# Patient Record
Sex: MALE | Race: BLACK OR AFRICAN AMERICAN | Employment: OTHER | URBAN - METROPOLITAN AREA
[De-identification: names, ages, dates, MRNs, and addresses within clinical notes are randomized per-mention and may not be internally consistent; named-entity substitution may affect disease eponyms.]

---

## 2020-04-14 ENCOUNTER — HOSPITAL ENCOUNTER (OUTPATIENT)
Age: 75
Setting detail: SPECIMEN
Discharge: HOME | End: 2020-04-14
Payer: MEDICARE

## 2020-04-14 LAB
ABSOLUTE EOS #: 0.12 K/UL (ref 0–0.44)
ABSOLUTE IMMATURE GRANULOCYTE: <0.03 K/UL (ref 0–0.3)
ABSOLUTE LYMPH #: 1.37 K/UL (ref 1.1–3.7)
ABSOLUTE MONO #: 0.38 K/UL (ref 0.1–1.2)
ALBUMIN SERPL-MCNC: 4.4 G/DL (ref 3.5–5.2)
ALBUMIN/GLOBULIN RATIO: 1.3 (ref 1–2.5)
ALP BLD-CCNC: 73 U/L (ref 40–129)
ALT SERPL-CCNC: 16 U/L (ref 5–41)
ANION GAP SERPL CALCULATED.3IONS-SCNC: 13 MMOL/L (ref 9–17)
AST SERPL-CCNC: 18 U/L
BASOPHILS # BLD: 1 % (ref 0–2)
BASOPHILS ABSOLUTE: 0.04 K/UL (ref 0–0.2)
BILIRUB SERPL-MCNC: 0.62 MG/DL (ref 0.3–1.2)
BUN BLDV-MCNC: 23 MG/DL (ref 8–23)
BUN/CREAT BLD: ABNORMAL (ref 9–20)
CALCIUM SERPL-MCNC: 9.8 MG/DL (ref 8.6–10.4)
CHLORIDE BLD-SCNC: 103 MMOL/L (ref 98–107)
CHOLESTEROL/HDL RATIO: 2.9
CHOLESTEROL: 112 MG/DL
CO2: 26 MMOL/L (ref 20–31)
CREAT SERPL-MCNC: 1.19 MG/DL (ref 0.7–1.2)
DIFFERENTIAL TYPE: NORMAL
EOSINOPHILS RELATIVE PERCENT: 3 % (ref 1–4)
GFR AFRICAN AMERICAN: >60 ML/MIN
GFR NON-AFRICAN AMERICAN: 60 ML/MIN
GFR SERPL CREATININE-BSD FRML MDRD: ABNORMAL ML/MIN/{1.73_M2}
GFR SERPL CREATININE-BSD FRML MDRD: ABNORMAL ML/MIN/{1.73_M2}
GLUCOSE BLD-MCNC: 88 MG/DL (ref 70–99)
HCT VFR BLD CALC: 47.6 % (ref 40.7–50.3)
HDLC SERPL-MCNC: 39 MG/DL
HEMOGLOBIN: 14.8 G/DL (ref 13–17)
IMMATURE GRANULOCYTES: 0 %
INR BLD: 1.8
LDL CHOLESTEROL: 47 MG/DL (ref 0–130)
LYMPHOCYTES # BLD: 36 % (ref 24–43)
MCH RBC QN AUTO: 27.6 PG (ref 25.2–33.5)
MCHC RBC AUTO-ENTMCNC: 31.1 G/DL (ref 28.4–34.8)
MCV RBC AUTO: 88.8 FL (ref 82.6–102.9)
MONOCYTES # BLD: 10 % (ref 3–12)
NRBC AUTOMATED: 0 PER 100 WBC
PDW BLD-RTO: 14.1 % (ref 11.8–14.4)
PLATELET # BLD: 200 K/UL (ref 138–453)
PLATELET ESTIMATE: NORMAL
PMV BLD AUTO: 10.2 FL (ref 8.1–13.5)
POTASSIUM SERPL-SCNC: 5.2 MMOL/L (ref 3.7–5.3)
PROTHROMBIN TIME: 18.4 SEC (ref 9–12)
RBC # BLD: 5.36 M/UL (ref 4.21–5.77)
RBC # BLD: NORMAL 10*6/UL
SEG NEUTROPHILS: 50 % (ref 36–65)
SEGMENTED NEUTROPHILS ABSOLUTE COUNT: 1.9 K/UL (ref 1.5–8.1)
SODIUM BLD-SCNC: 142 MMOL/L (ref 135–144)
TOTAL PROTEIN: 7.7 G/DL (ref 6.4–8.3)
TRIGL SERPL-MCNC: 129 MG/DL
TSH SERPL DL<=0.05 MIU/L-ACNC: 0.91 MIU/L (ref 0.3–5)
VLDLC SERPL CALC-MCNC: ABNORMAL MG/DL (ref 1–30)
WBC # BLD: 3.8 K/UL (ref 3.5–11.3)
WBC # BLD: NORMAL 10*3/UL

## 2020-04-21 ENCOUNTER — HOSPITAL ENCOUNTER (EMERGENCY)
Age: 75
Discharge: HOME | End: 2020-04-22
Payer: MEDICAID

## 2020-04-21 ENCOUNTER — APPOINTMENT (OUTPATIENT)
Dept: CT IMAGING | Age: 75
End: 2020-04-21
Payer: MEDICAID

## 2020-04-21 VITALS
SYSTOLIC BLOOD PRESSURE: 141 MMHG | TEMPERATURE: 97.7 F | RESPIRATION RATE: 22 BRPM | DIASTOLIC BLOOD PRESSURE: 78 MMHG | OXYGEN SATURATION: 95 % | WEIGHT: 260 LBS | HEART RATE: 73 BPM

## 2020-04-21 LAB
ABO: NORMAL
ALBUMIN SERPL-MCNC: 4.3 G/DL (ref 3.5–5.1)
ALP BLD-CCNC: 76 U/L (ref 38–126)
ALT SERPL-CCNC: 14 U/L (ref 11–66)
AMPHETAMINE+METHAMPHETAMINE URINE SCREEN: NEGATIVE
ANION GAP SERPL CALCULATED.3IONS-SCNC: 8 MEQ/L (ref 8–16)
ANTIBODY SCREEN: NORMAL
APTT: 48.6 SECONDS (ref 22–38)
AST SERPL-CCNC: 19 U/L (ref 5–40)
BACTERIA: ABNORMAL
BARBITURATE QUANTITATIVE URINE: NEGATIVE
BASOPHILS # BLD: 0.9 %
BASOPHILS ABSOLUTE: 0 THOU/MM3 (ref 0–0.1)
BENZODIAZEPINE QUANTITATIVE URINE: NEGATIVE
BILIRUB SERPL-MCNC: 0.4 MG/DL (ref 0.3–1.2)
BILIRUBIN URINE: NEGATIVE
BLOOD, URINE: NEGATIVE
BUN BLDV-MCNC: 17 MG/DL (ref 7–22)
CALCIUM SERPL-MCNC: 10.1 MG/DL (ref 8.5–10.5)
CANNABINOID QUANTITATIVE URINE: NEGATIVE
CASTS: ABNORMAL /LPF
CASTS: ABNORMAL /LPF
CHARACTER, URINE: CLEAR
CHLORIDE BLD-SCNC: 103 MEQ/L (ref 98–111)
CO2: 31 MEQ/L (ref 23–33)
COCAINE METABOLITE QUANTITATIVE URINE: NEGATIVE
COLOR: YELLOW
CREAT SERPL-MCNC: 1.3 MG/DL (ref 0.4–1.2)
CRYSTALS: ABNORMAL
EKG ATRIAL RATE: 68 BPM
EKG P AXIS: 77 DEGREES
EKG P-R INTERVAL: 170 MS
EKG Q-T INTERVAL: 408 MS
EKG QRS DURATION: 86 MS
EKG QTC CALCULATION (BAZETT): 433 MS
EKG R AXIS: 65 DEGREES
EKG T AXIS: 78 DEGREES
EKG VENTRICULAR RATE: 68 BPM
EOSINOPHIL # BLD: 5.2 %
EOSINOPHILS ABSOLUTE: 0.2 THOU/MM3 (ref 0–0.4)
EPITHELIAL CELLS, UA: ABNORMAL /HPF
ERYTHROCYTE [DISTWIDTH] IN BLOOD BY AUTOMATED COUNT: 14 % (ref 11.5–14.5)
ERYTHROCYTE [DISTWIDTH] IN BLOOD BY AUTOMATED COUNT: 47.3 FL (ref 35–45)
ETHYL ALCOHOL, SERUM: < 0.01 %
GLUCOSE BLD-MCNC: 77 MG/DL (ref 70–108)
GLUCOSE, URINE: NEGATIVE MG/DL
HCT VFR BLD CALC: 48.9 % (ref 42–52)
HEMOGLOBIN: 14.8 GM/DL (ref 14–18)
IMMATURE GRANS (ABS): 0.01 THOU/MM3 (ref 0–0.07)
IMMATURE GRANULOCYTES: 0.2 %
INR BLD: 2.23 (ref 0.85–1.13)
KETONES, URINE: NEGATIVE
LACTIC ACID: 1.6 MMOL/L (ref 0.5–2.2)
LEUKOCYTE EST, POC: NEGATIVE
LYMPHOCYTES # BLD: 34.8 %
LYMPHOCYTES ABSOLUTE: 1.5 THOU/MM3 (ref 1–4.8)
MCH RBC QN AUTO: 28 PG (ref 26–33)
MCHC RBC AUTO-ENTMCNC: 30.3 GM/DL (ref 32.2–35.5)
MCV RBC AUTO: 92.6 FL (ref 80–94)
MISCELLANEOUS LAB TEST RESULT: ABNORMAL
MONOCYTES # BLD: 11.1 %
MONOCYTES ABSOLUTE: 0.5 THOU/MM3 (ref 0.4–1.3)
NITRITE, URINE: NEGATIVE
NUCLEATED RED BLOOD CELLS: 0 /100 WBC
OPIATES, URINE: NEGATIVE
OSMOLALITY CALCULATION: 283.5 MOSMOL/KG (ref 275–300)
OXYCODONE: NEGATIVE
PH UA: 6 (ref 5–9)
PHENCYCLIDINE QUANTITATIVE URINE: NEGATIVE
PLATELET # BLD: 195 THOU/MM3 (ref 130–400)
PMV BLD AUTO: 9.6 FL (ref 9.4–12.4)
POTASSIUM SERPL-SCNC: 4.8 MEQ/L (ref 3.5–5.2)
PROTEIN UA: 30 MG/DL
RBC # BLD: 5.28 MILL/MM3 (ref 4.7–6.1)
RBC URINE: ABNORMAL /HPF
RENAL EPITHELIAL, UA: ABNORMAL
RH FACTOR: NORMAL
SEG NEUTROPHILS: 47.8 %
SEGMENTED NEUTROPHILS ABSOLUTE COUNT: 2 THOU/MM3 (ref 1.8–7.7)
SODIUM BLD-SCNC: 142 MEQ/L (ref 135–145)
SPECIFIC GRAVITY UA: 1.01 (ref 1–1.03)
TOTAL PROTEIN: 7.2 G/DL (ref 6.1–8)
UROBILINOGEN, URINE: 0.2 EU/DL (ref 0–1)
WBC # BLD: 4.2 THOU/MM3 (ref 4.8–10.8)
WBC UA: ABNORMAL /HPF
YEAST: ABNORMAL

## 2020-04-21 PROCEDURE — 74176 CT ABD & PELVIS W/O CONTRAST: CPT

## 2020-04-21 PROCEDURE — 85025 COMPLETE CBC W/AUTO DIFF WBC: CPT

## 2020-04-21 PROCEDURE — G0480 DRUG TEST DEF 1-7 CLASSES: HCPCS

## 2020-04-21 PROCEDURE — 80307 DRUG TEST PRSMV CHEM ANLYZR: CPT

## 2020-04-21 PROCEDURE — 86850 RBC ANTIBODY SCREEN: CPT

## 2020-04-21 PROCEDURE — 99285 EMERGENCY DEPT VISIT HI MDM: CPT

## 2020-04-21 PROCEDURE — APPSS180 APP SPLIT SHARED TIME > 60 MINUTES: Performed by: PHYSICIAN ASSISTANT

## 2020-04-21 PROCEDURE — 83605 ASSAY OF LACTIC ACID: CPT

## 2020-04-21 PROCEDURE — 36415 COLL VENOUS BLD VENIPUNCTURE: CPT

## 2020-04-21 PROCEDURE — 80053 COMPREHEN METABOLIC PANEL: CPT

## 2020-04-21 PROCEDURE — 86901 BLOOD TYPING SEROLOGIC RH(D): CPT

## 2020-04-21 PROCEDURE — 76376 3D RENDER W/INTRP POSTPROCES: CPT

## 2020-04-21 PROCEDURE — 71250 CT THORAX DX C-: CPT

## 2020-04-21 PROCEDURE — 6370000000 HC RX 637 (ALT 250 FOR IP): Performed by: NURSE PRACTITIONER

## 2020-04-21 PROCEDURE — 93005 ELECTROCARDIOGRAM TRACING: CPT | Performed by: NURSE PRACTITIONER

## 2020-04-21 PROCEDURE — 85610 PROTHROMBIN TIME: CPT

## 2020-04-21 PROCEDURE — 70450 CT HEAD/BRAIN W/O DYE: CPT

## 2020-04-21 PROCEDURE — 72125 CT NECK SPINE W/O DYE: CPT

## 2020-04-21 PROCEDURE — 6360000004 HC RX CONTRAST MEDICATION: Performed by: NURSE PRACTITIONER

## 2020-04-21 PROCEDURE — 81001 URINALYSIS AUTO W/SCOPE: CPT

## 2020-04-21 PROCEDURE — 6820000002 HC L2 INJURY CALL ACTIVATION

## 2020-04-21 PROCEDURE — 85730 THROMBOPLASTIN TIME PARTIAL: CPT

## 2020-04-21 PROCEDURE — 86900 BLOOD TYPING SEROLOGIC ABO: CPT

## 2020-04-21 RX ORDER — ACETAMINOPHEN 500 MG
500 TABLET ORAL EVERY 8 HOURS PRN
Status: ON HOLD | COMMUNITY
End: 2021-06-08 | Stop reason: HOSPADM

## 2020-04-21 RX ORDER — LOSARTAN POTASSIUM 50 MG/1
50 TABLET ORAL DAILY
COMMUNITY

## 2020-04-21 RX ORDER — MECLIZINE HYDROCHLORIDE 25 MG/1
25 TABLET ORAL 2 TIMES DAILY PRN
COMMUNITY

## 2020-04-21 RX ORDER — WARFARIN SODIUM 3 MG/1
TABLET ORAL DAILY
COMMUNITY
End: 2020-05-21 | Stop reason: ALTCHOICE

## 2020-04-21 RX ORDER — HYDROCODONE BITARTRATE AND ACETAMINOPHEN 5; 325 MG/1; MG/1
1 TABLET ORAL ONCE
Status: COMPLETED | OUTPATIENT
Start: 2020-04-21 | End: 2020-04-21

## 2020-04-21 RX ORDER — BUSPIRONE HYDROCHLORIDE 15 MG/1
30 TABLET ORAL DAILY
COMMUNITY
End: 2020-05-21

## 2020-04-21 RX ORDER — WARFARIN SODIUM 2.5 MG/1
2.5 TABLET ORAL
COMMUNITY
End: 2020-05-21 | Stop reason: ALTCHOICE

## 2020-04-21 RX ORDER — TAMSULOSIN HYDROCHLORIDE 0.4 MG/1
0.4 CAPSULE ORAL DAILY
COMMUNITY

## 2020-04-21 RX ORDER — TRAMADOL HYDROCHLORIDE 50 MG/1
50 TABLET ORAL EVERY 6 HOURS PRN
Qty: 12 TABLET | Refills: 0 | Status: SHIPPED | OUTPATIENT
Start: 2020-04-21 | End: 2020-04-24

## 2020-04-21 RX ORDER — ATORVASTATIN CALCIUM 20 MG/1
20 TABLET, FILM COATED ORAL DAILY
COMMUNITY

## 2020-04-21 RX ADMIN — IOPAMIDOL 80 ML: 755 INJECTION, SOLUTION INTRAVENOUS at 20:15

## 2020-04-21 RX ADMIN — HYDROCODONE BITARTRATE AND ACETAMINOPHEN 1 TABLET: 5; 325 TABLET ORAL at 22:50

## 2020-04-21 SDOH — HEALTH STABILITY: MENTAL HEALTH: HOW OFTEN DO YOU HAVE A DRINK CONTAINING ALCOHOL?: NEVER

## 2020-04-21 ASSESSMENT — ENCOUNTER SYMPTOMS
WHEEZING: 0
VOMITING: 0
FACIAL SWELLING: 0
ABDOMINAL PAIN: 1
SHORTNESS OF BREATH: 0
BACK PAIN: 0
EYE PAIN: 0
STRIDOR: 0
NAUSEA: 0

## 2020-04-21 ASSESSMENT — PAIN SCALES - GENERAL
PAINLEVEL_OUTOF10: 10
PAINLEVEL_OUTOF10: 10

## 2020-04-21 ASSESSMENT — PAIN DESCRIPTION - LOCATION: LOCATION: GENERALIZED

## 2020-04-21 ASSESSMENT — PAIN DESCRIPTION - PAIN TYPE: TYPE: ACUTE PAIN

## 2020-04-21 NOTE — ED NOTES
Bed: 021A  Expected date: 4/21/20  Expected time: 7:35 PM  Means of arrival: [facility]  Comments:     Phuong Terry RN  04/21/20 1940

## 2020-04-22 LAB — GFR SERPL CREATININE-BSD FRML MDRD: 54 ML/MIN/1.73M2

## 2020-04-22 PROCEDURE — 93010 ELECTROCARDIOGRAM REPORT: CPT | Performed by: INTERNAL MEDICINE

## 2020-04-22 NOTE — CONSULTS
Trauma Consult     Patient:  Brittnee Gonsales date: 4/21/2020   YOB: 1945 Date of Evaluation: 4/21/2020  MRN: [id number]  Acct: [de-identified]    Injury Date: 4/21/20  Injury time: evening  PCP: No primary care provider on file. Referring physician: Melissa Mclean CNP    Time of Trauma Surgeon Notification: 20:08 on 4/21/20  Time of CAROLYNE Arrival: 20:12 on 4/21/20  Time of Trauma Surgeon Arrival:  20:27 on 4/21/20    Assessment:    Fall  Right sided abdominal pain  Neck pain  Dizziness  Changes in Mental Status  Closed head injury with no loss of consciousness  Plan:    Pan CT images reviewed. Patient did not sustain any traumatic injuries that warrant admission under trauma surgery. Patient initially presenting with altered mental status which improved throughout subsequent assessments. Labs reviewed, generally unremarkable. Disposition per ED provider regarding possible medicine admission. Care coordinated with ED provider and trauma surgeon, Dr. Raji Rogers. Activation: []Level I (Trauma Alert) [x]Level II (Injury Call) []Level III (Trauma Consult) [] Downgraded (Time)   Mode of Arrival: EMS transportation  Referring Facility: none  Loss of Consciousness [x]No []Yes[]Unknown Duration(min)  Mechanism of Injury:  []Motor Vehicle crash   []Single Vehicle [] []Passenger []Scene Fatality []Front Seat  []Restrained   []Air Bag Deployed   []Ejected []Rollover []Pedestrian []Trapped   Type of vehicle:   Protective Devices:   []Motorcycle  Wearing Helmet []Yes []No  []Bicycle  Wearing Helmet []Yes []No  [x]Fall   Distance - from standing   []Assault    Abuse Reported []Yes []No  []Gunshot  []Stabbing  []Work Related  []Burn: []Flame []Scald []Electrical []Chemical []Contact []Inhalation []House Fire  []Other: There is no problem list on file for this patient.     Subjective   Chief Complaint: Fall    History of Present Illness: Patient is a 24-year-old male presents to [facility] Patient did not sustain any traumatic injuries that warrant admission under trauma surgery. Disposition per ED provider. Care coordinated with ED provider and trauma surgeon, Dr. Lexie Sharma. Review of Systems:   Review of Systems   Constitutional: Negative for chills, diaphoresis and fever. HENT: Negative for facial swelling, mouth sores and nosebleeds. Eyes: Positive for visual disturbance. Negative for pain. Blurry vision   Respiratory: Negative for shortness of breath, wheezing and stridor. Cardiovascular: Negative for chest pain and palpitations. Gastrointestinal: Positive for abdominal pain. Negative for nausea and vomiting. Musculoskeletal: Positive for neck pain. Negative for arthralgias and back pain. Skin: Negative for pallor, rash and wound. Neurological: Positive for dizziness, numbness and headaches. Paresthesias in hands and patient reports this is chronic and unchanged. Hematological: Bruises/bleeds easily. On Coumadin   Psychiatric/Behavioral: Positive for confusion. Negative for agitation and behavioral problems. Codeine and Ibuprofen  History reviewed. Patient endorsed having history of neck and back surgery. Also endorsed history of cholecystectomy and colectomy with colostomy. History reviewed.   Patient endorsed history of renal cancer and history of clots  Social History     Socioeconomic History    Marital status: Single     Spouse name: None    Number of children: None    Years of education: None    Highest education level: None   Occupational History    None   Social Needs    Financial resource strain: None    Food insecurity     Worry: None     Inability: None    Transportation needs     Medical: None     Non-medical: None   Tobacco Use    Smoking status: Never Smoker    Smokeless tobacco: Never Used   Substance and Sexual Activity    Alcohol use: Never     Frequency: Never    Drug use: Never    Sexual activity: None   Lifestyle  Physical activity     Days per week: None     Minutes per session: None    Stress: None   Relationships    Social connections     Talks on phone: None     Gets together: None     Attends [employer] service: None     Active member of club or organization: None     Attends meetings of clubs or organizations: None     Relationship status: None    Intimate partner violence     Fear of current or ex partner: None     Emotionally abused: None     Physically abused: None     Forced sexual activity: None   Other Topics Concern    None   Social History Narrative    None     History reviewed. No pertinent family history.     Home medications:    Previous Medications    ACETAMINOPHEN (TYLENOL) 500 MG TABLET    Take 1,000 mg by mouth every 6 hours as needed for Pain    ATORVASTATIN (LIPITOR) 20 MG TABLET    Take 20 mg by mouth daily pm    BUSPIRONE (BUSPAR) 15 MG TABLET    Take 30 mg by mouth daily am    DULOXETINE HCL 30 MG CSDR    Take 90 mg by mouth daily am    LOSARTAN (COZAAR) 50 MG TABLET    Take 50 mg by mouth daily Am    MECLIZINE (ANTIVERT) 25 MG TABLET    Take 25 mg by mouth daily am    TAMSULOSIN (FLOMAX) 0.4 MG CAPSULE    Take 0.4 mg by mouth daily pm    WARFARIN (COUMADIN) 2.5 MG TABLET    Take 2.5 mg by mouth pm    WARFARIN (COUMADIN) 3 MG TABLET    Take by mouth daily pm       Hospital medications:  Scheduled Meds:  Continuous Infusions:  PRN Meds:  Objective   ED TRIAGE VITALS  BP: (!) 141/78, Temp: 97.7 °F (36.5 °C), Pulse: 73, Resp: 22, SpO2: 95 %  Radha Coma Scale  Eye Opening: Spontaneous  Best Verbal Response: Confused  Best Motor Response: Obeys commands  [place] Coma Scale Score: 14  Results for orders placed or performed during the hospital encounter of 04/21/20   APTT   Result Value Ref Range    aPTT 48.6 (H) 22.0 - 38.0 seconds   CBC Auto Differential   Result Value Ref Range    WBC 4.2 (L) 4.8 - 10.8 thou/mm3    RBC 5.28 4.70 - 6.10 mill/mm3    Hemoglobin 14.8 14.0 - 18.0 gm/dl    Hematocrit trauma noted. Eyes: PERRL, EOMI, Nontraumatic  Neurologic: Initially only oriented to self. Subsequent exams with improved mental status as patient could later identify location and time. Following commands. Moves all four extremities. CN 2-12 grossly intact. No signs of focal neurological deficits. Neck: Trachea midline. Cervical spines are tender to palpation midline, without step-offs, crepitus or deformity. C-collar applied. Back:TL spines are NTTP midline, without step-offs, crepitus or deformity. No abrasions, contusions, or ecchymosis noted. Midline surgical scar noted over thoracic lumbar spine  Lungs: Clear to auscultation bilaterally. Chest Wall: Chest rise symmetrical.  Chest wall without tenderness to palpation. No crepitus, deformities, lacerations, or abrasions. Heart: RRR. Normal S1/S2. No obvious M/G/R. Abdomen:  Soft, Tenderness palpation in right upper and lower quadrants. No guarding. Non-peritoneal.  Multiple large surgical scars noted across abdomen including large midline incisional scar, transverse scar in RUQ and transverse scar in LLQ. Pelvis:  Tender to palpation over right hip, stable to compression. Extremities: No gross deformities. PMS intact. Radial /DP/PT pulses 2+ bilaterally. Patient moves all extremities x4. Patient able to flex and extends joints of bilateral upper and lower extremities. Skin: Skin warm and dry. Normal for ethnicity. Radiology:     CT HEAD WO CONTRAST   Final Result   1. Negative exam for acute pathology. 2. Mild cerebral atrophy changes noted. **This report has been created using voice recognition software. It may contain minor errors which are inherent in voice recognition technology. **      Final report electronically signed by Dr. Yadi Rodas on 4/21/2020 9:16 PM      CT CERVICAL SPINE WO CONTRAST   Final Result   . 1. Negative exam for acute fracture changes.    2. Postsurgical instrumentation of the cervical spine described above. 3. Chronic degenerative changes present. **This report has been created using voice recognition software. It may contain minor errors which are inherent in voice recognition technology. **      Final report electronically signed by Dr. Joanna Colon on 4/21/2020 9:24 PM      CT LUMBAR RECONSTRUCTION WO POST PROCESS   Final Result   1. . Chronic multilevel degenerative changes throughout the lumbar spine with postsurgical laminectomy. 2. A lateral inguinal hernias containing fat. 3. No evidence of acute pathology of the abdomen or pelvis. CT lumbar spine with reconstruction. FINDINGS:  The patient has undergone prior spinal laminectomy from the level of T12-L5 S1 despite changes there are chronic appearing canal narrowing findings. The immediate paraspinal soft tissues are negative for fluid collection or acute changes. Chronic facet arthropathy and stenosis of the foramen are present         Chronic canal stenosis remains most prominent at L4-5. There is bilateral moderate foramen stenosis at L3-4 and bilateral severe impingement of the foramen at L4-5 and L5-S1. Atherosclerotic changes of the aorta are present. Chronic postsurgical changes of left iliac bone near the iliac crest are also noted. IMPRESSION:.   1. Chronic postsurgical changes of the lumbar spine with degenerative spondylosis and associated canal and foramen narrowing described above. 2. Negative exam for post traumatic acute pathology changes. **This report has been created using voice recognition software. It may contain minor errors which are inherent in voice recognition technology. **      Final report electronically signed by Dr. Joanna Colon on 4/21/2020 9:51 PM      CT THORACIC RECONSTRUCTION WO POST PROCESS   Final Result   . 1. Negative CT of the chest for acute appearing pathology. 2. Chronic multilevel degenerative changes of the thoracic spine.       CT thoracic spine with the foramen at L4-5 and L5-S1. Atherosclerotic changes of the aorta are present. Chronic postsurgical changes of left iliac bone near the iliac crest are also noted. IMPRESSION:.   1. Chronic postsurgical changes of the lumbar spine with degenerative spondylosis and associated canal and foramen narrowing described above. 2. Negative exam for post traumatic acute pathology changes. **This report has been created using voice recognition software. It may contain minor errors which are inherent in voice recognition technology. **      Final report electronically signed by Dr. Kathern Cogan on 4/21/2020 9:51 PM      CT CHEST WO CONTRAST   Final Result   . 1. Negative CT of the chest for acute appearing pathology. 2. Chronic multilevel degenerative changes of the thoracic spine. CT thoracic spine with multiplanar reconstruction      FINDINGS:  There is multilevel degenerative changes of the thoracic spine without evidence of acute fracture or gross subluxation. There are chronic degenerative disc and endplate changes and marginal osteophyte changes that produce chronic central canal    narrowing at C7-T1 T4-5 which are chronic in appearance. The cervicothoracic and thoracolumbar junctions remain intact. The patient has had prior laminectomy involving the upper portion of the lumbar spine partially visualized. The paraspinal soft    tissues are grossly negative for active appearing pathology changes. There is partial visualization of surgical laminectomy of the upper lumbar spine. IMPRESSION:   1.. Multilevel general changes of the thoracic spine with posterior disc marginal osteophyte spurring producing areas of ventral thecal sac mass effect that are chronic in appearance            **This report has been created using voice recognition software. It may contain minor errors which are inherent in voice recognition technology. **      Final report electronically signed by Dr. Camilo Dailey Zaid on 4/21/2020 9:39 PM        Fast Exam: Yes    FAST EXAM:  A limited, bedside FAST exam was performed. The medical necessity was to evaluate for the presence or absence of intraperitoneal or pericardial fluid. The structures studied were the hepatorenal space, splenorenal space, pericardium, and bladder. FINDINGS:  Indeterminate, no obvious free intra-abdominal fluid seen but study not technically adequate as visualization all of important structures throughout each window difficult to obtain. FAST exam performed by myself. CT chest and abd/pelvis obtained. Electronically signed by Micha Lubin PA-C on 4/21/2020 at 11:05 PM Patient seen and examined independently by me. Above discussed and I agree with CNP. Labs, cultures, and radiographs where available were reviewed. See orders for the updated patient care plan.     Hemanth Resendez MD, level 2 trauma consult time called was 8:09 PM time arrived was 627 PM.  51-year-old male apparently released from [facility] but in a nursing home house with a ankle monitor on who apparently had an unwitnessed fall and initially became confused in the emergency room he had been here approximately half hour and with the confusion a level 2 trauma was called patient is on Coumadin states he hit his head although there is no visible signs of trauma multiple ER personnel were unable to obtain IV access and CTs of the head were performed without contrast but was negative for acute bleed CT of the spine was negative for acute injury although patient has had a prior fusion all other x-rays were negative patient admits that he takes multiple psych meds and also some meclizine for some recent dizziness after review there is no significant injuries from the fall okay to admit to medicine for further review and work-up  4/22/2020   8:28 AM

## 2020-04-22 NOTE — ED NOTES
Unable to obtain IV access at this time. Multiple attempts failed.       Georges Becerra RN  04/21/20 [id number]

## 2020-04-22 NOTE — ED NOTES
Pt concerned with not being able to pay for medical care. Informed pt that let us make sure he is okay and then worry about his insurance. Pt consents to medical treatment at this time.      Yaa Hagan RN  04/21/20 [id number]

## 2020-04-23 NOTE — ED PROVIDER NOTES
by mouth daily pmHistorical Med       !! - Potential duplicate medications found. Please discuss with provider. ALLERGIES     is allergic to codeine and ibuprofen. FAMILY HISTORY     has no family status information on file. family history is not on file. SOCIAL HISTORY      reports that he has never smoked. He has never used smokeless tobacco. He reports that he does not drink alcohol or use drugs. PHYSICAL EXAM     INITIAL VITALS:  weight is 260 lb (117.9 kg). His oral temperature is 97.7 °F (36.5 °C). His blood pressure is 141/78 (abnormal) and his pulse is 73. His respiration is 22 and oxygen saturation is 95%. Physical Exam  Vitals signs and nursing note reviewed. Constitutional:       General: He is not in acute distress. Appearance: He is well-developed. He is not diaphoretic. HENT:      Head: Normocephalic and atraumatic. Eyes:      General:         Right eye: No discharge. Left eye: No discharge. Conjunctiva/sclera: Conjunctivae normal.   Neck:      Musculoskeletal: Normal range of motion. Trachea: No tracheal deviation. Cardiovascular:      Rate and Rhythm: Normal rate and regular rhythm. Heart sounds: Normal heart sounds. No murmur. No gallop. Comments: Normal capillary refill  Pulmonary:      Effort: Pulmonary effort is normal. No respiratory distress. Breath sounds: Normal breath sounds. No stridor. Abdominal:      General: Bowel sounds are normal.      Palpations: Abdomen is soft. Musculoskeletal: Normal range of motion. General: No tenderness or deformity. Skin:     General: Skin is warm and dry. Coloration: Skin is not pale. Findings: No erythema or rash. Neurological:      General: No focal deficit present. Mental Status: He is alert. Mental status is at baseline. He is disoriented. Cranial Nerves: No cranial nerve deficit.    Psychiatric:         Behavior: Behavior normal.           DIFFERENTIAL DIAGNOSIS:   Including but not limited to CHI, drug abuse, withdrawal, injury    DIAGNOSTIC RESULTS     EKG: AllEKG's are interpreted by the Emergency Department Physician who either signs or Co-signs this chart in the absence of a cardiologist.      EKG 12 Lead (Final result)    Component (Lab Inquiry)   Collection Time Result Time Ventricular Rate Atrial Rate P-R Interval QRS Duration Q-T Interval QTc Calculation (Bazett) P Axis R Axis T Axis   04/21/20 19:48:02 04/21/20 19:48:02 68 68 170 86 408 433 77 65 78         Final result                Narrative:    Normal sinus rhythm  Nonspecific T wave abnormality  Abnormal ECG  No previous ECGs available  Confirmed by CARLIE JARRETT ([id number]) on 4/22/2020 10:25:17 AM                      RADIOLOGY: non-plain film images(s) such as CT, Ultrasound and MRI are read by the radiologist.  Plain radiographic images are visualized and preliminarily interpreted by the emergencyphysician unless otherwise stated below. CT HEAD WO CONTRAST   Final Result   1. Negative exam for acute pathology. 2. Mild cerebral atrophy changes noted. **This report has been created using voice recognition software. It may contain minor errors which are inherent in voice recognition technology. **      Final report electronically signed by Dr. Lynnette Milan on 4/21/2020 9:16 PM      CT CERVICAL SPINE WO CONTRAST   Final Result   . 1. Negative exam for acute fracture changes. 2. Postsurgical instrumentation of the cervical spine described above. 3. Chronic degenerative changes present. **This report has been created using voice recognition software. It may contain minor errors which are inherent in voice recognition technology. **      Final report electronically signed by Dr. Lynnette Milan on 4/21/2020 9:24 PM      CT LUMBAR RECONSTRUCTION WO POST PROCESS   Final Result   1. . Chronic multilevel degenerative changes throughout the lumbar spine with postsurgical laminectomy.    2. A lateral inguinal hernias containing fat. 3. No evidence of acute pathology of the abdomen or pelvis. CT lumbar spine with reconstruction. FINDINGS:  The patient has undergone prior spinal laminectomy from the level of T12-L5 S1 despite changes there are chronic appearing canal narrowing findings. The immediate paraspinal soft tissues are negative for fluid collection or acute changes. Chronic facet arthropathy and stenosis of the foramen are present         Chronic canal stenosis remains most prominent at L4-5. There is bilateral moderate foramen stenosis at L3-4 and bilateral severe impingement of the foramen at L4-5 and L5-S1. Atherosclerotic changes of the aorta are present. Chronic postsurgical changes of left iliac bone near the iliac crest are also noted. IMPRESSION:.   1. Chronic postsurgical changes of the lumbar spine with degenerative spondylosis and associated canal and foramen narrowing described above. 2. Negative exam for post traumatic acute pathology changes. **This report has been created using voice recognition software. It may contain minor errors which are inherent in voice recognition technology. **      Final report electronically signed by Dr. Dee Beckett on 4/21/2020 9:51 PM      CT THORACIC RECONSTRUCTION WO POST PROCESS   Final Result   . 1. Negative CT of the chest for acute appearing pathology. 2. Chronic multilevel degenerative changes of the thoracic spine. CT thoracic spine with multiplanar reconstruction      FINDINGS:  There is multilevel degenerative changes of the thoracic spine without evidence of acute fracture or gross subluxation. There are chronic degenerative disc and endplate changes and marginal osteophyte changes that produce chronic central canal    narrowing at C7-T1 T4-5 which are chronic in appearance. The cervicothoracic and thoracolumbar junctions remain intact.  The patient has had prior laminectomy involving the upper It may contain minor errors which are inherent in voice recognition technology. **      Final report electronically signed by Dr. Philippe Mukherjee on 4/21/2020 9:51 PM      CT CHEST WO CONTRAST   Final Result   . 1. Negative CT of the chest for acute appearing pathology. 2. Chronic multilevel degenerative changes of the thoracic spine. CT thoracic spine with multiplanar reconstruction      FINDINGS:  There is multilevel degenerative changes of the thoracic spine without evidence of acute fracture or gross subluxation. There are chronic degenerative disc and endplate changes and marginal osteophyte changes that produce chronic central canal    narrowing at C7-T1 T4-5 which are chronic in appearance. The cervicothoracic and thoracolumbar junctions remain intact. The patient has had prior laminectomy involving the upper portion of the lumbar spine partially visualized. The paraspinal soft    tissues are grossly negative for active appearing pathology changes. There is partial visualization of surgical laminectomy of the upper lumbar spine. IMPRESSION:   1.. Multilevel general changes of the thoracic spine with posterior disc marginal osteophyte spurring producing areas of ventral thecal sac mass effect that are chronic in appearance            **This report has been created using voice recognition software. It may contain minor errors which are inherent in voice recognition technology. **      Final report electronically signed by Dr. Philippe Mukherjee on 4/21/2020 9:39 PM            LABS:   Labs Reviewed   APTT - Abnormal; Notable for the following components:       Result Value    aPTT 48.6 (*)     All other components within normal limits   CBC WITH AUTO DIFFERENTIAL - Abnormal; Notable for the following components:    WBC 4.2 (*)     MCHC 30.3 (*)     RDW-SD 47.3 (*)     All other components within normal limits   COMPREHENSIVE METABOLIC PANEL - Abnormal; Notable for the following components:    CREATININE every 6 hours as needed for Pain for up to 12 doses. , Disp-12 tablet, R-0Print             (Please note that portions of this note were completed with a voice recognition program.  Efforts were made to edit thedictations but occasionally words are mis-transcribed.)    STEFANO Zuleta CNP, APRN - CNP  04/23/20 [id number]

## 2020-05-11 ENCOUNTER — HOSPITAL ENCOUNTER (EMERGENCY)
Age: 75
Discharge: HOME | End: 2020-05-11
Attending: FAMILY MEDICINE
Payer: MEDICARE

## 2020-05-11 VITALS
TEMPERATURE: 98.6 F | HEART RATE: 75 BPM | HEIGHT: 68 IN | DIASTOLIC BLOOD PRESSURE: 87 MMHG | RESPIRATION RATE: 15 BRPM | OXYGEN SATURATION: 97 % | WEIGHT: 260 LBS | SYSTOLIC BLOOD PRESSURE: 146 MMHG | BODY MASS INDEX: 39.4 KG/M2

## 2020-05-11 LAB
ANION GAP SERPL CALCULATED.3IONS-SCNC: 7 MEQ/L (ref 8–16)
BASOPHILS # BLD: 1.3 %
BASOPHILS ABSOLUTE: 0 THOU/MM3 (ref 0–0.1)
BUN BLDV-MCNC: 15 MG/DL (ref 7–22)
CALCIUM SERPL-MCNC: 9.6 MG/DL (ref 8.5–10.5)
CHLORIDE BLD-SCNC: 107 MEQ/L (ref 98–111)
CO2: 28 MEQ/L (ref 23–33)
CREAT SERPL-MCNC: 1 MG/DL (ref 0.4–1.2)
EOSINOPHIL # BLD: 5 %
EOSINOPHILS ABSOLUTE: 0.2 THOU/MM3 (ref 0–0.4)
ERYTHROCYTE [DISTWIDTH] IN BLOOD BY AUTOMATED COUNT: 14.2 % (ref 11.5–14.5)
ERYTHROCYTE [DISTWIDTH] IN BLOOD BY AUTOMATED COUNT: 46.9 FL (ref 35–45)
GFR SERPL CREATININE-BSD FRML MDRD: 73 ML/MIN/1.73M2
GLUCOSE BLD-MCNC: 96 MG/DL (ref 70–108)
HCT VFR BLD CALC: 44.6 % (ref 42–52)
HEMOGLOBIN: 14.1 GM/DL (ref 14–18)
IMMATURE GRANS (ABS): 0.01 THOU/MM3 (ref 0–0.07)
IMMATURE GRANULOCYTES: 0.3 %
LYMPHOCYTES # BLD: 26.5 %
LYMPHOCYTES ABSOLUTE: 0.8 THOU/MM3 (ref 1–4.8)
MCH RBC QN AUTO: 28.5 PG (ref 26–33)
MCHC RBC AUTO-ENTMCNC: 31.6 GM/DL (ref 32.2–35.5)
MCV RBC AUTO: 90.3 FL (ref 80–94)
MONOCYTES # BLD: 8.5 %
MONOCYTES ABSOLUTE: 0.3 THOU/MM3 (ref 0.4–1.3)
NUCLEATED RED BLOOD CELLS: 0 /100 WBC
OSMOLALITY CALCULATION: 283.8 MOSMOL/KG (ref 275–300)
PLATELET # BLD: 167 THOU/MM3 (ref 130–400)
PMV BLD AUTO: 9.3 FL (ref 9.4–12.4)
POTASSIUM SERPL-SCNC: 4.8 MEQ/L (ref 3.5–5.2)
RBC # BLD: 4.94 MILL/MM3 (ref 4.7–6.1)
SEG NEUTROPHILS: 58.4 %
SEGMENTED NEUTROPHILS ABSOLUTE COUNT: 1.9 THOU/MM3 (ref 1.8–7.7)
SODIUM BLD-SCNC: 142 MEQ/L (ref 135–145)
WBC # BLD: 3.2 THOU/MM3 (ref 4.8–10.8)

## 2020-05-11 PROCEDURE — 85025 COMPLETE CBC W/AUTO DIFF WBC: CPT

## 2020-05-11 PROCEDURE — 99285 EMERGENCY DEPT VISIT HI MDM: CPT

## 2020-05-11 PROCEDURE — 6370000000 HC RX 637 (ALT 250 FOR IP): Performed by: FAMILY MEDICINE

## 2020-05-11 PROCEDURE — 36415 COLL VENOUS BLD VENIPUNCTURE: CPT

## 2020-05-11 PROCEDURE — 80048 BASIC METABOLIC PNL TOTAL CA: CPT

## 2020-05-11 RX ORDER — HYDROCODONE BITARTRATE AND ACETAMINOPHEN 5; 325 MG/1; MG/1
1 TABLET ORAL EVERY 6 HOURS PRN
Qty: 6 TABLET | Refills: 0 | Status: SHIPPED | OUTPATIENT
Start: 2020-05-11 | End: 2020-05-14

## 2020-05-11 RX ORDER — CYCLOBENZAPRINE HCL 10 MG
10 TABLET ORAL 2 TIMES DAILY PRN
Qty: 10 TABLET | Refills: 0 | Status: SHIPPED | OUTPATIENT
Start: 2020-05-11 | End: 2020-05-21

## 2020-05-11 RX ORDER — HYDROCODONE BITARTRATE AND ACETAMINOPHEN 5; 325 MG/1; MG/1
1 TABLET ORAL ONCE
Status: COMPLETED | OUTPATIENT
Start: 2020-05-11 | End: 2020-05-11

## 2020-05-11 RX ADMIN — HYDROCODONE BITARTRATE AND ACETAMINOPHEN 1 TABLET: 5; 325 TABLET ORAL at 16:26

## 2020-05-11 ASSESSMENT — ENCOUNTER SYMPTOMS
ABDOMINAL PAIN: 1
BACK PAIN: 1
SHORTNESS OF BREATH: 0

## 2020-05-11 ASSESSMENT — PAIN DESCRIPTION - LOCATION: LOCATION: NECK

## 2020-05-11 ASSESSMENT — PAIN SCALES - GENERAL: PAINLEVEL_OUTOF10: 9

## 2020-05-11 ASSESSMENT — PAIN DESCRIPTION - PAIN TYPE: TYPE: ACUTE PAIN

## 2020-05-11 ASSESSMENT — PAIN DESCRIPTION - FREQUENCY: FREQUENCY: CONTINUOUS

## 2020-05-11 NOTE — ED PROVIDER NOTES
[facility]  eMERGENCY dEPARTMENT eNCOUnter          CHIEF COMPLAINT       Chief Complaint   Patient presents with    Back Pain    Neck Pain       Nurses Notes reviewed and I agree except as noted in the HPI. HISTORY OF PRESENT ILLNESS    Bettye Chambers is a 76 y.o. male who presents with neck and back pain    Location/Symptom: Chronic neck and back pain  Timing/Onset: For years  Context/Setting: Multiple neck and back surgeries  History of degenerative spine disease    Quality: Chronic pain in the neck worse with movement  Pain in the low back chronic moving around  Pain is gradually worse over time  He was supposed to be considered for another surgery by a specialist in [place]  Duration: Chronic for years worse over the last week  Modifying Factors: Been using Tylenol for pain  Severity: 6/10    REVIEW OF SYSTEMS     Review of Systems   Constitutional: Negative for chills and fever. HENT: Negative for congestion. Respiratory: Negative for shortness of breath. Cardiovascular: Negative for chest pain. Gastrointestinal: Positive for abdominal pain. Abdominal bloating    Some right-sided pain intermittently    Remote cholecystectomy   Genitourinary: Negative for difficulty urinating and dysuria. Musculoskeletal: Positive for back pain and neck pain. Skin: Negative for rash. Neurological:        He has difficulty ambulating around because of his chronic spinal problems and he has a walker and wheelchair at home    No acute change in strength   Hematological: Negative for adenopathy. Psychiatric/Behavioral: Negative for confusion. PAST MEDICAL HISTORY    has no past medical history on file. SURGICAL HISTORY      has no past surgical history on file.     CURRENT MEDICATIONS       Previous Medications    ACETAMINOPHEN (TYLENOL) 500 MG TABLET    Take 1,000 mg by mouth every 6 hours as needed for Pain    ATORVASTATIN (LIPITOR) 20 MG TABLET    Take 20 mg by mouth changes in the lumbar spine without acute process    WBC 3200    Normal renal function and blood sugar    Patient appears to have chronic pain and no acute intervention is needed today    Would recommend he follow with spine-list as scheduled to be referred    Additionally can consider chronic pain therapy    We did give norco 1 by mouth    We will prescribe a few Norco for home use    Add Flexeril        CRITICAL CARE:   none    CONSULTS:  none    PROCEDURES:  None    FINAL IMPRESSION      1. Neck pain    2. Chronic bilateral low back pain, unspecified whether sciatica present          DISPOSITION/PLAN     Follow with his primary care    Confirm the date of his outpatient referral to spine specialist    Ask his primary care to refer for pain management    Discharge home    [phone]    In 3 days        DISCHARGE MEDICATIONS:  New Prescriptions    CYCLOBENZAPRINE (FLEXERIL) 10 MG TABLET    Take 1 tablet by mouth 2 times daily as needed for Muscle spasms    HYDROCODONE-ACETAMINOPHEN (NORCO) 5-325 MG PER TABLET    Take 1 tablet by mouth every 6 hours as needed for Pain for up to 6 doses. Intended supply: 3 days.  Take lowest dose possible to manage pain       (Please note that portions of this note were completed with a voice recognition program.  Efforts were made to edit the dictations but occasionally words are mis-transcribed.)    MD Krzysztof Saha MD  05/11/20 1799

## 2020-05-18 ENCOUNTER — HOSPITAL ENCOUNTER (EMERGENCY)
Age: 75
Discharge: HOME | End: 2020-05-19
Payer: MEDICARE

## 2020-05-18 ENCOUNTER — APPOINTMENT (OUTPATIENT)
Dept: CT IMAGING | Age: 75
End: 2020-05-18
Payer: MEDICARE

## 2020-05-18 ENCOUNTER — APPOINTMENT (OUTPATIENT)
Dept: GENERAL RADIOLOGY | Age: 75
End: 2020-05-18
Payer: MEDICARE

## 2020-05-18 VITALS
OXYGEN SATURATION: 96 % | TEMPERATURE: 97.9 F | SYSTOLIC BLOOD PRESSURE: 184 MMHG | DIASTOLIC BLOOD PRESSURE: 101 MMHG | RESPIRATION RATE: 14 BRPM | HEART RATE: 73 BPM | WEIGHT: 250 LBS | BODY MASS INDEX: 35.79 KG/M2 | HEIGHT: 70 IN

## 2020-05-18 LAB
ALBUMIN SERPL-MCNC: 4.3 G/DL (ref 3.5–5.1)
ALP BLD-CCNC: 53 U/L (ref 38–126)
ALT SERPL-CCNC: 16 U/L (ref 11–66)
ANION GAP SERPL CALCULATED.3IONS-SCNC: 8 MEQ/L (ref 8–16)
APTT: 37.2 SECONDS (ref 22–38)
AST SERPL-CCNC: 19 U/L (ref 5–40)
BACTERIA: ABNORMAL /HPF
BASOPHILS # BLD: 0.7 %
BASOPHILS ABSOLUTE: 0 THOU/MM3 (ref 0–0.1)
BILIRUB SERPL-MCNC: 0.5 MG/DL (ref 0.3–1.2)
BILIRUBIN DIRECT: < 0.2 MG/DL (ref 0–0.3)
BILIRUBIN URINE: NEGATIVE
BLOOD, URINE: NEGATIVE
BUN BLDV-MCNC: 20 MG/DL (ref 7–22)
CALCIUM SERPL-MCNC: 10.2 MG/DL (ref 8.5–10.5)
CASTS 2: ABNORMAL /LPF
CASTS UA: ABNORMAL /LPF
CHARACTER, URINE: CLEAR
CHLORIDE BLD-SCNC: 104 MEQ/L (ref 98–111)
CO2: 29 MEQ/L (ref 23–33)
COLOR: YELLOW
CREAT SERPL-MCNC: 1.1 MG/DL (ref 0.4–1.2)
CRYSTALS, UA: ABNORMAL
EKG ATRIAL RATE: 74 BPM
EKG P AXIS: 60 DEGREES
EKG P-R INTERVAL: 160 MS
EKG Q-T INTERVAL: 396 MS
EKG QRS DURATION: 80 MS
EKG QTC CALCULATION (BAZETT): 439 MS
EKG R AXIS: 26 DEGREES
EKG T AXIS: 59 DEGREES
EKG VENTRICULAR RATE: 74 BPM
EOSINOPHIL # BLD: 1.1 %
EOSINOPHILS ABSOLUTE: 0.1 THOU/MM3 (ref 0–0.4)
EPITHELIAL CELLS, UA: ABNORMAL /HPF
ERYTHROCYTE [DISTWIDTH] IN BLOOD BY AUTOMATED COUNT: 14 % (ref 11.5–14.5)
ERYTHROCYTE [DISTWIDTH] IN BLOOD BY AUTOMATED COUNT: 44.9 FL (ref 35–45)
GFR SERPL CREATININE-BSD FRML MDRD: 65 ML/MIN/1.73M2
GLUCOSE BLD-MCNC: 98 MG/DL (ref 70–108)
GLUCOSE URINE: NEGATIVE MG/DL
HCT VFR BLD CALC: 43.8 % (ref 42–52)
HEMOGLOBIN: 14.3 GM/DL (ref 14–18)
IMMATURE GRANS (ABS): 0.02 THOU/MM3 (ref 0–0.07)
IMMATURE GRANULOCYTES: 0.4 %
INR BLD: 0.99 (ref 0.85–1.13)
KETONES, URINE: NEGATIVE
LEUKOCYTE ESTERASE, URINE: NEGATIVE
LIPASE: 34.7 U/L (ref 5.6–51.3)
LYMPHOCYTES # BLD: 19.1 %
LYMPHOCYTES ABSOLUTE: 1 THOU/MM3 (ref 1–4.8)
MAGNESIUM: 2 MG/DL (ref 1.6–2.4)
MCH RBC QN AUTO: 28.5 PG (ref 26–33)
MCHC RBC AUTO-ENTMCNC: 32.6 GM/DL (ref 32.2–35.5)
MCV RBC AUTO: 87.4 FL (ref 80–94)
MISCELLANEOUS 2: ABNORMAL
MONOCYTES # BLD: 8.7 %
MONOCYTES ABSOLUTE: 0.5 THOU/MM3 (ref 0.4–1.3)
NITRITE, URINE: NEGATIVE
NUCLEATED RED BLOOD CELLS: 0 /100 WBC
OSMOLALITY CALCULATION: 283.8 MOSMOL/KG (ref 275–300)
PH UA: 5 (ref 5–9)
PLATELET # BLD: 194 THOU/MM3 (ref 130–400)
PMV BLD AUTO: 9.6 FL (ref 9.4–12.4)
POTASSIUM SERPL-SCNC: 4.6 MEQ/L (ref 3.5–5.2)
PROTEIN UA: 300
RBC # BLD: 5.01 MILL/MM3 (ref 4.7–6.1)
RBC URINE: ABNORMAL /HPF
RENAL EPITHELIAL, UA: ABNORMAL
SEG NEUTROPHILS: 70 %
SEGMENTED NEUTROPHILS ABSOLUTE COUNT: 3.8 THOU/MM3 (ref 1.8–7.7)
SODIUM BLD-SCNC: 141 MEQ/L (ref 135–145)
SPECIFIC GRAVITY, URINE: > 1.03 (ref 1–1.03)
TOTAL PROTEIN: 7.5 G/DL (ref 6.1–8)
TROPONIN T: < 0.01 NG/ML
TSH SERPL DL<=0.05 MIU/L-ACNC: 1.68 UIU/ML (ref 0.4–4.2)
UROBILINOGEN, URINE: 1 EU/DL (ref 0–1)
WBC # BLD: 5.4 THOU/MM3 (ref 4.8–10.8)
WBC UA: ABNORMAL /HPF
YEAST: ABNORMAL

## 2020-05-18 PROCEDURE — 83690 ASSAY OF LIPASE: CPT

## 2020-05-18 PROCEDURE — 81001 URINALYSIS AUTO W/SCOPE: CPT

## 2020-05-18 PROCEDURE — 93005 ELECTROCARDIOGRAM TRACING: CPT | Performed by: NURSE PRACTITIONER

## 2020-05-18 PROCEDURE — 70450 CT HEAD/BRAIN W/O DYE: CPT

## 2020-05-18 PROCEDURE — 84443 ASSAY THYROID STIM HORMONE: CPT

## 2020-05-18 PROCEDURE — 71045 X-RAY EXAM CHEST 1 VIEW: CPT

## 2020-05-18 PROCEDURE — 82248 BILIRUBIN DIRECT: CPT

## 2020-05-18 PROCEDURE — 83735 ASSAY OF MAGNESIUM: CPT

## 2020-05-18 PROCEDURE — 84484 ASSAY OF TROPONIN QUANT: CPT

## 2020-05-18 PROCEDURE — 80053 COMPREHEN METABOLIC PANEL: CPT

## 2020-05-18 PROCEDURE — 86850 RBC ANTIBODY SCREEN: CPT

## 2020-05-18 PROCEDURE — 85730 THROMBOPLASTIN TIME PARTIAL: CPT

## 2020-05-18 PROCEDURE — 85025 COMPLETE CBC W/AUTO DIFF WBC: CPT

## 2020-05-18 PROCEDURE — 86900 BLOOD TYPING SEROLOGIC ABO: CPT

## 2020-05-18 PROCEDURE — 86901 BLOOD TYPING SEROLOGIC RH(D): CPT

## 2020-05-18 PROCEDURE — 99284 EMERGENCY DEPT VISIT MOD MDM: CPT

## 2020-05-18 PROCEDURE — 74176 CT ABD & PELVIS W/O CONTRAST: CPT

## 2020-05-18 PROCEDURE — 85610 PROTHROMBIN TIME: CPT

## 2020-05-18 PROCEDURE — 6360000004 HC RX CONTRAST MEDICATION: Performed by: NURSE PRACTITIONER

## 2020-05-18 PROCEDURE — 36415 COLL VENOUS BLD VENIPUNCTURE: CPT

## 2020-05-18 PROCEDURE — 93010 ELECTROCARDIOGRAM REPORT: CPT | Performed by: INTERNAL MEDICINE

## 2020-05-18 RX ADMIN — IOPAMIDOL 80 ML: 755 INJECTION, SOLUTION INTRAVENOUS at 22:41

## 2020-05-18 ASSESSMENT — PAIN DESCRIPTION - LOCATION
LOCATION: ABDOMEN
LOCATION: ABDOMEN
LOCATION: ABDOMEN;BACK

## 2020-05-18 ASSESSMENT — PAIN DESCRIPTION - PAIN TYPE
TYPE: ACUTE PAIN

## 2020-05-18 ASSESSMENT — PAIN SCALES - GENERAL
PAINLEVEL_OUTOF10: 8
PAINLEVEL_OUTOF10: 9
PAINLEVEL_OUTOF10: 9

## 2020-05-18 ASSESSMENT — PAIN DESCRIPTION - ORIENTATION
ORIENTATION: LOWER
ORIENTATION: LOWER

## 2020-05-18 ASSESSMENT — PAIN DESCRIPTION - DESCRIPTORS: DESCRIPTORS: SHOOTING

## 2020-05-18 ASSESSMENT — PAIN DESCRIPTION - FREQUENCY: FREQUENCY: INTERMITTENT

## 2020-05-19 LAB
ABO: NORMAL
ANTIBODY SCREEN: NORMAL
RH FACTOR: NORMAL

## 2020-05-19 ASSESSMENT — ENCOUNTER SYMPTOMS
RHINORRHEA: 0
ABDOMINAL PAIN: 1
VOMITING: 1
BACK PAIN: 0
NAUSEA: 1
CHEST TIGHTNESS: 0
COUGH: 0
DIARRHEA: 1

## 2020-05-19 NOTE — ED NOTES
Bed: 021A  Expected date: 5/18/20  Expected time: 7:56 PM  Means of arrival: [facility]  Comments:     Corie Ceja RN  05/18/20 2002

## 2020-05-19 NOTE — ED PROVIDER NOTES
Department Physician who either signs or Co-signs this chart in the absence of a cardiologist.  SR with PVC's. Rate of 74, MO 10, QRS 80, QT/QTc 396-439      RADIOLOGY: non-plain film images(s) such as CT, Ultrasound and MRI are read by the radiologist.  Plain radiographic images are visualized and preliminarily interpreted by the emergencyphysician unless otherwise stated below. CT ABDOMEN PELVIS WO CONTRAST Additional Contrast? None   Final Result   1. Bilateral inguinal hernias again noted. On the right a small portion of the urinary bladder dome is retained within the hernia. 2. Postsurgical changes of the small bowel with minimal bowel wall thickening not excluded. No evidence of bowel obstruction present. Changes are similar to prior study. 3. Chronic degenerative changes of the skeleton      **This report has been created using voice recognition software. It may contain minor errors which are inherent in voice recognition technology. **      Final report electronically signed by Dr. Yina Singleton on 5/18/2020 11:06 PM      XR CHEST PORTABLE   Final Result   Probable minimal atelectasis left lung base. **This report has been created using voice recognition software. It may contain minor errors which are inherent in voice recognition technology. **      Final report electronically signed by Dr. Nyasia Rose on 5/18/2020 8:41 PM      CT HEAD WO CONTRAST   Final Result   No acute intracranial findings. **This report has been created using voice recognition software. It may contain minor errors which are inherent in voice recognition technology. **      Final report electronically signed by Dr. Nyasia Rose on 5/18/2020 8:37 PM            LABS:   Labs Reviewed   GLOMERULAR FILTRATION RATE, ESTIMATED - Abnormal; Notable for the following components:       Result Value    Est, Glom Filt Rate 65 (*)     All other components within normal limits   URINE WITH REFLEXED MICRO - Abnormal;

## 2020-05-19 NOTE — ED TRIAGE NOTES
Pt presents to ED by EMS with complaints of dizziness, emesis, generalized fatigued, and pain in the lower back/abdomen Pt states the dizzy spells have \"been around for years\". Tremors in hands noted. Pt appears slightly confused. Pt is diaphoretic. Pt also states he has been vomiting since this morning. Pt states that he has vomited 3-4 times today. Pt also states that he has noticed red drainage coming from his penis. Pt states this has been going on for awhile, intermittently. EKG completed. Tele applied. Pt is oriented to person and place. Call light in reach.  Will continue to monitor

## 2020-05-19 NOTE — ED NOTES
Pt reporting to this RN that he is unable to walk and requires a wheelchair. Pt wheelchair outisde of his home and will need it to get from vehicle to house. Pt unable to find ride home. [facility] called at this time. approx arrival in one hour.       Hemanth Gaston, NAZ  05/18/20 3130

## 2020-05-19 NOTE — ED NOTES
Pt reassessed at this time. Pt resting on cot with eyes open. States abdominal pain 8/10 currently. Vitals stable. Call light in reach.  Will continue to monitor      Ines Cockayne, RN  05/18/20 9476

## 2020-05-20 ENCOUNTER — CARE COORDINATION (OUTPATIENT)
Dept: CARE COORDINATION | Age: 75
End: 2020-05-20

## 2020-05-20 NOTE — CARE COORDINATION
Patient contacted regarding [id number] monitoring s/p recent ED visit. .  Care Transition Nurse/ Ambulatory Care Manager contacted the patient by telephone to perform post discharge assessment. Verified name and  with patient as identifiers. Provided introduction to self, and explanation of the CTN/ACM role, and reason for call due to risk factors for infection and/or exposure to COVID-19. Symptoms reviewed with patient who verbalized the following symptoms: pain or aching joints, no new symptoms, no worsening symptoms and abdominal pain, and dizziness. Due to no new or worsening symptoms encounter was not routed to provider for escalation. Patient has following risk factors of: heart failure, COPD and chronic kidney disease. CTN/ACM reviewed discharge instructions, medical action plan and red flags such as increased shortness of breath, increasing fever and signs of decompensation with patient who verbalized understanding. Discussed exposure protocols and quarantine with CDC Guidelines What to do if you are sick with coronavirus disease .  Patient was given an opportunity for questions and concerns. The patient agrees to contact the [facility] exposure line [phone], local health department and PCP office for questions related to their healthcare. CTN/ACM provided contact information for future needs. Reviewed and educated patient on any new and changed medications related to discharge diagnosis     Patient/family/caregiver given information for Get[facility] Loop and agrees to enroll - Yes - LOOP enrollment completed for self monitoring POC   Patient's preferred e-mail: N/A   Patient's preferred phone number: [phone]   Based on Loop alert triggers, patient will be contacted by nurse care manager for worsening symptoms. Pt will be further monitored by COVID Loop Team based on severity of symptoms and risk factors.     Patient called for covid-19 f/u s/p recent ED visit for body ache,

## 2020-05-21 ENCOUNTER — OFFICE VISIT (OUTPATIENT)
Dept: SURGERY | Age: 75
End: 2020-05-21
Payer: MEDICARE

## 2020-05-21 VITALS
DIASTOLIC BLOOD PRESSURE: 72 MMHG | HEART RATE: 72 BPM | TEMPERATURE: 96.6 F | RESPIRATION RATE: 20 BRPM | OXYGEN SATURATION: 95 % | BODY MASS INDEX: 35.87 KG/M2 | SYSTOLIC BLOOD PRESSURE: 128 MMHG | HEIGHT: 70 IN

## 2020-05-21 PROCEDURE — 1123F ACP DISCUSS/DSCN MKR DOCD: CPT | Performed by: SURGERY

## 2020-05-21 PROCEDURE — 3017F COLORECTAL CA SCREEN DOC REV: CPT | Performed by: SURGERY

## 2020-05-21 PROCEDURE — 99203 OFFICE O/P NEW LOW 30 MIN: CPT | Performed by: SURGERY

## 2020-05-21 PROCEDURE — 1036F TOBACCO NON-USER: CPT | Performed by: SURGERY

## 2020-05-21 PROCEDURE — G8417 CALC BMI ABV UP PARAM F/U: HCPCS | Performed by: SURGERY

## 2020-05-21 PROCEDURE — 4040F PNEUMOC VAC/ADMIN/RCVD: CPT | Performed by: SURGERY

## 2020-05-21 PROCEDURE — G8427 DOCREV CUR MEDS BY ELIG CLIN: HCPCS | Performed by: SURGERY

## 2020-05-21 RX ORDER — GABAPENTIN 600 MG/1
800 TABLET ORAL 3 TIMES DAILY
Status: ON HOLD | COMMUNITY
End: 2021-05-25

## 2020-05-21 RX ORDER — RIVAROXABAN 10 MG/1
10 TABLET, FILM COATED ORAL EVERY 24 HOURS
COMMUNITY

## 2020-05-21 RX ORDER — UMECLIDINIUM BROMIDE AND VILANTEROL TRIFENATATE 62.5; 25 UG/1; UG/1
1 POWDER RESPIRATORY (INHALATION) DAILY
Status: ON HOLD | COMMUNITY
End: 2021-02-20

## 2020-05-21 RX ORDER — HYDROXYZINE 50 MG/1
50 TABLET, FILM COATED ORAL 3 TIMES DAILY PRN
Status: ON HOLD | COMMUNITY
End: 2021-05-25

## 2020-05-21 RX ORDER — HYDROCODONE BITARTRATE AND ACETAMINOPHEN 5; 325 MG/1; MG/1
1 TABLET ORAL EVERY 6 HOURS PRN
Status: ON HOLD | COMMUNITY
End: 2021-05-25

## 2020-05-21 RX ORDER — ALBUTEROL SULFATE 90 UG/1
2 AEROSOL, METERED RESPIRATORY (INHALATION) EVERY 6 HOURS PRN
COMMUNITY

## 2020-05-21 NOTE — PROGRESS NOTES
MG capsule Take 0.4 mg by mouth daily pm      cyclobenzaprine (FLEXERIL) 10 MG tablet Take 1 tablet by mouth 2 times daily as needed for Muscle spasms 10 tablet 0    acetaminophen (TYLENOL) 500 MG tablet Take 1,000 mg by mouth every 6 hours as needed for Pain      atorvastatin (LIPITOR) 20 MG tablet Take 20 mg by mouth daily pm       No current facility-administered medications for this visit. Allergies   Allergen Reactions    Codeine Hives    Effexor [Venlafaxine] Itching    Ibuprofen     Tramadol Hives       Subjective:      Review of Systems   Constitutional: Positive for activity change and fatigue. Negative for appetite change, chills, diaphoresis, fever and unexpected weight change. HENT: Negative. Negative for congestion, dental problem, drooling, ear discharge, ear pain, facial swelling, hearing loss, mouth sores, nosebleeds, postnasal drip, rhinorrhea, sinus pressure, sinus pain, sneezing, sore throat, tinnitus, trouble swallowing and voice change. Eyes: Negative. Negative for photophobia, pain, discharge, redness, itching and visual disturbance. Respiratory: Positive for shortness of breath. Negative for apnea, cough, choking, chest tightness, wheezing and stridor. Cardiovascular: Positive for leg swelling. Negative for chest pain and palpitations. Gastrointestinal: Positive for abdominal pain and nausea. Endocrine: Negative. Negative for cold intolerance, heat intolerance, polydipsia, polyphagia and polyuria. Genitourinary: Positive for difficulty urinating. Negative for decreased urine volume, discharge, dysuria, enuresis, flank pain, frequency, genital sores, hematuria, penile pain, penile swelling, scrotal swelling, testicular pain and urgency. Musculoskeletal: Positive for back pain. Negative for arthralgias, gait problem, joint swelling, myalgias, neck pain and neck stiffness. Skin: Negative for color change, pallor, rash and wound. Allergic/Immunologic: Negative. Range    Troponin T < 0.010 ng/ml   TSH with Reflex   Result Value Ref Range    TSH 1.680 0.400 - 4.20 uIU/mL   Anion Gap   Result Value Ref Range    Anion Gap 8.0 8.0 - 16.0 meq/L   Glomerular Filtration Rate, Estimated   Result Value Ref Range    Est, Glom Filt Rate 65 (A) ml/min/1.73m2   Osmolality   Result Value Ref Range    Osmolality Calc 283.8 275.0 - 300 mOsmol/kg   Urine with Reflexed Micro   Result Value Ref Range    Glucose, Ur NEGATIVE NEGATIVE mg/dl    Bilirubin Urine NEGATIVE NEGATIVE    Ketones, Urine NEGATIVE NEGATIVE    Specific Gravity, Urine > 1.030 (A) 1.002 - 1.03    Blood, Urine NEGATIVE NEGATIVE    pH, UA 5.0 5.0 - 9.0    Protein,  (A) NEGATIVE    Urobilinogen, Urine 1.0 0.0 - 1.0 eu/dl    Nitrite, Urine NEGATIVE NEGATIVE    Leukocyte Esterase, Urine NEGATIVE NEGATIVE    Color, UA YELLOW STRAW-YELL    Character, Urine CLEAR CLEAR-SL C    RBC, UA 0-2 0-2/hpf /hpf    WBC, UA 0-2 0-4/hpf /hpf    Epithelial Cells, UA 0-2 3-5/hpf /hpf    Bacteria, UA NONE SEEN FEW/NONE S /hpf    Casts UA 4-8 HYALINE NONE SEEN /lpf    Crystals, UA NONE SEEN NONE SEEN    Renal Epithelial, UA NONE SEEN NONE SEEN    Yeast, UA NONE SEEN NONE SEEN    CASTS 2 NONE SEEN NONE SEEN /lpf    MISCELLANEOUS 2 NONE SEEN    EKG 12 Lead   Result Value Ref Range    Ventricular Rate 74 BPM    Atrial Rate 74 BPM    P-R Interval 160 ms    QRS Duration 80 ms    Q-T Interval 396 ms    QTc Calculation (Bazett) 439 ms    P Axis 60 degrees    R Axis 26 degrees    T Axis 59 degrees   TYPE AND SCREEN   Result Value Ref Range    ABO O     Rh Factor POS     Antibody Screen NEG        Assessment:     1.   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 morbidly obese patient does have bilateral inguinal hernias he is having some discomfort but no evidence of small bowel obstruction on recent CT scan patient is also had multiple back surgeries and is going to be needing more surgery soon patient is such a poor risk for surgery that I would be very hesitant to repair these hernias they would have to be done via the open method meka all of this with the patient for the time being recommend continuing to follow patient might call back after he gets his back surgeries performed    Plan:     Turn to office as needed      Electronicallysigned by Hemanth Resendez MD on 5/21/2020 at 1:22 PM

## 2020-05-25 ASSESSMENT — ENCOUNTER SYMPTOMS
SHORTNESS OF BREATH: 1
SORE THROAT: 0
EYE ITCHING: 0
TROUBLE SWALLOWING: 0
NAUSEA: 1
PHOTOPHOBIA: 0
SINUS PAIN: 0
COUGH: 0
EYE REDNESS: 0
EYE DISCHARGE: 0
BACK PAIN: 1
FACIAL SWELLING: 0
CHOKING: 0
RHINORRHEA: 0
COLOR CHANGE: 0
ALLERGIC/IMMUNOLOGIC NEGATIVE: 1
APNEA: 0
SINUS PRESSURE: 0
ABDOMINAL PAIN: 1
STRIDOR: 0
WHEEZING: 0
VOICE CHANGE: 0
CHEST TIGHTNESS: 0
EYE PAIN: 0
EYES NEGATIVE: 1

## 2020-05-27 ENCOUNTER — APPOINTMENT (OUTPATIENT)
Dept: GENERAL RADIOLOGY | Age: 75
End: 2020-05-27
Payer: MEDICARE

## 2020-05-27 ENCOUNTER — HOSPITAL ENCOUNTER (EMERGENCY)
Age: 75
Discharge: HOME | End: 2020-05-27
Payer: MEDICARE

## 2020-05-27 ENCOUNTER — APPOINTMENT (OUTPATIENT)
Dept: CT IMAGING | Age: 75
End: 2020-05-27
Payer: MEDICARE

## 2020-05-27 VITALS
WEIGHT: 260 LBS | HEART RATE: 62 BPM | BODY MASS INDEX: 37.22 KG/M2 | DIASTOLIC BLOOD PRESSURE: 94 MMHG | RESPIRATION RATE: 15 BRPM | OXYGEN SATURATION: 96 % | HEIGHT: 70 IN | TEMPERATURE: 98.1 F | SYSTOLIC BLOOD PRESSURE: 168 MMHG

## 2020-05-27 LAB
ALBUMIN SERPL-MCNC: 3.9 G/DL (ref 3.5–5.1)
ALP BLD-CCNC: 61 U/L (ref 38–126)
ALT SERPL-CCNC: 10 U/L (ref 11–66)
ANION GAP SERPL CALCULATED.3IONS-SCNC: 9 MEQ/L (ref 8–16)
AST SERPL-CCNC: 16 U/L (ref 5–40)
BASOPHILS # BLD: 1.1 %
BASOPHILS ABSOLUTE: 0 THOU/MM3 (ref 0–0.1)
BILIRUB SERPL-MCNC: 0.7 MG/DL (ref 0.3–1.2)
BUN BLDV-MCNC: 19 MG/DL (ref 7–22)
CALCIUM SERPL-MCNC: 9.5 MG/DL (ref 8.5–10.5)
CHLORIDE BLD-SCNC: 103 MEQ/L (ref 98–111)
CO2: 27 MEQ/L (ref 23–33)
CREAT SERPL-MCNC: 1.1 MG/DL (ref 0.4–1.2)
EOSINOPHIL # BLD: 2.3 %
EOSINOPHILS ABSOLUTE: 0.1 THOU/MM3 (ref 0–0.4)
ERYTHROCYTE [DISTWIDTH] IN BLOOD BY AUTOMATED COUNT: 13.6 % (ref 11.5–14.5)
ERYTHROCYTE [DISTWIDTH] IN BLOOD BY AUTOMATED COUNT: 45.9 FL (ref 35–45)
GFR SERPL CREATININE-BSD FRML MDRD: 65 ML/MIN/1.73M2
GLUCOSE BLD-MCNC: 84 MG/DL (ref 70–108)
HCT VFR BLD CALC: 48.3 % (ref 42–52)
HEMOGLOBIN: 14.5 GM/DL (ref 14–18)
IMMATURE GRANS (ABS): 0.01 THOU/MM3 (ref 0–0.07)
IMMATURE GRANULOCYTES: 0.3 %
LIPASE: 60.4 U/L (ref 5.6–51.3)
LYMPHOCYTES # BLD: 31.1 %
LYMPHOCYTES ABSOLUTE: 1.1 THOU/MM3 (ref 1–4.8)
MCH RBC QN AUTO: 27.8 PG (ref 26–33)
MCHC RBC AUTO-ENTMCNC: 30 GM/DL (ref 32.2–35.5)
MCV RBC AUTO: 92.5 FL (ref 80–94)
MONOCYTES # BLD: 8.9 %
MONOCYTES ABSOLUTE: 0.3 THOU/MM3 (ref 0.4–1.3)
NUCLEATED RED BLOOD CELLS: 0 /100 WBC
OSMOLALITY CALCULATION: 279 MOSMOL/KG (ref 275–300)
PLATELET # BLD: 200 THOU/MM3 (ref 130–400)
PMV BLD AUTO: 9.5 FL (ref 9.4–12.4)
POTASSIUM SERPL-SCNC: 4.2 MEQ/L (ref 3.5–5.2)
RBC # BLD: 5.22 MILL/MM3 (ref 4.7–6.1)
SEG NEUTROPHILS: 56.3 %
SEGMENTED NEUTROPHILS ABSOLUTE COUNT: 2 THOU/MM3 (ref 1.8–7.7)
SODIUM BLD-SCNC: 139 MEQ/L (ref 135–145)
TOTAL PROTEIN: 7.1 G/DL (ref 6.1–8)
WBC # BLD: 3.5 THOU/MM3 (ref 4.8–10.8)

## 2020-05-27 PROCEDURE — 6370000000 HC RX 637 (ALT 250 FOR IP): Performed by: PHYSICIAN ASSISTANT

## 2020-05-27 PROCEDURE — 99283 EMERGENCY DEPT VISIT LOW MDM: CPT

## 2020-05-27 PROCEDURE — 80053 COMPREHEN METABOLIC PANEL: CPT

## 2020-05-27 PROCEDURE — 83690 ASSAY OF LIPASE: CPT

## 2020-05-27 PROCEDURE — 74176 CT ABD & PELVIS W/O CONTRAST: CPT

## 2020-05-27 PROCEDURE — 36415 COLL VENOUS BLD VENIPUNCTURE: CPT

## 2020-05-27 PROCEDURE — 74018 RADEX ABDOMEN 1 VIEW: CPT

## 2020-05-27 PROCEDURE — 85025 COMPLETE CBC W/AUTO DIFF WBC: CPT

## 2020-05-27 RX ORDER — LIDOCAINE 50 MG/G
1 PATCH TOPICAL DAILY
Qty: 15 PATCH | Refills: 0 | Status: ON HOLD | OUTPATIENT
Start: 2020-05-27 | End: 2021-05-25

## 2020-05-27 RX ORDER — HYDROCODONE BITARTRATE AND ACETAMINOPHEN 5; 325 MG/1; MG/1
1 TABLET ORAL ONCE
Status: COMPLETED | OUTPATIENT
Start: 2020-05-27 | End: 2020-05-27

## 2020-05-27 RX ORDER — POLYETHYLENE GLYCOL 3350 17 G/17G
17 POWDER, FOR SOLUTION ORAL DAILY
Qty: 510 G | Refills: 0 | Status: SHIPPED | OUTPATIENT
Start: 2020-05-27 | End: 2020-06-26

## 2020-05-27 RX ORDER — DICYCLOMINE HYDROCHLORIDE 10 MG/1
20 CAPSULE ORAL
Qty: 40 CAPSULE | Refills: 0 | Status: ON HOLD | OUTPATIENT
Start: 2020-05-27 | End: 2021-05-25

## 2020-05-27 RX ORDER — DICYCLOMINE HYDROCHLORIDE 10 MG/1
20 CAPSULE ORAL ONCE
Status: COMPLETED | OUTPATIENT
Start: 2020-05-27 | End: 2020-05-27

## 2020-05-27 RX ADMIN — DICYCLOMINE HYDROCHLORIDE 20 MG: 10 CAPSULE ORAL at 16:32

## 2020-05-27 RX ADMIN — HYDROCODONE BITARTRATE AND ACETAMINOPHEN 1 TABLET: 5; 325 TABLET ORAL at 16:32

## 2020-05-27 ASSESSMENT — ENCOUNTER SYMPTOMS
SHORTNESS OF BREATH: 0
ABDOMINAL PAIN: 1
CONSTIPATION: 1
NAUSEA: 0
VOMITING: 0
BACK PAIN: 1
COLOR CHANGE: 0

## 2020-05-27 ASSESSMENT — PAIN DESCRIPTION - PAIN TYPE: TYPE: ACUTE PAIN

## 2020-05-27 ASSESSMENT — PAIN SCALES - GENERAL: PAINLEVEL_OUTOF10: 9

## 2020-05-27 ASSESSMENT — PAIN DESCRIPTION - DESCRIPTORS: DESCRIPTORS: CRAMPING

## 2020-05-27 ASSESSMENT — PAIN DESCRIPTION - LOCATION: LOCATION: ABDOMEN

## 2020-05-27 NOTE — ED NOTES
Incontinent care provided for patient at this time.  Patient called family to bring him more clothes     Jennifer More RN  05/27/20 9207

## 2020-05-27 NOTE — ED PROVIDER NOTES
[facility]  eMERGENCY dEPARTMENT eNCOUnter          CHIEF COMPLAINT       Chief Complaint   Patient presents with    Abdominal Cramping     x1 month       Nurses Notes reviewed and I agree except as noted inthe HPI. HISTORY OF PRESENT ILLNESS    Sebastian Polanco is a 76 y.o. male who presents to the Emergency Department for the evaluation of abdominal pain. Patient reports he's been having intermittent abdominal pain for the last month, occurring diffusely in the upper abdomen with bowel movements. No post-prandial pain. Reports no fevers, chills, nausea, vomiting. He describes pain as an aching with sharp pain as well as worsened in the right abdomen. Abdominal pain is worse today. He's had multiple evaluations for the right sided pain without diagnosis. Reports prior open cholecystectomy at [facility] in 2014 as well as bowel resection secondary to obstruction. He's been taking Norco for the pain, but hasn't had it today. The HPI was provided by the patient. REVIEW OF SYSTEMS     Review of Systems   Constitutional: Negative for chills and fever. Respiratory: Negative for shortness of breath. Cardiovascular: Negative for chest pain. Gastrointestinal: Positive for abdominal pain and constipation. Negative for nausea and vomiting. Genitourinary: Negative for dysuria. Musculoskeletal: Positive for back pain. Skin: Negative for color change. Neurological: Negative for syncope, light-headedness and headaches. PAST MEDICAL HISTORY    has a past medical history of Anxiety, Chronic back pain, Chronic kidney disease (CKD), stage II (mild), COPD (chronic obstructive pulmonary disease) ([address] Depression, Diabetes mellitus ([address] DVT (deep venous thrombosis) ([address] Hyperlipidemia, Hypertension, Hypothyroidism, Lumbago with sciatica, and Polyneuropathy.     SURGICAL HISTORY      has a past surgical history that includes Cholecystectomy; back surgery; shoulder surgery; and Small intestine surgery. CURRENT MEDICATIONS       Discharge Medication List as of 2020  4:21 PM      CONTINUE these medications which have NOT CHANGED    Details   rivaroxaban (XARELTO) 10 MG TABS tablet Take 10 mg by mouth every 24 hoursHistorical Med      umeclidinium-vilanterol (ANORO ELLIPTA) 62.5-25 MCG/INH AEPB inhaler Inhale 1 puff into the lungs dailyHistorical Med      albuterol sulfate HFA (VENTOLIN HFA) 108 (90 Base) MCG/ACT inhaler Inhale 2 puffs into the lungs every 6 hours as needed for WheezingHistorical Med      hydrOXYzine (ATARAX) 50 MG tablet Take 50 mg by mouth 3 times daily as needed for ItchingHistorical Med      gabapentin (NEURONTIN) 600 MG tablet Take 600 mg by mouth 2 times daily. Historical Med      HYDROcodone-acetaminophen (NORCO) 5-325 MG per tablet Take 1 tablet by mouth every 6 hours as needed for Pain. Historical Med      losartan (COZAAR) 50 MG tablet Take 50 mg by mouth daily AmHistorical Med      meclizine (ANTIVERT) 25 MG tablet Take 25 mg by mouth daily amHistorical Med      acetaminophen (TYLENOL) 500 MG tablet Take 1,000 mg by mouth every 6 hours as needed for PainHistorical Med      tamsulosin (FLOMAX) 0.4 MG capsule Take 0.4 mg by mouth daily pmHistorical Med      atorvastatin (LIPITOR) 20 MG tablet Take 20 mg by mouth daily pmHistorical Med             ALLERGIES     is allergic to codeine; effexor [venlafaxine]; ibuprofen; and tramadol. FAMILY HISTORY     He indicated that his mother is . He indicated that his father is . Family history is unknown by patient. SOCIAL HISTORY      reports that he has never smoked. He has never used smokeless tobacco. He reports that he does not drink alcohol or use drugs. PHYSICAL EXAM     INITIAL VITALS:  height is 5' 10\" (1.778 m) and weight is 260 lb (117.9 kg). His oral temperature is 98.1 °F (36.7 °C). His blood pressure is 168/94 (abnormal) and his pulse is 62.  His respiration is 15 and oxygen saturation is 96%.    Physical Exam  Vitals signs and nursing note reviewed. Constitutional:       Appearance: Normal appearance. HENT:      Head: Normocephalic and atraumatic. Eyes:      Conjunctiva/sclera: Conjunctivae normal.   Cardiovascular:      Rate and Rhythm: Normal rate. Pulmonary:      Effort: Pulmonary effort is normal. No respiratory distress. Abdominal:      Palpations: Abdomen is soft. Tenderness: There is abdominal tenderness. There is no right CVA tenderness, left CVA tenderness, guarding or rebound. Negative signs include Soriano's sign and McBurney's sign. Comments: Extensive abdominal scarring   Skin:     General: Skin is warm and dry. Neurological:      General: No focal deficit present. Mental Status: He is alert and oriented to person, place, and time. Psychiatric:         Mood and Affect: Mood normal.         Behavior: Behavior normal.         DIFFERENTIAL DIAGNOSIS:   Differential diagnoses are discussed    DIAGNOSTIC RESULTS     EKG: All EKG's are interpreted by the Emergency Department Physician who either signs or Co-signsthis chart in the absence of a cardiologist.          RADIOLOGY: non-plain film images(s) such as CT, Ultrasound and MRI are read by the radiologist.    CT ABDOMEN PELVIS WO CONTRAST Additional Contrast? None   Final Result      1. Scattered stool in the colon with diverticulosis. No evidence for diverticulitis. 2. No free intraperitoneal air. 3. Contracted gallbladder with gallstone. **This report has been created using voice recognition software. It may contain minor errors which are inherent in voice recognition technology. **      Final report electronically signed by Dr. Rob Fernandez on 5/27/2020 3:08 PM      XR ABDOMEN (KUB) (SINGLE AP VIEW)   Final Result      Stool in the visualized colon. Generalized lucency over the mid abdomen. Correlation with symptoms is advised. Free intraperitoneal air cannot be entirely excluded.  Consider erect or decubitus films. CT abdomen pelvis also be performed if clinically    warranted. Findings were discussed with [facility] at 1:15 PM on the by 20/7/2020   **This report has been created using voice recognition software. It may contain minor errors which are inherent in voice recognition technology. **      Final report electronically signed by Dr. Nicole Rudolph on 5/27/2020 1:16 PM          LABS:      Labs Reviewed   CBC WITH AUTO DIFFERENTIAL - Abnormal; Notable for the following components:       Result Value    WBC 3.5 (*)     MCHC 30.0 (*)     RDW-SD 45.9 (*)     Monocytes Absolute 0.3 (*)     All other components within normal limits   COMPREHENSIVE METABOLIC PANEL - Abnormal; Notable for the following components:    ALT 10 (*)     All other components within normal limits   LIPASE - Abnormal; Notable for the following components:    Lipase 60.4 (*)     All other components within normal limits   GLOMERULAR FILTRATION RATE, ESTIMATED - Abnormal; Notable for the following components:    Est, Glom Filt Rate 65 (*)     All other components within normal limits   ANION GAP   OSMOLALITY       EMERGENCY DEPARTMENT COURSE:   Vitals:    Vitals:    05/27/20 1232 05/27/20 1236 05/27/20 1238 05/27/20 1340   BP:   (!) 168/94    Pulse:  60  62   Resp:  18  15   Temp:  98.1 °F (36.7 °C)     TempSrc:  Oral     SpO2:  96%     Weight: 260 lb (117.9 kg)      Height: 5' 10\" (1.778 m)         4:04 PM EDT: The patient was seen and evaluated. Patient presents for complaints of abdominal pain. He has had some constant pain in the right abdomen for a while now and is pending GI work-up for this. However, he had worsened pain with attempting bowel movements and came in for this today. He has extensive surgical history for the abdomen. KUB showed visualized stool as well as generalized lucency of the mid abdomen. Free intraperitoneal air cannot be entirely excluded.   CT of the abdomen was performed for further evaluation and showed scattered stool in the colon with diverticulosis with no free intraperitoneal air and contracted gallbladder with gallstones. Interestingly, the patient has had prior cholecystectomy many years ago. His laboratory results were reassuring. Lipase was marginally elevated at 60 and there is no white blood cell count elevation. Discussed with attending provider. He has negative Soriano sign. He will continue GI follow-up and return precautions were discussed. Due to the association of pain with bowel movements, we will start MiraLAX and Bentyl and topical lidocaine patches as his Norco is likely contributing to constipation. CRITICAL CARE:   None    CONSULTS:  None    PROCEDURES:  None    FINAL IMPRESSION      1. Gallstones    2. Generalized abdominal pain    3.  Constipation, unspecified constipation type          DISPOSITION/PLAN   Discharge    PATIENT REFERRED TO:  Brea Mcintosh [place]  [facility] [place] [phone]    Call in 1 day  to discuss change of Flomax and further discuss back/abdominal pain    [facility] EMERGENCY DEPT  [address]  [phone]    If symptoms worsen    Elda Lee MD  [address]  [facility]  [phone]    Call   to set up GI consult for abdominal pain    [facility] EMERGENCY DEPT  [address] [place] [address]  [address]  [phone]    If symptoms worsen      DISCHARGEMEDICATIONS:  Discharge Medication List as of 5/27/2020  4:21 PM      START taking these medications    Details   lidocaine (LIDODERM) 5 % Place 1 patch onto the skin daily 12 hours on, 12 hours off., Disp-15 patch, R-0Print      polyethylene glycol (GLYCOLAX) 17 GM/SCOOP powder Take 17 g by mouth daily, Disp-510 g, R-0Print      dicyclomine (BENTYL) 10 MG capsule Take 2 capsules by mouth 4 times daily (before meals and nightly), Disp-40 capsule, R-0Print             (Please note that portions of this note were completedwith a voice recognition

## 2020-05-28 ENCOUNTER — CARE COORDINATION (OUTPATIENT)
Dept: CARE COORDINATION | Age: 75
End: 2020-05-28

## 2020-05-28 NOTE — CARE COORDINATION
Patient contacted regarding [facility] Eve. Discussed COVID-19 related testing which was not done at this time. Test results were not done. Patient informed of results, if available? N/A    Care Transition Nurse/ Ambulatory Care Manager contacted the patient by telephone to perform post discharge assessment. Verified name and  with patient as identifiers. Provided introduction to self, and explanation of the CTN/ACM role, and reason for call due to risk factors for infection and/or exposure to COVID-19. Symptoms reviewed with patient who verbalized the following symptoms: no new symptoms, no worsening symptoms and abdominal pain . Due to no new or worsening symptoms  and inability for PCP to receive routed messages encounter was not routed to provider for escalation. Discussed follow-up appointments. If no appointment was previously scheduled, appointment scheduling offered: Patient aware of need to f/u with PCP and GI and he declined any need for assistance with this process. [facility] follow up appointment(s): No future appointments. Non-[facility] follow up appointment(s): N/A - see above     Patient has following risk factors of: heart failure, COPD and chronic kidney disease. CTN/ACM reviewed discharge instructions, medical action plan and red flags such as increased shortness of breath, increasing fever and signs of decompensation with patient who verbalized understanding. Discussed exposure protocols and quarantine with CDC Guidelines What to do if you are sick with coronavirus disease .  Patient was given an opportunity for questions and concerns. The patient agrees to contact the [facility] exposure line [phone], local health department and PCP office for questions related to their healthcare. CTN/ACM provided contact information for future needs.     Reviewed and educated patient on any new and changed medications related to discharge diagnosis     Patient/family/caregiver given information for [facility] Loop and agrees to enroll - Pt. previously enrolled in LOOP and was encouraged to complete enrollment/log in process. Patient's preferred e-mail: N/A   Patient's preferred phone number: N/A  Based on Loop alert triggers, patient will be contacted by nurse care manager for worsening symptoms. Pt will be further monitored by COVID Loop Team based on severity of symptoms and risk factors. Patient called for covid-19 f/u s/p recent ED visit for abdominal pain. Patient denied any new/change/worsneing of symptoms. Patient shared symptoms remain at his baseline today. Patient reported he plans to f/u with PCP and GI and he has contact information and denied any need for assistance. Patient denied any questions re: his discharge instructions. Covid-19 education was reviewed and patient verbalized understanding. Patient was reminded to call covid-19 hotline number with any new symptoms or questions/concerns. Patient verbalized understanding. Patient stated he has hotline information if needed. Patient denied any other questions, concerns, or needs.

## 2020-06-09 ENCOUNTER — CARE COORDINATION (OUTPATIENT)
Dept: CARE COORDINATION | Age: 75
End: 2020-06-09

## 2020-06-09 NOTE — CARE COORDINATION
Patient previously called for COVID-19 follow up s/p recent ED visit. ACM received message from patient this AM stating he needed information on where he could buy \"different things\" and \"who he can call to order them. \"  Attempted to reach out to patient to review ACM role and follow up but patient was not available at the time of my call. No further outreach planned.

## 2020-06-16 ENCOUNTER — CARE COORDINATION (OUTPATIENT)
Dept: CARE COORDINATION | Age: 75
End: 2020-06-16

## 2020-06-18 ENCOUNTER — HOSPITAL ENCOUNTER (OUTPATIENT)
Dept: ULTRASOUND IMAGING | Age: 75
Discharge: HOME | End: 2020-06-18
Payer: MEDICARE

## 2020-06-18 PROCEDURE — 76705 ECHO EXAM OF ABDOMEN: CPT

## 2020-06-18 PROCEDURE — 93975 VASCULAR STUDY: CPT

## 2020-07-06 ENCOUNTER — HOSPITAL ENCOUNTER (OUTPATIENT)
Dept: NUCLEAR MEDICINE | Age: 75
Discharge: HOME | End: 2020-07-06
Payer: MEDICARE

## 2020-07-11 ENCOUNTER — APPOINTMENT (OUTPATIENT)
Dept: CT IMAGING | Age: 75
End: 2020-07-11
Payer: MEDICARE

## 2020-07-11 ENCOUNTER — HOSPITAL ENCOUNTER (EMERGENCY)
Age: 75
Discharge: HOME | End: 2020-07-11
Attending: EMERGENCY MEDICINE
Payer: MEDICARE

## 2020-07-11 ENCOUNTER — APPOINTMENT (OUTPATIENT)
Dept: GENERAL RADIOLOGY | Age: 75
End: 2020-07-11
Payer: MEDICARE

## 2020-07-11 VITALS
DIASTOLIC BLOOD PRESSURE: 80 MMHG | TEMPERATURE: 98.6 F | OXYGEN SATURATION: 95 % | HEART RATE: 54 BPM | RESPIRATION RATE: 14 BRPM | BODY MASS INDEX: 38.65 KG/M2 | HEIGHT: 70 IN | WEIGHT: 270 LBS | SYSTOLIC BLOOD PRESSURE: 133 MMHG

## 2020-07-11 LAB
ALBUMIN SERPL-MCNC: 3.6 G/DL (ref 3.5–5.1)
ALP BLD-CCNC: 48 U/L (ref 38–126)
ALT SERPL-CCNC: 9 U/L (ref 11–66)
AMPHETAMINE+METHAMPHETAMINE URINE SCREEN: NEGATIVE
ANION GAP SERPL CALCULATED.3IONS-SCNC: 8 MEQ/L (ref 8–16)
APTT: 51 SECONDS (ref 22–38)
AST SERPL-CCNC: 11 U/L (ref 5–40)
BACTERIA: ABNORMAL /HPF
BARBITURATE QUANTITATIVE URINE: NEGATIVE
BASOPHILS # BLD: 1.2 %
BASOPHILS ABSOLUTE: 0 THOU/MM3 (ref 0–0.1)
BENZODIAZEPINE QUANTITATIVE URINE: NEGATIVE
BILIRUB SERPL-MCNC: 0.5 MG/DL (ref 0.3–1.2)
BILIRUBIN DIRECT: < 0.2 MG/DL (ref 0–0.3)
BILIRUBIN URINE: NEGATIVE
BLOOD, URINE: ABNORMAL
BUN BLDV-MCNC: 14 MG/DL (ref 7–22)
CALCIUM SERPL-MCNC: 8.7 MG/DL (ref 8.5–10.5)
CANNABINOID QUANTITATIVE URINE: NEGATIVE
CASTS 2: ABNORMAL /LPF
CASTS UA: ABNORMAL /LPF
CHARACTER, URINE: CLEAR
CHLORIDE BLD-SCNC: 107 MEQ/L (ref 98–111)
CO2: 27 MEQ/L (ref 23–33)
COCAINE METABOLITE QUANTITATIVE URINE: NEGATIVE
COLOR: YELLOW
CREAT SERPL-MCNC: 1.3 MG/DL (ref 0.4–1.2)
CRYSTALS, UA: ABNORMAL
EKG ATRIAL RATE: 58 BPM
EKG P AXIS: 67 DEGREES
EKG P-R INTERVAL: 174 MS
EKG Q-T INTERVAL: 432 MS
EKG QRS DURATION: 86 MS
EKG QTC CALCULATION (BAZETT): 424 MS
EKG R AXIS: 63 DEGREES
EKG T AXIS: 65 DEGREES
EKG VENTRICULAR RATE: 58 BPM
EOSINOPHIL # BLD: 3.4 %
EOSINOPHILS ABSOLUTE: 0.1 THOU/MM3 (ref 0–0.4)
EPITHELIAL CELLS, UA: ABNORMAL /HPF
ERYTHROCYTE [DISTWIDTH] IN BLOOD BY AUTOMATED COUNT: 13.5 % (ref 11.5–14.5)
ERYTHROCYTE [DISTWIDTH] IN BLOOD BY AUTOMATED COUNT: 45.9 FL (ref 35–45)
ETHYL ALCOHOL, SERUM: < 0.01 %
GFR SERPL CREATININE-BSD FRML MDRD: 54 ML/MIN/1.73M2
GLUCOSE BLD-MCNC: 72 MG/DL (ref 70–108)
GLUCOSE URINE: NEGATIVE MG/DL
HCT VFR BLD CALC: 42.1 % (ref 42–52)
HEMOGLOBIN: 12.7 GM/DL (ref 14–18)
IMMATURE GRANS (ABS): 0.01 THOU/MM3 (ref 0–0.07)
IMMATURE GRANULOCYTES: 0.3 %
INR BLD: 2.02 (ref 0.85–1.13)
KETONES, URINE: NEGATIVE
LEUKOCYTE ESTERASE, URINE: NEGATIVE
LIPASE: 37 U/L (ref 5.6–51.3)
LYMPHOCYTES # BLD: 34.2 %
LYMPHOCYTES ABSOLUTE: 1.1 THOU/MM3 (ref 1–4.8)
MCH RBC QN AUTO: 28 PG (ref 26–33)
MCHC RBC AUTO-ENTMCNC: 30.2 GM/DL (ref 32.2–35.5)
MCV RBC AUTO: 92.9 FL (ref 80–94)
MISCELLANEOUS 2: ABNORMAL
MONOCYTES # BLD: 10.9 %
MONOCYTES ABSOLUTE: 0.3 THOU/MM3 (ref 0.4–1.3)
NITRITE, URINE: NEGATIVE
NUCLEATED RED BLOOD CELLS: 0 /100 WBC
OPIATES, URINE: POSITIVE
OSMOLALITY CALCULATION: 282.1 MOSMOL/KG (ref 275–300)
OXYCODONE: NEGATIVE
PH UA: 5.5 (ref 5–9)
PHENCYCLIDINE QUANTITATIVE URINE: NEGATIVE
PLATELET # BLD: 155 THOU/MM3 (ref 130–400)
PMV BLD AUTO: 9.7 FL (ref 9.4–12.4)
POTASSIUM REFLEX MAGNESIUM: 4.5 MEQ/L (ref 3.5–5.2)
PRO-BNP: 182.2 PG/ML (ref 0–900)
PROTEIN UA: NEGATIVE
RBC # BLD: 4.53 MILL/MM3 (ref 4.7–6.1)
RBC URINE: ABNORMAL /HPF
RENAL EPITHELIAL, UA: ABNORMAL
SEG NEUTROPHILS: 50 %
SEGMENTED NEUTROPHILS ABSOLUTE COUNT: 1.6 THOU/MM3 (ref 1.8–7.7)
SODIUM BLD-SCNC: 142 MEQ/L (ref 135–145)
SPECIFIC GRAVITY, URINE: 1.01 (ref 1–1.03)
TOTAL PROTEIN: 6 G/DL (ref 6.1–8)
TROPONIN T: < 0.01 NG/ML
UROBILINOGEN, URINE: 0.2 EU/DL (ref 0–1)
WBC # BLD: 3.2 THOU/MM3 (ref 4.8–10.8)
WBC UA: ABNORMAL /HPF
YEAST: ABNORMAL

## 2020-07-11 PROCEDURE — 85025 COMPLETE CBC W/AUTO DIFF WBC: CPT

## 2020-07-11 PROCEDURE — 71045 X-RAY EXAM CHEST 1 VIEW: CPT

## 2020-07-11 PROCEDURE — 80048 BASIC METABOLIC PNL TOTAL CA: CPT

## 2020-07-11 PROCEDURE — 70450 CT HEAD/BRAIN W/O DYE: CPT

## 2020-07-11 PROCEDURE — 81001 URINALYSIS AUTO W/SCOPE: CPT

## 2020-07-11 PROCEDURE — 80307 DRUG TEST PRSMV CHEM ANLYZR: CPT

## 2020-07-11 PROCEDURE — 83880 ASSAY OF NATRIURETIC PEPTIDE: CPT

## 2020-07-11 PROCEDURE — P9612 CATHETERIZE FOR URINE SPEC: HCPCS

## 2020-07-11 PROCEDURE — 80076 HEPATIC FUNCTION PANEL: CPT

## 2020-07-11 PROCEDURE — 6370000000 HC RX 637 (ALT 250 FOR IP): Performed by: EMERGENCY MEDICINE

## 2020-07-11 PROCEDURE — 36415 COLL VENOUS BLD VENIPUNCTURE: CPT

## 2020-07-11 PROCEDURE — 85730 THROMBOPLASTIN TIME PARTIAL: CPT

## 2020-07-11 PROCEDURE — 72125 CT NECK SPINE W/O DYE: CPT

## 2020-07-11 PROCEDURE — 83690 ASSAY OF LIPASE: CPT

## 2020-07-11 PROCEDURE — 84484 ASSAY OF TROPONIN QUANT: CPT

## 2020-07-11 PROCEDURE — 85610 PROTHROMBIN TIME: CPT

## 2020-07-11 PROCEDURE — G0480 DRUG TEST DEF 1-7 CLASSES: HCPCS

## 2020-07-11 PROCEDURE — 99285 EMERGENCY DEPT VISIT HI MDM: CPT

## 2020-07-11 PROCEDURE — 93005 ELECTROCARDIOGRAM TRACING: CPT | Performed by: EMERGENCY MEDICINE

## 2020-07-11 RX ORDER — HYDROCODONE BITARTRATE AND ACETAMINOPHEN 5; 325 MG/1; MG/1
1 TABLET ORAL ONCE
Status: COMPLETED | OUTPATIENT
Start: 2020-07-11 | End: 2020-07-11

## 2020-07-11 RX ADMIN — HYDROCODONE BITARTRATE AND ACETAMINOPHEN 1 TABLET: 5; 325 TABLET ORAL at 19:01

## 2020-07-11 ASSESSMENT — ENCOUNTER SYMPTOMS
SORE THROAT: 0
WHEEZING: 0
CHEST TIGHTNESS: 0
ABDOMINAL PAIN: 0
NAUSEA: 0
COUGH: 0
VOMITING: 0
SINUS PRESSURE: 0
VOICE CHANGE: 0
SHORTNESS OF BREATH: 0
CONSTIPATION: 0
DIARRHEA: 0
TROUBLE SWALLOWING: 0
BACK PAIN: 0
RHINORRHEA: 0

## 2020-07-11 ASSESSMENT — PAIN SCALES - GENERAL: PAINLEVEL_OUTOF10: 8

## 2020-07-11 NOTE — ED NOTES
Patient straight cathed for urine specimen and tolerated well. Urine specimen obtained and sent to lab.       Trey Benitez RN  07/11/20 1776

## 2020-07-11 NOTE — ED PROVIDER NOTES
rash.   Neurological: Negative for dizziness, syncope, weakness, light-headedness, numbness and headaches. PAST MEDICAL HISTORY     has a past medical history of Anxiety, Chronic back pain, Chronic kidney disease (CKD), stage II (mild), COPD (chronic obstructive pulmonary disease) ([facility] [address] Depression, Diabetes mellitus ([facility] [address] DVT (deep venous thrombosis) ([facility] Hyperlipidemia, Hypertension, Hypothyroidism, Lumbago with sciatica, and Polyneuropathy. SURGICAL HISTORY   has a past surgical history that includes Cholecystectomy; back surgery; shoulder surgery; and Small intestine surgery. CURRENT MEDICATIONS    Previous Medications    ACETAMINOPHEN (TYLENOL) 500 MG TABLET    Take 1,000 mg by mouth every 6 hours as needed for Pain    ALBUTEROL SULFATE HFA (VENTOLIN HFA) 108 (90 BASE) MCG/ACT INHALER    Inhale 2 puffs into the lungs every 6 hours as needed for Wheezing    ATORVASTATIN (LIPITOR) 20 MG TABLET    Take 20 mg by mouth daily pm    DICYCLOMINE (BENTYL) 10 MG CAPSULE    Take 2 capsules by mouth 4 times daily (before meals and nightly)    GABAPENTIN (NEURONTIN) 600 MG TABLET    Take 600 mg by mouth 2 times daily. HYDROCODONE-ACETAMINOPHEN (NORCO) 5-325 MG PER TABLET    Take 1 tablet by mouth every 6 hours as needed for Pain. HYDROXYZINE (ATARAX) 50 MG TABLET    Take 50 mg by mouth 3 times daily as needed for Itching    LIDOCAINE (LIDODERM) 5 %    Place 1 patch onto the skin daily 12 hours on, 12 hours off. LOSARTAN (COZAAR) 50 MG TABLET    Take 50 mg by mouth daily Am    MECLIZINE (ANTIVERT) 25 MG TABLET    Take 25 mg by mouth daily am    RIVAROXABAN (XARELTO) 10 MG TABS TABLET    Take 10 mg by mouth every 24 hours    TAMSULOSIN (FLOMAX) 0.4 MG CAPSULE    Take 0.4 mg by mouth daily pm    UMECLIDINIUM-VILANTEROL (ANORO ELLIPTA) 62.5-25 MCG/INH AEPB INHALER    Inhale 1 puff into the lungs daily       ALLERGIES    is allergic to codeine; effexor [venlafaxine]; ibuprofen; and tramadol.     FAMILY HISTORY    He indicated that his mother is . He indicated that his father is . Family history is unknown by patient. SOCIAL HISTORY     reports that he has never smoked. He has never used smokeless tobacco. He reports that he does not drink alcohol or use drugs. PHYSICAL EXAM      INITIAL VITALS: /68   Pulse 50   Temp 98.6 °F (37 °C) (Oral)   Resp 14   Ht 5' 10\" (1.778 m)   Wt 270 lb (122.5 kg)   SpO2 97%   BMI 38.74 kg/m² Estimated body mass index is 38.74 kg/m² as calculated from the following:    Height as of this encounter: 5' 10\" (1.778 m). Weight as of this encounter: 270 lb (122.5 kg). Physical Exam  Vitals signs reviewed. Constitutional:       Appearance: He is well-developed. HENT:      Head: Normocephalic and atraumatic. Right Ear: External ear normal.      Left Ear: External ear normal.      Nose: Nose normal.   Eyes:      General: No scleral icterus. Conjunctiva/sclera: Conjunctivae normal.      Pupils: Pupils are equal, round, and reactive to light. Neck:      Musculoskeletal: Normal range of motion and neck supple. Thyroid: No thyromegaly. Vascular: No JVD. Comments: Patient is currently on a cervical collar, the neck was reexamined after CT scan was taken and the collar was removed. Patient had tightness on the neck however there is no neck rigidity able to flex extend the neck  Cardiovascular:      Rate and Rhythm: Normal rate and regular rhythm. Heart sounds: No murmur. No friction rub. Pulmonary:      Effort: Pulmonary effort is normal.      Breath sounds: Normal breath sounds. No wheezing or rales. Chest:      Chest wall: No tenderness. Abdominal:      General: Bowel sounds are normal.      Palpations: Abdomen is soft. There is no mass. Tenderness: There is no abdominal tenderness. Lymphadenopathy:      Cervical: No cervical adenopathy. Skin:     Findings: No rash.    Neurological:      Mental Status: He is alert and oriented to person, place, and time. Psychiatric:         Behavior: Behavior is cooperative. MEDICAL DECISION MAKING    DIFFERENTIAL DIAGNOSIS:  Fall injury, closed head injury, concussion,  Strain neck, history of mild chronic pain neck and back, chronic anticoagulation for DVT    DIAGNOSTIC RESULTS    EKG   Interpreted by Lino Mcdonnell MD      Rhythm: Sinus bradycardia  Rate: normal  Axis: normal  Ectopy: none  Conduction: normal  ST Segments: no acute change  T Waves: Nonspecific T wave abnormality  Q Waves: none    Clinical Impression: Sinus bradycardia with nonspecific T wave abnormality, abnormal EKG      Lino Mcdonnell MD      RADIOLOGY:  I have reviewed radiologic plain film image(s). The plain films will be read or overread by the radiologist.All other non-plain film images(s) such as CT, Ultrasound and MRI have been read by the radiologist.  XR CHEST 1 VW   Final Result   No acute disease. **This report has been created using voice recognition software. It may contain minor errors which are inherent in voice recognition technology. **      Final report electronically signed by Dr. Leonard Tanner on 7/11/2020 5:10 PM      CT HEAD WO CONTRAST (CODE STROKE)   Final Result   There is no acute fracture or hemorrhage. **This report has been created using voice recognition software. It may contain minor errors which are inherent in voice recognition technology. **      Final report electronically signed by Dr. Leonard Tanner on 7/11/2020 4:51 PM      CT CERVICAL SPINE WO CONTRAST   Final Result      Sensitivity is limited by motion as well as extensive previous surgery. No gross acute fracture. **This report has been created using voice recognition software. It may contain minor errors which are inherent in voice recognition technology. **      Final report electronically signed by Dr. Leonard Tanner on 7/11/2020 5:01 PM          LABS: Labs Reviewed   APTT - Abnormal; Notable for the following components:       Result Value    aPTT 51.0 (*)     All other components within normal limits   PROTIME-INR - Abnormal; Notable for the following components:    INR 2.02 (*)     All other components within normal limits   CBC WITH AUTO DIFFERENTIAL - Abnormal; Notable for the following components:    WBC 3.2 (*)     RBC 4.53 (*)     Hemoglobin 12.7 (*)     MCHC 30.2 (*)     RDW-SD 45.9 (*)     Segs Absolute 1.6 (*)     Monocytes Absolute 0.3 (*)     All other components within normal limits   HEPATIC FUNCTION PANEL - Abnormal; Notable for the following components:    ALT 9 (*)     Total Protein 6.0 (*)     All other components within normal limits   BASIC METABOLIC PANEL W/ REFLEX TO MG FOR LOW K - Abnormal; Notable for the following components:    CREATININE 1.3 (*)     All other components within normal limits   GLOMERULAR FILTRATION RATE, ESTIMATED - Abnormal; Notable for the following components:    Est, Glom Filt Rate 54 (*)     All other components within normal limits   URINE WITH REFLEXED MICRO - Abnormal; Notable for the following components:    Blood, Urine MODERATE (*)     All other components within normal limits   BRAIN NATRIURETIC PEPTIDE   LIPASE   TROPONIN   ETHANOL   URINE DRUG SCREEN   ANION GAP   OSMOLALITY     All other unresulted laboratory test above are normal:    Vitals:    Vitals:    07/11/20 1608 07/11/20 1621 07/11/20 1656 07/11/20 1800   BP: (!) 103/57  132/69 138/68   Pulse: 54 55 53 50   Resp: 14 14 14 14   Temp: 98.6 °F (37 °C)      TempSrc: Oral      SpO2: 91% 96% 95% 97%   Weight: 270 lb (122.5 kg)      Height: 5' 10\" (1.778 m)          EMERGENCY DEPARTMENT COURSE:    Medications   HYDROcodone-acetaminophen (NORCO) 5-325 MG per tablet 1 tablet (has no administration in time range)       The pt was seen and evaluated by me.  Within the department, I observed the pt's vitalsigns to be within acceptable range Laboratory and Radiological studies were performed, results were reviewed with the patient. Within the department, the pt was treated with Norco, and was able to eat dinner while in the emergency room I observed the pt's condition to be hemodynamically stable during the duration of their stay. I explained my proposed course of treatment to the pt, and they were amenable to my decision. They were discharged home, and they will return to the ED if their symptoms become more severein nature, or otherwise change. I called the [facility]  Génesis Wahl and the patient's going home    CRITICAL CARE:   None. CONSULTS:  None    PROCEDURES:  None. FINAL IMPRESSION       1. Closed head injury, initial encounter    2. Chronic pain syndrome    3. Chronic anticoagulation          DISPOSITION/PLAN  PATIENT REFERRED TO:  Binh Fairbanks MD  [place] [address] [phone]    Schedule an appointment as soon as possible for a visit in 3 days      [address] [place] [address] EMERGENCY DEPT  [address] [place] [address]  [phone]    As needed    DISCHARGE MEDICATIONS:  New Prescriptions    No medications on file         (Please note that portions of this note were completed with a voice recognition program and electronically transcribed. Efforts were [facility] edit the dictations but occasionally words are mis-transcribed . The transcription may contain errors not detected in proofreading.   This transcription was electronically signed.)     07/11/20 6:52 PM      Halie Heredia MD      Emergency room physician           Halie Heredia MD  07/11/20 4972

## 2020-07-11 NOTE — ED NOTES
Patient wakes to voice and is A&O x4 at this time. States he remembers becoming dizzy and falling in the street and thinks he \"blacked out for a minute. \"  Dr. oNla Rodriguez notified.      Dev Espinal RN  07/11/20 7294

## 2020-07-11 NOTE — ED NOTES
Dr. Short People at bedside to evaluate patient.       Isamar Huizar RN  07/11/20 49 Radha Chavez RN  07/11/20 0065

## 2020-07-14 ENCOUNTER — HOSPITAL ENCOUNTER (OUTPATIENT)
Dept: MRI IMAGING | Age: 75
Discharge: HOME | End: 2020-07-14
Payer: MEDICARE

## 2020-07-14 PROCEDURE — A9579 GAD-BASE MR CONTRAST NOS,1ML: HCPCS | Performed by: INTERNAL MEDICINE

## 2020-07-14 PROCEDURE — 6360000004 HC RX CONTRAST MEDICATION: Performed by: INTERNAL MEDICINE

## 2020-07-14 PROCEDURE — 74183 MRI ABD W/O CNTR FLWD CNTR: CPT

## 2020-07-14 RX ADMIN — GADOTERIDOL 20 ML: 279.3 INJECTION, SOLUTION INTRAVENOUS at 22:05

## 2020-07-30 ENCOUNTER — OFFICE VISIT (OUTPATIENT)
Dept: SURGERY | Age: 75
End: 2020-07-30
Payer: MEDICARE

## 2020-07-30 VITALS
DIASTOLIC BLOOD PRESSURE: 76 MMHG | BODY MASS INDEX: 38.65 KG/M2 | OXYGEN SATURATION: 98 % | TEMPERATURE: 97.3 F | WEIGHT: 270 LBS | SYSTOLIC BLOOD PRESSURE: 120 MMHG | RESPIRATION RATE: 16 BRPM | HEIGHT: 70 IN | HEART RATE: 68 BPM

## 2020-07-30 PROCEDURE — 1036F TOBACCO NON-USER: CPT | Performed by: SURGERY

## 2020-07-30 PROCEDURE — G8427 DOCREV CUR MEDS BY ELIG CLIN: HCPCS | Performed by: SURGERY

## 2020-07-30 PROCEDURE — 4040F PNEUMOC VAC/ADMIN/RCVD: CPT | Performed by: SURGERY

## 2020-07-30 PROCEDURE — 3017F COLORECTAL CA SCREEN DOC REV: CPT | Performed by: SURGERY

## 2020-07-30 PROCEDURE — 1123F ACP DISCUSS/DSCN MKR DOCD: CPT | Performed by: SURGERY

## 2020-07-30 PROCEDURE — G8417 CALC BMI ABV UP PARAM F/U: HCPCS | Performed by: SURGERY

## 2020-07-30 PROCEDURE — 99214 OFFICE O/P EST MOD 30 MIN: CPT | Performed by: SURGERY

## 2020-07-30 ASSESSMENT — ENCOUNTER SYMPTOMS
CHEST TIGHTNESS: 0
WHEEZING: 0
APNEA: 0
SORE THROAT: 0
COLOR CHANGE: 0
SINUS PAIN: 0
SHORTNESS OF BREATH: 0
CHOKING: 0
RHINORRHEA: 0
EYE DISCHARGE: 0
ABDOMINAL PAIN: 1
STRIDOR: 0
BACK PAIN: 1
EYES NEGATIVE: 1
SINUS PRESSURE: 0
VOICE CHANGE: 0
TROUBLE SWALLOWING: 0
EYE PAIN: 0
FACIAL SWELLING: 0
EYE REDNESS: 0
EYE ITCHING: 0
COUGH: 1
PHOTOPHOBIA: 0

## 2020-07-30 NOTE — PROGRESS NOTES
[address] [place] [address] [address] [facility] [address]  Dept: [phone]  Dept Fax: [phone]  Loc: [phone]    Visit Date: 7/30/2020    Kristin Lilly is a 76 y.o. male who presentstoday for:  Chief Complaint   Patient presents with    Surgical Consult     est pt-RUQ pain ref by Dr. 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 he also had had an open cholecystectomy several years ago at [facility] and while in intermediate in 2017 he states he was told by intermediate doctor he had a gallstone he also states that surgery operative report on his gallbladder he revealed a \"subtotal cholecystectomy \"patient then in May of this year went to the emergency room for abdominal pain and a CT scan was performed and was read as a small contracted gallbladder with a residual gallstone ultrasound then performed in June apparently revealed a small calcification in the gallbladder fossa 7.5 mm with an area of thickened wall that again was felt to be a markedly contracted gallbladder. Patient then had an MRI performed recently showing no evidence of residual gallbladder per report normal liver normal common bile duct does not describe the calcification seen on the ultrasound and CT.   Patient is continuing to have right-sided abdominal pain certainly in the area of the gallbladder but also lower along the whole right side he was referred back from GI for my evaluation patient is on chronic Xarelto and also states he is going to be having neck surgery in the near future due to neuropathy down the arms at [facility]  . Past Medical History:   Diagnosis Date    Anxiety     Chronic back pain     Chronic kidney disease (CKD), stage II (mild)     COPD (chronic obstructive pulmonary disease) (HCC)     Depression     Diabetes mellitus (HCC)     DVT (deep venous thrombosis) (HCC)     Hyperlipidemia     Hypertension     Hypothyroidism     Lumbago with sciatica     Polyneuropathy       Past Surgical History:   Procedure Laterality Date    BACK SURGERY      CHOLECYSTECTOMY      SHOULDER SURGERY      SMALL INTESTINE SURGERY      partial bowel resection        Family History   Family history unknown: Yes        Social History     Tobacco Use    Smoking status: Never Smoker    Smokeless tobacco: Never Used   Substance Use Topics    Alcohol use: Never     Frequency: Never          Current Outpatient Medications   Medication Sig Dispense Refill    lidocaine (LIDODERM) 5 % Place 1 patch onto the skin daily 12 hours on, 12 hours off. 15 patch 0    dicyclomine (BENTYL) 10 MG capsule Take 2 capsules by mouth 4 times daily (before meals and nightly) 40 capsule 0    rivaroxaban (XARELTO) 10 MG TABS tablet Take 10 mg by mouth every 24 hours      umeclidinium-vilanterol (ANORO ELLIPTA) 62.5-25 MCG/INH AEPB inhaler Inhale 1 puff into the lungs daily      albuterol sulfate HFA (VENTOLIN HFA) 108 (90 Base) MCG/ACT inhaler Inhale 2 puffs into the lungs every 6 hours as needed for Wheezing      hydrOXYzine (ATARAX) 50 MG tablet Take 50 mg by mouth 3 times daily as needed for Itching      gabapentin (NEURONTIN) 600 MG tablet Take 800 mg by mouth 3 times daily.  HYDROcodone-acetaminophen (NORCO) 5-325 MG per tablet Take 1 tablet by mouth every 6 hours as needed for Pain.       losartan (COZAAR) 50 MG tablet Take 50 mg by mouth daily Am      meclizine (ANTIVERT) 25 MG tablet Take 25 mg by mouth daily am      acetaminophen (TYLENOL) 500 light-headedness, numbness and headaches. Negative for tremors, seizures, syncope, facial asymmetry and speech difficulty. Hematological: Negative. Negative for adenopathy. Does not bruise/bleed easily. Psychiatric/Behavioral: Positive for dysphoric mood. Negative for agitation, behavioral problems, confusion, decreased concentration, hallucinations, self-injury, sleep disturbance and suicidal ideas. The patient is nervous/anxious. The patient is not hyperactive. Objective:   /76 (Site: Right Upper Arm, Position: Sitting, Cuff Size: Medium Adult)   Pulse 68   Temp 97.3 °F (36.3 °C) (Tympanic)   Resp 16   Ht 5' 10\" (1.778 m)   Wt 270 lb (122.5 kg)   SpO2 98%   BMI 38.74 kg/m²     Physical Exam  Constitutional:       Appearance: He is well-developed. HENT:      Head: Normocephalic and atraumatic. Eyes:      General: No scleral icterus. Left eye: No discharge. Conjunctiva/sclera: Conjunctivae normal.      Pupils: Pupils are equal, round, and reactive to light. Neck:      Thyroid: No thyromegaly. Trachea: No tracheal deviation. Cardiovascular:      Rate and Rhythm: Normal rate and regular rhythm. Heart sounds: Normal heart sounds. No murmur. No friction rub. No gallop. Pulmonary:      Effort: Pulmonary effort is normal. No respiratory distress. Breath sounds: Normal breath sounds. No wheezing or rales. Chest:      Chest wall: No tenderness. Abdominal:      Palpations: There is no mass. Tenderness: There is abdominal tenderness. Hernia: No hernia is present. Musculoskeletal: Normal range of motion. General: No tenderness. Lymphadenopathy:      Cervical: No cervical adenopathy. Skin:     General: Skin is warm and dry. Coloration: Skin is not pale. Findings: No erythema or rash. Neurological:      Mental Status: He is alert and oriented to person, place, and time.    Psychiatric:         Behavior: Behavior normal. Thought Content:  Thought content normal.         Judgment: Judgment normal.            Results for orders placed or performed during the hospital encounter of 07/11/20   APTT   Result Value Ref Range    aPTT 51.0 (H) 22.0 - 38.0 seconds   Protime-INR   Result Value Ref Range    INR 2.02 (H) 0.85 - 1.13   CBC auto differential   Result Value Ref Range    WBC 3.2 (L) 4.8 - 10.8 thou/mm3    RBC 4.53 (L) 4.70 - 6.10 mill/mm3    Hemoglobin 12.7 (L) 14.0 - 18.0 gm/dl    Hematocrit 42.1 42.0 - 52.0 %    MCV 92.9 80.0 - 94.0 fL    MCH 28.0 26.0 - 33.0 pg    MCHC 30.2 (L) 32.2 - 35.5 gm/dl    RDW-CV 13.5 11.5 - 14.5 %    RDW-SD 45.9 (H) 35.0 - 45.0 fL    Platelets 817 995 - 491 thou/mm3    MPV 9.7 9.4 - 12.4 fL    Seg Neutrophils 50.0 %    Lymphocytes 34.2 %    Monocytes 10.9 %    Eosinophils 3.4 %    Basophils 1.2 %    Immature Granulocytes 0.3 %    Segs Absolute 1.6 (L) 1.8 - 7.7 thou/mm3    Lymphocytes Absolute 1.1 1.0 - 4.8 thou/mm3    Monocytes Absolute 0.3 (L) 0.4 - 1.3 thou/mm3    Eosinophils Absolute 0.1 0.0 - 0.4 thou/mm3    Basophils Absolute 0.0 0.0 - 0.1 thou/mm3    Immature Grans (Abs) 0.01 0.00 - 0.07 thou/mm3    nRBC 0 /100 wbc   Brain Natriuretic Peptide   Result Value Ref Range    Pro-.2 0.0 - 900.0 pg/mL   Hepatic function panel   Result Value Ref Range    Alb 3.6 3.5 - 5.1 g/dL    Total Bilirubin 0.5 0.3 - 1.2 mg/dL    Bilirubin, Direct <0.2 0.0 - 0.3 mg/dL    Alkaline Phosphatase 48 38 - 126 U/L    AST 11 5 - 40 U/L    ALT 9 (L) 11 - 66 U/L    Total Protein 6.0 (L) 6.1 - 8.0 g/dL   Lipase   Result Value Ref Range    Lipase 37.0 5.6 - 51.3 U/L   Troponin   Result Value Ref Range    Troponin T < 0.010 ng/ml   Basic Metabolic Panel w/ Reflex to MG   Result Value Ref Range    Sodium 142 135 - 145 meq/L    Potassium reflex Magnesium 4.5 3.5 - 5.2 meq/L    Chloride 107 98 - 111 meq/L    CO2 27 23 - 33 meq/L    Glucose 72 70 - 108 mg/dL    BUN 14 7 - 22 mg/dL    CREATININE 1.3 (H) 0.4 - 1.2 mg/dL Calcium 8.7 8.5 - 10.5 mg/dL   Ethanol   Result Value Ref Range    ETHYL ALCOHOL, SERUM < 0.01 0.00 %   Urine Drug Screen   Result Value Ref Range    AMPHETAMINE+METHAMPHETAMINE URINE SCREEN Negative NEGATIVE    Barbiturate Quant, Ur Negative NEGATIVE    Benzodiazepine Quant, Ur Negative NEGATIVE    Cannabinoid Quant, Ur Negative NEGATIVE    Cocaine Metab Quant, Ur Negative NEGATIVE    Opiates, Urine POSITIVE NEGATIVE    Oxycodone Negative NEGATIVE    PCP Quant, Ur Negative NEGATIVE   Anion Gap   Result Value Ref Range    Anion Gap 8.0 8.0 - 16.0 meq/L   Osmolality   Result Value Ref Range    Osmolality Calc 282.1 275.0 - 300 mOsmol/kg   Glomerular Filtration Rate, Estimated   Result Value Ref Range    Est, Glom Filt Rate 54 (A) ml/min/1.73m2   Urine with Reflexed Micro   Result Value Ref Range    Glucose, Ur NEGATIVE NEGATIVE mg/dl    Bilirubin Urine NEGATIVE NEGATIVE    Ketones, Urine NEGATIVE NEGATIVE    Specific Gravity, Urine 1.012 1.002 - 1.03    Blood, Urine MODERATE (A) NEGATIVE    pH, UA 5.5 5.0 - 9.0    Protein, UA NEGATIVE NEGATIVE    Urobilinogen, Urine 0.2 0.0 - 1.0 eu/dl    Nitrite, Urine NEGATIVE NEGATIVE    Leukocyte Esterase, Urine NEGATIVE NEGATIVE    Color, UA YELLOW STRAW-YELL    Character, Urine CLEAR CLEAR-SL C    RBC, UA 15-25 0-2/hpf /hpf    WBC, UA 0-2 0-4/hpf /hpf    Epithelial Cells, UA 3-5 3-5/hpf /hpf    Bacteria, UA NONE SEEN FEW/NONE S /hpf    Casts UA NONE SEEN NONE SEEN /lpf    Crystals, UA NONE SEEN NONE SEEN    Renal Epithelial, UA NONE SEEN NONE SEEN    Yeast, UA NONE SEEN NONE SEEN    CASTS 2 NONE SEEN NONE SEEN /lpf    MISCELLANEOUS 2 NONE SEEN    EKG Altered Mental Status   Result Value Ref Range    Ventricular Rate 58 BPM    Atrial Rate 58 BPM    P-R Interval 174 ms    QRS Duration 86 ms    Q-T Interval 432 ms    QTc Calculation (Bazett) 424 ms    P Axis 67 degrees    R Axis 63 degrees    T Axis 65 degrees       Assessment:     1.   Persistent right upper quadrant right-sided

## 2020-08-11 ENCOUNTER — TELEPHONE (OUTPATIENT)
Dept: PULMONOLOGY | Age: 75
End: 2020-08-11

## 2020-08-11 NOTE — TELEPHONE ENCOUNTER
LM with pt to offer new sleep consult appt with Dr Tessa Marino for 8/12/2020.  See if patient has had prior sleep study, if so where and if pt has pap ( if so pt will need a download prior to appt)

## 2020-10-06 ENCOUNTER — INITIAL CONSULT (OUTPATIENT)
Dept: PULMONOLOGY | Age: 75
End: 2020-10-06
Payer: MEDICARE

## 2020-10-06 VITALS
HEART RATE: 64 BPM | OXYGEN SATURATION: 98 % | HEIGHT: 70 IN | SYSTOLIC BLOOD PRESSURE: 126 MMHG | DIASTOLIC BLOOD PRESSURE: 82 MMHG | BODY MASS INDEX: 39.08 KG/M2 | WEIGHT: 273 LBS | TEMPERATURE: 97.6 F

## 2020-10-06 PROCEDURE — 3017F COLORECTAL CA SCREEN DOC REV: CPT | Performed by: INTERNAL MEDICINE

## 2020-10-06 PROCEDURE — 4040F PNEUMOC VAC/ADMIN/RCVD: CPT | Performed by: INTERNAL MEDICINE

## 2020-10-06 PROCEDURE — G8417 CALC BMI ABV UP PARAM F/U: HCPCS | Performed by: INTERNAL MEDICINE

## 2020-10-06 PROCEDURE — G8428 CUR MEDS NOT DOCUMENT: HCPCS | Performed by: INTERNAL MEDICINE

## 2020-10-06 PROCEDURE — G8484 FLU IMMUNIZE NO ADMIN: HCPCS | Performed by: INTERNAL MEDICINE

## 2020-10-06 PROCEDURE — 3023F SPIROM DOC REV: CPT | Performed by: INTERNAL MEDICINE

## 2020-10-06 PROCEDURE — G8926 SPIRO NO PERF OR DOC: HCPCS | Performed by: INTERNAL MEDICINE

## 2020-10-06 PROCEDURE — 1123F ACP DISCUSS/DSCN MKR DOCD: CPT | Performed by: INTERNAL MEDICINE

## 2020-10-06 PROCEDURE — 99204 OFFICE O/P NEW MOD 45 MIN: CPT | Performed by: INTERNAL MEDICINE

## 2020-10-06 PROCEDURE — 1036F TOBACCO NON-USER: CPT | Performed by: INTERNAL MEDICINE

## 2020-10-06 NOTE — LETTER
[address] [facility]  [place] [address]  Phone: [phone]  Fax: [phone]    Cristian Leon MD        October 6, 2020     Ronaldo Chavez MD  [facility] [address] [id number]    Patient: Delisa Hurtado  MR Number: [id number]  YOB: 1945  Date of Visit: 10/6/2020    Dear Dr. All [facility]: Thank you for the request for consultation for Jesus Rodriguez to me for the evaluation of MICKI. Below are the relevant portions of my assessment and plan of care. If you have questions, please do not hesitate to call me. I look forward to following [facility] along with you.     Sincerely,        Cristian Leon MD

## 2020-10-06 NOTE — PROGRESS NOTES
New Sleep Patient H/P    Presentation:  Gianni Mitchell is referred by Jolynn Knight  for  MICKI      Gianni Mitchell has extensive medical history, including COPD, DVT on chronic anticoagulation, obesity CAD, Diastolic CHF, GERD, glaucoma, hypothyroidism, HTN, bilateral inguinal hernias, renal cancer s/p nephrectomy, pulmonary embolism. Diagnosed with MICKI 10 years ago at [facility] he was evicted from his apartment while he was in long term and his belongings were thrown away including his CPAP, needs a replacement CPAP, [facility] contacted records from sleep study no longer available. To have neck surgery by  Dr Tamela Chacon at [facility] COPD on Anoro    Time in Bed:   Bedtime: 11p.m. Awakens  7 a.m. Different on weekends? No       Dock falls asleep in 10  minutes. Any awakenings? Yes  Difficulty Falling back to sleep? No    Naps:  Any naps? Yes and are they helpful Yes    Snoring and Apneas:  Do you snore or been told you a snore? Yes  How long have known about your snoring? years  Any witnessed apneas? Yes  Any awakenings with choking or gasping? Yes    Dreams:  Any recurring dreams? No  Hallucinations? No  Sleep Paralysis? No  Symptoms of Cataplexy? No    Driving History:  Do you have a CDL or drive long distances for work? No  Any driving accidents in the past year? No  Any sleepiness while driving? Yes    Weight:  Any change in weight over the past year? Yes   How about past 5 years?  Yes  How much? 40      Past Medical History:   Diagnosis Date    Anxiety     Chronic back pain     Chronic kidney disease (CKD), stage II (mild)     COPD (chronic obstructive pulmonary disease) (HCC)     Depression     Diabetes mellitus (HCC)     DVT (deep venous thrombosis) (HCC)     Hyperlipidemia     Hypertension     Hypothyroidism     Lumbago with sciatica     Polyneuropathy     Renal cell carcinoma (HCC)        Past Surgical History:   Procedure Laterality Date    BACK SURGERY      CHOLECYSTECTOMY      SHOULDER SURGERY      SMALL INTESTINE SURGERY partial bowel resection       Social History     Tobacco Use    Smoking status: Never Smoker    Smokeless tobacco: Never Used   Substance Use Topics    Alcohol use: Never     Frequency: Never    Drug use: Never       Allergies   Allergen Reactions    Codeine Hives    Effexor [Venlafaxine] Itching    Ibuprofen     Tramadol Hives       Current Outpatient Medications   Medication Sig Dispense Refill    lidocaine (LIDODERM) 5 % Place 1 patch onto the skin daily 12 hours on, 12 hours off. 15 patch 0    dicyclomine (BENTYL) 10 MG capsule Take 2 capsules by mouth 4 times daily (before meals and nightly) 40 capsule 0    rivaroxaban (XARELTO) 10 MG TABS tablet Take 10 mg by mouth every 24 hours      umeclidinium-vilanterol (ANORO ELLIPTA) 62.5-25 MCG/INH AEPB inhaler Inhale 1 puff into the lungs daily      albuterol sulfate HFA (VENTOLIN HFA) 108 (90 Base) MCG/ACT inhaler Inhale 2 puffs into the lungs every 6 hours as needed for Wheezing      hydrOXYzine (ATARAX) 50 MG tablet Take 50 mg by mouth 3 times daily as needed for Itching      gabapentin (NEURONTIN) 600 MG tablet Take 800 mg by mouth 3 times daily.  HYDROcodone-acetaminophen (NORCO) 5-325 MG per tablet Take 1 tablet by mouth every 6 hours as needed for Pain.  losartan (COZAAR) 50 MG tablet Take 50 mg by mouth daily Am      meclizine (ANTIVERT) 25 MG tablet Take 25 mg by mouth daily am      acetaminophen (TYLENOL) 500 MG tablet Take 1,000 mg by mouth every 6 hours as needed for Pain      tamsulosin (FLOMAX) 0.4 MG capsule Take 0.4 mg by mouth daily pm      atorvastatin (LIPITOR) 20 MG tablet Take 20 mg by mouth daily pm       No current facility-administered medications for this visit. Family History   Family history unknown: Yes        Any family history of any sleep problems or any one in your family on CPAP? { has not seen family in years    Caffeine Intake: How much soda (pop), coffee, tea, power drinks do you ingest per day? 3 per day. Employment History:  Where do you work? Retired      Physical Exam:    HEIGHTHeight: 5' 10\" (177.8 cm) WEIGHTWeight: 273 lb (123.8 kg)    BMI:  Body mass index is 39.17 kg/m². Neck Size: 19.25 Oxygen Sat: room air    ESS: 19   Vitals: /82 (Site: Left Lower Arm, Position: Sitting, Cuff Size: Large Adult)   Pulse 64   Temp 97.6 °F (36.4 °C) (Tympanic)   Ht 5' 10\" (1.778 m)   Wt 273 lb (123.8 kg)   SpO2 98% Comment: room air  BMI 39.17 kg/m²       Mallampati Score: 4  General appearance: alert and oriented to person, place and time and obese  Nose: Nares normal. Septum midline. Mucosa normal. No drainage or sinus tenderness. Oropharynx:  Large tongue, crowded pharynx, mallampati class 3, upper dentures  Lungs: clear to auscultation bilaterally  Heart: regular rate and rhythm, S1, S2 normal, no murmur, click, rub or gallop  Extremities: extremities normal, atraumatic, no cyanosis or edema  Neurologic: Mental status: Alert, oriented, thought content appropriate      Assessment    Diagnosis Orders   1. MICKI (obstructive sleep apnea)  Sleep Study with PAP Titration   2. Obesity (BMI 30-39. 9)  Sleep Study with PAP Titration       Plan   Orders Placed This Encounter   Procedures    Sleep Study with PAP Titration     Split study     Standing Status:   Future     Standing Expiration Date:   10/6/2021     Order Specific Question:   Sleep Study Titration Type     Answer:   Split Night Study (Baseline followed by PAP Titration)     Order Specific Question:   Location For Sleep Study     Answer:   [facility]     Order Specific Question:   Select Sleep Lab Location     Answer:   [address]           Mask Desensitization and Pre study teaching? No  Weight Loss Information Given? Yes  Sleep Hygiene Discussed?  Yes

## 2020-11-04 ENCOUNTER — HOSPITAL ENCOUNTER (OUTPATIENT)
Dept: SLEEP CENTER | Age: 75
Discharge: HOME | End: 2020-11-06
Payer: MEDICARE

## 2020-11-04 PROCEDURE — 95811 POLYSOM 6/>YRS CPAP 4/> PARM: CPT

## 2020-11-05 LAB — STATUS: NORMAL

## 2020-11-05 NOTE — PROGRESS NOTES
Title:  CPAP/BiLevel Titration's    Approved by:  Sarah Motley MD      Approval Date:  December, 2019 Next Review:  December, 2021     Responsible Party:  [facility] Applies to:   Gabo Jesus Number:  None    Document Type:  Such as Guideline, Policy, Policy & Procedure, or Procedure, Instructions  Manual:  Policy and Procedures    Section: IV Policy Start Date: October, 7745           POLICY: Positive airway pressure device is used to treat patients with sleep related breathing disorders characterized by full or partial occlusion of the upper airway during sleep. A patient must have undergone polysomnography and diagnosed with obstructive sleep apnea. All individuals who record sleep studies must follow best practices for CPAP/bilevel/ASV titrations in order to attain the ideal pressure setting for their patients. Too low of pressure may cause patients to either be sub-optimally treated or to wake up in a panic. Too much pressure may cause the patient to experience bloating or mask leakage. Determining the appropriate pressure setting for each patient will lead to improved adherence and outcome. Titrations are not an exact science, and it is understood that technologists may need to make minor changes for individual patients. The procedures outlined below are meant to be a guideline and follow the spirit of the AASM clinical guidelines. PROCEDURE:    CPAP:    1. Review the patients pertinent medical al history, previous sleep study or studies to ass the severity of sleep disordered breathing. Review of pertinent information will help to attain a better titration. 2. Applications of electrodes, montages, filters, sensitivities and scoring will be performed according to the current version of the AASM Scoring Manual.   3. Prior to initiating study collect all appropriate PAP supplies  a. Tubing   b.  Humidifier (filled with distilled water)  c. Masks   4. The technologist should assess and measure patient for most appropriate mask prior to start of study. 5. CPAP should be initiated at 5 cm H20.  a. More pressure at start of study may be necessary if patient is morbidly obese or unable to fall asleep on a pressure of 5 cm H20   6. If apneas or frequent hypopneas are present, pressure settings should be increased by 2 cm H20. If occasional hypopneas, snoring, or mask flow limitation are present, pressure settings should be increased by 1 cm H20 and maintained for at least fie minutes to determine if events improve or resolve. Pressure settings may need to be increased more quickly during REM sleep given the limited amount of REM during sleep and the need to treat events during this stage. 7. If a mask leak occurs, the tech should first fix the leakage before raising the pressure. Otherwise, the final pressure setting chosen for the patient may be too high. Once the mask leak has been fixed, decrease the pressure to the last setting where mouth breathing and/or mask leakage was not present, and then re-titrate as indicated. Make sure to document directly on the study the steps taken to resolve the leak and the type of masks used. Pressure setting usually do not need to be set as high with a nasal-mask than with a full-face mask. 8. The recording technologist should document directly on the study at least every 30 minutes. 9. If the patient takes a break from wearing the mask, do not decrease the CPAP pressure on attempted return to sleep unless the patient remains awake for 15 minutes or the patient specifically requests that the pressure be lowered. 10. Do not raise pressure for central apneas. If the patient develops central apneas, pressure setting may need to be lowered. 11. If the patient is unable to tolerate CPAP secondary due to:  a. Persistent mouth breathing despite use of a full-face mask/chin strap  b.  Inability to exhale against higher expiratory pressures (typically beginning anywhere from 15 to 20 cm of H20.  c. Has frequent central apneas, the use of bilevel positive airway pressure may be indicated. Document directly on study why the patient is being switched from CPAP to bilevel. 12. Ensure that supine sleep has been seen on the chosen setting. Going above the chosen setting 1 or 2 cm H20 to show range may be helpful to ensure that the correct pressure has been established. BiLEVEL:    1. Technologist may change from CPAP to bilevel during a study if proven the patient is unable to tolerate CPAP. 2. Review the patients pertinent medical al history, previous sleep study or studies to ass the severity of sleep disordered breathing. Review of pertinent information will help to attain a better titration. 3. Applications of electrodes, montages, filters, sensitivities and scoring will be performed according to the current version of the AASM Scoring Manual.   4. Prior to initiating study collect all appropriate PAP supplies  a. Tubing   b. Humidifier (filled with distilled water)  c. Masks   5. The technologist should assess and measure patient for most appropriate mask prior to start of study. 6. If the patient has not previously been on CPAP, beginning pressures should be 8/4 cm H20 or higher if patient is morbidly obese or unable to fall asleep at lower pressures. If the patient has been previously successfully treated on CPAP start expiratory pressure at therapeutic setting and set inspiratory pressure 4 cm H20 higher. If advancing from CPAP to bilevel during a study expiratory pressure should be set at most successful CPAP setting and inspiratory pressure set 4 cm h20 higher. 7. The standard differential pressure utilized during bilevel titrations typically ranges from 3 to 5 cm H20, with 4 cm H20 being the most common.   Higher differential pressures may be needed in patients who are morbidly obese or who have neuromuscular diseases. 8. If apneas or frequent hypopneas are present, inspiratory and expiratory pressure settings should be increased by 2 cm H20. If occasional hypopneas, snoring, or mask flow limitation are present inspiratory and expiratory pressures settings should be increased by 1 cm h20 and maintained for at least 5 minutes to determine if events improve or resolve. Pressure settings may need to be increased more quickly during REM sleep given the limited amount of REM during sleep and the need to treat events during this stage. 9. If a mask leak occurs, the tech should first fix the leakage before raising the pressure. Otherwise, the final pressure setting chosen for the patient may be too high. Once the mask leak has been fixed, decrease the pressure to the last setting where mouth breathing and/or mask leakage was not present, and then re-titrate as indicated. Make sure to document directly on the study the steps taken to resolve the leak and the type of masks used. Pressure setting usually do not need to be set as high with a nasal-mask than with a full-face mask. 10. The recording technologist should document directly on the study at least every 30 minutes. 11. If the patient takes a break from wearing the mask, do not decrease the CPAP pressure on attempted return to sleep unless the patient remains awake for 15 minutes or the patient specifically requests that the pressure be lowered. 12. Do not raise pressure for central apneas. If the patient develops central apneas, pressure setting may need to be lowered. If the patient has central apneas on bilevel, the use of spontaneous (ST) mode may be indicated. a. ST mode may only be used in 2 cases  i. An order for ST mode with a primary diagnosis of central sleep apnea  ii. During a titration if obstructive events are less than 5/ hour and centrals must be greater than 50% of total respiratory events.    13. Ensure that supine sleep has been seen on the chosen setting. Going above the chosen setting 1 or 2 cm H20 to show range may be helpful to ensure that the correct pressure has been established.

## 2020-11-21 NOTE — PROGRESS NOTES
Asha Rai M.D.    D: 11/19/2020 23:24:12       T: 11/20/2020 3:49:07     [email or web address]  Job#: [id number]     Doc#: [id number]    CC:

## 2020-11-23 ENCOUNTER — TELEPHONE (OUTPATIENT)
Dept: SLEEP CENTER | Age: 75
End: 2020-11-23

## 2021-02-19 ENCOUNTER — APPOINTMENT (OUTPATIENT)
Dept: CT IMAGING | Age: 76
DRG: 917 | End: 2021-02-19
Payer: MEDICARE

## 2021-02-19 ENCOUNTER — HOSPITAL ENCOUNTER (INPATIENT)
Age: 76
LOS: 1 days | Discharge: HOME HEALTH | DRG: 917 | End: 2021-02-23
Attending: EMERGENCY MEDICINE | Admitting: FAMILY MEDICINE
Payer: MEDICARE

## 2021-02-19 ENCOUNTER — APPOINTMENT (OUTPATIENT)
Dept: GENERAL RADIOLOGY | Age: 76
DRG: 917 | End: 2021-02-19
Payer: MEDICARE

## 2021-02-19 DIAGNOSIS — T50.905A ADVERSE EFFECT OF DRUG, INITIAL ENCOUNTER: ICD-10-CM

## 2021-02-19 DIAGNOSIS — T40.2X5A: ICD-10-CM

## 2021-02-19 DIAGNOSIS — R41.82 ALTERED MENTAL STATUS, UNSPECIFIED ALTERED MENTAL STATUS TYPE: Primary | ICD-10-CM

## 2021-02-19 PROBLEM — G93.40 ENCEPHALOPATHY: Status: ACTIVE | Noted: 2021-02-19

## 2021-02-19 LAB
ALBUMIN SERPL-MCNC: 3.5 G/DL (ref 3.5–5.1)
ALLEN TEST: POSITIVE
ALP BLD-CCNC: 44 U/L (ref 38–126)
ALT SERPL-CCNC: < 5 U/L (ref 11–66)
AMMONIA: 36 UMOL/L (ref 11–60)
AMPHETAMINE+METHAMPHETAMINE URINE SCREEN: NEGATIVE
ANION GAP SERPL CALCULATED.3IONS-SCNC: 10 MEQ/L (ref 8–16)
AST SERPL-CCNC: 11 U/L (ref 5–40)
BACTERIA: ABNORMAL /HPF
BARBITURATE QUANTITATIVE URINE: NEGATIVE
BASE EXCESS (CALCULATED): 4.5 MMOL/L (ref -2.5–2.5)
BASOPHILS # BLD: 0.6 %
BASOPHILS ABSOLUTE: 0 THOU/MM3 (ref 0–0.1)
BENZODIAZEPINE QUANTITATIVE URINE: NEGATIVE
BILIRUB SERPL-MCNC: 1.3 MG/DL (ref 0.3–1.2)
BILIRUBIN URINE: NEGATIVE
BLOOD, URINE: NEGATIVE
BUN BLDV-MCNC: 11 MG/DL (ref 7–22)
CALCIUM SERPL-MCNC: 9.5 MG/DL (ref 8.5–10.5)
CANNABINOID QUANTITATIVE URINE: NEGATIVE
CASTS 2: ABNORMAL /LPF
CASTS UA: ABNORMAL /LPF
CHARACTER, URINE: CLEAR
CHLORIDE BLD-SCNC: 105 MEQ/L (ref 98–111)
CO2: 24 MEQ/L (ref 23–33)
COCAINE METABOLITE QUANTITATIVE URINE: NEGATIVE
COLLECTED BY:: ABNORMAL
COLOR: ABNORMAL
CREAT SERPL-MCNC: 1 MG/DL (ref 0.4–1.2)
CRYSTALS, UA: ABNORMAL
DEVICE: ABNORMAL
EOSINOPHIL # BLD: 2.1 %
EOSINOPHILS ABSOLUTE: 0.1 THOU/MM3 (ref 0–0.4)
EPITHELIAL CELLS, UA: ABNORMAL /HPF
ERYTHROCYTE [DISTWIDTH] IN BLOOD BY AUTOMATED COUNT: 14.5 % (ref 11.5–14.5)
ERYTHROCYTE [DISTWIDTH] IN BLOOD BY AUTOMATED COUNT: 46.2 FL (ref 35–45)
ETHYL ALCOHOL, SERUM: < 0.01 %
GFR SERPL CREATININE-BSD FRML MDRD: 73 ML/MIN/1.73M2
GLUCOSE BLD-MCNC: 69 MG/DL (ref 70–108)
GLUCOSE URINE: NEGATIVE MG/DL
HCO3: 30 MMOL/L (ref 23–28)
HCT VFR BLD CALC: 42 % (ref 42–52)
HEMOGLOBIN: 12.4 GM/DL (ref 14–18)
IMMATURE GRANS (ABS): 0 THOU/MM3 (ref 0–0.07)
IMMATURE GRANULOCYTES: 0 %
INR BLD: 1.03 (ref 0.85–1.13)
KETONES, URINE: ABNORMAL
LEUKOCYTE ESTERASE, URINE: NEGATIVE
LIPASE: 26.3 U/L (ref 5.6–51.3)
LYMPHOCYTES # BLD: 36.6 %
LYMPHOCYTES ABSOLUTE: 1.2 THOU/MM3 (ref 1–4.8)
MCH RBC QN AUTO: 25.8 PG (ref 26–33)
MCHC RBC AUTO-ENTMCNC: 29.5 GM/DL (ref 32.2–35.5)
MCV RBC AUTO: 87.3 FL (ref 80–94)
MISCELLANEOUS 2: ABNORMAL
MONOCYTES # BLD: 7.1 %
MONOCYTES ABSOLUTE: 0.2 THOU/MM3 (ref 0.4–1.3)
NITRITE, URINE: NEGATIVE
NUCLEATED RED BLOOD CELLS: 0 /100 WBC
O2 SATURATION: 96 %
OPIATES, URINE: NEGATIVE
OSMOLALITY CALCULATION: 275.3 MOSMOL/KG (ref 275–300)
OXYCODONE: POSITIVE
PCO2: 45 MMHG (ref 35–45)
PH BLOOD GAS: 7.43 (ref 7.35–7.45)
PH UA: 6 (ref 5–9)
PHENCYCLIDINE QUANTITATIVE URINE: NEGATIVE
PLATELET # BLD: 202 THOU/MM3 (ref 130–400)
PLATELET ESTIMATE: ADEQUATE
PMV BLD AUTO: 9.7 FL (ref 9.4–12.4)
PO2: 84 MMHG (ref 71–104)
POTASSIUM REFLEX MAGNESIUM: 4.3 MEQ/L (ref 3.5–5.2)
PRO-BNP: 484 PG/ML (ref 0–1800)
PROTEIN UA: 30
RBC # BLD: 4.81 MILL/MM3 (ref 4.7–6.1)
RBC URINE: ABNORMAL /HPF
RENAL EPITHELIAL, UA: ABNORMAL
SCAN OF BLOOD SMEAR: NORMAL
SEG NEUTROPHILS: 53.6 %
SEGMENTED NEUTROPHILS ABSOLUTE COUNT: 1.8 THOU/MM3 (ref 1.8–7.7)
SODIUM BLD-SCNC: 139 MEQ/L (ref 135–145)
SOURCE, BLOOD GAS: ABNORMAL
SPECIFIC GRAVITY, URINE: 1.02 (ref 1–1.03)
T4 FREE: 1.15 NG/DL (ref 0.93–1.76)
TOTAL PROTEIN: 6.5 G/DL (ref 6.1–8)
TROPONIN T: < 0.01 NG/ML
TSH SERPL DL<=0.05 MIU/L-ACNC: 0.41 UIU/ML (ref 0.4–4.2)
UROBILINOGEN, URINE: 1 EU/DL (ref 0–1)
WBC # BLD: 3.4 THOU/MM3 (ref 4.8–10.8)
WBC UA: ABNORMAL /HPF
YEAST: ABNORMAL

## 2021-02-19 PROCEDURE — 84439 ASSAY OF FREE THYROXINE: CPT

## 2021-02-19 PROCEDURE — 71045 X-RAY EXAM CHEST 1 VIEW: CPT

## 2021-02-19 PROCEDURE — 96360 HYDRATION IV INFUSION INIT: CPT

## 2021-02-19 PROCEDURE — 80307 DRUG TEST PRSMV CHEM ANLYZR: CPT

## 2021-02-19 PROCEDURE — 96361 HYDRATE IV INFUSION ADD-ON: CPT

## 2021-02-19 PROCEDURE — 93005 ELECTROCARDIOGRAM TRACING: CPT | Performed by: FAMILY MEDICINE

## 2021-02-19 PROCEDURE — 99223 1ST HOSP IP/OBS HIGH 75: CPT | Performed by: FAMILY MEDICINE

## 2021-02-19 PROCEDURE — 82607 VITAMIN B-12: CPT

## 2021-02-19 PROCEDURE — 93005 ELECTROCARDIOGRAM TRACING: CPT | Performed by: EMERGENCY MEDICINE

## 2021-02-19 PROCEDURE — 2580000003 HC RX 258: Performed by: FAMILY MEDICINE

## 2021-02-19 PROCEDURE — 2500000003 HC RX 250 WO HCPCS: Performed by: FAMILY MEDICINE

## 2021-02-19 PROCEDURE — 82140 ASSAY OF AMMONIA: CPT

## 2021-02-19 PROCEDURE — 84484 ASSAY OF TROPONIN QUANT: CPT

## 2021-02-19 PROCEDURE — 81001 URINALYSIS AUTO W/SCOPE: CPT

## 2021-02-19 PROCEDURE — 84443 ASSAY THYROID STIM HORMONE: CPT

## 2021-02-19 PROCEDURE — 96374 THER/PROPH/DIAG INJ IV PUSH: CPT

## 2021-02-19 PROCEDURE — 80053 COMPREHEN METABOLIC PANEL: CPT

## 2021-02-19 PROCEDURE — 82803 BLOOD GASES ANY COMBINATION: CPT

## 2021-02-19 PROCEDURE — 83880 ASSAY OF NATRIURETIC PEPTIDE: CPT

## 2021-02-19 PROCEDURE — 36415 COLL VENOUS BLD VENIPUNCTURE: CPT

## 2021-02-19 PROCEDURE — 82746 ASSAY OF FOLIC ACID SERUM: CPT

## 2021-02-19 PROCEDURE — 2580000003 HC RX 258: Performed by: EMERGENCY MEDICINE

## 2021-02-19 PROCEDURE — 83690 ASSAY OF LIPASE: CPT

## 2021-02-19 PROCEDURE — G0378 HOSPITAL OBSERVATION PER HR: HCPCS

## 2021-02-19 PROCEDURE — 85610 PROTHROMBIN TIME: CPT

## 2021-02-19 PROCEDURE — 99285 EMERGENCY DEPT VISIT HI MDM: CPT

## 2021-02-19 PROCEDURE — 94760 N-INVAS EAR/PLS OXIMETRY 1: CPT

## 2021-02-19 PROCEDURE — 82077 ASSAY SPEC XCP UR&BREATH IA: CPT

## 2021-02-19 PROCEDURE — 85025 COMPLETE CBC W/AUTO DIFF WBC: CPT

## 2021-02-19 PROCEDURE — 70450 CT HEAD/BRAIN W/O DYE: CPT

## 2021-02-19 RX ORDER — TAMSULOSIN HYDROCHLORIDE 0.4 MG/1
0.4 CAPSULE ORAL NIGHTLY
Status: DISCONTINUED | OUTPATIENT
Start: 2021-02-20 | End: 2021-02-23 | Stop reason: HOSPADM

## 2021-02-19 RX ORDER — MECLIZINE HCL 12.5 MG/1
25 TABLET ORAL DAILY
Status: DISCONTINUED | OUTPATIENT
Start: 2021-02-20 | End: 2021-02-23 | Stop reason: HOSPADM

## 2021-02-19 RX ORDER — ONDANSETRON 2 MG/ML
4 INJECTION INTRAMUSCULAR; INTRAVENOUS EVERY 6 HOURS PRN
Status: DISCONTINUED | OUTPATIENT
Start: 2021-02-19 | End: 2021-02-23 | Stop reason: HOSPADM

## 2021-02-19 RX ORDER — POLYETHYLENE GLYCOL 3350 17 G/17G
17 POWDER, FOR SOLUTION ORAL DAILY PRN
Status: DISCONTINUED | OUTPATIENT
Start: 2021-02-19 | End: 2021-02-23 | Stop reason: HOSPADM

## 2021-02-19 RX ORDER — ACETAMINOPHEN 500 MG
1000 TABLET ORAL EVERY 6 HOURS PRN
Status: DISCONTINUED | OUTPATIENT
Start: 2021-02-19 | End: 2021-02-19 | Stop reason: SDUPTHER

## 2021-02-19 RX ORDER — MAGNESIUM SULFATE IN WATER 40 MG/ML
2000 INJECTION, SOLUTION INTRAVENOUS PRN
Status: DISCONTINUED | OUTPATIENT
Start: 2021-02-19 | End: 2021-02-23 | Stop reason: HOSPADM

## 2021-02-19 RX ORDER — ATORVASTATIN CALCIUM 20 MG/1
20 TABLET, FILM COATED ORAL NIGHTLY
Status: DISCONTINUED | OUTPATIENT
Start: 2021-02-20 | End: 2021-02-23 | Stop reason: HOSPADM

## 2021-02-19 RX ORDER — PROMETHAZINE HYDROCHLORIDE 25 MG/1
12.5 TABLET ORAL EVERY 6 HOURS PRN
Status: DISCONTINUED | OUTPATIENT
Start: 2021-02-19 | End: 2021-02-23 | Stop reason: HOSPADM

## 2021-02-19 RX ORDER — ACETAMINOPHEN 650 MG/1
650 SUPPOSITORY RECTAL EVERY 6 HOURS PRN
Status: DISCONTINUED | OUTPATIENT
Start: 2021-02-19 | End: 2021-02-23 | Stop reason: HOSPADM

## 2021-02-19 RX ORDER — POTASSIUM CHLORIDE 20 MEQ/1
40 TABLET, EXTENDED RELEASE ORAL PRN
Status: DISCONTINUED | OUTPATIENT
Start: 2021-02-19 | End: 2021-02-23 | Stop reason: HOSPADM

## 2021-02-19 RX ORDER — ACETAMINOPHEN 325 MG/1
650 TABLET ORAL EVERY 6 HOURS PRN
Status: DISCONTINUED | OUTPATIENT
Start: 2021-02-19 | End: 2021-02-23 | Stop reason: HOSPADM

## 2021-02-19 RX ORDER — ALBUTEROL SULFATE 90 UG/1
2 AEROSOL, METERED RESPIRATORY (INHALATION) EVERY 6 HOURS PRN
Status: DISCONTINUED | OUTPATIENT
Start: 2021-02-19 | End: 2021-02-23 | Stop reason: HOSPADM

## 2021-02-19 RX ORDER — DICYCLOMINE HYDROCHLORIDE 10 MG/1
20 CAPSULE ORAL
Status: DISCONTINUED | OUTPATIENT
Start: 2021-02-20 | End: 2021-02-23 | Stop reason: HOSPADM

## 2021-02-19 RX ORDER — POTASSIUM CHLORIDE 7.45 MG/ML
10 INJECTION INTRAVENOUS PRN
Status: DISCONTINUED | OUTPATIENT
Start: 2021-02-19 | End: 2021-02-23 | Stop reason: HOSPADM

## 2021-02-19 RX ORDER — SODIUM CHLORIDE 0.9 % (FLUSH) 0.9 %
10 SYRINGE (ML) INJECTION PRN
Status: DISCONTINUED | OUTPATIENT
Start: 2021-02-19 | End: 2021-02-23 | Stop reason: HOSPADM

## 2021-02-19 RX ORDER — 0.9 % SODIUM CHLORIDE 0.9 %
1000 INTRAVENOUS SOLUTION INTRAVENOUS ONCE
Status: COMPLETED | OUTPATIENT
Start: 2021-02-19 | End: 2021-02-19

## 2021-02-19 RX ORDER — SODIUM CHLORIDE 9 MG/ML
INJECTION, SOLUTION INTRAVENOUS CONTINUOUS
Status: DISCONTINUED | OUTPATIENT
Start: 2021-02-19 | End: 2021-02-23 | Stop reason: HOSPADM

## 2021-02-19 RX ORDER — LOSARTAN POTASSIUM 50 MG/1
50 TABLET ORAL DAILY
Status: DISCONTINUED | OUTPATIENT
Start: 2021-02-20 | End: 2021-02-23 | Stop reason: HOSPADM

## 2021-02-19 RX ORDER — SODIUM CHLORIDE 0.9 % (FLUSH) 0.9 %
10 SYRINGE (ML) INJECTION EVERY 12 HOURS SCHEDULED
Status: DISCONTINUED | OUTPATIENT
Start: 2021-02-19 | End: 2021-02-23 | Stop reason: HOSPADM

## 2021-02-19 RX ADMIN — SODIUM CHLORIDE: 9 INJECTION, SOLUTION INTRAVENOUS at 22:37

## 2021-02-19 RX ADMIN — SODIUM CHLORIDE 1000 ML: 9 INJECTION, SOLUTION INTRAVENOUS at 13:48

## 2021-02-19 RX ADMIN — SODIUM CHLORIDE, PRESERVATIVE FREE 10 ML: 5 INJECTION INTRAVENOUS at 22:37

## 2021-02-19 RX ADMIN — FAMOTIDINE 20 MG: 10 INJECTION INTRAVENOUS at 22:39

## 2021-02-19 ASSESSMENT — PAIN SCALES - GENERAL: PAINLEVEL_OUTOF10: 8

## 2021-02-19 ASSESSMENT — ENCOUNTER SYMPTOMS
TROUBLE SWALLOWING: 0
EYE REDNESS: 0
BACK PAIN: 0
NAUSEA: 0
SHORTNESS OF BREATH: 0
COUGH: 0
ABDOMINAL PAIN: 0
VOMITING: 0

## 2021-02-19 ASSESSMENT — PAIN DESCRIPTION - DESCRIPTORS: DESCRIPTORS: SHARP

## 2021-02-19 NOTE — ED TRIAGE NOTES
Pt in bed. Neva reported that pt's room mate stated that pt was normal 2/17/2021. Neva reported that pt has been becoming more altered. Neva reported that home health nurse told pt to call the squad. PT rambling on about some random things. Neva reported that pt has cervical surgery recently. PT in no distress. PT is very poor historian.

## 2021-02-19 NOTE — H&P
History & Physical        Patient:  Carolee Baker  YOB: 1945    MRN: [id number]     Acct: [de-identified]    PCP: Alfonzo Lamar MD    Date of Admission: 2/19/2021    Date of Service: Pt seen/examined on 02/19/21  and Admitted to Observation with expected LOS less than two midnights due to medical therapy. Chief Complaint:  AMS    Assessment/Plan:    Encephalopathy, unspecified  - etiology not clear  - Intermittent with moments of clarity  - unsure of baseline although he was alert in the ER and they were planning dc home  - will get b12, folate, tsh, MRI brain w/o contrast.   - neuro checks  - neuro consult  - hold any meds that may worsen confusion    Chronic Pain/Cervical Spinal Stenosis s/p c2-t3 decompression in November  - multiple ER visits for falls per care-everywhere  - continue home meds    HTN/HLD/VTE/BPH  - resume xarelto, losartan, flomax, statin      History Of Present Illness:      76 y.o. male who presented to [address] [place] [address] with c/o of AMS. He was brought in by EMS after [facility] nurse called squad stating pt is not being himself. He was evaluated in the ER, and had a negative workup. He does take opioids at home and there was concern for OD but pt became alert and oriented. Plan was to send him home but when he stood up to walk, he became pale, confused and nurses had to get him back to bed. He then became confused again and started mumbling. He tells me he is at a quick stop and would like to go home. He does have a hx of Spinal decompression. Multiple er visits and hospitalizations at outside hospitals. Known sex offender. Recent SNF stay. Pt lives at home alone with [facility] visiting. Discussed with ER physician about trial of narcan. Will admit to obs overnight.       Past Medical History:          Diagnosis Date    Anxiety     Chronic back pain     Chronic kidney disease (CKD), stage II (mild)     COPD (chronic obstructive pulmonary disease) (HCC)     needed for Pain    Historical Provider, MD   tamsulosin (FLOMAX) 0.4 MG capsule Take 0.4 mg by mouth daily pm    Historical Provider, MD   atorvastatin (LIPITOR) 20 MG tablet Take 20 mg by mouth daily pm    Historical Provider, MD       Allergies:  Codeine, Effexor [venlafaxine], Ibuprofen, and Tramadol    Social History:      The patient currently lives home    TOBACCO:   reports that he has never smoked. He has never used smokeless tobacco.  ETOH:   reports no history of alcohol use. Family History:      Reviewed in detail and negative for DM, CAD, Cancer, CVA. Positive as follows:        Family history unknown: Yes       Diet:  No diet orders on file    REVIEW OF SYSTEMS:   Pertinent positives as noted in the HPI. All other systems reviewed and negative. PHYSICAL EXAM:    /69   Pulse 70   Temp 97.8 °F (36.6 °C) (Oral)   Resp 12   Ht 5' 10\" (1.778 m)   Wt 273 lb (123.8 kg)   SpO2 94%   BMI 39.17 kg/m²     General appearance:  No apparent distress, appears stated age and cooperative. HEENT:  Normal cephalic, atraumatic without obvious deformity. Pupils equal, round, and reactive to light. Extra ocular muscles intact. Conjunctivae/corneas clear. Neck: Supple, with full range of motion. No jugular venous distention. Trachea midline. Respiratory:  Normal respiratory effort. Clear  Cardiovascular:  Regular rate and rhythm   Abdomen: Soft, non-tender, non-distended with normal bowel sounds. Musculoskeletal:  No clubbing, cyanosis or edema bilaterally. Full range of motion without deformity. Skin: Skin color, texture, turgor normal.  No rashes or lesions.   Neurologic:  grossly non-focal.  Psychiatric:  Alert but altered  Peripheral Pulses: +2 palpable, equal bilaterally       Labs:     Recent Labs     02/19/21  1437   WBC 3.4*   HGB 12.4*   HCT 42.0        Recent Labs     02/19/21  1437      K 4.3      CO2 24   BUN 11   CREATININE 1.0   CALCIUM 9.5     Recent Labs

## 2021-02-19 NOTE — ED NOTES
PT getting dressed to be discharged. PT started to look pale and not stable on feet. PT started rambling and not making sense. PT sat down on bed. PT's BP taken while standing. PT's BP is 118/69 standing. Dr. Latonya Masterson at bedside. Dr Latonya Masterson assessing pt walking. PT almost fell. Pt caught by RN and placed back in bed. PT placed back in bed. PT rambling and not making sense. PT stated he is trying to leave. PT spaces in and out during conversation.       Jeri Azul RN  02/19/21 7845

## 2021-02-19 NOTE — ED NOTES
PT sitting in bed talking on the phone and eating a lunch.       Olinda Rasmussen, NAZ  02/19/21 [id number]

## 2021-02-19 NOTE — ED NOTES
ED to inpatient nurses report    Chief Complaint   Patient presents with    Altered Mental Status      Present to ED from home  LOC: alert to only name  Vital signs   Vitals:    02/19/21 1317 02/19/21 1441 02/19/21 1644   BP: 126/71  118/69   Pulse: 59 60 70   Resp: 12     Temp: 97.8 °F (36.6 °C)     TempSrc: Oral     SpO2: 95% 96% 94%   Weight: 273 lb (123.8 kg)     Height: 5' 10\" (1.778 m)        Oxygen Baseline RA    Current needs required none Bipap/Cpap No  LDAs:    Mobility: Requires assistance * 2  Pending ED orders: none  Present condition: stable  Person of contractself, phone number   Our promise was given to patient    Electronically signed by Qi Ceja RN on 2/19/2021 at 5:38 PM       Qi Ceja RN  02/19/21 9841

## 2021-02-19 NOTE — ED NOTES
Pt resting in bed with side rails up 2x2 and call light in reach. Vital signs assessed and stable. IV site checked. Pt updated on plan of care. Pt voiced understanding. Pt verbalized no other needs or concerns at this time. PT sitting up in bed on his cell phone. PT seems more oriented.         Susannah Farias RN  02/19/21 9939

## 2021-02-19 NOTE — ED NOTES
Bed: 015A  Expected date: 2/19/21  Expected time: 12:53 PM  Means of arrival: [facility] Dept  Comments:     Dominguez Clark RN  02/19/21 1257

## 2021-02-19 NOTE — ED NOTES
Patient placed on bedside commode to have a bowel movement. Patient requested several times to go to the bathroom, refused bedpan. Patient had to be oriented to his unsteady gait. Patient would sway back and forth just with pivoting. He states he feels steady. Patient did have bowel movement on bedside commode and put back in bed. Both side rails up, posey in place. Patient educated on use of call light. Updated on plan of care. Denies any current needs.      Corinne Harding RN  02/19/21 6413

## 2021-02-20 ENCOUNTER — APPOINTMENT (OUTPATIENT)
Dept: MRI IMAGING | Age: 76
DRG: 917 | End: 2021-02-20
Payer: MEDICARE

## 2021-02-20 PROBLEM — E44.0 MODERATE MALNUTRITION (HCC): Status: ACTIVE | Noted: 2021-02-20

## 2021-02-20 LAB
ALBUMIN SERPL-MCNC: 3.5 G/DL (ref 3.5–5.1)
ALP BLD-CCNC: 44 U/L (ref 38–126)
ALT SERPL-CCNC: < 5 U/L (ref 11–66)
ANION GAP SERPL CALCULATED.3IONS-SCNC: 7 MEQ/L (ref 8–16)
AST SERPL-CCNC: 10 U/L (ref 5–40)
BASOPHILS # BLD: 0.3 %
BASOPHILS ABSOLUTE: 0 THOU/MM3 (ref 0–0.1)
BILIRUB SERPL-MCNC: 1 MG/DL (ref 0.3–1.2)
BUN BLDV-MCNC: 11 MG/DL (ref 7–22)
CALCIUM SERPL-MCNC: 9.6 MG/DL (ref 8.5–10.5)
CHLORIDE BLD-SCNC: 108 MEQ/L (ref 98–111)
CO2: 26 MEQ/L (ref 23–33)
CREAT SERPL-MCNC: 1 MG/DL (ref 0.4–1.2)
EKG ATRIAL RATE: 54 BPM
EKG ATRIAL RATE: 61 BPM
EKG P AXIS: 66 DEGREES
EKG P AXIS: 67 DEGREES
EKG P-R INTERVAL: 162 MS
EKG P-R INTERVAL: 164 MS
EKG Q-T INTERVAL: 440 MS
EKG Q-T INTERVAL: 474 MS
EKG QRS DURATION: 78 MS
EKG QRS DURATION: 84 MS
EKG QTC CALCULATION (BAZETT): 442 MS
EKG QTC CALCULATION (BAZETT): 449 MS
EKG R AXIS: 44 DEGREES
EKG R AXIS: 56 DEGREES
EKG T AXIS: 40 DEGREES
EKG T AXIS: 60 DEGREES
EKG VENTRICULAR RATE: 54 BPM
EKG VENTRICULAR RATE: 61 BPM
EOSINOPHIL # BLD: 4.7 %
EOSINOPHILS ABSOLUTE: 0.1 THOU/MM3 (ref 0–0.4)
ERYTHROCYTE [DISTWIDTH] IN BLOOD BY AUTOMATED COUNT: 14.6 % (ref 11.5–14.5)
ERYTHROCYTE [DISTWIDTH] IN BLOOD BY AUTOMATED COUNT: 45.8 FL (ref 35–45)
FOLATE: 16.3 NG/ML (ref 4.8–24.2)
GFR SERPL CREATININE-BSD FRML MDRD: 73 ML/MIN/1.73M2
GLUCOSE BLD-MCNC: 72 MG/DL (ref 70–108)
HCT VFR BLD CALC: 41.7 % (ref 42–52)
HEMOGLOBIN: 12.2 GM/DL (ref 14–18)
IMMATURE GRANS (ABS): 0 THOU/MM3 (ref 0–0.07)
IMMATURE GRANULOCYTES: 0 %
LYMPHOCYTES # BLD: 34.3 %
LYMPHOCYTES ABSOLUTE: 1 THOU/MM3 (ref 1–4.8)
MCH RBC QN AUTO: 25.2 PG (ref 26–33)
MCHC RBC AUTO-ENTMCNC: 29.3 GM/DL (ref 32.2–35.5)
MCV RBC AUTO: 86 FL (ref 80–94)
MONOCYTES # BLD: 8.3 %
MONOCYTES ABSOLUTE: 0.2 THOU/MM3 (ref 0.4–1.3)
NUCLEATED RED BLOOD CELLS: 0 /100 WBC
PLATELET # BLD: 220 THOU/MM3 (ref 130–400)
PMV BLD AUTO: 9.2 FL (ref 9.4–12.4)
POTASSIUM REFLEX MAGNESIUM: 4.1 MEQ/L (ref 3.5–5.2)
RBC # BLD: 4.85 MILL/MM3 (ref 4.7–6.1)
SEG NEUTROPHILS: 52.4 %
SEGMENTED NEUTROPHILS ABSOLUTE COUNT: 1.6 THOU/MM3 (ref 1.8–7.7)
SODIUM BLD-SCNC: 141 MEQ/L (ref 135–145)
TOTAL PROTEIN: 6.7 G/DL (ref 6.1–8)
VITAMIN B-12: 632 PG/ML (ref 211–911)
VITAMIN B-12: 677 PG/ML (ref 211–911)
WBC # BLD: 3 THOU/MM3 (ref 4.8–10.8)

## 2021-02-20 PROCEDURE — 36415 COLL VENOUS BLD VENIPUNCTURE: CPT

## 2021-02-20 PROCEDURE — G0378 HOSPITAL OBSERVATION PER HR: HCPCS

## 2021-02-20 PROCEDURE — 93010 ELECTROCARDIOGRAM REPORT: CPT | Performed by: INTERNAL MEDICINE

## 2021-02-20 PROCEDURE — 99232 SBSQ HOSP IP/OBS MODERATE 35: CPT | Performed by: NURSE PRACTITIONER

## 2021-02-20 PROCEDURE — 96376 TX/PRO/DX INJ SAME DRUG ADON: CPT

## 2021-02-20 PROCEDURE — 6370000000 HC RX 637 (ALT 250 FOR IP): Performed by: NURSE PRACTITIONER

## 2021-02-20 PROCEDURE — 6370000000 HC RX 637 (ALT 250 FOR IP): Performed by: FAMILY MEDICINE

## 2021-02-20 PROCEDURE — 85025 COMPLETE CBC W/AUTO DIFF WBC: CPT

## 2021-02-20 PROCEDURE — 80053 COMPREHEN METABOLIC PANEL: CPT

## 2021-02-20 PROCEDURE — 2580000003 HC RX 258: Performed by: FAMILY MEDICINE

## 2021-02-20 PROCEDURE — 70551 MRI BRAIN STEM W/O DYE: CPT

## 2021-02-20 PROCEDURE — 2500000003 HC RX 250 WO HCPCS: Performed by: FAMILY MEDICINE

## 2021-02-20 RX ORDER — LIDOCAINE 4 G/G
1 PATCH TOPICAL DAILY
Status: DISCONTINUED | OUTPATIENT
Start: 2021-02-20 | End: 2021-02-23 | Stop reason: HOSPADM

## 2021-02-20 RX ADMIN — DICYCLOMINE HYDROCHLORIDE 20 MG: 10 CAPSULE ORAL at 18:18

## 2021-02-20 RX ADMIN — SODIUM CHLORIDE: 9 INJECTION, SOLUTION INTRAVENOUS at 18:18

## 2021-02-20 RX ADMIN — RIVAROXABAN 10 MG: 10 TABLET, FILM COATED ORAL at 09:50

## 2021-02-20 RX ADMIN — ATORVASTATIN CALCIUM 20 MG: 20 TABLET, FILM COATED ORAL at 19:35

## 2021-02-20 RX ADMIN — SODIUM CHLORIDE, PRESERVATIVE FREE 10 ML: 5 INJECTION INTRAVENOUS at 19:35

## 2021-02-20 RX ADMIN — LOSARTAN POTASSIUM 50 MG: 50 TABLET, FILM COATED ORAL at 09:49

## 2021-02-20 RX ADMIN — FAMOTIDINE 20 MG: 10 INJECTION INTRAVENOUS at 09:49

## 2021-02-20 RX ADMIN — MECLIZINE HYDROCHLORIDE 25 MG: 12.5 TABLET, FILM COATED ORAL at 09:49

## 2021-02-20 RX ADMIN — TAMSULOSIN HYDROCHLORIDE 0.4 MG: 0.4 CAPSULE ORAL at 19:35

## 2021-02-20 RX ADMIN — ACETAMINOPHEN 650 MG: 325 TABLET ORAL at 18:50

## 2021-02-20 RX ADMIN — FAMOTIDINE 20 MG: 10 INJECTION INTRAVENOUS at 19:35

## 2021-02-20 RX ADMIN — ACETAMINOPHEN 650 MG: 325 TABLET ORAL at 10:00

## 2021-02-20 RX ADMIN — DICYCLOMINE HYDROCHLORIDE 20 MG: 10 CAPSULE ORAL at 19:35

## 2021-02-20 RX ADMIN — DICYCLOMINE HYDROCHLORIDE 20 MG: 10 CAPSULE ORAL at 09:50

## 2021-02-20 ASSESSMENT — PAIN DESCRIPTION - ONSET: ONSET: AWAKENED FROM SLEEP

## 2021-02-20 ASSESSMENT — PAIN DESCRIPTION - PAIN TYPE
TYPE: ACUTE PAIN
TYPE: ACUTE PAIN

## 2021-02-20 ASSESSMENT — PAIN DESCRIPTION - PROGRESSION
CLINICAL_PROGRESSION: GRADUALLY WORSENING
CLINICAL_PROGRESSION: NOT CHANGED

## 2021-02-20 ASSESSMENT — PAIN DESCRIPTION - FREQUENCY
FREQUENCY: CONTINUOUS
FREQUENCY: CONTINUOUS

## 2021-02-20 ASSESSMENT — PAIN DESCRIPTION - LOCATION
LOCATION: HEAD
LOCATION: HEAD

## 2021-02-20 ASSESSMENT — PAIN SCALES - GENERAL
PAINLEVEL_OUTOF10: 10
PAINLEVEL_OUTOF10: 3

## 2021-02-20 NOTE — PROGRESS NOTES
Comprehensive Nutrition Assessment    Type and Reason for Visit:  Initial, Positive Nutrition Screen, Wound    Nutrition Recommendations/Plan:   *Recommend a Multivitamin w/minerals daily. *Started Glucerna TID. *Continue current diet. Nutrition Assessment: Pt. moderately malnourished AEB criteria as listed below. At risk for further nutrition compromise r/t admit d/t AMS, underlying medical condition (hx DM, CKF, COPD, Depression, HTN, HLD, Renal Cell Cancer) and need for nutrition support. Nutrition recommendations/interventions as per above. Malnutrition Assessment:  Malnutrition Status: Moderate malnutrition    Context:  Acute Illness     Findings of the 6 clinical characteristics of malnutrition:  Energy Intake:  1 - 75% or less of estimated energy requirements for 7 or more days  Weight Loss:  Unable to assess(40 lbs unplanned weight loss in 3 months per pt report; however, no weight hx per EMR to confirm reported weight loss)     Body Fat Loss:  No significant body fat loss Orbital   Muscle Mass Loss:  7 - Moderate muscle mass loss Temples (temporalis)  Fluid Accumulation:  1 - Mild Extremities   Strength:  Not Performed    Estimated Daily Nutrient Needs:  Energy (kcal):  9229-9629 kcal/day (15-18 kcal/kg); Weight Used for Energy Requirements:  (124 kg on 2/19- stated weight)     Protein (g):  105+ g/day (1.4+ g/kg); Weight Used for Protein Requirements:  (75.5 kg)    Nutrition Related Findings:  admit d/t AMS; confused per RN but pt was able to answer questions appropriately today; pt seen; he reports poor appetite and intake over the past week with unplanned weight loss of 40 lbs in 3 months- no weight hx per EMR to confirm reported weight loss; pt denies N/V or chewing/swallowing difficulty with food; pt amenable to try ensure durng LOS; last BM x1 on 2/19. Hg 12.2.  Rx includes: Lipitor      Wounds:  (small spot on coccyx per nursing admit screen; no wounds documented in flowsheet) Current Nutrition Therapies:    Diet NPO Effective Now    Anthropometric Measures:  · Height: 5' 10\" (177.8 cm)  · Current Body Weight: 273 lb (123.8 kg)(2/19; stated weight; +trace edema BLE)   · Admission Body Weight: 273 lb (123.8 kg)(2/19; stated weight; +trace edema BLE)    · Usual Body Weight: (265 lbs per pt report. No actual weights per EMR)     · Ideal Body Weight: 166 lbs  · BMI: 39.2  · BMI Categories: Obese Class 2 (BMI 35.0 -39.9)       Nutrition Diagnosis:   · Moderate malnutrition, In context of acute illness or injury related to inadequate protein-energy intake as evidenced by poor intake prior to admission, moderate muscle loss    Nutrition Interventions:   Food and/or Nutrient Delivery:  Continue Current Diet, Start Oral Nutrition Supplement, Vitamin Supplement  Nutrition Education/Counseling:  Education initiated(Encouraged po intake of meals at best effort)   Coordination of Nutrition Care:  Continue to monitor while inpatient    Goals:  Pt will consume 75% or more of meals during LOS       Nutrition Monitoring and Evaluation:   Behavioral-Environmental Outcomes:  None Identified   Food/Nutrient Intake Outcomes:  Food and Nutrient Intake, Supplement Intake, Diet Advancement/Tolerance, Vitamin/Mineral Intake  Physical Signs/Symptoms Outcomes:  Biochemical Data, GI Status, Weight, Skin, Nutrition Focused Physical Findings, Fluid Status or Edema     Discharge Planning:     Too soon to determine     Electronically signed by Liliana Solitario RD, LD on 2/20/21 at 1:07 PM EST    Contact: [phone]

## 2021-02-20 NOTE — ED PROVIDER NOTES
[facility] [address] [address] EMERGENCY DEPT    EMERGENCY MEDICINE     Pt Name: Chloe Boland  MRN: [id number]  [place] 1945  Date of evaluation: 2/19/2021  Provider: Glenis Shafer MD,    CHIEF COMPLAINT       Chief Complaint   Patient presents with    Altered Mental Status       HISTORY OF PRESENT ILLNESS    Chloe Boland is a pleasant 76 y.o. male who presents to the emergency department from home for altered mental status. EMS states that the home health nurse called the squad because he is becoming more altered. She states that this is increased over the past few days. When the patient arrived in the emergency department he was rambling about nonsensical stuff. He denies any headache, chills, fever, chest pain, shortness of breath, nausea, vomiting, or abdominal pain. Patient states that he does not have chronic neck pain after surgery. He was given some oxycodones to take. He states that the nurses the one who distributes his medications. He also states that he has had intermittent dizziness for the past few days. Triage notes and Nursing notes were reviewed by myself. Any discrepancies are addressed above.     PAST MEDICAL HISTORY     Past Medical History:   Diagnosis Date    Anxiety     Chronic back pain     Chronic kidney disease (CKD), stage II (mild)     [facility]ch[facility]     Depression     Diabetes mellitus ([facility]     DVT (deep venous thrombosis) (HCC)     Hyperlipidemia     Hypertension     Hypothyroidism     Lumbago with sciatica     Polyneuropathy     Renal cell carcinoma (HCC)        SURGICAL HISTORY       Past Surgical History:   Procedure Laterality Date    BACK SURGERY      CHOLECYSTECTOMY      SHOULDER SURGERY      SMALL INTESTINE SURGERY      partial bowel resection       CURRENT MEDICATIONS       Previous Medications    ACETAMINOPHEN (TYLENOL) 500 MG TABLET    Take 1,000 mg by mouth every 6 hours as needed for Pain    ALBUTEROL SULFATE HFA (VENTOLIN HFA) 108 (90 BASE) MCG/ACT INHALER    Inhale 2 puffs into the lungs every 6 hours as needed for Wheezing    ATORVASTATIN (LIPITOR) 20 MG TABLET    Take 20 mg by mouth daily pm    DICYCLOMINE (BENTYL) 10 MG CAPSULE    Take 2 capsules by mouth 4 times daily (before meals and nightly)    GABAPENTIN (NEURONTIN) 600 MG TABLET    Take 800 mg by mouth 3 times daily. HYDROCODONE-ACETAMINOPHEN (NORCO) 5-325 MG PER TABLET    Take 1 tablet by mouth every 6 hours as needed for Pain. HYDROXYZINE (ATARAX) 50 MG TABLET    Take 50 mg by mouth 3 times daily as needed for Itching    LIDOCAINE (LIDODERM) 5 %    Place 1 patch onto the skin daily 12 hours on, 12 hours off.     LOSARTAN (COZAAR) 50 MG TABLET    Take 50 mg by mouth daily Am    MECLIZINE (ANTIVERT) 25 MG TABLET    Take 25 mg by mouth daily am    RIVAROXABAN (XARELTO) 10 MG TABS TABLET    Take 10 mg by mouth every 24 hours    TAMSULOSIN (FLOMAX) 0.4 MG CAPSULE    Take 0.4 mg by mouth daily pm    UMECLIDINIUM-VILANTEROL (ANORO ELLIPTA) 62.5-25 MCG/INH AEPB INHALER    Inhale 1 puff into the lungs daily       ALLERGIES     Codeine, Effexor [venlafaxine], Ibuprofen, and Tramadol    FAMILY HISTORY       Family History   Family history unknown: Yes        SOCIAL HISTORY       Social History     Socioeconomic History    Marital status: Single     Spouse name: None    Number of children: None    Years of education: None    Highest education level: None   Occupational History    None   Social Needs    Financial resource strain: None    Food insecurity     Worry: None     Inability: None    Transportation needs     Medical: None     Non-medical: None   Tobacco Use    Smoking status: Never Smoker    Smokeless tobacco: Never Used   Substance and Sexual Activity    Alcohol use: Never     Frequency: Never    Drug use: Never    Sexual activity: None   Lifestyle    Physical activity     Days per week: None     Minutes per session: None    Stress: None intact. Right eye: Normal extraocular motion and no nystagmus. Left eye: Normal extraocular motion and no nystagmus. Pupils: Pupils are equal, round, and reactive to light. Pupils are equal.      Right eye: Pupil is round and reactive. Left eye: Pupil is round and reactive. Neck:      Musculoskeletal: Normal range of motion and neck supple. Cardiovascular:      Rate and Rhythm: Normal rate and regular rhythm. Heart sounds: Normal heart sounds. No murmur. Pulmonary:      Effort: Pulmonary effort is normal. No respiratory distress. Breath sounds: Normal breath sounds. No decreased breath sounds, wheezing or rales. Abdominal:      General: Bowel sounds are normal. There is no distension. Palpations: Abdomen is soft. Tenderness: There is no abdominal tenderness. There is no guarding or rebound. Musculoskeletal: Normal range of motion. General: No swelling. Right lower leg: No edema. Left lower leg: No edema. Lymphadenopathy:      Cervical: No cervical adenopathy. Skin:     General: Skin is warm and dry. Capillary Refill: Capillary refill takes less than 2 seconds. Neurological:      General: No focal deficit present. Mental Status: He is confused. GCS: GCS eye subscore is 4. GCS verbal subscore is 5. GCS motor subscore is 6. Cranial Nerves: No facial asymmetry. Sensory: No sensory deficit. Coordination: Romberg sign positive. Gait: Gait abnormal.         DIAGNOSTIC RESULTS     EKG:(none if blank)  All EKG's are interpreted by [facility] Phys[facility] who either signs or Co-signs this chart in the absence of a cardiologist.        RADIOLOGY: (none if blank)   Interpretation per the Radiologistbelow, if available at the time of this note:    CT Head WO Contrast   Final Result   No acute intracranial abnormality. Sequelae of mild chronic microvascular ischemic disease.                **This report has been created using voice recognition software. It may contain minor errors which are inherent in voice recognition technology. **      Final report electronically signed by Dr. Darrion Green on 2/19/2021 2:21 PM      XR CHEST 1 VIEW   Final Result   Low lung volumes and cardiomegaly. No acute process. **This report has been created using voice recognition software. It may contain minor errors which are inherent in voice recognition technology. **      Final report electronically signed by Dr Stuart Suh on 2/19/2021 2:14 PM          LABS:  Labs Reviewed   CBC WITH AUTO DIFFERENTIAL - Abnormal; Notable for the following components:       Result Value    WBC 3.4 (*)     Hemoglobin 12.4 (*)     MCH 25.8 (*)     MCHC 29.5 (*)     RDW-SD 46.2 (*)     Monocytes Absolute 0.2 (*)     All other components within normal limits   COMPREHENSIVE METABOLIC PANEL W/ REFLEX TO MG FOR LOW K - Abnormal; Notable for the following components:    Glucose 69 (*)     Total Bilirubin 1.3 (*)     ALT <5 (*)     All other components within normal limits   URINE WITH REFLEXED MICRO - Abnormal; Notable for the following components:    Ketones, Urine TRACE (*)     Protein, UA 30 (*)     Color, UA DK YELLOW (*)     All other components within normal limits   GLOMERULAR FILTRATION RATE, ESTIMATED - Abnormal; Notable for the following components:    Est, Glom Filt Rate 73 (*)     All other components within normal limits   LIPASE   TROPONIN   BRAIN NATRIURETIC PEPTIDE   PROTIME-INR   URINE DRUG SCREEN   ETHANOL   ANION GAP   OSMOLALITY   SCAN OF BLOOD SMEAR   VITAMIN B12 & FOLATE       All other labs were within normal range or not returned as of this dictation. Please note, any cultures that may have been sent were not resulted at the time of this patient visit.     EMERGENCY DEPARTMENT COURSE andMedical Decision Making:     MDM  Number of Diagnoses or Management Options  Adverse effect of drug, initial encounter  Altered mental status, unspecified altered mental status type  Oxycodone adverse reaction  Diagnosis management comments: 80-year-old male presents emergency room for altered mental status. This has been occurring over the past few days. Patient currently denies any significant symptoms other than some intermittent dizziness. Will evaluate with a CBC, CMP, EKG, troponin, UA, urine drug screen, alcohol level, CT head. Differential to include electrolyte abnormality, head injury including intracranial bleed or skull fracture, pneumonia, ACS, acute kidney injury, dehydration, ischemia, medication reaction, intoxication. We will give IV hydration here in evaluation. /  ED Course as of Feb 19 1916   Fri Feb 19, 2021   1513 No evidence of urinary tract infection. Patient's labs are pending for electrolyte abnormality or acute kidney injury. Head CT is within normal limits. His urine is positive for oxycodone which she is prescribed. It is possible that his altered mental status is secondary to medication. [DD]   0062 Patient is more alert. Most likely his confusion was secondary to his medication. All of his labs are within normal limits. Will discharge home.    [DD]   1915 Late entry secondary to patient volume. After being discharged I was called to the room by the nurse, Sally Medina. Patient was unable to stand on his own. He was unable to walk even with his cane. Patient was back to his confused state that he was at upon arrival.  I counted his oxycodone pills and he had not taken any since arrival.  Patient states he is feeling dizzy. Blood pressure and other vitals were within normal limits. Had the patient sit back down and planned for admission for further evaluation of this dizziness and inability to walk. [DD]      ED Course User Index  [DD] Anna Fernando MD         The patient was evaluated during the global COVID-19 pandemic, and that diagnosis was considered upon their initial presentation.  Their evaluation, treatment and testing was consistent with current guidelines for patients who present with complaints or symptoms that may be related to COVID-19. Strict returnprecautions and follow up instructions were discussed with the patient with which the patient agrees        ED Medications administered this visit:    Medications   0.9 % sodium chloride bolus (0 mLs Intravenous Stopped 2/19/21 1602)         EKG 12 Lead    Date/Time: 2/19/2021 1:29 PM  Performed by: Mahsa Ventura MD  Authorized by: Gareth Abrams MD     ECG reviewed by ED Physician in the absence of a cardiologist: yes    Previous ECG:     Previous ECG:  Unavailable  Interpretation:     Interpretation: normal    Rate:     ECG rate:  61    ECG rate assessment: normal    Rhythm:     Rhythm: sinus rhythm    Ectopy:     Ectopy: none    QRS:     QRS axis:  Normal    QRS intervals:  Normal  Conduction:     Conduction: normal    ST segments:     ST segments:  Normal  T waves:     T waves: flattening      Flattening:  I, II, III, aVR, aVL, aVF, V6, V5, V4, V3, V2 and V1    : (None if blank)       CLINICAL       1. Altered mental status, unspecified altered mental status type    2. Adverse effect of drug, initial encounter    3.  Oxycodone adverse reaction          DISPOSITION/PLAN   DISPOSITION Admitted 02/19/2021 05:22:40 PM      PATIENT REFERRED TO:  Pauline Gregg MD  [address]  [phone]    Schedule an appointment as soon as possible for a visit   If symptoms worsen      DISCHARGE MEDICATIONS:  New Prescriptions    NALOXONE HCL (NALOXONE OPIATE OVERDOSE KIT)    1 each by Nasal route once for 1 dose              (Please note that portions of this note were completed with a voice recognition program.  Efforts were made to edit the dictations but occasionallywords are mis-transcribed.)      Electronically signed by Khushbu Shoemaker MD on 2/19/21 at 7:10 PM EST    Attending Physician, Emergency Department Jesus Restrepo MD  02/19/21 5048

## 2021-02-20 NOTE — ED NOTES
Message sent to admitting provider about patient requesting pain medication. Darlin Dykes at bedside doing EKG at this time. Patient in no distress, denies any other needs.      Vineet Morris RN  02/19/21 2309

## 2021-02-20 NOTE — PROGRESS NOTES
Pt returned from MRI, placed back into bed, telemetry monitor replaced,safety maintained, pt denies complaints, remains confused, awaiting results

## 2021-02-20 NOTE — PROGRESS NOTES
Pt to MRI per transport tech and stretcher. Pt's nephew updated per phone on pt status and plan of care.

## 2021-02-20 NOTE — PROGRESS NOTES
Hospitalist Progress Note    Patient:  Luda Javed      Unit/Bed:8B-25/025-A    YOB: 1945    MRN: [id number]       Acct: [de-identified]     PCP: Vinny Vasquez MD    Date of Admission: 2/19/2021    Assessment/Plan:    1. Encephalopathy: etiology unclear, possibly behavioral - pt seems to reorient at will, see subjective below. MRI no acute findings, does show abnormality within sella trucica - radiology rec pre/post contrast images on elective basis. Folate, B12, TSH okay. Cont neuro checks. Hold meds that may worsen confusion Awaiting neuro. 2. 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 3. HTN: Cont losartan  4. HLD: Cont atorvatstatin  5. Hx/o DVT: Cont Xarelto  6. BPH: Cont tamsulosin    Dispo: d/c if cleared by neuro. Chief Complaint: AMS     Hospital Course: Per HPI, \"65 y.o. male who presented to [address] [place] [address] with c/o of AMS. He was brought in by EMS after [facility] nurse called squad stating pt is not being himself. He was evaluated in the ER, and had a negative workup. He does take opioids at home and there was concern for OD but pt became alert and oriented. Plan was to send him home but when he stood up to walk, he became pale, confused and nurses had to get him back to bed. He then became confused again and started mumbling. He tells me he is at a quick stop and would like to go home.      He does have a hx of Spinal decompression. Multiple er visits and hospitalizations at outside hospitals. Known sex offender. Recent SNF stay. Pt lives at home alone with [facility] visiting. Discussed with ER physician about trial of narcan. Will admit to obs overnight. \"    Subjective (past 24 hours): During orientation questions pt initially stated time as \"8170\" and place as \"8128. \" Pt requesting to go home, advised pt waiting on neuro and MRI results and he did not need to go home if he is still confused.  Pt stated he was not confused and was then able to recall LOC question answers correctly. Pt c/o abd pain, stating he has chronic abd pain/tenderness due to the multiple surgeries. Pt states the pain he has now feels the same as his everyday pain. Pt c/o neck pain, again stating pain is his \"normal\" chronic neck pain. Pt denies CP, SOB, n/v, diarrhea, melena. Medications:  Reviewed    Infusion Medications    sodium chloride 75 mL/hr at 02/19/21 2237     Scheduled Medications    atorvastatin  20 mg Oral Nightly    dicyclomine  20 mg Oral 4x Daily AC & HS    losartan  50 mg Oral Daily    meclizine  25 mg Oral Daily    rivaroxaban  10 mg Oral Q24H    tamsulosin  0.4 mg Oral Nightly    sodium chloride flush  10 mL Intravenous 2 times per day    famotidine (PEPCID) injection  20 mg Intravenous BID     PRN Meds: albuterol sulfate HFA, sodium chloride flush, promethazine **OR** ondansetron, polyethylene glycol, acetaminophen **OR** acetaminophen, potassium chloride **OR** potassium alternative oral replacement **OR** potassium chloride, magnesium sulfate      Intake/Output Summary (Last 24 hours) at 2/20/2021 1140  Last data filed at 2/20/2021 1000  Gross per 24 hour   Intake 193 ml   Output 1150 ml   Net -957 ml       Diet:  Diet NPO Effective Now    Exam:  BP (!) 153/81   Pulse 53   Temp 98.4 °F (36.9 °C) (Oral)   Resp 16   Ht 5' 10\" (1.778 m)   Wt 273 lb (123.8 kg)   SpO2 94%   BMI 39.17 kg/m²     General appearance: No apparent distress, tired, soft spoken, appears stated age and cooperative. HEENT: Pupils equal, round, and reactive to light. Conjunctivae/corneas clear. Neck: Supple, with full range of motion. C-spine TTP. Trachea midline. Respiratory:  Normal respiratory effort. Clear to auscultation, bilaterally without Rales/Wheezes/Rhonchi. Cardiovascular: Regular rate and rhythm with normal S1/S2 without murmurs, rubs or gallops. Abdomen: Soft, generalized TTP, non-distended with normal bowel sounds. Multiple scars.    Musculoskeletal: active ROM x 4 extremities. Skin: Skin color, texture, turgor normal.    Neurologic:  Neurovascularly intact without any focal sensory/motor deficits. Cranial nerves: II-XII intact, grossly non-focal.  Psychiatric: Alert to verbal stimuli. Oriented to president, place. Disoriented to time. Capillary Refill: Brisk,< 3 seconds   Peripheral Pulses: +2 palpable, equal bilaterally       Labs:   Recent Labs     02/19/21  1437 02/20/21  0547   WBC 3.4* 3.0*   HGB 12.4* 12.2*   HCT 42.0 41.7*    220     Recent Labs     02/19/21  1437 02/20/21  0547    141   K 4.3 4.1    108   CO2 24 26   BUN 11 11   CREATININE 1.0 1.0   CALCIUM 9.5 9.6     Recent Labs     02/19/21  1437 02/20/21  0547   AST 11 10   ALT <5* <5*   BILITOT 1.3* 1.0   ALKPHOS 44 44     Recent Labs     02/19/21  1440   INR 1.03     No results for input(s): Tenna Pinta in the last 72 hours. No results for input(s): PROCAL in the last 72 hours. Microbiology:      Urinalysis:      Lab Results   Component Value Date    NITRU NEGATIVE 02/19/2021    WBCUA 0-2 02/19/2021    BACTERIA NONE SEEN 02/19/2021    RBCUA 0-2 02/19/2021    BLOODU NEGATIVE 02/19/2021    SPECGRAV 1.014 04/21/2020    GLUCOSEU NEGATIVE 02/19/2021       Radiology:  Ct Head Wo Contrast    Result Date: 2/19/2021  PROCEDURE: CT HEAD WO CONTRAST CLINICAL INFORMATION: altered mental status. COMPARISON: Head CT 7/11/2020. TECHNIQUE: Noncontrast 5 mm axial images were obtained through the brain. Sagittal and coronal reconstructions were obtained. All CT scans at this facility use dose modulation, iterative reconstruction, and/or weight-based dosing when appropriate to reduce radiation dose to as low as reasonably achievable. FINDINGS: No hydrocephalus, midline shift, extra-axial fluid collection or intracranial hemorrhage. There is mild sequelae of chronic microvascular ischemic disease. No large territorial infarction. Mastoids and middle ears are clear. Orbits are intact.  Mild mucosal thickening of the right maxillary sinus. Postsurgical changes of the upper cervical spine, partially imaged. No acute intracranial abnormality. Sequelae of mild chronic microvascular ischemic disease. **This report has been created using voice recognition software. It may contain minor errors which are inherent in voice recognition technology. ** Final report electronically signed by Dr. Karen Ceja on 2/19/2021 2:21 PM    Xr Chest 1 View    Result Date: 2/19/2021  PROCEDURE: XR CHEST 1 VIEW CLINICAL INFORMATION: 68-year-old male with altered mental status. COMPARISON: Chest x-ray 7/11/2020. TECHNIQUE: AP upright view of the chest was obtained. FINDINGS: Fusion hardware is noted in the cervical and upper thoracic spine. There are low lung volumes. Cardiomegaly. The pulmonary vasculature is within normal limits. There is no significant pleural effusion or pneumothorax. Visualized portions of the upper abdomen are within normal limits. The osseous structures are intact. No acute fractures or suspicious osseous lesions. Low lung volumes and cardiomegaly. No acute process. **This report has been created using voice recognition software. It may contain minor errors which are inherent in voice recognition technology. ** Final report electronically signed by Dr Jhony Kaye on 2/19/2021 2:14 PM       Code Status: Full Code      Active Hospital Problems    Diagnosis Date Noted    Encephalopathy [G93.40] 02/19/2021       Electronically signed by STEFANO Butt CNP on 2/20/2021 at 11:40 AM

## 2021-02-21 PROBLEM — Z86.718 HISTORY OF DVT (DEEP VEIN THROMBOSIS): Status: ACTIVE | Noted: 2021-02-21

## 2021-02-21 PROBLEM — E78.5 HYPERLIPIDEMIA: Status: ACTIVE | Noted: 2021-02-21

## 2021-02-21 PROBLEM — I10 HYPERTENSION: Status: ACTIVE | Noted: 2021-02-21

## 2021-02-21 PROBLEM — E11.9 DIABETES (HCC): Status: ACTIVE | Noted: 2021-02-21

## 2021-02-21 LAB
GLUCOSE BLD-MCNC: 107 MG/DL (ref 70–108)
GLUCOSE BLD-MCNC: 77 MG/DL (ref 70–108)
GLUCOSE BLD-MCNC: 97 MG/DL (ref 70–108)

## 2021-02-21 PROCEDURE — 6370000000 HC RX 637 (ALT 250 FOR IP): Performed by: NURSE PRACTITIONER

## 2021-02-21 PROCEDURE — 96375 TX/PRO/DX INJ NEW DRUG ADDON: CPT

## 2021-02-21 PROCEDURE — G0378 HOSPITAL OBSERVATION PER HR: HCPCS

## 2021-02-21 PROCEDURE — 99232 SBSQ HOSP IP/OBS MODERATE 35: CPT | Performed by: NURSE PRACTITIONER

## 2021-02-21 PROCEDURE — 2580000003 HC RX 258: Performed by: FAMILY MEDICINE

## 2021-02-21 PROCEDURE — 6360000002 HC RX W HCPCS: Performed by: NURSE PRACTITIONER

## 2021-02-21 PROCEDURE — 2500000003 HC RX 250 WO HCPCS: Performed by: FAMILY MEDICINE

## 2021-02-21 PROCEDURE — 96376 TX/PRO/DX INJ SAME DRUG ADON: CPT

## 2021-02-21 PROCEDURE — 99223 1ST HOSP IP/OBS HIGH 75: CPT | Performed by: PSYCHIATRY & NEUROLOGY

## 2021-02-21 PROCEDURE — 82948 REAGENT STRIP/BLOOD GLUCOSE: CPT

## 2021-02-21 PROCEDURE — 6360000002 HC RX W HCPCS: Performed by: FAMILY MEDICINE

## 2021-02-21 PROCEDURE — 6370000000 HC RX 637 (ALT 250 FOR IP): Performed by: FAMILY MEDICINE

## 2021-02-21 RX ORDER — PHENOBARBITAL SODIUM 65 MG/ML
130 INJECTION INTRAMUSCULAR
Status: DISCONTINUED | OUTPATIENT
Start: 2021-02-21 | End: 2021-02-23 | Stop reason: HOSPADM

## 2021-02-21 RX ORDER — PHENOBARBITAL SODIUM 65 MG/ML
260 INJECTION INTRAMUSCULAR
Status: DISCONTINUED | OUTPATIENT
Start: 2021-02-21 | End: 2021-02-23 | Stop reason: HOSPADM

## 2021-02-21 RX ADMIN — DICYCLOMINE HYDROCHLORIDE 20 MG: 10 CAPSULE ORAL at 21:00

## 2021-02-21 RX ADMIN — ATORVASTATIN CALCIUM 20 MG: 20 TABLET, FILM COATED ORAL at 22:02

## 2021-02-21 RX ADMIN — ACETAMINOPHEN 650 MG: 325 TABLET ORAL at 23:16

## 2021-02-21 RX ADMIN — FAMOTIDINE 20 MG: 10 INJECTION INTRAVENOUS at 08:18

## 2021-02-21 RX ADMIN — LOSARTAN POTASSIUM 50 MG: 50 TABLET, FILM COATED ORAL at 09:10

## 2021-02-21 RX ADMIN — FAMOTIDINE 20 MG: 10 INJECTION INTRAVENOUS at 21:00

## 2021-02-21 RX ADMIN — SODIUM CHLORIDE, PRESERVATIVE FREE 10 ML: 5 INJECTION INTRAVENOUS at 21:01

## 2021-02-21 RX ADMIN — ACETAMINOPHEN 650 MG: 325 TABLET ORAL at 17:01

## 2021-02-21 RX ADMIN — ONDANSETRON 4 MG: 2 INJECTION INTRAMUSCULAR; INTRAVENOUS at 08:38

## 2021-02-21 RX ADMIN — PHENOBARBITAL SODIUM 130 MG: 65 INJECTION INTRAMUSCULAR; INTRAVENOUS at 23:16

## 2021-02-21 RX ADMIN — ACETAMINOPHEN 650 MG: 325 TABLET ORAL at 00:28

## 2021-02-21 RX ADMIN — TAMSULOSIN HYDROCHLORIDE 0.4 MG: 0.4 CAPSULE ORAL at 22:02

## 2021-02-21 RX ADMIN — SODIUM CHLORIDE: 9 INJECTION, SOLUTION INTRAVENOUS at 21:35

## 2021-02-21 RX ADMIN — DICYCLOMINE HYDROCHLORIDE 20 MG: 10 CAPSULE ORAL at 08:11

## 2021-02-21 RX ADMIN — ONDANSETRON 4 MG: 2 INJECTION INTRAMUSCULAR; INTRAVENOUS at 21:00

## 2021-02-21 RX ADMIN — MECLIZINE HYDROCHLORIDE 25 MG: 12.5 TABLET, FILM COATED ORAL at 08:18

## 2021-02-21 RX ADMIN — RIVAROXABAN 10 MG: 10 TABLET, FILM COATED ORAL at 09:10

## 2021-02-21 RX ADMIN — ACETAMINOPHEN 650 MG: 325 TABLET ORAL at 08:18

## 2021-02-21 ASSESSMENT — PAIN DESCRIPTION - PAIN TYPE
TYPE: CHRONIC PAIN;ACUTE PAIN
TYPE: ACUTE PAIN;CHRONIC PAIN

## 2021-02-21 ASSESSMENT — PAIN DESCRIPTION - PROGRESSION
CLINICAL_PROGRESSION: NOT CHANGED

## 2021-02-21 ASSESSMENT — PAIN SCALES - GENERAL
PAINLEVEL_OUTOF10: 10
PAINLEVEL_OUTOF10: 9
PAINLEVEL_OUTOF10: 10

## 2021-02-21 ASSESSMENT — PAIN DESCRIPTION - ONSET: ONSET: ON-GOING

## 2021-02-21 ASSESSMENT — PAIN DESCRIPTION - LOCATION: LOCATION: HEAD;BACK

## 2021-02-21 NOTE — CONSULTS
NEUROLOGY CONSULT NOTE      Requesting Physician: Alanis Luque, *    Reason for Consult:  Evaluate for encephalopathy    History of Present Illness:  Jose Villafana is a 76 y.o. male admitted to [address] [place] [address] on 2/19/2021. He  presents to the emergency department from home for altered mental status. EMS states that the home health nurse called the squad because he is becoming more altered. She states that this is increased over the past few days. When the patient arrived in the emergency department he was rambling about nonsensical stuff. He denies any headache, chills, fever, chest pain, shortness of breath, nausea, vomiting, or abdominal pain. Patient states that he does not have chronic neck pain after surgery. He was given some oxycodones to take. He states that the nurses the one who distributes his medications. He also states that he has had intermittent dizziness for the past few days. He does have history of alcohol use in the past.  MRI brain done yesterday showed no acute findings. Reports no chest pain. No shortness of breath with exertion. Reports no neck pain. No vision changes. No dysphagia. No fever. No rash. No weight loss. Patient is poor historian, history obtained from review of medical record. Past Medical History:        Diagnosis Date    Anxiety     Chronic back pain     Chronic kidney disease (CKD), stage II (mild)     COPD (chronic obstructive pulmonary disease) (HCC)     Depression     Diabetes mellitus (HCC)     DVT (deep venous thrombosis) (HCC)     Hyperlipidemia     Hypertension     Hypothyroidism     Lumbago with sciatica     Polyneuropathy     Renal cell carcinoma ([facility] [address]            Procedure Laterality Date    BACK SURGERY      CHOLECYSTECTOMY      SHOULDER SURGERY      SMALL INTESTINE SURGERY      partial bowel resection       Allergies:     Allergies   Allergen Reactions    Codeine Hives    Effexor [Venlafaxine] Itching    Ibuprofen     Tramadol Hives        Current Medications:       lidocaine 4 % external patch 1 patch, Daily      albuterol sulfate  (90 Base) MCG/ACT inhaler 2 puff, Q6H PRN      atorvastatin (LIPITOR) tablet 20 mg, Nightly      dicyclomine (BENTYL) capsule 20 mg, 4x Daily AC & HS      losartan (COZAAR) tablet 50 mg, Daily      meclizine (ANTIVERT) tablet 25 mg, Daily      rivaroxaban (XARELTO) tablet 10 mg, Q24H      tamsulosin (FLOMAX) capsule 0.4 mg, Nightly      sodium chloride flush 0.9 % injection 10 mL, 2 times per day      sodium chloride flush 0.9 % injection 10 mL, PRN      promethazine (PHENERGAN) tablet 12.5 mg, Q6H PRN    Or      ondansetron (ZOFRAN) injection 4 mg, Q6H PRN      polyethylene glycol (GLYCOLAX) packet 17 g, Daily PRN      acetaminophen (TYLENOL) tablet 650 mg, Q6H PRN    Or      acetaminophen (TYLENOL) suppository 650 mg, Q6H PRN      0.9 % sodium chloride infusion, Continuous      potassium chloride (KLOR-CON M) extended release tablet 40 mEq, PRN    Or      potassium bicarb-citric acid (EFFER-K) effervescent tablet 40 mEq, PRN    Or      potassium chloride 10 mEq/100 mL IVPB (Peripheral Line), PRN      magnesium sulfate 2000 mg in 50 mL IVPB premix, PRN      famotidine (PEPCID) injection 20 mg, BID         Social History:  Social History     Tobacco Use   Smoking Status Never Smoker   Smokeless Tobacco Never Used     Social History     Substance and Sexual Activity   Alcohol Use Never    Frequency: Never     Social History     Substance and Sexual Activity   Drug Use Never     Single    Family History:       Family history unknown: Yes       Review of Systems:  All systems reviewed and are all negative except what is mentioned in history of present illness. I reviewed the patient PMH,medications, family history, social history.     Physical Exam:  BP (!) 162/85   Pulse 62   Temp 98.7 °F (37.1 °C) (Oral)   Resp 18   Ht 5' 10\" (1.778 m)   Wt 273 lb (123.8 kg)   SpO2 93%   BMI 39.17 kg/m²  I Body mass index is 39.17 kg/m². I   Wt Readings from Last 1 Encounters:   02/19/21 273 lb (123.8 kg)        General appearance - alert, in no distress, oriented to  person, place,  and overweight, he follows commands, he responds slowly and accurately he does better when he is engaged and interested. He has poor eye contact  Mental status -Level of Alertness: awake  Orientation: person, place  Memory: fair  Fund of Knowledge: fair  Attention/Concentration: fair  Language: normal. Mood is flat, poor eye contact  Neck - supple, no neck adenopathy, carotids upstroke normal bilaterally, no carotid bruits. There is no LAP in the neck. There is no thyroid enlargement. Neurological -   Cranial Vptmkm-CM-BVI:   Cranial nerve II: Normal. There is full visual fields  Cranial nerve III: Pupils: equal, round, reactive to light   Cranial nerves III, IV, VI: Extraocular Movements: intact   Cranial nerve V: Facial sensation: intact   Cranial nerve VII:Facial strength: intact   Cranial nerve VIII: Hearing: intact   Cranial nerve IX: Palate Elevation intact bilaterally  Cranial nerve XI: Shoulder shrug intact bilaterally  Cranial nerve XII: Tongue midline   neck supple without rigidity  DTR's are decreased distal and symmetric  Babinski sign is negative on bilaterally. Motor exam is 5/5 in the bilateral upper and right lower extremities,-4/5 in left lower extremity. Normal muscle tone . There is no muscle atrophy. There is no muscle fasciculation    Sensory is intact forlight touch. Coordination: finger to nose intact  Gait and station not tested  Abnormal movement none. Long tracts are  deferred. Skin - no rashes or lesions, warm and dry to touch  Superficial temporal artery pulses are normal.   Musculoskeletal: Has no hand arthritis, no limitation of ROM in any of the four extremities. no joint tenderness, deformity or swelling. There is no leg edema.    The Heart was regular in rate and rhythm. No heart murmur  Chest- Clear, good effort. Abdomen: soft, intact bowel sounds. Labs:    CBC:   Recent Labs     02/19/21  1437 02/20/21  0547   WBC 3.4* 3.0*   HGB 12.4* 12.2*    220   MCV 87.3 86.0   MCH 25.8* 25.2*   MCHC 29.5* 29.3*     CMP:  Recent Labs     02/19/21  1437 02/20/21  0547    141   K 4.3 4.1    108   CO2 24 26   BUN 11 11   CREATININE 1.0 1.0   LABGLOM 73* 73*   GLUCOSE 69* 72   CALCIUM 9.5 9.6     Liver:   Recent Labs     02/20/21  0547   AST 10   ALT <5*   ALKPHOS 44   PROT 6.7   LABALBU 3.5   BILITOT 1.0      I reviewed the MRI brain and agree with interpretation. Results for orders placed during the hospital encounter of 02/19/21   MRI BRAIN WO CONTRAST    Narrative PROCEDURE: MRI BRAIN WO CONTRAST    CLINICAL INFORMATION AMS. Altered mental status. COMPARISON: Head CT 2/19/2021. TECHNIQUE: Multiplanar and multiple spin echo MRI images were obtained of the brain without contrast.    FINDINGS:        The diffusion-weighted images are normal.  The brain volume is normal. There is minimal signal hyperintensity scattered in the white matter of the brain suggestive of minimal severity chronic small vessel ischemic changes. There are no intra-or extra-axial collections. There is no hydrocephalus, midline shift or mass effect. There is mineralization in the medial aspects of the basal ganglia bilaterally. No other areas of susceptibility artifact are present. The major intracranial vascular flow voids are present. The midline craniocervical junction structures are normal.  There is a 3 mm area of soft tissue thickening superior to the pituitary gland and anterior to the pituitary stalk. This is within the sella turcica. This may be separate from the pituitary   gland. There is no significant mass effect associated with this. This appears to be located on the left.  Retrospectively, this was likely present on CT head dated 5/18/2020 and is grossly stable. This would be very difficult to identify on CT without   current MRI. Impression    1. No acute findings. 2. Minimal severity chronic small vessel ischemic changes. 3. Abnormality within the sella turcica on the left superior to the pituitary gland. Dedicated pre and postcontrast images through the pituitary gland are recommended. This can be done on an elective basis. **This report has been created using voice recognition software. It may contain minor errors which are inherent in voice recognition technology. **    Final report electronically signed by Dr. Venessa Arguello on 2/20/2021 1:40 PM     Reviewed   Results for orders placed during the hospital encounter of 02/19/21   CT Head WO Contrast    Narrative PROCEDURE: CT HEAD WO CONTRAST    CLINICAL INFORMATION: altered mental status. COMPARISON: Head CT 7/11/2020. TECHNIQUE: Noncontrast 5 mm axial images were obtained through the brain. Sagittal and coronal reconstructions were obtained. All CT scans at this facility use dose modulation, iterative reconstruction, and/or weight-based dosing when appropriate to reduce radiation dose to as low as reasonably achievable. FINDINGS:    No hydrocephalus, midline shift, extra-axial fluid collection or intracranial hemorrhage. There is mild sequelae of chronic microvascular ischemic disease. No large territorial infarction. Mastoids and middle ears are clear. Orbits are intact. Mild mucosal thickening of the right maxillary sinus. Postsurgical changes of the upper cervical spine, partially imaged. Impression No acute intracranial abnormality. Sequelae of mild chronic microvascular ischemic disease. **This report has been created using voice recognition software. It may contain minor errors which are inherent in voice recognition technology. **    Final report electronically signed by Dr. Carlos Keating on 2/19/2021 2:21 PM         We reviewed the patient records and available information in the EHR       Impression:    Principal Problem:    Encephalopathy  Active Problems: Moderate malnutrition ([address]    Hypertension    Hyperlipidemia    History of DVT (deep vein thrombosis)    Diabetes ([address]  Resolved Problems:    * No resolved hospital problems. *  This is a 42-year-old male who presents with altered mental status, dizziness. He was noted by his home health nurse to be more confused and not acting himself. He was also complaining of dizziness. His mental state is appropriate, he has a flat affect, he does better when he is engaged in the conversation. He has poor eye contact. he underwent an MRI of the brain done yesterday that showed no acute findings. I reviewed the MRI brain and agree with interpretation. He will need to undergo work up for his symptoms, including EEG and testing as outlined below. Recommendations:                                              1. MRI brain WO contrast, reviewed, agree with interpretation  2. EEG   3. B12, Folate. 4. Correct metabolic derangements  5. DVT prophylaxis  6. Discussed with primary service. 7. Please call if any questions.               Electronically signed by Uriah Dickens MD on 2/21/2021 at 11:24 AM

## 2021-02-21 NOTE — PROGRESS NOTES
Hospitalist Progress Note    Patient:  Michael Gurrola      Unit/Bed:8B-25/025-A    YOB: 1945    MRN: [id number]       Acct: [de-identified]     PCP: Barbara Bassett MD    Date of Admission: 2/19/2021    Assessment/Plan:    1. Encephalopathy: etiology unclear, possibly behavioral - pt seems to reorient at will, possible hx/o drug/EtOH abuse? see subjective below. MRI no acute findings, does show abnormality within sella trucica - radiology rec pre/post contrast images on elective basis. Folate, B12, TSH okay. Cont neuro checks. Hold meds that may worsen confusion. Awaiting neuro. 2. 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 3. Unsteady gait: per nursing. Consult PT/OT. 4. HTN: Cont losartan. 5. HLD: Cont atorvatstatin  6. Hx/o DVT: Cont Xarelto  7. BPH: Cont tamsulosin  8. Hx/o EtOH abuse: 20 yr hx of \"heavy drinking\" per PCP notes. Does not report current EtOH use. Phenobarb withdrawal protocol PRN. 9. DM?: unknown type, noted in chart history. Not treated. Not noted in prior PCP notes. . Glucose stable during admission. Chief Complaint: AMS     Hospital Course: Per HPI, \"65 y.o. male who presented to [address] [place] [address] with c/o of AMS. He was brought in by EMS after [facility] nurse called squad stating pt is not being himself. He was evaluated in the ER, and had a negative workup. He does take opioids at home and there was concern for OD but pt became alert and oriented. Plan was to send him home but when he stood up to walk, he became pale, confused and nurses had to get him back to bed. He then became confused again and started mumbling. He tells me he is at a quick stop and would like to go home.      He does have a hx of Spinal decompression. Multiple er visits and hospitalizations at outside hospitals. Known sex offender. Recent SNF stay. Pt lives at home alone with [facility] visiting. Discussed with ER physician about trial of narcan.    Will admit to obs overnight. \"    2/20/2021- During orientation questions pt initially stated time as \"4272\" and place as \"5128. \" Pt requesting to go home, advised pt waiting on neuro and MRI results and he did not need to go home if he is still confused. Pt stated he was not confused and was then able to recall LOC question answers correctly. Subjective (past 24 hours):  Pt c/o neck pain, again stating pain is his \"normal\" chronic neck pain. Pt denies CP, SOB, n/v, diarrhea, melena. Nursing reports pt has been confused and unable to answer LOC questions correctly, difficulty standing/ambulating. Each time this CNP has evaluated pt he has been oriented. RN reports pt's roommate at [facility] house reported pt \"drinks liquor and parties w/ young ladies and takes oxycodone until he passes out. \"      Medications:  Reviewed    Infusion Medications    sodium chloride 75 mL/hr at 02/20/21 1818     Scheduled Medications    lidocaine  1 patch Transdermal Daily    atorvastatin  20 mg Oral Nightly    dicyclomine  20 mg Oral 4x Daily AC & HS    losartan  50 mg Oral Daily    meclizine  25 mg Oral Daily    rivaroxaban  10 mg Oral Q24H    tamsulosin  0.4 mg Oral Nightly    sodium chloride flush  10 mL Intravenous 2 times per day    famotidine (PEPCID) injection  20 mg Intravenous BID     PRN Meds: albuterol sulfate HFA, sodium chloride flush, promethazine **OR** ondansetron, polyethylene glycol, acetaminophen **OR** acetaminophen, potassium chloride **OR** potassium alternative oral replacement **OR** potassium chloride, magnesium sulfate      Intake/Output Summary (Last 24 hours) at 2/21/2021 1156  Last data filed at 2/21/2021 0103  Gross per 24 hour   Intake 736.91 ml   Output 800 ml   Net -63.09 ml       Diet:  Dietary Nutrition Supplements: Diabetic Oral Supplement  DIET CARB CONTROL; Carb Control: 5 carb choices (75 gms)/meal    Exam:  BP (!) 140/77   Pulse 62   Temp 98.2 °F (36.8 °C) (Oral)   Resp 18   Ht 5' 10\" (1.778 m)   Wt 273 lb (123.8 kg)   SpO2 94%   BMI 39.17 kg/m²     General appearance: No apparent distress, appears stated age and cooperative. HEENT: Pupils equal, round, and reactive to light. Conjunctivae/corneas clear. Neck: Supple, with full range of motion. C-spine TTP. Trachea midline. Respiratory:  Normal respiratory effort. Clear to auscultation, bilaterally without Rales/Wheezes/Rhonchi. Cardiovascular: Regular rate and rhythm with normal S1/S2 without murmurs, rubs or gallops. Abdomen: Soft, generalized TTP, non-distended with normal bowel sounds. Multiple scars. Musculoskeletal: active ROM x 4 extremities. Skin: Skin color, texture, turgor normal.    Neurologic:  Neurovascularly intact without any focal sensory/motor deficits. Cranial nerves: II-XII intact, grossly non-focal.  Psychiatric: Alert and oriented to person, place, time. Capillary Refill: Brisk,< 3 seconds   Peripheral Pulses: +2 palpable, equal bilaterally       Labs:   Recent Labs     02/19/21  1437 02/20/21  0547   WBC 3.4* 3.0*   HGB 12.4* 12.2*   HCT 42.0 41.7*    220     Recent Labs     02/19/21  1437 02/20/21  0547    141   K 4.3 4.1    108   CO2 24 26   BUN 11 11   CREATININE 1.0 1.0   CALCIUM 9.5 9.6     Recent Labs     02/19/21  1437 02/20/21  0547   AST 11 10   ALT <5* <5*   BILITOT 1.3* 1.0   ALKPHOS 44 44     Recent Labs     02/19/21  1440   INR 1.03     No results for input(s): [place] Speaks in the last 72 hours. No results for input(s): PROCAL in the last 72 hours. Microbiology:      Urinalysis:      Lab Results   Component Value Date    NITRU NEGATIVE 02/19/2021    WBCUA 0-2 02/19/2021    BACTERIA NONE SEEN 02/19/2021    RBCUA 0-2 02/19/2021    BLOODU NEGATIVE 02/19/2021    SPECGRAV 1.014 04/21/2020    GLUCOSEU NEGATIVE 02/19/2021       Radiology:  Ct Head Wo Contrast    Result Date: 2/19/2021  PROCEDURE: CT HEAD WO CONTRAST CLINICAL INFORMATION: altered mental status. COMPARISON: Head CT 7/11/2020. TECHNIQUE: Noncontrast 5 mm axial images were obtained through the brain. Sagittal and coronal reconstructions were obtained. All CT scans at this facility use dose modulation, iterative reconstruction, and/or weight-based dosing when appropriate to reduce radiation dose to as low as reasonably achievable. FINDINGS: No hydrocephalus, midline shift, extra-axial fluid collection or intracranial hemorrhage. There is mild sequelae of chronic microvascular ischemic disease. No large territorial infarction. Mastoids and middle ears are clear. Orbits are intact. Mild mucosal thickening of the right maxillary sinus. Postsurgical changes of the upper cervical spine, partially imaged. No acute intracranial abnormality. Sequelae of mild chronic microvascular ischemic disease. **This report has been created using voice recognition software. It may contain minor errors which are inherent in voice recognition technology. ** Final report electronically signed by Dr. Radhames Rubin on 2/19/2021 2:21 PM    Xr Chest 1 View    Result Date: 2/19/2021  PROCEDURE: XR CHEST 1 VIEW CLINICAL INFORMATION: 77-year-old male with altered mental status. COMPARISON: Chest x-ray 7/11/2020. TECHNIQUE: AP upright view of the chest was obtained. FINDINGS: Fusion hardware is noted in the cervical and upper thoracic spine. There are low lung volumes. Cardiomegaly. The pulmonary vasculature is within normal limits. There is no significant pleural effusion or pneumothorax. Visualized portions of the upper abdomen are within normal limits. The osseous structures are intact. No acute fractures or suspicious osseous lesions. Low lung volumes and cardiomegaly. No acute process. **This report has been created using voice recognition software. It may contain minor errors which are inherent in voice recognition technology. ** Final report electronically signed by Dr Luis M Drew on 2/19/2021 2:14 PM       Code Status: Reiseñor 3 Problems    Diagnosis Date Noted    Moderate malnutrition ([facility] [address] [E44.0] 02/20/2021     Class: Acute    Encephalopathy [G93.40] 02/19/2021       Electronically signed by STEFANO Butt CNP on 2/21/2021 at 11:56 AM

## 2021-02-21 NOTE — PLAN OF CARE
[facility] Trans[facility] Follow Up Call    2020    Patient: Doroteo Forward  Patient : 1942   MRN: [id number]  Reason for Admission:   Discharge Date: 20 RARS: Readmission Risk Score: 23    Follow Up:  Contacted [facility] and [address] Spoke with Wale TOBAR. She confirmed Regina Renae is still at the facility. They do have a discharge plan but not for any time soon. Patient would like to go home but family not sure that patient is ready to go back home yet. She continues to work with therapy.         Future Appointments   Date Time Provider Brandi Arias   2020  3:00 PM Logan Dee MD [facility] Rodolfo Watkins RN Problem: Falls - Risk of:  Goal: Will remain free from falls  Description: Will remain free from falls  Outcome: Ongoing  Goal: Absence of physical injury  Description: Absence of physical injury  Outcome: Ongoing     Problem: Pain:  Goal: Pain level will decrease  Description: Pain level will decrease  Outcome: Ongoing  Goal: Control of acute pain  Description: Control of acute pain  Outcome: Ongoing  Goal: Control of chronic pain  Description: Control of chronic pain  Outcome: Ongoing     Problem: Skin Integrity:  Goal: Will show no infection signs and symptoms  Description: Will show no infection signs and symptoms  Outcome: Ongoing  Goal: Absence of new skin breakdown  Description: Absence of new skin breakdown  Outcome: Ongoing     Problem: Nutrition  Goal: Optimal nutrition therapy  2/20/2021 2147 by Shivam Bardford RN  Outcome: Ongoing  2/20/2021 1308 by Brandy Farrell RD, LD  Outcome: Ongoing

## 2021-02-21 NOTE — PROGRESS NOTES
Patient admitted to room 8B 25. Two person skin assessment performed by myself and Keshav Lemus. Small pinpoint pressure ulcer found on coccyx. Patient stated he got the wound when he was in a nursing home after his neck surgery.

## 2021-02-22 PROBLEM — G93.40 ACUTE ENCEPHALOPATHY: Status: ACTIVE | Noted: 2021-02-22

## 2021-02-22 LAB
GLUCOSE BLD-MCNC: 83 MG/DL (ref 70–108)
GLUCOSE BLD-MCNC: 85 MG/DL (ref 70–108)
GLUCOSE BLD-MCNC: 89 MG/DL (ref 70–108)
GLUCOSE BLD-MCNC: 97 MG/DL (ref 70–108)

## 2021-02-22 PROCEDURE — 2580000003 HC RX 258: Performed by: FAMILY MEDICINE

## 2021-02-22 PROCEDURE — 2500000003 HC RX 250 WO HCPCS: Performed by: FAMILY MEDICINE

## 2021-02-22 PROCEDURE — 1200000003 HC TELEMETRY R&B

## 2021-02-22 PROCEDURE — 95819 EEG AWAKE AND ASLEEP: CPT

## 2021-02-22 PROCEDURE — 99232 SBSQ HOSP IP/OBS MODERATE 35: CPT | Performed by: NURSE PRACTITIONER

## 2021-02-22 PROCEDURE — 6370000000 HC RX 637 (ALT 250 FOR IP): Performed by: FAMILY MEDICINE

## 2021-02-22 PROCEDURE — 97110 THERAPEUTIC EXERCISES: CPT

## 2021-02-22 PROCEDURE — 95819 EEG AWAKE AND ASLEEP: CPT | Performed by: PSYCHIATRY & NEUROLOGY

## 2021-02-22 PROCEDURE — 82948 REAGENT STRIP/BLOOD GLUCOSE: CPT

## 2021-02-22 PROCEDURE — 97535 SELF CARE MNGMENT TRAINING: CPT

## 2021-02-22 PROCEDURE — 6370000000 HC RX 637 (ALT 250 FOR IP): Performed by: NURSE PRACTITIONER

## 2021-02-22 PROCEDURE — 97166 OT EVAL MOD COMPLEX 45 MIN: CPT

## 2021-02-22 RX ADMIN — ACETAMINOPHEN 650 MG: 325 TABLET ORAL at 11:03

## 2021-02-22 RX ADMIN — TAMSULOSIN HYDROCHLORIDE 0.4 MG: 0.4 CAPSULE ORAL at 23:08

## 2021-02-22 RX ADMIN — RIVAROXABAN 10 MG: 10 TABLET, FILM COATED ORAL at 09:23

## 2021-02-22 RX ADMIN — ACETAMINOPHEN 650 MG: 325 TABLET ORAL at 23:08

## 2021-02-22 RX ADMIN — ATORVASTATIN CALCIUM 20 MG: 20 TABLET, FILM COATED ORAL at 23:08

## 2021-02-22 RX ADMIN — DICYCLOMINE HYDROCHLORIDE 20 MG: 10 CAPSULE ORAL at 17:28

## 2021-02-22 RX ADMIN — FAMOTIDINE 20 MG: 10 INJECTION INTRAVENOUS at 23:08

## 2021-02-22 RX ADMIN — ACETAMINOPHEN 650 MG: 325 TABLET ORAL at 17:31

## 2021-02-22 RX ADMIN — LOSARTAN POTASSIUM 50 MG: 50 TABLET, FILM COATED ORAL at 09:23

## 2021-02-22 RX ADMIN — FAMOTIDINE 20 MG: 10 INJECTION INTRAVENOUS at 09:22

## 2021-02-22 RX ADMIN — DICYCLOMINE HYDROCHLORIDE 20 MG: 10 CAPSULE ORAL at 12:32

## 2021-02-22 RX ADMIN — SODIUM CHLORIDE, PRESERVATIVE FREE 10 ML: 5 INJECTION INTRAVENOUS at 23:14

## 2021-02-22 RX ADMIN — DICYCLOMINE HYDROCHLORIDE 20 MG: 10 CAPSULE ORAL at 23:08

## 2021-02-22 RX ADMIN — MECLIZINE HYDROCHLORIDE 25 MG: 12.5 TABLET, FILM COATED ORAL at 09:22

## 2021-02-22 ASSESSMENT — PAIN SCALES - GENERAL
PAINLEVEL_OUTOF10: 0
PAINLEVEL_OUTOF10: 8
PAINLEVEL_OUTOF10: 0
PAINLEVEL_OUTOF10: 9
PAINLEVEL_OUTOF10: 0
PAINLEVEL_OUTOF10: 0

## 2021-02-22 NOTE — PROGRESS NOTES
Hospitalist Progress Note    Patient:  [facility]      Unit/Bed:8B-25/025-A    YOB: 1945    MRN: [id number]       Acct: [de-identified]     PCP: Raisa Reynaga MD    Date of Admission: 2/19/2021    Assessment/Plan:    1. Encephalopathy: etiology unclear, possibly behavioral - pt seems to reorient at will, possible hx/o drug/EtOH abuse, current use unclear - pt reports no current use, UDS + oxycodone, roommate reports (+) EtOH use. MRI no acute findings, does show abnormality within sella trucica - radiology rec pre/post contrast images on elective basis. Folate, B12, TSH okay. Cont neuro checks. Hold meds that may worsen confusion. Appreciate neuro - rec EEG. 2. 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 3. Unsteady gait: per nursing. Awaiting PT/OT. 4. Leukopenia: WBC 3.0. Repeat in AM.   5. HTN: Cont losartan. 6. HLD: Cont atorvatstatin  7. Hx/o DVT: Cont Xarelto  8. BPH: Cont tamsulosin  9. Hx/o opioid / EtOH abuse: 20 yr hx of \"heavy drinking\" per PCP notes. Does not report current EtOH use. Pt resides at [facility] house, drinks and takes oxycodone until he passes out - subjective per roommate. Phenobarb withdrawal protocol PRN. 10. DM?: unknown type, noted in chart history. Not treated. Not noted in prior PCP notes. Glucose stable during admission. Accuchek Qshift. Chief Complaint: AMS     Hospital Course: Per HPI, \"65 y.o. male who presented to [address] [place] [address] with c/o of AMS. He was brought in by EMS [facility] stating pt is not being himself. He was evaluated in the ER, and had a negative workup. He does take opioids at home and there was concern for OD but pt became alert and oriented. Plan was to send him home but when he stood up to walk, he became pale, confused and nurses had to get him back to bed. He then became confused again and started mumbling.  He tells me he is at a quick stop and would like to go home.      He does have a hx of Spinal decompression. Multiple er visits and hospitalizations at outside hospitals. Known sex offender. Recent SNF stay. Pt lives at home alone with [facility] visiting. Discussed with ER physician about trial of narcan. Will admit to obs overnight. \"    2/20/2021- During orientation questions pt initially stated time as \"5816\" and place as \"8258. \" Pt requesting to go home, advised pt waiting on neuro and MRI results and he did not need to go home if he is still confused. Pt stated he was not confused and was then able to recall LOC question answers correctly. Subjective (past 24 hours):  Pt c/o neck pain, again stating pain is his \"normal\" chronic neck pain. Pt denies CP, SOB, n/v, diarrhea, melena. Nursing reports pt has been confused and unable to answer LOC questions correctly, difficulty standing/ambulating. Each time this CNP has evaluated pt he has been oriented. RN reports pt's roommate at half-way house reported pt \"drinks liquor and parties w/ young ladies and takes oxycodone until he passes out. \"      Medications:  Reviewed    Infusion Medications    sodium chloride 75 mL/hr at 02/21/21 2135     Scheduled Medications    lidocaine  1 patch Transdermal Daily    atorvastatin  20 mg Oral Nightly    dicyclomine  20 mg Oral 4x Daily AC & HS    losartan  50 mg Oral Daily    meclizine  25 mg Oral Daily    rivaroxaban  10 mg Oral Q24H    tamsulosin  0.4 mg Oral Nightly    sodium chloride flush  10 mL Intravenous 2 times per day    famotidine (PEPCID) injection  20 mg Intravenous BID     PRN Meds:  PHENobarbital **OR** PHENobarbital **OR** PHENobarbital IVPB, albuterol sulfate HFA, sodium chloride flush, promethazine **OR** ondansetron, polyethylene glycol, acetaminophen **OR** acetaminophen, potassium chloride **OR** potassium alternative oral replacement **OR** potassium chloride, magnesium sulfate      Intake/Output Summary (Last 24 hours) at 2/22/2021 2212  Last data filed at 2/21/2021 2135  Gross per 24 hour   Intake 2334.19 ml   Output 200 ml   Net 2134.19 ml       Diet:  Dietary Nutrition Supplements: Diabetic Oral Supplement  DIET CARB CONTROL; Carb Control: 5 carb choices (75 gms)/meal    Exam:  BP (!) 173/75   Pulse 54   Temp 98.1 °F (36.7 °C) (Oral)   Resp 18   Ht 5' 10\" (1.778 m)   Wt 273 lb (123.8 kg)   SpO2 94%   BMI 39.17 kg/m²     General appearance: No apparent distress, appears stated age and cooperative. HEENT: Pupils equal, round, and reactive to light. Conjunctivae/corneas clear. Neck: Supple, with full range of motion. C-spine TTP. Trachea midline. Respiratory:  Normal respiratory effort. Clear to auscultation, bilaterally without Rales/Wheezes/Rhonchi. Cardiovascular: Regular rate and rhythm with normal S1/S2 without murmurs, rubs or gallops. Abdomen: Soft, generalized TTP, non-distended with normal bowel sounds. Multiple scars. Musculoskeletal: active ROM x 4 extremities. Skin: Skin color, texture, turgor normal.    Neurologic:  Neurovascularly intact without any focal sensory/motor deficits. Cranial nerves: II-XII intact, grossly non-focal.  Psychiatric: Alert and oriented to person, place, time. Capillary Refill: Brisk,< 3 seconds   Peripheral Pulses: +2 palpable, equal bilaterally       Labs:   Recent Labs     02/19/21  1437 02/20/21  0547   WBC 3.4* 3.0*   HGB 12.4* 12.2*   HCT 42.0 41.7*    220     Recent Labs     02/19/21  1437 02/20/21  0547    141   K 4.3 4.1    108   CO2 24 26   BUN 11 11   CREATININE 1.0 1.0   CALCIUM 9.5 9.6     Recent Labs     02/19/21  1437 02/20/21  0547   AST 11 10   ALT <5* <5*   BILITOT 1.3* 1.0   ALKPHOS 44 44     Recent Labs     02/19/21  1440   INR 1.03     No results for input(s): Justin Spoon in the last 72 hours. No results for input(s): PROCAL in the last 72 hours.     Microbiology:      Urinalysis:      Lab Results   Component Value Date    NITRU NEGATIVE 02/19/2021    WBCUA 0-2 02/19/2021    BACTERIA NONE SEEN 02/19/2021    RBCUA 0-2 02/19/2021    BLOODU NEGATIVE 02/19/2021    SPECGRAV 1.014 04/21/2020    GLUCOSEU NEGATIVE 02/19/2021       Radiology:  Ct Head Wo Contrast    Result Date: 2/19/2021  PROCEDURE: CT HEAD WO CONTRAST CLINICAL INFORMATION: altered mental status. COMPARISON: Head CT 7/11/2020. TECHNIQUE: Noncontrast 5 mm axial images were obtained through the brain. Sagittal and coronal reconstructions were obtained. All CT scans at this facility use dose modulation, iterative reconstruction, and/or weight-based dosing when appropriate to reduce radiation dose to as low as reasonably achievable. FINDINGS: No hydrocephalus, midline shift, extra-axial fluid collection or intracranial hemorrhage. There is mild sequelae of chronic microvascular ischemic disease. No large territorial infarction. Mastoids and middle ears are clear. Orbits are intact. Mild mucosal thickening of the right maxillary sinus. Postsurgical changes of the upper cervical spine, partially imaged. No acute intracranial abnormality. Sequelae of mild chronic microvascular ischemic disease. **This report has been created using voice recognition software. It may contain minor errors which are inherent in voice recognition technology. ** Final report electronically signed by Dr. Velia Valentin on 2/19/2021 2:21 PM    Xr Chest 1 View    Result Date: 2/19/2021  PROCEDURE: XR CHEST 1 VIEW CLINICAL INFORMATION: 75-year-old male with altered mental status. COMPARISON: Chest x-ray 7/11/2020. TECHNIQUE: AP upright view of the chest was obtained. FINDINGS: Fusion hardware is noted in the cervical and upper thoracic spine. There are low lung volumes. Cardiomegaly. The pulmonary vasculature is within normal limits. There is no significant pleural effusion or pneumothorax. Visualized portions of the upper abdomen are within normal limits. The osseous structures are intact. No acute fractures or suspicious osseous lesions.      Low lung volumes and cardiomegaly. No acute process. **This report has been created using voice recognition software. It may contain minor errors which are inherent in voice recognition technology. ** Final report electronically signed by Dr Sukhwinder Urbano on 2/19/2021 2:14 PM       Code Status: Full Code      Active Hospital Problems    Diagnosis Date Noted    Hypertension [I10] 02/21/2021    Hyperlipidemia [E78.5] 02/21/2021    History of DVT (deep vein thrombosis) [Z86.718] 02/21/2021    Diabetes ([facility] [address] [E11.9] 02/21/2021    Moderate malnutrition ([address] [E44.0] 02/20/2021     Class: Acute    Encephalopathy [G93.40] 02/19/2021       Electronically signed by STEFANO Holloway CNP on 2/22/2021 at 9:51 AM

## 2021-02-22 NOTE — CARE COORDINATION
2/22/21, 7:08 AM EST  DISCHARGE PLANNING EVALUATION:    Dann Cranker Pippens       Admitted: 2/19/2021/ [place] day: 0   Location: [facility]25/025-A Reason for admit: Encephalopathy [G93.40]   PMH:  has a past medical history of Anxiety, Chronic back pain, Chronic kidney disease (CKD), stage II (mild), COPD (chronic obstructive pulmonary disease) ([facility] [address] Depression, Diabetes mellitus ([facility] [address] DVT (deep venous thrombosis) ([facility] [address] Hyperlipidemia, Hypertension, Hypothyroidism, Lumbago with sciatica, Polyneuropathy, and Renal cell carcinoma ([facility] [address] Procedure: No  Barriers to Discharge: To ER with AMS. Speaking nonsensical. [place] nurse recommended ER as she noted changes. Pt takes oxycodone and consideration was for adverse reaction to this. PT/OT. Consult to Neurology. PCP: Cherrie Quinn MD   %    Patient Goals/Plan/Treatment Preferences: Presented to pt room. Brief visit as pt needed to void and nurse with pt. Pt states he is from home alone. SW is consulted. So full CM assessment deferred at present. Current with HH. CM will continue to follow however. Transportation/Food Security/Housekeeping Addressed:  No issues identified.

## 2021-02-22 NOTE — PROGRESS NOTES
Physician Progress Note      Aldair Kim  [facility] #:                  [id number]  :                       1945  ADMIT DATE:       2021 12:59 PM  [address] [place] DATE:  RESPONDING  PROVIDER #:        Kita AGARWAL - CNP          QUERY TEXT:    Pt admitted with AMS and has encephalopathy documented. If possible, please   document in progress notes and discharge summary further specificity regarding   the type of encephalopathy:    The medical record reflects the following:  Risk Factors:  AMS  Clinical Indicators: disoriented at times, per ED note, confused and mumbling,   drug screen +oxycodone  Treatment:supportive, labs, imaging, neuro consult, IVF  Options provided:  -- Alcoholic encephalopathy  -- Metabolic encephalopathy  -- Toxic encephalopathy  -- Toxic metabolic encephalopathy  -- No encephalopathy, delirium  -- Other - I will add my own diagnosis  -- Disagree - Not applicable / Not valid  -- Disagree - Clinically unable to determine / Unknown  -- Refer to Clinical Documentation Reviewer    PROVIDER RESPONSE TEXT:    Etiology unknown as presentation is very atypical. Awaiting completion of work   up per neuro    Query created by: Roselia Flowers on 2021 11:37 AM      Electronically signed by:  Rhiannon Tesfaye CNP 2021 11:52   AM

## 2021-02-22 NOTE — PROGRESS NOTES
[facility] 60  INPATIENT OCCUPATIONAL THERAPY  [facility] MED SURG 8B  EVALUATION    Time:    Time In: 1505  Time Out: 1539  Timed Code Treatment Minutes: 23 Minutes  Minutes: 34          Date: 2021  Patient Name: Bernett Cogan,   Gender: male      MRN: [id number]  : 1945  (69 y.o.)  Referring Practitioner: Edinson Diaz CNP  Diagnosis: encephalopaty  Additional Pertinent Hx: \"65 y.o. male who presented to [address] [place] [address] with c/o of AMS. He was brought in by EMS after [facility] nurse called squad stating pt is not being himself. He was evaluated in the ER, and had a negative workup. He does take opioids at home and there was concern for OD but pt became alert and oriented. Plan was to send him home but when he stood up to walk, he became pale, confused and nurses had to get him back to bed. He then became confused again and started mumbling. He tells    Restrictions/Precautions:  Restrictions/Precautions: General Precautions, Fall Risk  Position Activity Restriction  Other position/activity restrictions: confusion    Subjective  Chart Reviewed: Yes, Orders, Progress Notes  Patient assessed for rehabilitation services?: Yes    Subjective: Pt sleeping in bed. Upon awakening, noted increased confusion. Pain:  Pain Assessment  Patient Currently in Pain: Yes  Pain Assessment: 0-10  Pain Level: (did not state \"real bad\")  Pain Type: Chronic pain;Acute pain  Pain Location: Neck;Head  Pain Descriptors: Aching;Headache    Social/Functional History:  Type of Home: (half-way house)           ADL Assistance: Independent  Ambulation Assistance: Independent  Transfer Assistance: Independent          Additional Comments: Pt having increased difficulty answering questions this date especially regarding home environment. Pt staitng he lives alone in a partment but per chart he [facility].  Pt stating he had a lady that came in 2x week to assist him with walking and had him picking things up from ground [facility] PT/OT?). Pt continued to state he was fine throughout session. VISION:Pt stating he can't ssee very well after requiring ccues during mobility to not run into objects and still requiring therapist to [facility] walker for safety    HEARING:  WNL    COGNITION: Slow Processing, Decreased Recall, Decreased Insight, Decreased Problem Solving, Decreased Safety Awareness, Difficulty Following Commands and pt with diffiuclty answering questions and holding approrpaite conversation. RANGE OF MOTION:  Bilateral Upper Extremity:  WNL    STRENGTH:  Bilateral Upper Extremity:  WNL    SENSATION:   WFL    ADL:   Grooming: Contact Guard Assistance. standing at sink with educaiton to keep walker in front of him requiring max A to do so by therapist  Toileting: Contact Guard Assistance. Toilet Transfer: Minimal Assistance. form standard toilet. BALANCE:  Standing Balance: Minimal Assistance. with bilateral UE support d/t decreased balance and increased dizziness. Pt continued to stae he was \"fine\" throughout    BED MOBILITY:  Supine to Sit: Minimal Assistance      TRANSFERS:  Sit to Stand:  Contact Guard Assistance. from eOB with cues to initiate tasks  Stand to Sit: [employer]. onto eOB with ccues to initaite tasks    FUNCTIONAL MOBILITY:  Assistive Device: Rolling Walker  Assist Level:  Minimal Assistance. Distance: To and from bathroom  Cues for walker safety d/t pt runing into objects        Activity Tolerance:  Patient tolerance of  treatment: fair. Assessment:  Assessment: Pt with increased confusion throughout session limiting his ability to complete his ADL tasks indep. Pt with decreased balance throughout as well wiht complaints of dizziness. Pt would benefit from skild OT services to icnreases his safety awareness with imrpoved cogntion and improve his balance as well for decreased fall risk to be more indep with aDL tasks.   Performance deficits / Impairments: Decreased functional mobility , Decreased safe awareness, Decreased balance, Decreased ADL status, Decreased cognition, Decreased endurance, Decreased strength  Prognosis: Fair  REQUIRES OT FOLLOW UP: Yes  Decision Making: Medium Complexity    Treatment Initiated: Treatment and education initiated within context of evaluation. Evaluation time included review of current medical information, gathering information related to past medical, social and functional history, completion of standardized testing, formal and informal observation of tasks, assessment of data and development of plan of care and goals. Treatment time included skilled education and facilitation of tasks to increase safety and independence with ADL's for improved functional independence and quality of life. Discharge Recommendations:  Patient would benefit from continued therapy after discharge, Continue to assess pending progress    Patient Education:  OT Education: OT Role, Plan of Care  Patient Education: safety with transfers and use of walker as well as udring mobility  Barriers to Learning: cogntion    Equipment Recommendations: Other: continue to assess    Plan:  Times per week: 3-5x  Current Treatment Recommendations: Strengthening, Endurance Training, Patient/Caregiver Education & Training, Self-Care / ADL, Balance Training, Functional Mobility Training, Safety Education & Training. See long-term goal time frame for expected duration of plan of care. If no long-term goals established, a short length of stay is anticipated.     Goals:  Patient goals : feel better  Short term goals  Time Frame for Short term goals: by discharge  Short term goal 1: Pt to complete BADL routine with min A and min cues for safety or cognition throughout  Short term goal 2: Pt to dmeo dynamic standing > 3 min with 0-1 UE support and CGA in prep fpr comp  Short term goal 3: Pt to answer quesitons appropraitely and hold a appropriate ocnversation witih min cues for repeating of questions or redirection to topic of conversation         Following session, patient left in safe position with all fall risk precautions in place.

## 2021-02-22 NOTE — PROGRESS NOTES
[facility] Laboratory Technician Worksheet      EEG Date: 2021    Name: Kerry Hale   : 1945   Age: 76 y.o. SEX: male    ROOM: [facility] MRN: [id number]           CSN: [id number]      Ordering Provider: Rafael Jeffers  EEG Number: [id number] Time of Test:  4821    Hand: Right   Sedation: no    H.V. Done: No NOT DONE Photic: Yes    Sleep: Yes  Drowsy: Yes   Sleep Deprived: No    Seizures observed: no    Mentality: lethargic  MRI     Clinical History:AMS  . No acute findings. 2. Minimal severity chronic small vessel ischemic changes. 3. Abnormality within the sella turcica on the left superior to the pituitary gland. Dedicated pre and postcontrast images through the pituitary gland are recommended. This can be done on an elective          Past Medical History:       Diagnosis Date    Anxiety     Chronic back pain     Chronic kidney disease (CKD), stage II (mild)     COPD (chronic obstructive pulmonary disease) (HCC)     Depression     Diabetes mellitus (HCC)     DVT (deep venous thrombosis) (HCC)     Hyperlipidemia     Hypertension     Hypothyroidism     Lumbago with sciatica     Polyneuropathy     Renal cell carcinoma (HCC)        Scheduled Meds:   lidocaine  1 patch Transdermal Daily    atorvastatin  20 mg Oral Nightly    dicyclomine  20 mg Oral 4x Daily AC & HS    losartan  50 mg Oral Daily    meclizine  25 mg Oral Daily    rivaroxaban  10 mg Oral Q24H    tamsulosin  0.4 mg Oral Nightly    sodium chloride flush  10 mL Intravenous 2 times per day    famotidine (PEPCID) injection  20 mg Intravenous BID     Continuous Infusions:   sodium chloride 75 mL/hr at 21 2135     PRN Meds:. PHENobarbital **OR** PHENobarbital **OR** PHENobarbital IVPB, albuterol sulfate HFA, sodium chloride flush, promethazine **OR** ondansetron, polyethylene glycol, acetaminophen **OR** acetaminophen, potassium chloride **OR** potassium alternative oral replacement **OR**

## 2021-02-22 NOTE — PROGRESS NOTES
Utilize [facility] alcohol withdrawal scale (Based on [place] Modified Alcohol Withdrawal Scale). Tabulate score based on classifications including Tremor, Sweating, Hallucination, Orientation, and Agitation. Tremor: 1  Sweatin  Hallucinations: 0  Orientation: 0  Agitation: 0  Total Score: 2  Action perform as described below     Tremor:  No tremor is 0 points. Tremor on movement is 1 point. Tremor at rest is 2 points. Sweating: No Sweat 0 points. Moist is 1 point. Drenching sweats is 2 points. Hallucinations: No present 0 points. Dissuadable is 1 point. Not dissuadable is 2 points. Orientation: Oriented 0 points. Vague/detached 1 point. Disoriented/no contact 2 points. Agitation: Calm 0 points. Anxious 1 point. Panicky 2 points. Check scale every 2 hours. Discontinue scoring with 4 consecutive scorings of 0. Scale 0: No phenobarbital given. Re-assess every 60 minutes as needed. Scale 1-3: Phenobarbital 130 mg IV over 3 minutes. Re-assess every 60 minutes as needed. May administer every 60 minutes to a maximum dose of phenobarbital 1040 mg in 24 hours! Scale 4-8: Phenobarbital 260 mg IV over 5 minutes. Re-assess every 60 minutes as needed. May administer every 60 minutes to a maximum dose of phenobarbital 1040mg in 24 hours! Scale 9-10: Transfer to ICU (if not already in ICU). Administer 10mg/kg phenobarbital IV over 60 minutes. Maximum dose phenobarbital is 1040mg in 24 hours!

## 2021-02-22 NOTE — CARE COORDINATION
DISCHARGE/PLANNING EVALUATION  2/22/21, 4:06 PM EST    Reason for Referral: \"Current with HH\"  Mental Status: Patient is alert and oriented  Decision Making: Patient makes own descions  Family/Social/Home Environment: Spoke with patient, assessment completed. Patient lives at a recovery house. He had surgery [facility] his [facility], was on Oxy, stopped taking oxy couple of days prior to admission. He has been fairly independent. Has [facility], nursing, aide, PT & OT. Current Services including food security, transportation and housekeeping: [facility]  Current Equipment: cane  Payment Source: Medicare and Medicaid  Concerns or Barriers to Discharge: Patient plans to return to recovery house and resume [address] [place] [address] provider list with quality measures, geographic area and applicable managed care information provided. Questions regarding selection process answered:current with [facility]    Teach Back Method used with patient regarding care plan and discharge planning. Patient  verbalize understanding of the plan of care and contribute to goal setting. Patient goals, treatment preferences and discharge plan: Patient plans to return to recovery house and resume [facility]. Spoke with Zoila Castillo from [facility], [facility] services.     Electronically signed by EDILIA Faustin on 2/22/2021 at 4:06 PM

## 2021-02-23 VITALS
BODY MASS INDEX: 39.08 KG/M2 | HEIGHT: 70 IN | TEMPERATURE: 98.3 F | RESPIRATION RATE: 16 BRPM | DIASTOLIC BLOOD PRESSURE: 86 MMHG | SYSTOLIC BLOOD PRESSURE: 198 MMHG | OXYGEN SATURATION: 96 % | WEIGHT: 273 LBS | HEART RATE: 51 BPM

## 2021-02-23 LAB
ANION GAP SERPL CALCULATED.3IONS-SCNC: 11 MEQ/L (ref 8–16)
BASOPHILS # BLD: 0.7 %
BASOPHILS ABSOLUTE: 0 THOU/MM3 (ref 0–0.1)
BUN BLDV-MCNC: 7 MG/DL (ref 7–22)
CALCIUM SERPL-MCNC: 9.6 MG/DL (ref 8.5–10.5)
CHLORIDE BLD-SCNC: 110 MEQ/L (ref 98–111)
CO2: 19 MEQ/L (ref 23–33)
CREAT SERPL-MCNC: 0.9 MG/DL (ref 0.4–1.2)
EOSINOPHIL # BLD: 3.9 %
EOSINOPHILS ABSOLUTE: 0.2 THOU/MM3 (ref 0–0.4)
ERYTHROCYTE [DISTWIDTH] IN BLOOD BY AUTOMATED COUNT: 15.1 % (ref 11.5–14.5)
ERYTHROCYTE [DISTWIDTH] IN BLOOD BY AUTOMATED COUNT: 43.7 FL (ref 35–45)
GFR SERPL CREATININE-BSD FRML MDRD: 82 ML/MIN/1.73M2
GLUCOSE BLD-MCNC: 71 MG/DL (ref 70–108)
GLUCOSE BLD-MCNC: 74 MG/DL (ref 70–108)
HCT VFR BLD CALC: 40.1 % (ref 42–52)
HEMOGLOBIN: 12.7 GM/DL (ref 14–18)
IMMATURE GRANS (ABS): 0.02 THOU/MM3 (ref 0–0.07)
IMMATURE GRANULOCYTES: 0.5 %
LYMPHOCYTES # BLD: 41.8 %
LYMPHOCYTES ABSOLUTE: 1.7 THOU/MM3 (ref 1–4.8)
MCH RBC QN AUTO: 25.6 PG (ref 26–33)
MCHC RBC AUTO-ENTMCNC: 31.7 GM/DL (ref 32.2–35.5)
MCV RBC AUTO: 80.7 FL (ref 80–94)
MONOCYTES # BLD: 8.1 %
MONOCYTES ABSOLUTE: 0.3 THOU/MM3 (ref 0.4–1.3)
NUCLEATED RED BLOOD CELLS: 0 /100 WBC
PLATELET # BLD: 238 THOU/MM3 (ref 130–400)
PMV BLD AUTO: 10.8 FL (ref 9.4–12.4)
POTASSIUM SERPL-SCNC: 4.8 MEQ/L (ref 3.5–5.2)
RBC # BLD: 4.97 MILL/MM3 (ref 4.7–6.1)
SEG NEUTROPHILS: 45 %
SEGMENTED NEUTROPHILS ABSOLUTE COUNT: 1.8 THOU/MM3 (ref 1.8–7.7)
SODIUM BLD-SCNC: 140 MEQ/L (ref 135–145)
WBC # BLD: 4.1 THOU/MM3 (ref 4.8–10.8)

## 2021-02-23 PROCEDURE — 97116 GAIT TRAINING THERAPY: CPT

## 2021-02-23 PROCEDURE — 97163 PT EVAL HIGH COMPLEX 45 MIN: CPT

## 2021-02-23 PROCEDURE — 6370000000 HC RX 637 (ALT 250 FOR IP): Performed by: NURSE PRACTITIONER

## 2021-02-23 PROCEDURE — 2500000003 HC RX 250 WO HCPCS: Performed by: FAMILY MEDICINE

## 2021-02-23 PROCEDURE — 99239 HOSP IP/OBS DSCHRG MGMT >30: CPT | Performed by: NURSE PRACTITIONER

## 2021-02-23 PROCEDURE — 6370000000 HC RX 637 (ALT 250 FOR IP): Performed by: FAMILY MEDICINE

## 2021-02-23 PROCEDURE — 82948 REAGENT STRIP/BLOOD GLUCOSE: CPT

## 2021-02-23 PROCEDURE — 80048 BASIC METABOLIC PNL TOTAL CA: CPT

## 2021-02-23 PROCEDURE — 36415 COLL VENOUS BLD VENIPUNCTURE: CPT

## 2021-02-23 PROCEDURE — 85025 COMPLETE CBC W/AUTO DIFF WBC: CPT

## 2021-02-23 RX ADMIN — RIVAROXABAN 10 MG: 10 TABLET, FILM COATED ORAL at 08:30

## 2021-02-23 RX ADMIN — FAMOTIDINE 20 MG: 10 INJECTION INTRAVENOUS at 08:28

## 2021-02-23 RX ADMIN — DICYCLOMINE HYDROCHLORIDE 20 MG: 10 CAPSULE ORAL at 10:25

## 2021-02-23 RX ADMIN — ACETAMINOPHEN 650 MG: 325 TABLET ORAL at 08:28

## 2021-02-23 RX ADMIN — DICYCLOMINE HYDROCHLORIDE 20 MG: 10 CAPSULE ORAL at 08:30

## 2021-02-23 RX ADMIN — MECLIZINE HYDROCHLORIDE 25 MG: 12.5 TABLET, FILM COATED ORAL at 08:28

## 2021-02-23 RX ADMIN — LOSARTAN POTASSIUM 50 MG: 50 TABLET, FILM COATED ORAL at 08:30

## 2021-02-23 ASSESSMENT — PAIN SCALES - GENERAL
PAINLEVEL_OUTOF10: 2
PAINLEVEL_OUTOF10: 8
PAINLEVEL_OUTOF10: 0

## 2021-02-23 NOTE — DISCHARGE SUMMARY
Hospital Medicine Discharge Summary      Patient Identification:   Gary Sheridan   : 1945  MRN: [id number]   Account: [de-identified]      Patient's PCP: Terese Angulo MD    Admit Date: 2021     Discharge Date: 2021      Admitting Physician: No admitting provider for patient encounter. Discharge Physician: Dinora Mayers, APRN - CNP     Discharge Diagnoses:    1. Encephalopathy  2. Chronic pain 2/2 c-spine stenosis  3. Unsteady gait   4. Leukopenia  5. HTN  6. HLD  7. Hx/o DVT  8. BPH  9. Hx/o opioid / EtOH abuse  10. DM? The patient was seen and examined on day of discharge and this discharge summary is in conjunction with any daily progress note from day of discharge. Hospital Course:   Gary Sheridan is a 76 y.o. male admitted to [address] [place] [address] on 2021 for AMS. Pt initially presented to the ED via ambulance d/t concerns of [facility] reporting pt was not being himself. Pt had a negative workup in the ED, was to be discharged, and became confused as he was getting up. Pt UDS (+) for oxycodone. Pt was admitted for further evaluation. MRI showed no acute findings. Folate, B12, TSH were okay. Neuro was consulted who recommended EEG - which came back abnormal, but showed no epileptiform activity. Pt was cleared by neuro. Of note, during a previous assessment  pt initially stated time as \"\" and place as \"5630. \" Pt requesting to go home, advised pt waiting on neuro and MRI results and he did not need to go home if he is still confused. Pt stated he was not confused and was then able to recall LOC question answers correctly. Pt then had a roommate present to the hospital to visit who reported pt \"drinks liquor and parties w/ young ladies and takes oxycodone until he passes out. \" Pt is known to have EtOH and substance abuse and is a registered sex offender.  This mostly likely contributes to patients acute episode of encephalopathy with possible behavioral component. Exam:     Vitals:  Vitals:    02/22/21 2100 02/23/21 0402 02/23/21 0810 02/23/21 1131   BP: (!) 159/74 (!) 147/80 (!) 157/81 (!) 198/86   Pulse: 60 57 52 51   Resp: 18 18 16 16   Temp: 98.2 °F (36.8 °C) 98 °F (36.7 °C) 98.3 °F (36.8 °C) 98.3 °F (36.8 °C)   TempSrc: Oral Oral Oral Oral   SpO2: 95% 96% 97% 96%   Weight:       Height:         Weight: Weight: 273 lb (123.8 kg)     24 hour intake/output:    Intake/Output Summary (Last 24 hours) at 2/23/2021 1523  Last data filed at 2/23/2021 1131  Gross per 24 hour   Intake 650 ml   Output 300 ml   Net 350 ml         General appearance:  No apparent distress, appears stated age and cooperative. HEENT:  Normal cephalic, atraumatic without obvious deformity. Pupils equal, round, and reactive to light. Extra ocular muscles intact. Conjunctivae/corneas clear. Neck: Supple, with full range of motion. Trachea midline. Respiratory:  Normal respiratory effort. Clear to auscultation, bilaterally without Rales/Wheezes/Rhonchi. Cardiovascular: RRR with normal S1/S2 without murmurs, rubs or gallops. Abdomen: Soft, generalized TTP, non-distended with +BS. Multiple scars. Musculoskeletal:  No clubbing, cyanosis or edema bilaterally. Full range of motion without deformity. Skin: Skin color, texture, turgor normal.  No rashes or lesions. Neurologic:  Neurovascularly intact without any focal sensory/motor deficits. Cranial nerves: II-XII intact, grossly non-focal.  Psychiatric:  Alert and oriented, thought content appropriate, normal insight  Capillary Refill: Brisk,< 3 seconds   Peripheral Pulses: +2 palpable, equal bilaterally       Labs:  For convenience and continuity at follow-up the following most recent labs are provided:      CBC:    Lab Results   Component Value Date    WBC 4.1 02/23/2021    HGB 12.7 02/23/2021    HCT 40.1 02/23/2021     02/23/2021       Renal:    Lab Results   Component Value Date     02/23/2021    K 4.8 02/23/2021    K [address] [facility], [address]  [phone]    On 3/2/2021  See Dr. Daina Vavlerde on Tues March 2 at 1:45. Discharge Medications:      Nicho Sanchez   Home Medication Instructions [id number]    Printed on:02/23/21 1523   Medication Information                      acetaminophen (TYLENOL) 500 MG tablet  Take 1,000 mg by mouth every 6 hours as needed for Pain             albuterol sulfate HFA (VENTOLIN HFA) 108 (90 Base) MCG/ACT inhaler  Inhale 2 puffs into the lungs every 6 hours as needed for Wheezing             atorvastatin (LIPITOR) 20 MG tablet  Take 20 mg by mouth daily pm             dicyclomine (BENTYL) 10 MG capsule  Take 2 capsules by mouth 4 times daily (before meals and nightly)             gabapentin (NEURONTIN) 600 MG tablet  Take 800 mg by mouth 3 times daily. HYDROcodone-acetaminophen (NORCO) 5-325 MG per tablet  Take 1 tablet by mouth every 6 hours as needed for Pain.             hydrOXYzine (ATARAX) 50 MG tablet  Take 50 mg by mouth 3 times daily as needed for Itching             lidocaine (LIDODERM) 5 %  Place 1 patch onto the skin daily 12 hours on, 12 hours off.             losartan (COZAAR) 50 MG tablet  Take 50 mg by mouth daily Am             meclizine (ANTIVERT) 25 MG tablet  Take 25 mg by mouth daily am             rivaroxaban (XARELTO) 10 MG TABS tablet  Take 10 mg by mouth every 24 hours             tamsulosin (FLOMAX) 0.4 MG capsule  Take 0.4 mg by mouth daily pm                 Time Spent on discharge is more than 31 minutes in the examination, evaluation, counseling and review of medications and discharge plan. Signed: Thank you Barbara Bassett MD for the opportunity to be involved in this patient's care.     Electronically signed by Fidela Habermann, APRN - CNP on 2/23/2021 at 3:23 PM

## 2021-02-23 NOTE — PLAN OF CARE
Problem: Falls - Risk of:  Goal: Will remain free from falls  Description: Will remain free from falls  Outcome: Ongoing  Goal: Absence of physical injury  Description: Absence of physical injury  Outcome: Ongoing     Problem: Pain:  Goal: Pain level will decrease  Description: Pain level will decrease  Outcome: Ongoing  Goal: Control of acute pain  Description: Control of acute pain  Outcome: Ongoing  Goal: Control of chronic pain  Description: Control of chronic pain  Outcome: Ongoing     Problem: Nutrition  Goal: Optimal nutrition therapy  Outcome: Ongoing     Problem: DISCHARGE BARRIERS  Goal: Patient's continuum of care needs are met  2/22/2021 2113 by Adam Hardwick RN  Outcome: Ongoing  2/22/2021 1613 by EDILIA Pedraza  Outcome: Ongoing

## 2021-02-23 NOTE — PROGRESS NOTES
[facility]  INPATIENT PHYSICAL THERAPY  EVALUATION  STRZ MED SURG 8B - 8B-25/025-A    Time In: 913  Time Out: 930  Timed Code Treatment Minutes: 9 Minutes  Minutes: 17          Date: 2021  Patient Name: Maricruz Estrada,  Gender:  male        MRN: [id number]  : 1945  (76 y.o.)      Referring Practitioner: Rosie Kilgore CNP  Diagnosis: encephalopathy  Additional Pertinent Hx: admit with above diagnosis, chronic pain, hx ETOH abuse     Restrictions/Precautions:  Restrictions/Precautions: General Precautions, Fall Risk  Position Activity Restriction  Other position/activity restrictions: confusion        Subjective:  Chart Reviewed: Yes  Patient assessed for rehabilitation services?: Yes  Subjective: required encouragement to participate, confusion, noted, pt repeatedly stating, \"I need to get dressed\", pt stuttering at times and difficulty with word finding at times    General:            Hearing: Within functional limits         Pain: 8/10: head and neck, per pt has chronic pain    Social/Functional History:    Type of Home: ([address])   ADL Assistance: Independent     Ambulation Assistance: Independent  Transfer Assistance: Independent    Additional Comments: Pt having increased difficulty answering questions this date especially regarding home environment. Pt staitng he lives alone in a partment but per chart he is in a long term house. Pt stating he had a lady that came in 2x week to assist him with walking and had him picking things up from ground [facility] PT/OT?). Pt continued to state he was fine throughout session.     OBJECTIVE:  Range of Motion:  Bilateral Lower Extremity: WFL    Strength:  Bilateral Lower Extremity: WFL, generalized weakness    Balance:  Static Sitting Balance:  Stand By Assistance  Static Standing Balance: Minimal Assistance, to CGA with RW, pt unsteady and impulsive, tendency to lean post    Bed Mobility:  Rolling to Right: Stand By Assistance   Supine to Sit: Stand By Assistance  Sit to Supine: Stand By Assistance   Scooting: Stand By [facility] time to complete  Transfers:  Sit to Stand: Minimal Assistance, x1 trial and CGA x1 trial, from EOB  Stand to [employer],     Ambulation:  Minimal Assistance, to CGA  Distance: 2'x1, 55'x1  Surface: Level Tile  Device:Rolling Walker  Gait Deviations: Forward Flexed Posture, Slow Ananya, Decreased Step Length Bilaterally, Moderate Path Deviations, Unsteady Gait and tendency to lean post at times, cues for direction        Functional Outcome Measures: Completed  AM-PAC Inpatient Mobility Raw Score : 18  AM-PAC Inpatient T-Scale Score : 43.63    ASSESSMENT:  Activity Tolerance:  Patient tolerance of  treatment: fair. Treatment Initiated: Treatment and education initiated within context of evaluation. Evaluation time included review of current medical information, gathering information related to past medical, social and functional history, completion of standardized testing, formal and informal observation of tasks, assessment of data and development of plan of care and goals. Treatment time included skilled education and facilitation of tasks to increase safety and independence with functional mobility for improved independence and quality of life. Assessment:   Body structures, Functions, Activity limitations: Decreased functional mobility , Decreased balance, Increased pain, Decreased strength, Decreased safe awareness, Decreased cognition, Decreased endurance  Assessment: pt with confusion, head and neck pain, generalized weakness, dec balance, inc fall risk, inc assist for safe mobility, recommend cont PT to inc pt I with functional mobility  Prognosis: Good    REQUIRES PT FOLLOW UP: Yes    Discharge Recommendations:  Discharge Recommendations: Continue to assess pending progress, Patient would benefit from continued therapy after discharge, 24 hour supervision or assist    Patient Education:  PT

## 2021-02-23 NOTE — CARE COORDINATION
2/23/21, 4:37 PM EST    Patient goals/plan/ treatment preferences discussed by  and . Patient goals/plan/ treatment preferences reviewed with patient/ family. Patient/ family verbalize understanding of discharge plan and are in agreement with goal/plan/treatment preferences. Understanding was demonstrated using the teach back method. AVS provided by RN at time of discharge, which includes all necessary medical information pertaining to the patients current course of illness, treatment, post-discharge goals of care, and treatment preferences. Services After Discharge  Services At/After Discharge: Nursing Services, OT, PT([facility])   IMM Letter  IMM Letter given to Patient/Family/Significant other/Guardian/POA/by[de-identified] Corinna CHILDS Case Manager. IMM Letter date given[de-identified] 02/22/21  IMM Letter time given[de-identified] 3251       Patient discharged home (recovery housing) and resume [facility]. RN notified [facility] of discharge and faxed AVS.  Cab provided at discharge.

## 2021-02-23 NOTE — CARE COORDINATION
2/23/21, 11:04 AM EST    DISCHARGE ON GOING EVALUATION    [address] [place] [address] day: 1  Location: [facility]25/025-A Reason for admit: Encephalopathy [G93.40]  Acute encephalopathy [G93.40]   Barriers to Discharge: PT/OT following. Recommend continued therapy at discharge. Per provider note, pt seems to reorient \"at will\". Continue neuro checks. Neuro has seen with recommendations. PCP: Antonio Sanchez MD  Readmission Risk Score: 12%  Patient Goals/Plan/Treatment Preferences: From [facility] and plans to return with current [facility]. SW following for continuity of services.

## 2021-02-23 NOTE — PROCEDURES
[address] [place] [address]                          ELECTROENCEPHALOGRAM REPORT    PATIENT NAME: Beronica Mosqueda                     :        1945  MED REC NO:   [id number]                           ROOM:       0025  ACCOUNT NO:   [de-identified]                           ADMIT DATE: 2021  PROVIDER:     Qi Guaman. Darcie Luu MD    DATE OF EE2021    REFERRING PROVIDER:  Qi Guaman. Darcie Luu MD    CLINICAL HISTORY:  A 24-year-old male presenting with altered mental  status. CLINICAL INTERPRETATION:  This is a 17-channel EEG performed without  sleep deprivation. Hyperventilation was not performed. Photic  stimulation was performed. The patient is described as alert. Background rhythm activity is noted to be 7 Hz in the posterior parietal  area, symmetric, attenuates with eye opening. The patient was noted to  be drowsy during parts of recording. The patient was noted to be asleep  during parts of recording. Excessive slowing was seen in the theta  range suggestive of cortical dysfunction such as seen with  encephalopathy. IMPRESSION:  This is an abnormal EEG due to the excessive slowing seen  in theta range suggestive of cortical dysfunction such as seen with  encephalopathy. However, there was no definitive evidence of  epileptiform activity appreciated.         El Price MD    D: 2021 10:40:10       T: 2021 10:49:44     [email or web address]  Job#: [id number]     Doc#: [id number]    CC:

## 2021-04-28 ENCOUNTER — TELEPHONE (OUTPATIENT)
Dept: PULMONOLOGY | Age: 76
End: 2021-04-28

## 2021-05-24 ENCOUNTER — APPOINTMENT (OUTPATIENT)
Dept: CT IMAGING | Age: 76
DRG: 917 | End: 2021-05-24
Payer: MEDICARE

## 2021-05-24 ENCOUNTER — HOSPITAL ENCOUNTER (INPATIENT)
Age: 76
LOS: 3 days | Discharge: NURSING, REHAB OR LONG-TERM CARE | DRG: 917 | End: 2021-05-27
Attending: EMERGENCY MEDICINE | Admitting: INTERNAL MEDICINE
Payer: MEDICARE

## 2021-05-24 ENCOUNTER — APPOINTMENT (OUTPATIENT)
Dept: GENERAL RADIOLOGY | Age: 76
DRG: 917 | End: 2021-05-24
Payer: MEDICARE

## 2021-05-24 DIAGNOSIS — R40.0 SOMNOLENCE: Primary | ICD-10-CM

## 2021-05-24 DIAGNOSIS — R29.6 FALLS FREQUENTLY: ICD-10-CM
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
## 2021-05-24 PROBLEM — R41.0 DISORIENTATION: Status: ACTIVE | Noted: 2021-05-24

## 2021-05-24 LAB
ALBUMIN SERPL-MCNC: 4 G/DL (ref 3.5–5.1)
ALP BLD-CCNC: 48 U/L (ref 38–126)
ALT SERPL-CCNC: 9 U/L (ref 11–66)
AMPHETAMINE+METHAMPHETAMINE URINE SCREEN: NEGATIVE
ANION GAP SERPL CALCULATED.3IONS-SCNC: 9 MEQ/L (ref 8–16)
AST SERPL-CCNC: 19 U/L (ref 5–40)
BACTERIA: NORMAL
BARBITURATE QUANTITATIVE URINE: NEGATIVE
BASE EXCESS MIXED: 1.6 MMOL/L (ref -2–3)
BASOPHILS # BLD: 1 %
BASOPHILS ABSOLUTE: 0 THOU/MM3 (ref 0–0.1)
BENZODIAZEPINE QUANTITATIVE URINE: NEGATIVE
BILIRUB SERPL-MCNC: 1.2 MG/DL (ref 0.3–1.2)
BILIRUBIN DIRECT: 0.3 MG/DL (ref 0–0.3)
BILIRUBIN URINE: NEGATIVE
BLOOD, URINE: NEGATIVE
BUN BLDV-MCNC: 28 MG/DL (ref 7–22)
CALCIUM SERPL-MCNC: 9.4 MG/DL (ref 8.5–10.5)
CANNABINOID QUANTITATIVE URINE: NEGATIVE
CASTS: NORMAL /LPF
CASTS: NORMAL /LPF
CHARACTER, URINE: CLEAR
CHLORIDE BLD-SCNC: 100 MEQ/L (ref 98–111)
CO2: 30 MEQ/L (ref 23–33)
COCAINE METABOLITE QUANTITATIVE URINE: NEGATIVE
COLLECTED BY:: ABNORMAL
COLOR: YELLOW
CREAT SERPL-MCNC: 1.8 MG/DL (ref 0.4–1.2)
CRYSTALS: NORMAL
DEVICE: ABNORMAL
EKG ATRIAL RATE: 53 BPM
EKG P AXIS: 70 DEGREES
EKG P-R INTERVAL: 180 MS
EKG Q-T INTERVAL: 462 MS
EKG QRS DURATION: 86 MS
EKG QTC CALCULATION (BAZETT): 433 MS
EKG R AXIS: 60 DEGREES
EKG T AXIS: 62 DEGREES
EKG VENTRICULAR RATE: 53 BPM
EOSINOPHIL # BLD: 4.9 %
EOSINOPHILS ABSOLUTE: 0.2 THOU/MM3 (ref 0–0.4)
EPITHELIAL CELLS, UA: NORMAL /HPF
ERYTHROCYTE [DISTWIDTH] IN BLOOD BY AUTOMATED COUNT: 14.2 % (ref 11.5–14.5)
ERYTHROCYTE [DISTWIDTH] IN BLOOD BY AUTOMATED COUNT: 48.2 FL (ref 35–45)
ETHYL ALCOHOL, SERUM: < 0.01 %
FIO2, MIXED VENOUS: 2
FOLATE: 14.1 NG/ML (ref 4.8–24.2)
GFR SERPL CREATININE-BSD FRML MDRD: 37 ML/MIN/1.73M2
GLUCOSE BLD-MCNC: 74 MG/DL (ref 70–108)
GLUCOSE BLD-MCNC: 79 MG/DL (ref 70–108)
GLUCOSE BLD-MCNC: 79 MG/DL (ref 70–108)
GLUCOSE BLD-MCNC: 92 MG/DL (ref 70–108)
GLUCOSE, URINE: NEGATIVE MG/DL
HCO3, MIXED: 28 MMOL/L (ref 23–28)
HCT VFR BLD CALC: 44.3 % (ref 42–52)
HEMOGLOBIN: 13.4 GM/DL (ref 14–18)
IMMATURE GRANS (ABS): 0.01 THOU/MM3 (ref 0–0.07)
IMMATURE GRANULOCYTES: 0.3 %
KETONES, URINE: NEGATIVE
LACTIC ACID, SEPSIS: 1.9 MMOL/L (ref 0.5–1.9)
LEUKOCYTE ESTERASE, URINE: NEGATIVE
LYMPHOCYTES # BLD: 33.2 %
LYMPHOCYTES ABSOLUTE: 1.3 THOU/MM3 (ref 1–4.8)
MCH RBC QN AUTO: 28.2 PG (ref 26–33)
MCHC RBC AUTO-ENTMCNC: 30.2 GM/DL (ref 32.2–35.5)
MCV RBC AUTO: 93.3 FL (ref 80–94)
MISCELLANEOUS LAB TEST RESULT: NORMAL
MONOCYTES # BLD: 13.4 %
MONOCYTES ABSOLUTE: 0.5 THOU/MM3 (ref 0.4–1.3)
NITRITE, URINE: NEGATIVE
NUCLEATED RED BLOOD CELLS: 0 /100 WBC
O2 SAT, MIXED: 63 %
OPIATES, URINE: NEGATIVE
OSMOLALITY CALCULATION: 281.9 MOSMOL/KG (ref 275–300)
OXYCODONE: POSITIVE
PCO2, MIXED VENOUS: 52 MMHG (ref 41–51)
PH UA: 5 (ref 5–9)
PH, MIXED: 7.34 (ref 7.31–7.41)
PHENCYCLIDINE QUANTITATIVE URINE: NEGATIVE
PLATELET # BLD: 171 THOU/MM3 (ref 130–400)
PMV BLD AUTO: 10 FL (ref 9.4–12.4)
PO2 MIXED: 35 MMHG (ref 25–40)
POTASSIUM REFLEX MAGNESIUM: 4.2 MEQ/L (ref 3.5–5.2)
PRO-BNP: 53.3 PG/ML (ref 0–1800)
PROTEIN UA: NEGATIVE MG/DL
RBC # BLD: 4.75 MILL/MM3 (ref 4.7–6.1)
RBC URINE: NORMAL /HPF
RENAL EPITHELIAL, UA: NORMAL
SARS-COV-2, NAAT: NOT DETECTED
SEG NEUTROPHILS: 47.2 %
SEGMENTED NEUTROPHILS ABSOLUTE COUNT: 1.8 THOU/MM3 (ref 1.8–7.7)
SODIUM BLD-SCNC: 139 MEQ/L (ref 135–145)
SPECIFIC GRAVITY UA: 1.01 (ref 1–1.03)
TOTAL PROTEIN: 7 G/DL (ref 6.1–8)
TROPONIN T: 0.02 NG/ML
TROPONIN T: 0.02 NG/ML
TSH SERPL DL<=0.05 MIU/L-ACNC: 0.54 UIU/ML (ref 0.4–4.2)
UROBILINOGEN, URINE: 0.2 EU/DL (ref 0–1)
VITAMIN B-12: 809 PG/ML (ref 211–911)
VITAMIN D 25-HYDROXY: 37 NG/ML (ref 30–100)
WBC # BLD: 3.9 THOU/MM3 (ref 4.8–10.8)
WBC UA: NORMAL /HPF
YEAST: NORMAL

## 2021-05-24 PROCEDURE — 83605 ASSAY OF LACTIC ACID: CPT

## 2021-05-24 PROCEDURE — 74176 CT ABD & PELVIS W/O CONTRAST: CPT

## 2021-05-24 PROCEDURE — 94761 N-INVAS EAR/PLS OXIMETRY MLT: CPT

## 2021-05-24 PROCEDURE — 2700000000 HC OXYGEN THERAPY PER DAY

## 2021-05-24 PROCEDURE — 84443 ASSAY THYROID STIM HORMONE: CPT

## 2021-05-24 PROCEDURE — 6820000002 HC L2 INJURY CALL ACTIVATION: Performed by: INTERNAL MEDICINE

## 2021-05-24 PROCEDURE — 6360000002 HC RX W HCPCS

## 2021-05-24 PROCEDURE — 1200000003 HC TELEMETRY R&B

## 2021-05-24 PROCEDURE — 99222 1ST HOSP IP/OBS MODERATE 55: CPT | Performed by: INTERNAL MEDICINE

## 2021-05-24 PROCEDURE — 2580000003 HC RX 258: Performed by: EMERGENCY MEDICINE

## 2021-05-24 PROCEDURE — 82746 ASSAY OF FOLIC ACID SERUM: CPT

## 2021-05-24 PROCEDURE — 87635 SARS-COV-2 COVID-19 AMP PRB: CPT

## 2021-05-24 PROCEDURE — 85025 COMPLETE CBC W/AUTO DIFF WBC: CPT

## 2021-05-24 PROCEDURE — 80048 BASIC METABOLIC PNL TOTAL CA: CPT

## 2021-05-24 PROCEDURE — 99285 EMERGENCY DEPT VISIT HI MDM: CPT

## 2021-05-24 PROCEDURE — 82948 REAGENT STRIP/BLOOD GLUCOSE: CPT

## 2021-05-24 PROCEDURE — 99283 EMERGENCY DEPT VISIT LOW MDM: CPT | Performed by: SURGERY

## 2021-05-24 PROCEDURE — APPSS180 APP SPLIT SHARED TIME > 60 MINUTES: Performed by: NURSE PRACTITIONER

## 2021-05-24 PROCEDURE — 82803 BLOOD GASES ANY COMBINATION: CPT

## 2021-05-24 PROCEDURE — 83880 ASSAY OF NATRIURETIC PEPTIDE: CPT

## 2021-05-24 PROCEDURE — 36415 COLL VENOUS BLD VENIPUNCTURE: CPT

## 2021-05-24 PROCEDURE — 80076 HEPATIC FUNCTION PANEL: CPT

## 2021-05-24 PROCEDURE — 93005 ELECTROCARDIOGRAM TRACING: CPT | Performed by: STUDENT IN AN ORGANIZED HEALTH CARE EDUCATION/TRAINING PROGRAM

## 2021-05-24 PROCEDURE — 81001 URINALYSIS AUTO W/SCOPE: CPT

## 2021-05-24 PROCEDURE — 82077 ASSAY SPEC XCP UR&BREATH IA: CPT

## 2021-05-24 PROCEDURE — 71045 X-RAY EXAM CHEST 1 VIEW: CPT

## 2021-05-24 PROCEDURE — 93005 ELECTROCARDIOGRAM TRACING: CPT | Performed by: EMERGENCY MEDICINE

## 2021-05-24 PROCEDURE — 70450 CT HEAD/BRAIN W/O DYE: CPT

## 2021-05-24 PROCEDURE — 80307 DRUG TEST PRSMV CHEM ANLYZR: CPT

## 2021-05-24 PROCEDURE — 72125 CT NECK SPINE W/O DYE: CPT

## 2021-05-24 PROCEDURE — 82607 VITAMIN B-12: CPT

## 2021-05-24 PROCEDURE — 84484 ASSAY OF TROPONIN QUANT: CPT

## 2021-05-24 PROCEDURE — 82306 VITAMIN D 25 HYDROXY: CPT

## 2021-05-24 RX ORDER — POTASSIUM CHLORIDE 20 MEQ/1
40 TABLET, EXTENDED RELEASE ORAL PRN
Status: DISCONTINUED | OUTPATIENT
Start: 2021-05-24 | End: 2021-05-27 | Stop reason: HOSPADM

## 2021-05-24 RX ORDER — MAGNESIUM SULFATE IN WATER 40 MG/ML
2000 INJECTION, SOLUTION INTRAVENOUS PRN
Status: DISCONTINUED | OUTPATIENT
Start: 2021-05-24 | End: 2021-05-27 | Stop reason: HOSPADM

## 2021-05-24 RX ORDER — SODIUM CHLORIDE 0.9 % (FLUSH) 0.9 %
5-40 SYRINGE (ML) INJECTION EVERY 12 HOURS SCHEDULED
Status: DISCONTINUED | OUTPATIENT
Start: 2021-05-24 | End: 2021-05-27 | Stop reason: HOSPADM

## 2021-05-24 RX ORDER — HYDROXYZINE HYDROCHLORIDE 25 MG/1
50 TABLET, FILM COATED ORAL 3 TIMES DAILY PRN
Status: DISCONTINUED | OUTPATIENT
Start: 2021-05-24 | End: 2021-05-25

## 2021-05-24 RX ORDER — ONDANSETRON 2 MG/ML
4 INJECTION INTRAMUSCULAR; INTRAVENOUS EVERY 6 HOURS PRN
Status: DISCONTINUED | OUTPATIENT
Start: 2021-05-24 | End: 2021-05-27 | Stop reason: HOSPADM

## 2021-05-24 RX ORDER — LOSARTAN POTASSIUM 50 MG/1
50 TABLET ORAL DAILY
Status: DISCONTINUED | OUTPATIENT
Start: 2021-05-25 | End: 2021-05-27 | Stop reason: HOSPADM

## 2021-05-24 RX ORDER — POLYETHYLENE GLYCOL 3350 17 G/17G
17 POWDER, FOR SOLUTION ORAL DAILY PRN
Status: DISCONTINUED | OUTPATIENT
Start: 2021-05-24 | End: 2021-05-27 | Stop reason: HOSPADM

## 2021-05-24 RX ORDER — ATORVASTATIN CALCIUM 20 MG/1
20 TABLET, FILM COATED ORAL DAILY
Status: DISCONTINUED | OUTPATIENT
Start: 2021-05-25 | End: 2021-05-27 | Stop reason: HOSPADM

## 2021-05-24 RX ORDER — ACETAMINOPHEN 650 MG/1
650 SUPPOSITORY RECTAL EVERY 6 HOURS PRN
Status: DISCONTINUED | OUTPATIENT
Start: 2021-05-24 | End: 2021-05-27 | Stop reason: HOSPADM

## 2021-05-24 RX ORDER — ACETAMINOPHEN 325 MG/1
650 TABLET ORAL EVERY 6 HOURS PRN
Status: DISCONTINUED | OUTPATIENT
Start: 2021-05-24 | End: 2021-05-27 | Stop reason: HOSPADM

## 2021-05-24 RX ORDER — MECLIZINE HCL 12.5 MG/1
25 TABLET ORAL DAILY
Status: DISCONTINUED | OUTPATIENT
Start: 2021-05-25 | End: 2021-05-27 | Stop reason: HOSPADM

## 2021-05-24 RX ORDER — DEXTROSE MONOHYDRATE 50 MG/ML
100 INJECTION, SOLUTION INTRAVENOUS PRN
Status: DISCONTINUED | OUTPATIENT
Start: 2021-05-24 | End: 2021-05-27 | Stop reason: HOSPADM

## 2021-05-24 RX ORDER — TAMSULOSIN HYDROCHLORIDE 0.4 MG/1
0.4 CAPSULE ORAL DAILY
Status: DISCONTINUED | OUTPATIENT
Start: 2021-05-25 | End: 2021-05-27 | Stop reason: HOSPADM

## 2021-05-24 RX ORDER — ALBUTEROL SULFATE 2.5 MG/3ML
2.5 SOLUTION RESPIRATORY (INHALATION) EVERY 6 HOURS PRN
Status: DISCONTINUED | OUTPATIENT
Start: 2021-05-24 | End: 2021-05-27 | Stop reason: HOSPADM

## 2021-05-24 RX ORDER — SODIUM CHLORIDE 0.9 % (FLUSH) 0.9 %
5-40 SYRINGE (ML) INJECTION PRN
Status: DISCONTINUED | OUTPATIENT
Start: 2021-05-24 | End: 2021-05-27 | Stop reason: HOSPADM

## 2021-05-24 RX ORDER — NICOTINE POLACRILEX 4 MG
15 LOZENGE BUCCAL PRN
Status: DISCONTINUED | OUTPATIENT
Start: 2021-05-24 | End: 2021-05-27 | Stop reason: HOSPADM

## 2021-05-24 RX ORDER — DEXTROSE MONOHYDRATE 25 G/50ML
12.5 INJECTION, SOLUTION INTRAVENOUS PRN
Status: DISCONTINUED | OUTPATIENT
Start: 2021-05-24 | End: 2021-05-27 | Stop reason: HOSPADM

## 2021-05-24 RX ORDER — 0.9 % SODIUM CHLORIDE 0.9 %
1000 INTRAVENOUS SOLUTION INTRAVENOUS ONCE
Status: COMPLETED | OUTPATIENT
Start: 2021-05-24 | End: 2021-05-24

## 2021-05-24 RX ORDER — PROMETHAZINE HYDROCHLORIDE 25 MG/1
12.5 TABLET ORAL EVERY 6 HOURS PRN
Status: DISCONTINUED | OUTPATIENT
Start: 2021-05-24 | End: 2021-05-27 | Stop reason: HOSPADM

## 2021-05-24 RX ORDER — SENNOSIDES 8.8 MG/5ML
5 LIQUID ORAL 2 TIMES DAILY PRN
Status: DISCONTINUED | OUTPATIENT
Start: 2021-05-24 | End: 2021-05-27 | Stop reason: HOSPADM

## 2021-05-24 RX ORDER — POTASSIUM CHLORIDE 7.45 MG/ML
10 INJECTION INTRAVENOUS PRN
Status: DISCONTINUED | OUTPATIENT
Start: 2021-05-24 | End: 2021-05-27 | Stop reason: HOSPADM

## 2021-05-24 RX ORDER — SODIUM CHLORIDE 9 MG/ML
25 INJECTION, SOLUTION INTRAVENOUS PRN
Status: DISCONTINUED | OUTPATIENT
Start: 2021-05-24 | End: 2021-05-27 | Stop reason: HOSPADM

## 2021-05-24 RX ADMIN — SODIUM CHLORIDE 1000 ML: 9 INJECTION, SOLUTION INTRAVENOUS at 14:30

## 2021-05-24 ASSESSMENT — ENCOUNTER SYMPTOMS
RHINORRHEA: 0
CHEST TIGHTNESS: 0
CONSTIPATION: 0
EYE REDNESS: 0
EYE DISCHARGE: 0
VOMITING: 0
COLOR CHANGE: 0
EYE PAIN: 0
PHOTOPHOBIA: 0
SORE THROAT: 0
FACIAL SWELLING: 0
COUGH: 0
STRIDOR: 0
WHEEZING: 0
BLOOD IN STOOL: 0
VOICE CHANGE: 0
BACK PAIN: 0
DIARRHEA: 0
NAUSEA: 0
SINUS PRESSURE: 0
SHORTNESS OF BREATH: 0
TROUBLE SWALLOWING: 0
CHOKING: 0
EYE ITCHING: 0
ABDOMINAL DISTENTION: 0
APNEA: 0
ABDOMINAL PAIN: 0

## 2021-05-24 NOTE — ED NOTES
Pt urinated off the side of his bed. Pt bed sheets changed and pt redressed. Pt still remains confused and unable to tell me what his name is.       Moises Poon RN  05/24/21 6174

## 2021-05-24 NOTE — CONSULTS
Nam Gallego     Patient:  Romayne Lips date: 5/24/2021   YOB: 1945 Date of Evaluation: 5/24/2021  MRN: [id number]  Acct: [de-identified]    Injury Date:5/24/2021  Injury time:PTA  PCP: Kaity Gallegos MD   Referring physician: Dr. Germania Cantrell    Time of Trauma Surgeon Notification:  [phone] May 25, 2021  Time of Trama CAROLYNE Arrival: 1316  Time of Trauma Surgeon Arrival: 1338 May 25, 2021    Assessment:    Fall  Anticoagulated on Xarelto  Altered mental status  Plan:    No acute traumatic injuries requiring admission to the trauma service. Recommend medicine admission for work up for confusion/altered mental status    Activation: []Level I (Trauma Alert) [x]Level II (Injury Call) []Level III (Trauma Consult) []Downgraded  Mode of Arrival: EMS transportation  Referring Facility: N/A   Loss of Consciousness []No []Yes[x]Unknown    Mechanism of Injury:  []Motor Vehicle crash   []Single Vehicle [] []Passenger []Scene Fatality []Front Seat  []Restrained   []Air Bag Deployed   []Ejected []Rollover []Pedestrian []Trapped   Type of vehicle:   Protective Devices:   []Motorcycle  Wearing Helmet []Yes []No  []Bicycle  Wearing Helmet []Yes []No  [x]Fall   Distance - ground level  []Assault    Abuse Reported []Yes []No  []Gunshot  []Stabbing  []Work Related  []Burn: []Flame []Scald []Electrical []Chemical []Contact []Inhalation []House Fire  []Other:   Patient Active Problem List   Diagnosis    Encephalopathy    Moderate malnutrition ([facility]    Hypertension    Hyperlipidemia    History of DVT (deep vein thrombosis)    Diabetes ([facility]    Acute encephalopathy    Fall    Disorientation     Subjective   Chief Complaint: Fall    History of Present Illness:  Argenis Vasquez is a 76year old male presenting to the Emergency Department via EMS from [facility] for evaluation of confusion. Per EMS report, the pharmacy staff reports that the patient was at the pharmacy, disoriented. The patient reports that he had fallen although it was not witnessed. He arrives complaining of a headache and neck pain. He takes Xarelto. Exact events surrounding fall are unclear and patient is not able to reliably provide much information. Review of Systems:   Review of Systems   Constitutional: Negative for activity change, appetite change, chills, diaphoresis, fatigue, fever and unexpected weight change. HENT: Negative for congestion, dental problem, drooling, ear discharge, ear pain, facial swelling, hearing loss, mouth sores, nosebleeds, postnasal drip, rhinorrhea, sinus pressure, sneezing, sore throat, tinnitus, trouble swallowing and voice change. Eyes: Negative for photophobia, pain, discharge, redness, itching and visual disturbance. Respiratory: Negative for apnea, cough, choking, chest tightness, shortness of breath, wheezing and stridor. Cardiovascular: Negative for chest pain, palpitations and leg swelling. Gastrointestinal: Negative for abdominal distention, abdominal pain, blood in stool, constipation, diarrhea, nausea and vomiting. Endocrine: Negative for cold intolerance, heat intolerance, polydipsia, polyphagia and polyuria. Genitourinary: Negative for difficulty urinating, dysuria, flank pain, frequency, hematuria and urgency. Musculoskeletal: Positive for neck pain. Negative for arthralgias, back pain, gait problem, joint swelling, myalgias and neck stiffness. Skin: Negative for color change, pallor, rash and wound. Allergic/Immunologic: Negative for environmental allergies, food allergies and immunocompromised state. Neurological: Positive for headaches. Negative for dizziness, tremors, seizures, syncope, facial asymmetry, speech difficulty, weakness, light-headedness and numbness. Hematological: Negative for adenopathy. Does not bruise/bleed easily. Psychiatric/Behavioral: Positive for confusion.  Negative for agitation, behavioral problems, decreased concentration, dysphoric mood, hallucinations, self-injury, sleep disturbance and suicidal ideas. The patient is not nervous/anxious and is not hyperactive. Codeine, Effexor [venlafaxine], Ibuprofen, and Tramadol  Past Surgical History:   Procedure Laterality Date    BACK SURGERY      CHOLECYSTECTOMY      SHOULDER SURGERY      SMALL INTESTINE SURGERY      partial bowel resection     Past Medical History:   Diagnosis Date    Anxiety     Chronic back pain     Chronic kidney disease (CKD), stage II (mild)     COPD (chronic obstructive pulmonary disease) (HCC)     Depression     Diabetes mellitus (HCC)     DVT (deep venous thrombosis) (HCC)     Hyperlipidemia     Hypertension     Hypothyroidism     Lumbago with sciatica     Polyneuropathy     Renal cell carcinoma (HCC)      Past Surgical History:   Procedure Laterality Date    BACK SURGERY      CHOLECYSTECTOMY      SHOULDER SURGERY      SMALL INTESTINE SURGERY      partial bowel resection     Social History     Socioeconomic History    Marital status: Single     Spouse name: Not on file    Number of children: Not on file    Years of education: Not on file    Highest education level: Not on file   Occupational History    Not on file   Tobacco Use    Smoking status: Never Smoker    Smokeless tobacco: Never Used   Substance and Sexual Activity    Alcohol use: Never    Drug use: Never    Sexual activity: Not on file   Other Topics Concern    Not on file   Social History Narrative    Not on file     Social Determinants of Health     Financial Resource Strain:     Difficulty of Paying Living Expenses:    Food Insecurity:     Worried About Running Out of Food in the Last Year:     Ran Out of Food in the Last Year:    Transportation Needs:     Lack of Transportation (Medical):      Lack of Transportation (Non-Medical):    Physical Activity:     Days of Exercise per Week:     Minutes of Exercise per Session:    Stress:     Feeling of Stress :    Social Connections:     Frequency of Communication with Friends and Family:     Frequency of Social Gatherings with Friends and Family:     Attends [employer] Services:     Active Member of Clubs or Organizations:     Attends Club or Organization Meetings:     Marital Status:    Intimate Partner Violence:     Fear of Current or Ex-Partner:     Emotionally Abused:     Physically Abused:     Sexually Abused:      Family History   Family history unknown: Yes       Home medications:    Current Discharge Medication List      CONTINUE these medications which have NOT CHANGED    Details   lidocaine (LIDODERM) 5 % Place 1 patch onto the skin daily 12 hours on, 12 hours off. Qty: 15 patch, Refills: 0      dicyclomine (BENTYL) 10 MG capsule Take 2 capsules by mouth 4 times daily (before meals and nightly)  Qty: 40 capsule, Refills: 0      rivaroxaban (XARELTO) 10 MG TABS tablet Take 10 mg by mouth every 24 hours      albuterol sulfate HFA (VENTOLIN HFA) 108 (90 Base) MCG/ACT inhaler Inhale 2 puffs into the lungs every 6 hours as needed for Wheezing      hydrOXYzine (ATARAX) 50 MG tablet Take 50 mg by mouth 3 times daily as needed for Itching      gabapentin (NEURONTIN) 600 MG tablet Take 800 mg by mouth 3 times daily.        HYDROcodone-acetaminophen (NORCO) 5-325 MG per tablet Take 1 tablet by mouth every 6 hours as needed for Pain.      losartan (COZAAR) 50 MG tablet Take 50 mg by mouth daily Am      meclizine (ANTIVERT) 25 MG tablet Take 25 mg by mouth daily am      acetaminophen (TYLENOL) 500 MG tablet Take 1,000 mg by mouth every 6 hours as needed for Pain      tamsulosin (FLOMAX) 0.4 MG capsule Take 0.4 mg by mouth daily pm      atorvastatin (LIPITOR) 20 MG tablet Take 20 mg by mouth daily pm             Hospital medications:  Scheduled Meds:   sodium chloride flush  5-40 mL Intravenous 2 times per day    calcium replacement protocol   Other RX Placeholder    famotidine (PEPCID) injection  20 mg Intravenous Daily     Continuous Infusions:   sodium chloride      dextrose       PRN Meds:  Objective   ED TRIAGE VITALS  BP: 132/69, Temp: 98.5 °F (36.9 °C), Pulse: 52, Resp: 18, SpO2: 95 %  Radha Coma Scale  Eye Opening: Spontaneous  Best Verbal Response: Confused  Best Motor Response: Obeys commands  [place] Coma Scale Score: 14  Results for orders placed or performed during the hospital encounter of 05/24/21   COVID-19, Rapid   Result Value Ref Range    SARS-CoV-2, NAAT NOT DETECTED NOT DETECTED   CBC Auto Differential   Result Value Ref Range    WBC 3.9 (L) 4.8 - 10.8 thou/mm3    RBC 4.75 4.70 - 6.10 mill/mm3    Hemoglobin 13.4 (L) 14.0 - 18.0 gm/dl    Hematocrit 44.3 42.0 - 52.0 %    MCV 93.3 80.0 - 94.0 fL    MCH 28.2 26.0 - 33.0 pg    MCHC 30.2 (L) 32.2 - 35.5 gm/dl    RDW-CV 14.2 11.5 - 14.5 %    RDW-SD 48.2 (H) 35.0 - 45.0 fL    Platelets 484 933 - 990 thou/mm3    MPV 10.0 9.4 - 12.4 fL    Seg Neutrophils 47.2 %    Lymphocytes 33.2 %    Monocytes 13.4 %    Eosinophils 4.9 %    Basophils 1.0 %    Immature Granulocytes 0.3 %    Segs Absolute 1.8 1 - 7 thou/mm3    Lymphocytes Absolute 1.3 1.0 - 4.8 thou/mm3    Monocytes Absolute 0.5 0.4 - 1.3 thou/mm3    Eosinophils Absolute 0.2 0.0 - 0.4 thou/mm3    Basophils Absolute 0.0 0.0 - 0.1 thou/mm3    Immature Grans (Abs) 0.01 0.00 - 0.07 thou/mm3    nRBC 0 /100 wbc   Basic Metabolic Panel w/ Reflex to MG   Result Value Ref Range    Sodium 139 135 - 145 meq/L    Potassium reflex Magnesium 4.2 3.5 - 5.2 meq/L    Chloride 100 98 - 111 meq/L    CO2 30 23 - 33 meq/L    Glucose 79 70 - 108 mg/dL    BUN 28 (H) 7 - 22 mg/dL    CREATININE 1.8 (H) 0.4 - 1.2 mg/dL    Calcium 9.4 8.5 - 10.5 mg/dL   Brain Natriuretic Peptide   Result Value Ref Range    Pro-BNP 53.3 0.0 - 1800.0 pg/mL   Troponin   Result Value Ref Range    Troponin T 0.019 (A) ng/ml   Ethanol   Result Value Ref Range    ETHYL ALCOHOL, SERUM < 0.01 0.00 %   Urine Drug Screen   Result Value Ref Range Urobilinogen, Urine 0.2 0.0 - 1.0 eu/dl    Nitrite, Urine NEGATIVE NEGATIVE    Leukocyte Esterase, Urine NEGATIVE NEGATIVE    Color, UA YELLOW YELLOW-STRAW    Character, Urine CLEAR [id number]    RBC, UA NONE SEEN 0-2/hpf /hpf    WBC, UA NONE SEEN 0-4/hpf /hpf    Epithelial Cells, UA NONE SEEN 3-5/hpf /hpf    Bacteria, UA NONE SEEN FEW/NONE SEEN    Casts 4-8 HYALINE NONE SEEN /lpf    Crystals NONE SEEN NONE SEEN    Renal Epithelial, UA NONE SEEN NONE SEEN    Yeast, UA NONE SEEN NONE SEEN    Casts NONE SEEN /lpf    Miscellaneous Lab Test Result NONE SEEN    POCT Glucose   Result Value Ref Range    POC Glucose 92 70 - 108 mg/dl   POCT glucose   Result Value Ref Range    POC Glucose 74 70 - 108 mg/dl   POCT Glucose   Result Value Ref Range    POC Glucose 79 70 - 108 mg/dl   EKG 12 Lead   Result Value Ref Range    Ventricular Rate 53 BPM    Atrial Rate 53 BPM    P-R Interval 180 ms    QRS Duration 86 ms    Q-T Interval 462 ms    QTc Calculation (Bazett) 433 ms    P Axis 70 degrees    R Axis 60 degrees    T Axis 62 degrees   EKG 12 lead   Result Value Ref Range    Ventricular Rate 53 BPM    Atrial Rate 53 BPM    P-R Interval 152 ms    QRS Duration 84 ms    Q-T Interval 468 ms    QTc Calculation (Bazett) 439 ms    P Axis 50 degrees    R Axis 46 degrees    T Axis 43 degrees       Physical Exam:  Patient Vitals for the past 24 hrs:   BP Temp Temp src Pulse Resp SpO2 Height Weight   05/24/21 1932 132/69 98.5 °F (36.9 °C) Oral 52 18 95 % -- --   05/24/21 1848 106/61 97.6 °F (36.4 °C) Oral 57 17 97 % -- --   05/24/21 1758 115/70 -- -- 55 16 98 % -- --   05/24/21 1650 122/71 -- -- 58 15 100 % -- --   05/24/21 1539 (!) 104/57 -- -- 64 16 98 % -- --   05/24/21 1429 -- -- -- -- -- 100 % -- --   05/24/21 1417 119/72 -- -- 55 14 100 % -- --   05/24/21 1327 119/69 -- -- 56 -- 97 % -- --   05/24/21 1313 112/70 -- -- -- -- -- -- --   05/24/21 1306 97/76 97.9 °F (36.6 °C) Oral 56 11 93 % 5' 10\" (1.778 m) 273 lb (123.8 kg)   05/24/21 1304 130/71 -- -- 57 12 98 % -- --     Primary Assessment:  Airway: Patent, trachea midline  Breathing: Breath sounds present and equal bilaterally, spontaneous, and unlabored  Circulation: Hemodynamically stable, 2+ central and peripheral pulses. Disability: VALDEZ x 4, following commands. GCS =14    Secondary Assessment:  General: Alert, NAD. Head: Normocephalic, mid face stable. Tympanic membranes intact. Nares patent bilaterally, no epistaxis. Mouth clear of foreign bodies, no lacerations or abrasions. Eyes: PERRLA. EOMI. Nontraumatic. Neurologic: Alert. Confused. Intermittently following commands. CN 2-12 intact  Neck: Immobilized in cervical collar, trachea midline. Cervical spines NTTP midline, without step-offs, crepitus or deformity. Back:TL spines are NTTP midline, without step-offs, crepitus or deformity. No abrasions, contusions, or ecchymosis noted. Lungs: Clear to auscultation bilaterally. Chest Wall: Chest rise symmetrical.  Chest wall without tenderness to palpation. No crepitus, deformities, lacerations, or abrasions. Heart: RRR. Normal S1/S2. No obvious M/G/R. Abdomen:  Soft, NTTP. No guarding. Non-peritoneal.  Pelvis:  NTTP, stable to compression. Femoral pulses 2+. GI/: No blood at the urinary meatus. No gross hematuria. Extremities: No gross deformities. PMS intact. Radial /DP/PT pulses 2+ bilaterally. Skin: Skin warm and dry. Normal for ethnicity. Radiology:     XR CHEST PORTABLE   Final Result   1. Very poor inflation of lungs. Mild cardiac megaly. Prior lower cervical/thoracic fusion with anterior posterior metallic hardware. Left shoulder prosthesis. 2. Mild left basilar atelectasis/pneumonia. **This report has been created using voice recognition software. It may contain minor errors which are inherent in voice recognition technology. **      Final report electronically signed by Dr. Destiney Medel on 5/24/2021 1:41 PM      CT HEAD WO CONTRAST Final Result   No acute process stable appearance            **This report has been created using voice recognition software. It may contain minor errors which are inherent in voice recognition technology. **      Final report electronically signed by Dr. Jacki Bull on 5/24/2021 1:37 PM      CT CERVICAL SPINE WO CONTRAST   Final Result   Extensive postsurgical changes which obscure some detail. No acute process is identified. **This report has been created using voice recognition software. It may contain minor errors which are inherent in voice recognition technology. **      Final report electronically signed by Dr. Jacki Bull on 5/24/2021 1:47 PM      CT ABDOMEN PELVIS WO CONTRAST Additional Contrast? None   Final Result   Stable CT scan abdomen and pelvis no acute process. Chronic findings detailed above report. **This report has been created using voice recognition software. It may contain minor errors which are inherent in voice recognition technology. **      Final report electronically signed by Dr. Jacki Bull on 5/24/2021 2:11 PM      VL DUP CAROTID BILATERAL    (Results Pending)     Fast Exam: No    Electronically signed by STEFANO Colon CNP on 5/24/2021 at 10:25 PM     Patient seen and examined independently by me. Above discussed and I agree with Tatianna Beth CNP. See my additional comments below for updated orders and plan. Labs, cultures, and radiographs where available were reviewed. I discussed patient concerns with the patient's nurse and instructions were given. Please see our orders for the updated patient care plan. -No acute traumatic injuries identified. Patient apparently has some baseline altered mental status given encephalopathy. Concern for illicit drug use versus hypoglycemia. Planning admission by medical services. Neurovascular checks. Call trauma services if reevaluation needed while in house.     Electronically signed by Reilly Florentino MD on 5/25/21 at 5:31 AM EDT

## 2021-05-24 NOTE — ED PROVIDER NOTES
Good range of motion in major joints is observed. No major deformities noted. Neurological: Somnolent/confused, moving upper and lower extremities spontaneously, no focal deficits. GCS 15  Skin: Skin is warm, dry and intact. No rash noted. No erythema. DIFFERENTIAL DIAGNOSIS:       DIAGNOSTIC RESULTS     EKG: All EKG's are interpreted by the Emergency Department Physician who either signs or Co-signs this chart in the absence of a cardiologist.      RADIOLOGY: non-plain film images(s) such as CT, Ultrasound and MRI are read by the radiologist.  Plain radiographic images are visualized and preliminarily interpreted by the emergency physician unless otherwise stated below.       LABS:   Labs Reviewed   CBC WITH AUTO DIFFERENTIAL - Abnormal; Notable for the following components:       Result Value    WBC 3.9 (*)     Hemoglobin 13.4 (*)     MCHC 30.2 (*)     RDW-SD 48.2 (*)     All other components within normal limits   BASIC METABOLIC PANEL W/ REFLEX TO MG FOR LOW K - Abnormal; Notable for the following components:    BUN 28 (*)     CREATININE 1.8 (*)     All other components within normal limits   TROPONIN - Abnormal; Notable for the following components:    Troponin T 0.019 (*)     All other components within normal limits   BLOOD GAS, VENOUS - Abnormal; Notable for the following components:    PCO2, MIXED VENOUS 52 (*)     All other components within normal limits   GLOMERULAR FILTRATION RATE, ESTIMATED - Abnormal; Notable for the following components:    Est, Glom Filt Rate 37 (*)     All other components within normal limits   HEPATIC FUNCTION PANEL - Abnormal; Notable for the following components:    ALT 9 (*)     All other components within normal limits   TROPONIN - Abnormal; Notable for the following components:    Troponin T 0.018 (*)     All other components within normal limits   BASIC METABOLIC PANEL W/ REFLEX TO MG FOR LOW K - Abnormal; Notable for the following components:    BUN 23 (*)     All other components within normal limits   CBC - Abnormal; Notable for the following components:    WBC 2.8 (*)     Hemoglobin 13.7 (*)     MCV 99.6 (*)     MCHC 28.6 (*)     RDW-SD 52.0 (*)     All other components within normal limits   GLOMERULAR FILTRATION RATE, ESTIMATED - Abnormal; Notable for the following components:    Est, Glom Filt Rate 65 (*)     All other components within normal limits   COVID-19, RAPID   BRAIN NATRIURETIC PEPTIDE   ETHANOL   URINE DRUG SCREEN   LACTATE, SEPSIS   ANION GAP   OSMOLALITY   TSH WITH REFLEX   VITAMIN D 25 HYDROXY   VITAMIN B12 & FOLATE   URINALYSIS WITH MICROSCOPIC   MAGNESIUM   ANION GAP   OSMOLALITY   BLOOD GAS, ARTERIAL   POCT GLUCOSE   POCT GLUCOSE   POCT GLUCOSE   POCT GLUCOSE       EMERGENCY DEPARTMENT COURSE:   Vitals:    Vitals:    05/25/21 0024 05/25/21 0025 05/25/21 0300 05/25/21 0744   BP:  129/75 127/87 137/84   Pulse:  55 61 70   Resp: 15 16 16 18   Temp:  98.6 °F (37 °C) 97.7 °F (36.5 °C) 98.4 °F (36.9 °C)   TempSrc:  Axillary Axillary Oral   SpO2:  99% 99% 98%   Weight:   238 lb 9.6 oz (108.2 kg)    Height:         Patient presenting with complaint of confusion, mechanism/etiology unclear. Patient finds a local drugstore confused, reports of possible fall. Patient is on Xarelto. Level 2 trauma activated, case discussed with trauma they will defer to medicine for further care/admission. Patient has probable encephalopathy secondary to drug abuse. CRITICAL CARE:       CONSULTS:  None    PROCEDURES:  None    FINAL IMPRESSION      1. Somnolence    2. Falls frequently          DISPOSITION/PLAN   Admitted    PATIENT REFERRED TO:  No follow-up provider specified.     DISCHARGE MEDICATIONS:  Current Discharge Medication List          (Please note that portions of this note were completed with a voice recognition program.  Efforts were made to edit the dictations but occasionally words are mis-transcribed.)    Josi Parr, DO Josi Parr DO  05/25/21 0627

## 2021-05-24 NOTE — ED NOTES
Pt assisted with urinal at this time. Given more blankets. Pt able to give me his full name now upon request, but not able to tell me his birthday. Call light in reach.      Arielle Wallace RN  05/24/21 2419

## 2021-05-24 NOTE — H&P
Hospitalist - History & Physical      Patient: Alistair Lockett    Unit/Bed:6-17/017-A  YOB: 1945  MRN: [id number]   Acct: [de-identified]   PCP: Arias Hairston MD    Date of Service: Pt seen/examined on 05/24/21  and Admitted to Inpatient with expected LOS less than two midnights due to medical therapy. Disposition: Discharge tomorrow to home vs ?rehab depending if patient agreeable and U/S and TTE NL -to exclude medically pathologic mechanism for presentation. Chief Complaint: Fall    Assessment and Plan:-  1. AMS w/unwitnessed fall likely 2/2 illicit drug use vs hypoglycemia -trauma team cleared. UDS positive for oxycodone which is not listed on home meds. EtOH negative. Continuous BiPAP while sleeping. Avoid sedative meds. Seizure and fall precautions. Ordered bilateral carotid ultrasound. VBG, TSH, B12, folate, proBNP, LFTs, lactic, CT head/spine/abd are ok. Patient does not appear malnourished. N.p.o. for now due to AMS. Hypoglycemia orders. Multiple admissions with the same presentation in the past, extensive work-up previously unrevealing. Avoid narcotics or other sedatives/hypnotics due to apparent abuse potential.  Unlikely DDx includes orthostatic, BPPV, myxoma. ? Meclizine on home med. Get echo. Cardiomegaly on AP film. Acapella to recruit alveoli. Good sats on RA. On admission PDMP score 670, filled 30-day supply Percocet -fill date 5/14/2021. Addiction services consulted. Consider Norco in case patient may be self-medicating while being hospitalized. 2. Antiphospholipid syndrome -noted. No concern of DVT at this time, although notable history of prior. On rivaroxaban  3. Central sleep apnea -sleep study previously showed no benefit from CPAP. Continue BiPAP on current settings, with pulse oximetry study overnight. 4. Troponin leak of unclear significance -downtrending. EKG nonacute. Troponin  0.018 from 0.019.   Probably prerenal demand and capsule 0    rivaroxaban (XARELTO) 10 MG TABS tablet Take 10 mg by mouth every 24 hours      albuterol sulfate HFA (VENTOLIN HFA) 108 (90 Base) MCG/ACT inhaler Inhale 2 puffs into the lungs every 6 hours as needed for Wheezing      hydrOXYzine (ATARAX) 50 MG tablet Take 50 mg by mouth 3 times daily as needed for Itching      gabapentin (NEURONTIN) 600 MG tablet Take 800 mg by mouth 3 times daily.  HYDROcodone-acetaminophen (NORCO) 5-325 MG per tablet Take 1 tablet by mouth every 6 hours as needed for Pain.  losartan (COZAAR) 50 MG tablet Take 50 mg by mouth daily Am      meclizine (ANTIVERT) 25 MG tablet Take 25 mg by mouth daily am      acetaminophen (TYLENOL) 500 MG tablet Take 1,000 mg by mouth every 6 hours as needed for Pain      tamsulosin (FLOMAX) 0.4 MG capsule Take 0.4 mg by mouth daily pm      atorvastatin (LIPITOR) 20 MG tablet Take 20 mg by mouth daily pm         Allergies:    Codeine, Effexor [venlafaxine], Ibuprofen, and Tramadol    Social History:    reports that he has never smoked. He has never used smokeless tobacco. He reports that he does not drink alcohol and does not use drugs. Family History:       Family history unknown: Yes       Diet:  Diet NPO Effective Now    Review of systems:   Pertinent positives as noted in the HPI. All other systems reviewed and negative. PHYSICAL EXAM:  /61   Pulse 57   Temp 97.6 °F (36.4 °C) (Oral)   Resp 17   Ht 5' 10\" (1.778 m)   Wt 273 lb (123.8 kg)   SpO2 97%   BMI 39.17 kg/m²   General appearance: No apparent distress, appears obese and difficulty answering questions. HEENT: Normal cephalic, atraumatic without obvious deformity. Extra ocular muscles intact. Neck: Unable to perform due to AMS. No jugular venous distention. Trachea midline. Respiratory:  Normal respiratory effort. Clear to auscultation, bilaterally without Rales/Wheezes/Rhonchi.   Cardiovascular: Bradycardic and regular rhythm with normal S1/S2   Abdomen: Soft, non-tender, distended but not tense with bowel sounds not appreciated. Musculoskeletal:  No clubbing, cyanosis or edema bilaterally. Skin: Skin color, texture, turgor normal.  No obvious rashes or lesions. Neurologic: Unable to perform due to AMS. Psychiatric: Alert to place only. Capillary Refill: Brisk,< 3 seconds   Peripheral Pulses: +2 palpable, equal bilaterally     Labs:   Recent Labs     05/24/21  1428   WBC 3.9*   HGB 13.4*   HCT 44.3        Recent Labs     05/24/21  1428      K 4.2      CO2 30   BUN 28*   CREATININE 1.8*   CALCIUM 9.4     Recent Labs     05/24/21  1615   AST 19   ALT 9*   BILIDIR 0.3   BILITOT 1.2   ALKPHOS 48     No results for input(s): INR in the last 72 hours. No results for input(s): Hilda Anchors in the last 72 hours. Urinalysis:    Lab Results   Component Value Date    NITRU NEGATIVE 05/24/2021    WBCUA NONE SEEN 05/24/2021    BACTERIA NONE SEEN 05/24/2021    RBCUA NONE SEEN 05/24/2021    BLOODU NEGATIVE 05/24/2021    SPECGRAV 1.009 05/24/2021    GLUCOSEU NEGATIVE 02/19/2021       Radiology:   XR CHEST PORTABLE   Final Result   1. Very poor inflation of lungs. Mild cardiac megaly. Prior lower cervical/thoracic fusion with anterior posterior metallic hardware. Left shoulder prosthesis. 2. Mild left basilar atelectasis/pneumonia. **This report has been created using voice recognition software. It may contain minor errors which are inherent in voice recognition technology. **      Final report electronically signed by Dr. Karlene Salgado on 5/24/2021 1:41 PM      CT HEAD WO CONTRAST   Final Result   No acute process stable appearance            **This report has been created using voice recognition software. It may contain minor errors which are inherent in voice recognition technology. **      Final report electronically signed by Dr. Gabby Tse on 5/24/2021 1:37 PM      CT CERVICAL SPINE WO CONTRAST   Final Result   Extensive postsurgical changes which obscure some detail. No acute process is identified. **This report has been created using voice recognition software. It may contain minor errors which are inherent in voice recognition technology. **      Final report electronically signed by Dr. Amy Corea on 5/24/2021 1:47 PM      CT ABDOMEN PELVIS WO CONTRAST Additional Contrast? None   Final Result   Stable CT scan abdomen and pelvis no acute process. Chronic findings detailed above report. **This report has been created using voice recognition software. It may contain minor errors which are inherent in voice recognition technology. **      Final report electronically signed by Dr. Amy Corea on 5/24/2021 2:11 PM      VL DUP CAROTID BILATERAL    (Results Pending)     CT ABDOMEN PELVIS WO CONTRAST Additional Contrast? None    Result Date: 5/24/2021  PROCEDURE: CT ABDOMEN PELVIS WO CONTRAST CLINICAL INFORMATION: abd pain . COMPARISON: 5/27/2020 TECHNIQUE: 2-D multiplanar noncontrast CT abdomen pelvis All CT scans at this facility use dose modulation, iterative reconstruction, and/or weight-based dosing when appropriate to reduce radiation dose to as low as reasonably achievable. FINDINGS: Limitations: Solid organ and hollow viscera evaluation limited without contrast Extensive streak artifact from the patient's extremities in the field-of-view. Lung bases No airspace consolidation or pleural effusion is seen. Minimal coronary artery calcifications. Abdomen pelvis Extensive streak artifact. Liver appears normal. Previously identified gallstone within the cystic duct remains present. Spleen is within normal limits. Pancreas grossly normal. Adrenals are normal. Postsurgical changes left kidney. Stable 1.2 cm cyst lower pole left kidney right kidney unremarkable. Mild calcifications of the aorta. Few scattered prominent retroperitoneal lymph nodes. Postsurgical changes colon. No evidence of bowel obstruction. Prostate gland is enlarged. Urinary bladder appears normal. Bilateral fat-containing inguinal hernias. Mass effect on left hernia. Calcifications left groin from presumed prior surgery. Postsurgical changes lumbar spine. Post surgical changes left iliac wing. No suspicious bone lesions. Postsurgical changes left kidney. Stable CT scan abdomen and pelvis no acute process. Chronic findings detailed above report. **This report has been created using voice recognition software. It may contain minor errors which are inherent in voice recognition technology. ** Final report electronically signed by Dr. Alexus Reynolds on 5/24/2021 2:11 PM    CT HEAD WO CONTRAST    Result Date: 5/24/2021  PROCEDURE: CT HEAD WO CONTRAST CLINICAL INFORMATION: ams . COMPARISON: 2/19/2021 TECHNIQUE: 2-D multiplanar noncontrast CT brain All CT scans at this facility use dose modulation, iterative reconstruction, and/or weight-based dosing when appropriate to reduce radiation dose to as low as reasonably achievable. FINDINGS: Stable appearance. No hemorrhage. No extra-axial fluid collection. Mild cerebral volume loss. 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 No calvarial fracture. Mucous retention cyst or polyp base of the right maxillary sinus. Otherwise the visualized paranasal sinuses and mastoid air cells are clear. Remote fractures of the nasal bone stable in appearance. No acute process stable appearance **This report has been created using voice recognition software. It may contain minor errors which are inherent in voice recognition technology. ** Final report electronically signed by Dr. Alexus Reynolds on 5/24/2021 1:37 PM    CT CERVICAL SPINE WO CONTRAST    Result Date: 5/24/2021  PROCEDURE: CT CERVICAL SPINE WO CONTRAST CLINICAL INFORMATION: fall/ams .  COMPARISON: 7/11/2020 TECHNIQUE: 2-D multiplanar noncontrast CT cervical spine All CT scans at this facility use dose modulation, iterative reconstruction, and/or weight-based dosing when appropriate to reduce radiation dose to as low as reasonably achievable. FINDINGS: No acute fracture or acute bony malalignment. Extensive postsurgical changes. There is both anterior and posterior fusions. There is laminectomy and posterior element excisions C3, C4, C5, C6, C7, T1. Extensive spinal hardware obscures detail. 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 These findings are stable. Has been a additional surgery since the prior exam with metallic cross brackets at C7 the T1 laminectomy and hardware is new since the prior exam. There is no precervical soft tissue swelling. There is chronic mass effect on the left piriform sinus this is secondary to prominent common carotid artery, with a medial course. Extensive postsurgical changes which obscure some detail. No acute process is identified. **This report has been created using voice recognition software. It may contain minor errors which are inherent in voice recognition technology. ** Final report electronically signed by Dr. Minerva Yadav on 5/24/2021 1:47 PM    XR CHEST PORTABLE    Result Date: 5/24/2021  PROCEDURE: XR CHEST PORTABLE CLINICAL INFORMATION: Altered mental status. COMPARISON: 2/19/2021 TECHNIQUE: A single mobile view of the chest was obtained. 1. Very poor inflation of lungs. Mild cardiac megaly. Prior lower cervical/thoracic fusion with anterior posterior metallic hardware. Left shoulder prosthesis. 2. Mild left basilar atelectasis/pneumonia. **This report has been created using voice recognition software. It may contain minor errors which are inherent in voice recognition technology. ** Final report electronically signed by Dr. Swathi Salazar on 5/24/2021 1:41 PM        EKG: Nonacute EKG, no ST changes.     Electronically signed by Celi Erazo MD on 5/24/2021 at 7:15 PM

## 2021-05-24 NOTE — ED NOTES
Pt given narcan at this time with no response or change in mentation.      Pura North RN  05/24/21 1931

## 2021-05-24 NOTE — PROGRESS NOTES
Pharmacy Renal Adjustment    [address] is a 76 y.o. male. Pharmacy renally adjust the following medications per P&T approved policy: famotidine    Recent Labs     05/24/21  1428   BUN 28*   CREATININE 1.8*     Estimated Creatinine Clearance: 47 mL/min (A) (based on SCr of 1.8 mg/dL (H)).       Height:   Ht Readings from Last 1 Encounters:   05/24/21 5' 10\" (1.778 m)     Weight:  Wt Readings from Last 1 Encounters:   05/24/21 273 lb (123.8 kg)       Plan: Adjustments based on renal function:          Decrease famotidine to 20 mg every 24 hours                Quyen Wallace, PharmD  5/24/2021  5:47 PM

## 2021-05-24 NOTE — ED NOTES
Pt resting on cot with eyes closed. Vitals assessed. No concerns voiced at this time.      Shaune Kanner, RN  05/24/21 2604

## 2021-05-25 ENCOUNTER — APPOINTMENT (OUTPATIENT)
Dept: INTERVENTIONAL RADIOLOGY/VASCULAR | Age: 76
DRG: 917 | End: 2021-05-25
Payer: MEDICARE

## 2021-05-25 LAB
ANION GAP SERPL CALCULATED.3IONS-SCNC: 12 MEQ/L (ref 8–16)
BUN BLDV-MCNC: 23 MG/DL (ref 7–22)
CALCIUM SERPL-MCNC: 9.5 MG/DL (ref 8.5–10.5)
CHLORIDE BLD-SCNC: 106 MEQ/L (ref 98–111)
CO2: 23 MEQ/L (ref 23–33)
CREAT SERPL-MCNC: 1.1 MG/DL (ref 0.4–1.2)
EKG ATRIAL RATE: 53 BPM
EKG P AXIS: 50 DEGREES
EKG P-R INTERVAL: 152 MS
EKG Q-T INTERVAL: 468 MS
EKG QRS DURATION: 84 MS
EKG QTC CALCULATION (BAZETT): 439 MS
EKG R AXIS: 46 DEGREES
EKG T AXIS: 43 DEGREES
EKG VENTRICULAR RATE: 53 BPM
ERYTHROCYTE [DISTWIDTH] IN BLOOD BY AUTOMATED COUNT: 14.2 % (ref 11.5–14.5)
ERYTHROCYTE [DISTWIDTH] IN BLOOD BY AUTOMATED COUNT: 52 FL (ref 35–45)
GFR SERPL CREATININE-BSD FRML MDRD: 65 ML/MIN/1.73M2
GLUCOSE BLD-MCNC: 79 MG/DL (ref 70–108)
GLUCOSE BLD-MCNC: 82 MG/DL (ref 70–108)
HCT VFR BLD CALC: 47.9 % (ref 42–52)
HEMOGLOBIN: 13.7 GM/DL (ref 14–18)
LV EF: 48 %
LVEF MODALITY: NORMAL
MAGNESIUM: 2.1 MG/DL (ref 1.6–2.4)
MCH RBC QN AUTO: 28.5 PG (ref 26–33)
MCHC RBC AUTO-ENTMCNC: 28.6 GM/DL (ref 32.2–35.5)
MCV RBC AUTO: 99.6 FL (ref 80–94)
OSMOLALITY CALCULATION: 284 MOSMOL/KG (ref 275–300)
PLATELET # BLD: 144 THOU/MM3 (ref 130–400)
PMV BLD AUTO: 10 FL (ref 9.4–12.4)
POTASSIUM REFLEX MAGNESIUM: 4.4 MEQ/L (ref 3.5–5.2)
RBC # BLD: 4.81 MILL/MM3 (ref 4.7–6.1)
SODIUM BLD-SCNC: 141 MEQ/L (ref 135–145)
WBC # BLD: 2.8 THOU/MM3 (ref 4.8–10.8)

## 2021-05-25 PROCEDURE — 80048 BASIC METABOLIC PNL TOTAL CA: CPT

## 2021-05-25 PROCEDURE — 2500000003 HC RX 250 WO HCPCS: Performed by: STUDENT IN AN ORGANIZED HEALTH CARE EDUCATION/TRAINING PROGRAM

## 2021-05-25 PROCEDURE — 93880 EXTRACRANIAL BILAT STUDY: CPT

## 2021-05-25 PROCEDURE — 92523 SPEECH SOUND LANG COMPREHEN: CPT

## 2021-05-25 PROCEDURE — 83735 ASSAY OF MAGNESIUM: CPT

## 2021-05-25 PROCEDURE — 1200000003 HC TELEMETRY R&B

## 2021-05-25 PROCEDURE — 92610 EVALUATE SWALLOWING FUNCTION: CPT

## 2021-05-25 PROCEDURE — 36415 COLL VENOUS BLD VENIPUNCTURE: CPT

## 2021-05-25 PROCEDURE — 6370000000 HC RX 637 (ALT 250 FOR IP): Performed by: STUDENT IN AN ORGANIZED HEALTH CARE EDUCATION/TRAINING PROGRAM

## 2021-05-25 PROCEDURE — 99232 SBSQ HOSP IP/OBS MODERATE 35: CPT | Performed by: INTERNAL MEDICINE

## 2021-05-25 PROCEDURE — 2500000003 HC RX 250 WO HCPCS: Performed by: PHARMACIST

## 2021-05-25 PROCEDURE — 2580000003 HC RX 258: Performed by: STUDENT IN AN ORGANIZED HEALTH CARE EDUCATION/TRAINING PROGRAM

## 2021-05-25 PROCEDURE — 85027 COMPLETE CBC AUTOMATED: CPT

## 2021-05-25 PROCEDURE — 93306 TTE W/DOPPLER COMPLETE: CPT

## 2021-05-25 PROCEDURE — 94660 CPAP INITIATION&MGMT: CPT

## 2021-05-25 PROCEDURE — 82948 REAGENT STRIP/BLOOD GLUCOSE: CPT

## 2021-05-25 RX ORDER — UMECLIDINIUM BROMIDE AND VILANTEROL TRIFENATATE 62.5; 25 UG/1; UG/1
1 POWDER RESPIRATORY (INHALATION) DAILY
COMMUNITY

## 2021-05-25 RX ORDER — SENNA PLUS 8.6 MG/1
1 TABLET ORAL 2 TIMES DAILY
COMMUNITY
Start: 2020-12-14

## 2021-05-25 RX ORDER — ISOSORBIDE MONONITRATE 60 MG/1
60 TABLET, EXTENDED RELEASE ORAL DAILY
Status: ON HOLD | COMMUNITY
Start: 2021-04-15 | End: 2021-06-08 | Stop reason: HOSPADM

## 2021-05-25 RX ORDER — AMMONIUM LACTATE 12 G/100G
CREAM TOPICAL PRN
COMMUNITY

## 2021-05-25 RX ORDER — METHYLCELLULOSE 500 MG/1
1000 TABLET ORAL DAILY PRN
COMMUNITY

## 2021-05-25 RX ORDER — ZINC GLUCONATE 50 MG
50 TABLET ORAL DAILY
COMMUNITY

## 2021-05-25 RX ORDER — GABAPENTIN 300 MG/1
600 CAPSULE ORAL 3 TIMES DAILY
Status: DISCONTINUED | OUTPATIENT
Start: 2021-05-25 | End: 2021-05-27 | Stop reason: HOSPADM

## 2021-05-25 RX ORDER — OXYCODONE AND ACETAMINOPHEN 10; 325 MG/1; MG/1
1 TABLET ORAL 4 TIMES DAILY
COMMUNITY
Start: 2021-05-14

## 2021-05-25 RX ORDER — MELOXICAM 15 MG/1
15 TABLET ORAL DAILY
Status: ON HOLD | COMMUNITY
End: 2021-06-08 | Stop reason: HOSPADM

## 2021-05-25 RX ORDER — HYDROXYZINE HYDROCHLORIDE 25 MG/1
50 TABLET, FILM COATED ORAL 3 TIMES DAILY PRN
Status: DISCONTINUED | OUTPATIENT
Start: 2021-05-25 | End: 2021-05-27 | Stop reason: HOSPADM

## 2021-05-25 RX ORDER — CITALOPRAM 40 MG/1
40 TABLET ORAL DAILY
COMMUNITY

## 2021-05-25 RX ORDER — LANOLIN ALCOHOL/MO/W.PET/CERES
325 CREAM (GRAM) TOPICAL
COMMUNITY
Start: 2021-05-12

## 2021-05-25 RX ORDER — GABAPENTIN 300 MG/1
600 CAPSULE ORAL 3 TIMES DAILY
Status: ON HOLD | COMMUNITY
Start: 2021-05-10 | End: 2021-06-08 | Stop reason: HOSPADM

## 2021-05-25 RX ORDER — OXYCODONE AND ACETAMINOPHEN 10; 325 MG/1; MG/1
1 TABLET ORAL 4 TIMES DAILY
Status: DISCONTINUED | OUTPATIENT
Start: 2021-05-25 | End: 2021-05-27 | Stop reason: HOSPADM

## 2021-05-25 RX ORDER — FUROSEMIDE 40 MG/1
40 TABLET ORAL DAILY
COMMUNITY
Start: 2021-03-04

## 2021-05-25 RX ORDER — DICYCLOMINE HYDROCHLORIDE 10 MG/1
10 CAPSULE ORAL EVERY 6 HOURS
COMMUNITY
Start: 2020-09-15

## 2021-05-25 RX ORDER — PANTOPRAZOLE SODIUM 40 MG/1
40 TABLET, DELAYED RELEASE ORAL DAILY
COMMUNITY
Start: 2021-04-15

## 2021-05-25 RX ADMIN — GABAPENTIN 600 MG: 300 CAPSULE ORAL at 21:13

## 2021-05-25 RX ADMIN — LOSARTAN POTASSIUM 50 MG: 50 TABLET, FILM COATED ORAL at 10:17

## 2021-05-25 RX ADMIN — RIVAROXABAN 10 MG: 10 TABLET, FILM COATED ORAL at 10:18

## 2021-05-25 RX ADMIN — FAMOTIDINE 20 MG: 10 INJECTION, SOLUTION INTRAVENOUS at 00:30

## 2021-05-25 RX ADMIN — GABAPENTIN 600 MG: 300 CAPSULE ORAL at 17:37

## 2021-05-25 RX ADMIN — ATORVASTATIN CALCIUM 20 MG: 20 TABLET, FILM COATED ORAL at 10:17

## 2021-05-25 RX ADMIN — FAMOTIDINE 20 MG: 10 INJECTION, SOLUTION INTRAVENOUS at 10:17

## 2021-05-25 RX ADMIN — FAMOTIDINE 20 MG: 10 INJECTION, SOLUTION INTRAVENOUS at 21:13

## 2021-05-25 RX ADMIN — SODIUM CHLORIDE, PRESERVATIVE FREE 10 ML: 5 INJECTION INTRAVENOUS at 00:31

## 2021-05-25 RX ADMIN — SODIUM CHLORIDE, PRESERVATIVE FREE 10 ML: 5 INJECTION INTRAVENOUS at 21:14

## 2021-05-25 RX ADMIN — OXYCODONE HYDROCHLORIDE AND ACETAMINOPHEN 1 TABLET: 10; 325 TABLET ORAL at 21:18

## 2021-05-25 RX ADMIN — SODIUM CHLORIDE, PRESERVATIVE FREE 10 ML: 5 INJECTION INTRAVENOUS at 10:17

## 2021-05-25 RX ADMIN — OXYCODONE HYDROCHLORIDE AND ACETAMINOPHEN 1 TABLET: 10; 325 TABLET ORAL at 17:53

## 2021-05-25 RX ADMIN — TAMSULOSIN HYDROCHLORIDE 0.4 MG: 0.4 CAPSULE ORAL at 10:17

## 2021-05-25 ASSESSMENT — PAIN SCALES - GENERAL
PAINLEVEL_OUTOF10: 9
PAINLEVEL_OUTOF10: 8

## 2021-05-25 NOTE — FLOWSHEET NOTE
05/25/21 0554   Provider Notification   Reason for Communication Evaluate   Provider Name Jean Euceda   Provider Notification Advance Practice Clinician (CNS, NP, CNM, CRNA, PA)   Method of Communication Secure Message   Response See orders     06:02- Notified provider of patient's continued decreased mental status. Received order for ABG's.

## 2021-05-25 NOTE — CONSULTS
[address] and Trauma SBIRT attempted, pt declined, state he takes medication as prescribed, receptive to receiving resources (OP MH/AOD treatment, Inpatient Rehab, AA/NA meeting information, Adelaide Meza)

## 2021-05-25 NOTE — PROGRESS NOTES
21:35- This RN and Natalia Finley went through patient belongs to ensure he did not have home medication with him. Patient's wallet fell out of his pants and 5-$100 bills fell out of patient's wallet. Money was placed back in patient's wallet and locked in medication cabinet.

## 2021-05-25 NOTE — FLOWSHEET NOTE
05/25/21 1715   Provider Notification   Reason for Communication Evaluate   Provider Name [address]   (pharmacist)   Provider Notification Other (Comment)   Method of Communication Call   Response No new orders   Notification Time [phone]   Asked if patient is safe receiving oxycodone since he has an allergy to codeine? She said it is safe to give.

## 2021-05-25 NOTE — CARE COORDINATION
5/25/21, 11:21 AM EDT  DISCHARGE PLANNING EVALUATION:    Brenda Subramanian       Admitted: 5/24/2021/ [facility] day: 1   [facility] Reason for admit: Encephalopathy [G93.40]   PMH:  has a past medical history of Anxiety, Chronic back pain, Chronic kidney disease (CKD), stage II (mild), COPD (chronic obstructive pulmonary disease) ([address] Depression, Diabetes mellitus ([address] DVT (deep venous thrombosis) ([address] Hyperlipidemia, Hypertension, Hypothyroidism, Lumbago with sciatica, Polyneuropathy, and Renal cell carcinoma ([address] Procedure: none  Barriers to Discharge:  Trops elev. Was using bipap, now on 2L. Telemetry, SB. Echo, carotid doppler, SLP eval ordered. Acapella daily. PCP: Kristian Reveles MD  Readmission Risk Score: 16%    Patient Goals/Plan/Treatment Preferences:  Uncertain, patient not oriented. Pharmacy has clarified he has [facility] [place] services. Transportation/Food Security/Housekeeping Addressed:  No issues identified.

## 2021-05-25 NOTE — PLAN OF CARE
Problem: Nutrition  Goal: Optimal nutrition therapy  Outcome: Ongoing   Nutrition Problem #1: Inadequate oral intake  Intervention: Food and/or Nutrient Delivery: Continue Current Diet (Offered ONS, pt declined all.)  Nutritional Goals: Pt will safely consume 75% or more of meals during LOS.

## 2021-05-25 NOTE — PROGRESS NOTES
Physician Progress Note      Marley Gaston  CSN #:                  [id number]  :                       1945  ADMIT DATE:       2021 1:02 PM  [address] DATE:  RESPONDING  PROVIDER #:        Marvel Kruger MD          QUERY TEXT:    Dr Sadiq Ballesteros,    Pt admitted with AMS Likely 2/2 illicit drug use and has positive oxycodone   per labs. If possible, please document in the progress notes and discharge   summary if you are evaluating and / or treating any of the following: The medical record reflects the following:  Risk Factors: HTN, COPD, DM, CKD  Clinical Indicators: Encephalopathy documented. AMS Likely 2/2 illicit drug   use documented by consult. CXR: PNA  Treatment: 0.9 NS 1000 ml bolus, hospital admission  Options provided:  -- Drug-induced encephalopathy due to oxycodone  -- Metabolic encephalopathy  -- Toxic encephalopathy  -- Toxic metabolic encephalopathy  -- Other - I will add my own diagnosis  -- Disagree - Not applicable / Not valid  -- Disagree - Clinically unable to determine / Unknown  -- Refer to Clinical Documentation Reviewer    PROVIDER RESPONSE TEXT:    This patient has drug-induced encephalopathy due to oxycodone.     Query created by: Guru Christianson on 2021 7:38 AM      Electronically signed by:  Marvel Kruger MD 2021 10:33 AM

## 2021-05-25 NOTE — PROGRESS NOTES
[facility]  SPEECH THERAPY  STRZ RENAL TELEMETRY 6K  Speech - Language - Cognitive Evaluation + Clinical Swallow Evaluation    SLP Individual Minutes  Time In: 0293  Time Out: 8806  Minutes: 34  Timed Code Treatment Minutes: 0 Minutes       Date: 2021  Patient Name: Portia Saleh      CSN: [id number]   : 1945  (76 y.o.)  Gender: male   Referring Physician: Martin Melchor MD  Diagnosis: Encephalopathy   Secondary Diagnosis: dysphagia, cognitive deficit  Precautions: aspiration, fall risk  History of Present Illness/Injury: Pt presented to [facility] with above dx. Per H&P review: \"Henrry is a 76year old male presenting to the Emergency Department via EMS from [facility] for evaluation of confusion. Per EMS report, the pharmacy staff reports that the patient was at the pharmacy, disoriented. The patient reports that he had fallen although it was not witnessed. He arrives complaining of a headache and neck pain. He takes Xarelto. Exact events surrounding fall are unclear and patient is not able to reliably provide much information. \" ST consulted to assess function of swallow and current level of cognitive functioning. Past Medical History:   Diagnosis Date    Anxiety     Chronic back pain     Chronic kidney disease (CKD), stage II (mild)     COPD (chronic obstructive pulmonary disease) (HCC)     Depression     Diabetes mellitus (HCC)     DVT (deep venous thrombosis) (HCC)     Hyperlipidemia     Hypertension     Hypothyroidism     Lumbago with sciatica     Polyneuropathy     Renal cell carcinoma (HCC)        Pain: 4/10 - Pain location: Left side of face, nurse notified    Subjective:  ST saw patient with permission from RN. Patient laying supine with bipap mask on upon ST arrival. RN approved removal of bipap mask.  Patient required MAX support to begin tx with ST.    SOCIAL HISTORY:   Living Arrangements: independent  Work History: Retired  Education return to PLOF  Goal 4: Patient will complete basic sequencing, divergent naming tasks with 70% accuracy mod cues to improve overall thought organization  Goal 5: Patient will complete orientation, recall 2-3 untis and basic working memory tasks 70% accuracy mod cues to improve overall cognitive functioning. LONG TERM GOALS:  No LTG's established at this time due to short ELOS.     Willie Hampton, [place], [address] [place] [address]
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
## 2021-05-25 NOTE — PROGRESS NOTES
Pharmacy Medication History Note      List of current medications patient is taking is complete. Source of information: [id number] [facility] medication list- confirmed RN fills pillbox, outside dispense history     Changes made to medication list:  Medications removed (include reason, ex. therapy complete or physician discontinued):  · APAP 1000 mg Q6H PRN  · Dicyclomine 20mg- 2 capsules four times a day   · Gabapentin 800 mg TID  · Meclizine daily AM  · Hydrocodone/ APAP  · Hydroxyzine 50 mg   · Lidoderm     Medications added/doses adjusted:  · APAP 500 mg Q8H PRN   · Citalopram 40 mg daily   · Dicylcomine 10 mg every 6 hours   · Ferrous Sulfate 325 mg TID   · Furosemide 40 mg daily   · Gabapentin 300 mg- take 2 capsules TID   · Imdur 60 mg daily   · Meclizine 25 mg BID PRN   · Pantoprazole 40 mg daily   · Senna BID   · Vitamin B12 1000 mcg daily   · Fiber-lax 500 mg 2 tablets daily PRN   · Oxycodone/ APAP  mg 1 tablet four times a day- confirmed on [facility]- not on [facility] health medication list. Pt reports a nurse gives it to him. · Zinc 50 mg daily     Other notes (ex.  Recent course of antibiotics, Coumadin dosing):  · Medications not on [facility] home health list that have recent refills are: Oxycodone/ APAP  mg, albuterol inhaler, Meloxicam 15 mg daily, Anoro Ellipta and Ammonium Lactate     Juan Alberto Boop PharmD 5/25/2021 10:50 AM      Electronically signed by Faviola Coreas, [address] [place] [address] on 5/25/2021 at 10:11 AM

## 2021-05-25 NOTE — PROGRESS NOTES
This RCP attempted ABG 1x. Patient pulled and was unable to obtain enough blood for a sample. When asked patient would not allow RCP to attempt another stick at this time.   RN notified

## 2021-05-25 NOTE — PROGRESS NOTES
Renal Adjustment Follow-up    Recent Labs     05/24/21  1428 05/25/21  0516   BUN 28* 23*   CREATININE 1.8* 1.1     Estimated Creatinine Clearance: 71 mL/min (based on SCr of 1.1 mg/dL).     Plan: change Famotidine 20 mg IV BID     Jayden Quan PharmD 5/25/2021 11:22 AM

## 2021-05-25 NOTE — PROGRESS NOTES
04:15- This RN in room to assess patient, removed Bi-PAP mask. Asked patient his name, patient dozing off to sleep. Doesn't give this RN his name. After several tries asking patient his name he appropriately answers. Doesn't respond after several attempts to ask patient his birthday despite eyes being opened. Patient clearly states he is thirsty and wants something to eat, this RN informs patient he is NPO which means nothing to eat or drink by mouth. Patient rolls eyes. This RN tries to ask patient what year it is, patient states 1921 and then closes eyes and refuses to answer anything else. Bi-PAP mask placed back on patient's face.

## 2021-05-25 NOTE — PROGRESS NOTES
but \" I just don't feel like making and I want to keep my weight down\". Reports history of hypoglycemia, stressed recommenation of eating small frequent meals to assist and prevent low blood sugars. SLP on the case, diet just initiated. Offered ONS, pt declined all. Diet recommendations reviewed, consulted for weight loss diet education. 5/23/21: BUN 23, Glucose 82. Rx: lipitor, cozaar      Wounds:  None       Current Nutrition Therapies:    DIET DYSPHAGIA SOFT AND BITE-SIZED; Anthropometric Measures:  · Height: 5' 10\" (177.8 cm)  · Current Body Weight: 238 lb 9.6 oz (108.2 kg) (5/25/21 no edema)   · Admission Body Weight: 238 lb 9.6 oz (108.2 kg) (5/25/21 no edema)    · Usual Body Weight:  (Per pt~ 246#. No previous actual EMR weights)     · Ideal Body Weight: 166 lbs;   · BMI: 34.2  · Adjusted Body Weight:  ; No Adjustment   · BMI Categories: Obese Class 1 (BMI 30.0-34. 9)       Nutrition Diagnosis:   · Inadequate oral intake related to cognitive or neurological impairment as evidenced by  (previous NPO status due to medical condition)      Nutrition Interventions:   Food and/or Nutrient Delivery:  Continue Current Diet (Offered ONS, pt declined all.)  Nutrition Education/Counseling:  Education initiated (Soft and bite sized, carb controlled weight loss plan provided, 5 day menu idea 5/25/21. Encouraged oral intake, small frequent meals to prevent hypoglycemia.)   Coordination of Nutrition Care:  Continue to monitor while inpatient    Goals:  Pt will safely consume 75% or more of meals during LOS. Nutrition Monitoring and Evaluation:   Behavioral-Environmental Outcomes:  None Identified   Food/Nutrient Intake Outcomes:  Diet Advancement/Tolerance, Food and Nutrient Intake, Supplement Intake  Physical Signs/Symptoms Outcomes:  Biochemical Data, Chewing or Swallowing, GI Status, Meal Time Behavior, Nutrition Focused Physical Findings, Skin, Weight     Discharge Planning:     Too soon to determine Electronically signed by Jessica Gordon RD, LD on 5/25/21 at 3:45 PM EDT    Contact: [phone]

## 2021-05-26 LAB
ANION GAP SERPL CALCULATED.3IONS-SCNC: 7 MEQ/L (ref 8–16)
BUN BLDV-MCNC: 25 MG/DL (ref 7–22)
CALCIUM SERPL-MCNC: 9.3 MG/DL (ref 8.5–10.5)
CHLORIDE BLD-SCNC: 105 MEQ/L (ref 98–111)
CO2: 26 MEQ/L (ref 23–33)
CREAT SERPL-MCNC: 1.2 MG/DL (ref 0.4–1.2)
ERYTHROCYTE [DISTWIDTH] IN BLOOD BY AUTOMATED COUNT: 14.1 % (ref 11.5–14.5)
ERYTHROCYTE [DISTWIDTH] IN BLOOD BY AUTOMATED COUNT: 47.5 FL (ref 35–45)
GFR SERPL CREATININE-BSD FRML MDRD: 59 ML/MIN/1.73M2
GLUCOSE BLD-MCNC: 98 MG/DL (ref 70–108)
HCT VFR BLD CALC: 45.2 % (ref 42–52)
HEMOGLOBIN: 13.7 GM/DL (ref 14–18)
MCH RBC QN AUTO: 28.1 PG (ref 26–33)
MCHC RBC AUTO-ENTMCNC: 30.3 GM/DL (ref 32.2–35.5)
MCV RBC AUTO: 92.6 FL (ref 80–94)
PLATELET # BLD: 173 THOU/MM3 (ref 130–400)
PMV BLD AUTO: 9.4 FL (ref 9.4–12.4)
POTASSIUM REFLEX MAGNESIUM: 3.9 MEQ/L (ref 3.5–5.2)
RBC # BLD: 4.88 MILL/MM3 (ref 4.7–6.1)
SODIUM BLD-SCNC: 138 MEQ/L (ref 135–145)
WBC # BLD: 3.2 THOU/MM3 (ref 4.8–10.8)

## 2021-05-26 PROCEDURE — 97116 GAIT TRAINING THERAPY: CPT

## 2021-05-26 PROCEDURE — 36415 COLL VENOUS BLD VENIPUNCTURE: CPT

## 2021-05-26 PROCEDURE — 2700000000 HC OXYGEN THERAPY PER DAY

## 2021-05-26 PROCEDURE — 97162 PT EVAL MOD COMPLEX 30 MIN: CPT

## 2021-05-26 PROCEDURE — 99223 1ST HOSP IP/OBS HIGH 75: CPT | Performed by: PHYSICAL MEDICINE & REHABILITATION

## 2021-05-26 PROCEDURE — 2500000003 HC RX 250 WO HCPCS: Performed by: PHARMACIST

## 2021-05-26 PROCEDURE — 97161 PT EVAL LOW COMPLEX 20 MIN: CPT

## 2021-05-26 PROCEDURE — 80048 BASIC METABOLIC PNL TOTAL CA: CPT

## 2021-05-26 PROCEDURE — 99232 SBSQ HOSP IP/OBS MODERATE 35: CPT | Performed by: INTERNAL MEDICINE

## 2021-05-26 PROCEDURE — 94760 N-INVAS EAR/PLS OXIMETRY 1: CPT

## 2021-05-26 PROCEDURE — 97530 THERAPEUTIC ACTIVITIES: CPT

## 2021-05-26 PROCEDURE — 6370000000 HC RX 637 (ALT 250 FOR IP): Performed by: STUDENT IN AN ORGANIZED HEALTH CARE EDUCATION/TRAINING PROGRAM

## 2021-05-26 PROCEDURE — 85027 COMPLETE CBC AUTOMATED: CPT

## 2021-05-26 PROCEDURE — 94761 N-INVAS EAR/PLS OXIMETRY MLT: CPT

## 2021-05-26 PROCEDURE — 94669 MECHANICAL CHEST WALL OSCILL: CPT

## 2021-05-26 PROCEDURE — 2580000003 HC RX 258: Performed by: STUDENT IN AN ORGANIZED HEALTH CARE EDUCATION/TRAINING PROGRAM

## 2021-05-26 PROCEDURE — 1200000003 HC TELEMETRY R&B

## 2021-05-26 RX ADMIN — OXYCODONE HYDROCHLORIDE AND ACETAMINOPHEN 1 TABLET: 10; 325 TABLET ORAL at 09:31

## 2021-05-26 RX ADMIN — FAMOTIDINE 20 MG: 10 INJECTION, SOLUTION INTRAVENOUS at 20:52

## 2021-05-26 RX ADMIN — TAMSULOSIN HYDROCHLORIDE 0.4 MG: 0.4 CAPSULE ORAL at 09:33

## 2021-05-26 RX ADMIN — GABAPENTIN 600 MG: 300 CAPSULE ORAL at 20:52

## 2021-05-26 RX ADMIN — OXYCODONE HYDROCHLORIDE AND ACETAMINOPHEN 1 TABLET: 10; 325 TABLET ORAL at 18:03

## 2021-05-26 RX ADMIN — GABAPENTIN 600 MG: 300 CAPSULE ORAL at 13:30

## 2021-05-26 RX ADMIN — FAMOTIDINE 20 MG: 10 INJECTION, SOLUTION INTRAVENOUS at 09:31

## 2021-05-26 RX ADMIN — ATORVASTATIN CALCIUM 20 MG: 20 TABLET, FILM COATED ORAL at 09:32

## 2021-05-26 RX ADMIN — GABAPENTIN 600 MG: 300 CAPSULE ORAL at 09:32

## 2021-05-26 RX ADMIN — SODIUM CHLORIDE, PRESERVATIVE FREE 10 ML: 5 INJECTION INTRAVENOUS at 20:52

## 2021-05-26 RX ADMIN — SODIUM CHLORIDE, PRESERVATIVE FREE 10 ML: 5 INJECTION INTRAVENOUS at 09:34

## 2021-05-26 RX ADMIN — HYDROXYZINE HYDROCHLORIDE 50 MG: 25 TABLET ORAL at 09:32

## 2021-05-26 RX ADMIN — LOSARTAN POTASSIUM 50 MG: 50 TABLET, FILM COATED ORAL at 09:32

## 2021-05-26 RX ADMIN — RIVAROXABAN 10 MG: 10 TABLET, FILM COATED ORAL at 09:33

## 2021-05-26 RX ADMIN — OXYCODONE HYDROCHLORIDE AND ACETAMINOPHEN 1 TABLET: 10; 325 TABLET ORAL at 20:52

## 2021-05-26 RX ADMIN — OXYCODONE HYDROCHLORIDE AND ACETAMINOPHEN 1 TABLET: 10; 325 TABLET ORAL at 13:30

## 2021-05-26 ASSESSMENT — PAIN DESCRIPTION - LOCATION: LOCATION: HEAD

## 2021-05-26 ASSESSMENT — PAIN DESCRIPTION - PROGRESSION: CLINICAL_PROGRESSION: NOT CHANGED

## 2021-05-26 ASSESSMENT — PAIN DESCRIPTION - DESCRIPTORS: DESCRIPTORS: HEADACHE

## 2021-05-26 ASSESSMENT — PAIN SCALES - GENERAL: PAINLEVEL_OUTOF10: 8

## 2021-05-26 NOTE — CARE COORDINATION
5/26/21, 2:57 PM EDT    DISCHARGE ON GOING [employer] 25 day: 2  Location: [facility]17/017-A Reason for admit: Encephalopathy [G93.40]   Procedure: none  Barriers to Discharge: Working with PT/OT. Physiatry consult ordered. PCP: Lupis Dhillon MD  Readmission Risk Score: 14%  Patient Goals/Plan/Treatment Preferences: Hopeful for IP Rehab.

## 2021-05-26 NOTE — PROGRESS NOTES
[facility]  INPATIENT PHYSICAL THERAPY  EVALUATION  [facility] RENAL TELEMETRY 6K - 3X-97/630-O    Time In: 1300  Time Out: 1350  Timed Code Treatment Minutes: 45 Minutes  Minutes: 50          Date: 2021  Patient Name: Melissa Jordan,  Gender:  male        MRN: [id number]  : 1945  (69 y.o.)      Referring Practitioner: Dr Rosangela Souza  Diagnosis: Encephalopathy  Additional Pertinent Hx: From admission note-This is a 12-year-old male who was admitted to [facility] after presenting by EMS from local drugstore confused, had unwitnessed fall presented with generalized AMS. Prior history of similar presentations in the past.  PMH include antiphospholipid syndrome, DVT, sleep apnea, COPD, CKD, HTN, hypothyroidism and RCC. Patient unable to provide more history. History obtained from chart review and ED accounts. Restrictions/Precautions:  Restrictions/Precautions: General Precautions    Subjective:  Chart Reviewed: Yes  Patient assessed for rehabilitation services?: Yes  Family / Caregiver Present: No  Subjective: Pt in bed, agreeable to PT and up to chair.     General:  Overall Orientation Status: Within Functional Limits  Follows Commands: Within Functional Limits    Vision: Within Functional Limits    Hearing: Within functional limits         Pain: 0/10:     Vitals: Vitals not assessed per clinical judgement, see nursing flowsheet    Social/Functional History:    Lives With: Other (comment) (rooming house)  Type of Home: House  Home Layout: One level  Home Access: Level entry             ADL Assistance: Independent     Ambulation Assistance: Independent  Transfer Assistance: Independent    Active : No          OBJECTIVE:  Range of Motion:  Right Lower Extremity: WFL  Left Lower Extremity: WFL    Strength:  Right Lower Extremity: Impaired - grossly 3+ to 4-/5  Left Lower Extremity: Impaired - grossly 3+/5    Balance:  Static Standing Balance: Stand By Assistance, Contact Guard Assistance  Dynamic Standing Balance: Contact Guard Assistance, Minimal Assistance    Bed Mobility:  Rolling to Left: Supervision   Supine to Sit: Supervision  Scooting: Supervision    Transfers:  Sit to Stand: Stand By Alex Alberto Assistance, X 1, with increased time for completion, cues for hand placement, to/from chair with arms, to/from chair without arms  [facility], X 1, with increased time for completion, cues for hand placement, to/from chair with arms, to/from chair without arms    Ambulation:  Contact Guard Assistance, Minimal Assistance, X 1, with increased time for completion  Distance: 15 feet X 1    Surface: Level Tile  Device:Rolling Walker  Gait Deviations: Forward Flexed Posture, Slow Ananya, Decreased Step Length Bilaterally, Decreased Heel Strike on Right, Mild Path Deviations, Moderate Path Deviations, Unsteady Gait and Pt had a LOB  When standing in walker and reaching with hand to use hand . Pt had slower advancement of the right LE and would drag his toe for the first 8-10 steps or so. With cues he was able to clear the floor with his right LE . Second ambulation 40 feet with RW same deviations as above with 1 LOB with turning which required Min A to correct. Functional Outcome Measures: Completed  AM-PAC Inpatient Mobility Raw Score : 17  AM-PAC Inpatient T-Scale Score : 42.13    ASSESSMENT:  Activity Tolerance:  Patient tolerance of  treatment: good. Treatment Initiated: Treatment and education initiated within context of evaluation. Evaluation time included review of current medical information, gathering information related to past medical, social and functional history, completion of standardized testing, formal and informal observation of tasks, assessment of data and development of plan of care and goals.   Treatment time included skilled education and facilitation of tasks to increase safety and independence with functional mobility for improved independence and quality of life. Assessment: Body structures, Functions, Activity limitations: Decreased functional mobility , Decreased endurance  Assessment: Pt has decreased strength in B LEs, Pt has numbness in the left LE and weakness in both with right LE decreased heelstrike causing him to stumble at times, Pt has decreased balance  and endurance. Pt is not at baseline. Prognosis: Excellent    REQUIRES PT FOLLOW UP: Yes    Discharge Recommendations:  Discharge Recommendations: IP Rehab, Patient would benefit from continued therapy after discharge    Patient Education:  PT Education: Goals, Gait Training, PT Role, Plan of Care, Transfer Training, Functional Mobility Training    Equipment Recommendations:  Equipment Needed: Yes  Mobility Devices: Dahlia Woo: Rolling    Plan:  Times per week: 5 X GM  Current Treatment Recommendations: Strengthening, Balance Training, Endurance Training, Functional Mobility Training, Transfer Training, Gait Training    Goals:  Patient goals : Get stronger  Short term goals  Time Frame for Short term goals: by discharge  Short term goal 1: Mod I with bed mobility to get in and out of bed  Short term goal 2: sit to stand with  S to get on and off various surfaces  Short term goal 3: Pt will ambulate with  feet with SBA to walk safely in community and household dstances  Long term goals  Time Frame for Long term goals : NA due to short ELOS    Following session, patient left in safe position with all fall risk precautions in place.

## 2021-05-26 NOTE — PLAN OF CARE
Problem: Falls - Risk of:  Goal: Will remain free from falls  Description: Will remain free from falls  Outcome: Met This Shift  Goal: Absence of physical injury  Description: Absence of physical injury  Outcome: Met This Shift     Problem: Skin Integrity:  Goal: Will show no infection signs and symptoms  Description: Will show no infection signs and symptoms  Outcome: Met This Shift  Goal: Absence of new skin breakdown  Description: Absence of new skin breakdown  Outcome: Met This Shift     Problem: Pain:  Goal: Pain level will decrease  Description: Pain level will decrease  Outcome: Ongoing  Goal: Control of acute pain  Description: Control of acute pain  Outcome: Ongoing  Goal: Control of chronic pain  Description: Control of chronic pain  Outcome: Ongoing     Problem: Nutrition  Goal: Optimal nutrition therapy  Outcome: Ongoing

## 2021-05-26 NOTE — PLAN OF CARE
Problem: Pain:  Goal: Pain level will decrease  Description: Pain level will decrease  5/26/2021 1555 by Jerica Fu RN  Outcome: Ongoing  5/26/2021 0610 by Wen Vences RN  Outcome: Ongoing  Goal: Control of acute pain  Description: Control of acute pain  5/26/2021 1555 by Trisha Villaseñor RN  Outcome: Ongoing  5/26/2021 0610 by Wen Vences RN  Outcome: Ongoing  Goal: Control of chronic pain  Description: Control of chronic pain  5/26/2021 1555 by Jerica Fu RN  Outcome: Ongoing  5/26/2021 0610 by Wen Vences RN  Outcome: Ongoing     Problem: Falls - Risk of:  Goal: Will remain free from falls  Description: Will remain free from falls  5/26/2021 1555 by Jerica Fu RN  Outcome: Ongoing  5/26/2021 0610 by Wen Vences RN  Outcome: Met This Shift  Goal: Absence of physical injury  Description: Absence of physical injury  5/26/2021 1555 by Jerica Fu RN  Outcome: Ongoing  5/26/2021 0610 by Wen Vences RN  Outcome: Met This Shift     Problem: Skin Integrity:  Goal: Will show no infection signs and symptoms  Description: Will show no infection signs and symptoms  5/26/2021 1555 by Jerica Fu RN  Outcome: Ongoing  5/26/2021 0610 by Wen Vences RN  Outcome: Met This Shift  Goal: Absence of new skin breakdown  Description: Absence of new skin breakdown  5/26/2021 1555 by Trisha Villaseñor RN  Outcome: Ongoing  5/26/2021 0610 by Wen Vences RN  Outcome: Met This Shift     Problem: Nutrition  Goal: Optimal nutrition therapy  5/26/2021 1555 by Trisha Villaseñor RN  Outcome: Ongoing  5/26/2021 0610 by Wen Vences RN  Outcome: Ongoing

## 2021-05-26 NOTE — CONSULTS
Physical Medicine & Rehabilitation   Consult Note      Admitting Physician: Renata Gabriel MD    Primary Care Provider: Caren Richey MD     Reason for Consult:   Parkinson's disease with frequent falls; Encephalopathy; AMS with fall; Rehab needs    History of Present Illness:  Kaya Reaves is a 76 y.o. male admitted to [address] [place] [address] on 5/24/2021. He was admitted to [address] [place] [address] after presenting by EMS from [facility], confused, had unwitnessed fall, presented with generalized AMS. He arrived c/o headache and neck pain. He takes Xarelto. Prior history of similar presentations in the past.  PMH include antiphospholipid syndrome, DVT, sleep apnea, COPD, CKD, HTN, hypothyroidism and RCC. Patient unable to provide more history. History obtained from chart review and ED accounts. During his hospitalization, diagnoses and plans of care:   1. Altered mental status with unwitnessed fall likely secondary to illicit drug use. UDS was positive for oxycodone which was not listed on his home meds. Alcohol was negative. Continuous BiPAP while sleeping. Avoid sedative meds. Seizure and fall precautions. Ordered bilateral carotid ultrasound. VBG, TSH, B12, folate, proBNP, LFTs, lactic, CT head/spine/abd are ok. Patient does not appear malnourished. N.p.o. for now due to AMS. Hypoglycemia orders. Multiple admissions with the same presentation in the past, extensive work-up previously unrevealing. Avoid narcotics or other sedatives/hypnotics due to apparent abuse potential.  Unlikely DDx includes orthostatic, BPPV, myxoma. ? Meclizine on home med. Get echo. Cardiomegaly on AP film. Acapella to recruit alveoli. Good sats on RA. On admission PDMP score 670, filled 30-day supply Percocet -fill date 5/14/2021. Addiction services consulted. Consider Norco in case patient may be self-medicating while being hospitalized. 2.  Antiphospholipid syndrome -noted.   No concern of DVT at this time, although notable history of prior. On rivaroxaban  3. Central sleep apnea -sleep study previously showed no benefit from CPAP. Continue BiPAP on current settings, with pulse oximetry study overnight. 4.  Troponin leak of unclear significance -downtrending. EKG nonacute. Troponin  0.018 from 0.019. Probably prerenal demand and JOSE A. 5.  JOSE A -received fluids in ED. 6.  GERD -IV Pepcid. 7.  DVT prophylaxis-already on Xarelto. 8.  Anemia and leukopenia -WBC 3.9, hemoglobin 13.4. Noted. Trending. No e/o bleeding. 9.  COPD -Albuterol if wheezing. 10. Obesity class II, borderline upper limit -may be a good candidate for bariatric intervention. An early intervention when indicated leads to better long-term outcomes in some studies. Dietary to see to establish reasonable goals and to provide appropriate education at this time.       Current Rehabilitation Assessments:  PT:      Diagnosis: Encephalopathy  Additional Pertinent Hx: From admission note-This is a 27-year-old male who was admitted to [facility] after presenting by [facility] confused, had unwitnessed fall presented with generalized AMS. Prior history of similar presentations in the past.  PMH include antiphospholipid syndrome, DVT, sleep apnea, COPD, CKD, HTN, hypothyroidism and RCC. Patient unable to provide more history.   History obtained from chart review and ED accounts.      Restrictions/Precautions:  Restrictions/Precautions: General Precautions     Subjective:  Chart Reviewed: Yes  Patient assessed for rehabilitation services?: Yes  Family / Caregiver Present: No  Subjective: Pt in bed, agreeable to PT and up to chair.     General:  Overall Orientation Status: Within Functional Limits  Follows Commands: Within Functional Limits     Vision: Within Functional Limits     Hearing: Within functional limits        Pain: 0/10:      Vitals: Vitals not assessed per clinical judgement, see nursing flowsheet     Social/Functional History:    Lives With: Other (comment) (rooming house)  Type of Home: House  Home Layout: One level  Home Access: Level entry         ADL Assistance: Independent  Ambulation Assistance: Independent  Transfer Assistance: Independent     Active : No     OBJECTIVE:  Range of Motion:  Right Lower Extremity: WFL  Left Lower Extremity: [facility]     Strength:  Right Lower Extremity: Impaired - grossly 3+ to 4-/5  Left Lower Extremity: Impaired - grossly 3+/5     Balance:  Static Standing Balance: Stand By Assistance, Contact Guard Assistance  Dynamic Standing Balance: Contact Guard Assistance, Minimal Assistance     Bed Mobility:  Rolling to Left: Supervision   Supine to Sit: Supervision  Scooting: Supervision     Transfers:  Sit to Stand: Stand By Assistance, [address], X 1, with increased time for completion, cues for hand placement, to/from chair with arms, to/from chair without arms  Stand to Sit:Contact [employer], X 1, with increased time for completion, cues for hand placement, to/from chair with arms, to/from chair without arms     Ambulation:  Contact Guard Assistance, Minimal Assistance, X 1, with increased time for completion  Distance: 15 feet X 1     Surface: Level Tile  Device:Rolling Walker  Gait Deviations: Forward Flexed Posture, Slow Ananya, Decreased Step Length Bilaterally, Decreased Heel Strike on Right, Mild Path Deviations, Moderate Path Deviations, Unsteady Gait and Pt had a LOB  When standing in walker and reaching with hand to use hand . Pt had slower advancement of the right LE and would drag his toe for the first 8-10 steps or so. With cues he was able to clear the floor with his right LE .   Second ambulation 40 feet with RW same deviations as above with 1 LOB with turning which required Min A to correct.         Functional Outcome Measures: Completed  AM-PAC Inpatient Mobility Raw Score : 17  AM-PAC Inpatient T-Scale Score : 42.13     ASSESSMENT:  Activity Tolerance:  Patient tolerance of  treatment: good.        Treatment Initiated: Treatment and education initiated within context of evaluation. Evaluation time included review of current medical information, gathering information related to past medical, social and functional history, completion of standardized testing, formal and informal observation of tasks, assessment of data and development of plan of care and goals. Treatment time included skilled education and facilitation of tasks to increase safety and independence with functional mobility for improved independence and quality of life.     Assessment: Body structures, Functions, Activity limitations: Decreased functional mobility , Decreased endurance  Assessment: Pt has decreased strength in B LEs, Pt has numbness in the left LE and weakness in both with right LE decreased heelstrike causing him to stumble at times, Pt has decreased balance  and endurance. Pt is not at baseline.   Prognosis: Excellent     REQUIRES PT FOLLOW UP: Yes     Discharge Recommendations:  Discharge Recommendations: IP Rehab, Patient would benefit from continued therapy after discharge     Patient Education:  PT Education: Goals, Gait Training, PT Role, Plan of Care, Transfer Training, Functional Mobility Training     Equipment Recommendations:  Equipment Needed: Yes  Mobility Devices: Sarnelian Villalobos: Rolling     Plan:  Times per week: 5 X GM  Current Treatment Recommendations: Strengthening, Balance Training, Endurance Training, Functional Mobility Training, Transfer Training, Gait Training     Goals:  Patient goals : Get stronger  Short term goals  Time Frame for Short term goals: by discharge  Short term goal 1: Mod I with bed mobility to get in and out of bed  Short term goal 2: sit to stand with  S to get on and off various surfaces  Short term goal 3: Pt will ambulate with  feet with SBA to walk safely in community and household dstances  Long term goals  Time Frame for VOICE:  Speech: stammer present     LANGUAGE:  Receptive:  1 Step Commands: 2/2  Simple Yes/No Questions: 2/2     Expressive:  Confrontational Namin/3  Conversational Speech: impaired     COGNITION:  [place] Cognitive Assessment (MOCA) version 7.1 was attempted. Unable to derive score at this date due to limited participation of task     Immediate Recall: 4/5  Problem Solving: impaired  Attention: 1/6  Math Computation: 0/3  Executive Functionin/5     SWALLOWING:     Respiratory Status:   Oxygen Mask       Behavioral Observation: Lethargic     ORAL MECHANISM EVALUATION:       Facial / Labial WFL     Lingual Impaired Poor ROM and coordination   Dentition WFL     Velum WFL     Vocal Quality WFL     Sensation WFL     Cough Not Tested        PATIENT WAS EVALUATED USING:  Thin liquid, puree, hard solid     ORAL PHASE:  Impaired:  prolonged mastication evident     PHARYNGEAL PHASE:  WFL:  Pharyngeal phase appears WFL but cannot rule out pharyngeal phase deficits from a bedside swallowing evaluation alone.     SIGNS AND SYMPTOMS OF LARYNGEAL PENETRATION / ASPIRATION:  No signs/symptoms of aspiration evident in this evaluation, but cannot rule out silent aspiration.     INSTRUMENTAL EVALUATION: Instrumental evaluation not indicated at this time.     DIET RECOMMENDATIONS:  Thin/soft and bite sized     STRATEGIES: Full Upright Position, Limit Distractions and Monitor for Fatigue             RECOMMENDATIONS/ASSESSMENT:  DIAGNOSTIC IMPRESSIONS:  Patient present with at least moderate cognitive impairment as evidenced by findings outlined above. ST attempted to complete MOCA on this date; however, due to poor attention to task and patient refusal, unable to gain derived score. Impairments were noted in the areas of: executive functioning, attention, problem solving, naming, and math computation. Receptive language appears to be largely intact at this time.  Patient presents with moderate-severe stammer when speaking and states \"this is how I talk\", ST suspects baseline impairment. No dysphonia. As patient was previously living independently, ST warranted to improve overall cognitive functioning to return to PLOF. CSE completed on this date revealed mild lingual incoordination, but otherwise structures WFL. CSE completed utilizing: thin liquids via cup/straw, puree, and hard solid. Thin liquids and puree unremarkable; however, prolonged mastication noted with hard solids and patient self limiting stating, \"it hurts to swallow\". No overt s/s of penetration/aspiration at this time. At this time, ST recommends patient consume soft and bite sized diet with thin liquids. ST recommends skilled intervention for dysphagia management. Rehabilitation Potential: fair     EDUCATION:  Learner: Patient  Education:  Reviewed results and recommendations of this evaluation  Evaluation of Education: Demonstrates with assistance and Family not present     PLAN:  Skilled SLP intervention on acute care 3-5 x per week or until goals met and/or pt plateaus in function. Specific interventions for next session may include: dysphagia management, cognititve tx.     PATIENT GOAL:    Did not state.   Will further assess during treatment.     SHORT TERM GOALS:  Short-term Goals  Timeframe for Short-term Goals: 2 weeks  Goal 1: Patient will safely consume soft and bite sized diet/thin liquids with no overt s/s of penetration/aspiration to meet proper nutrition/hydration needs  Goal 2: Patient will complete MOCA when clinically appropriate  Goal 3: Patient will complete executive functioning, problem solving adn reasoning tasks with 70% accuracy mod cues to improve safety within hospital settign and return to PLOF  Goal 4: Patient will complete basic sequencing, divergent naming tasks with 70% accuracy mod cues to improve overall thought organization  Goal 5: Patient will complete orientation, recall 2-3 untis and basic working memory tasks 70% accuracy mod cues to improve overall cognitive functioning.     LONG TERM GOALS:  No LTG's established at this time due to short ELOS.         Past Medical History:        Diagnosis Date    Anxiety     Chronic back pain     Chronic kidney disease (CKD), stage II (mild)     COPD (chronic obstructive pulmonary disease) (HCC)     Depression     Diabetes mellitus (HCC)     DVT (deep venous thrombosis) (HCC)     Hyperlipidemia     Hypertension     Hypothyroidism     Lumbago with sciatica     Polyneuropathy     Renal cell carcinoma (HCC)        Past Surgical History:        Procedure Laterality Date    BACK SURGERY      CHOLECYSTECTOMY      SHOULDER SURGERY      SMALL INTESTINE SURGERY      partial bowel resection       Allergies:     Allergies   Allergen Reactions    Codeine Hives    Effexor [Venlafaxine] Itching    Ibuprofen     Tramadol Hives        Current Medications:   Current Facility-Administered Medications: famotidine (PEPCID) injection 20 mg, 20 mg, Intravenous, BID  hydrOXYzine (ATARAX) tablet 50 mg, 50 mg, Oral, TID PRN  gabapentin (NEURONTIN) capsule 600 mg, 600 mg, Oral, TID  oxyCODONE-acetaminophen (PERCOCET)  MG per tablet 1 tablet, 1 tablet, Oral, 4x Daily  sodium chloride flush 0.9 % injection 5-40 mL, 5-40 mL, Intravenous, 2 times per day  sodium chloride flush 0.9 % injection 5-40 mL, 5-40 mL, Intravenous, PRN  0.9 % sodium chloride infusion, 25 mL, Intravenous, PRN  promethazine (PHENERGAN) tablet 12.5 mg, 12.5 mg, Oral, Q6H PRN **OR** ondansetron (ZOFRAN) injection 4 mg, 4 mg, Intravenous, Q6H PRN  polyethylene glycol (GLYCOLAX) packet 17 g, 17 g, Oral, Daily PRN  acetaminophen (TYLENOL) tablet 650 mg, 650 mg, Oral, Q6H PRN **OR** acetaminophen (TYLENOL) suppository 650 mg, 650 mg, Rectal, Q6H PRN  potassium chloride (KLOR-CON M) extended release tablet 40 mEq, 40 mEq, Oral, PRN **OR** potassium bicarb-citric acid (EFFER-K) effervescent tablet 40 mEq, 40 mEq, Oral, PRN **OR** potassium chloride 10 mEq/100 mL IVPB (Peripheral Line), 10 mEq, Intravenous, PRN  magnesium sulfate 2000 mg in 50 mL IVPB premix, 2,000 mg, Intravenous, PRN  calcium replacement protocol, , Other, RX Placeholder  senna (SENOKOT) 8.8 MG/5ML syrup 8.8 mg, 5 mL, Oral, BID PRN  glucose (GLUTOSE) 40 % oral gel 15 g, 15 g, Oral, PRN  dextrose 50 % IV solution, 12.5 g, Intravenous, PRN  glucagon (rDNA) injection 1 mg, 1 mg, Intramuscular, PRN  dextrose 5 % solution, 100 mL/hr, Intravenous, PRN  albuterol (PROVENTIL) nebulizer solution 2.5 mg, 2.5 mg, Nebulization, Q6H PRN  atorvastatin (LIPITOR) tablet 20 mg, 20 mg, Oral, Daily  losartan (COZAAR) tablet 50 mg, 50 mg, Oral, Daily  tamsulosin (FLOMAX) capsule 0.4 mg, 0.4 mg, Oral, Daily  [Held by provider] meclizine (ANTIVERT) tablet 25 mg, 25 mg, Oral, Daily  rivaroxaban (XARELTO) tablet 10 mg, 10 mg, Oral, Q24H    Social History:  Social History     Socioeconomic History    Marital status: Single     Spouse name: Not on file    Number of children: Not on file    Years of education: Not on file    Highest education level: Not on file   Occupational History    Not on file   Tobacco Use    Smoking status: Never Smoker    Smokeless tobacco: Never Used   Substance and Sexual Activity    Alcohol use: Never    Drug use: Never    Sexual activity: Not on file   Other Topics Concern    Not on file   Social History Narrative    Not on file     Social Determinants of Health     Financial Resource Strain:     Difficulty of Paying Living Expenses:    Food Insecurity:     Worried About Running Out of Food in the Last Year:     Ran Out of Food in the Last Year:    Transportation Needs:     Lack of Transportation (Medical):      Lack of Transportation (Non-Medical):    Physical Activity:     Days of Exercise per Week:     Minutes of Exercise per Session:    Stress:     Feeling of Stress :    Social Connections:     Frequency of Communication with Friends and Family:     Frequency of Social Gatherings with Friends and Family:     Attends [employer] Services:     Active Member of Clubs or Organizations:     Attends Club or Organization Meetings:     Marital Status:    Intimate Partner Violence:     Fear of Current or Ex-Partner:     Emotionally Abused:     Physically Abused:     Sexually Abused:        Lives With: Other (comment) (rooming house)  Type of Home: House  Home Layout: One level  Home Access: Level entry         ADL Assistance: Independent  Ambulation Assistance: Independent  Transfer Assistance: Independent     Active : No     Patient stated that he has help available at home including [address], [facility], and his \"buddies\" at the rooming house.     Family History:       Family history unknown: Yes       Review of Systems:  CONSTITUTIONAL:  positive for  Obesity with BMI of 33.81  EYES:  negative  HEENT:  negative  RESPIRATORY: On BiPAP for sleep apnea  CARDIOVASCULAR: Recent elevated troponin likely secondary to chronic kidney disease  GASTROINTESTINAL:  negative  GENITOURINARY:  CKD  SKIN:  negative  HEMATOLOGIC/LYMPHATIC:  Antiphospholipid syndrome  MUSCULOSKELETAL:  Frequent falls  NEUROLOGICAL: Bradykinesia, frequent falls, impaired memory and cognitive skills  BEHAVIOR/PSYCH:  positive for fatigue  System review otherwise negative      Physical Exam:  /68   Pulse 58   Temp 97.8 °F (36.6 °C) (Oral)   Resp 16   Ht 5' 10\" (1.778 m)   Wt 235 lb 9.6 oz (106.9 kg)   SpO2 93%   BMI 33.81 kg/m²   awake  Orientation:   person, place, time  Mood: within normal limits  Affect: calm  General appearance: Patient is well nourished, well developed    Memory:   abnormal - decreased  Attention/Concentration: normal  Language:  abnormal - hushed voice    Cranial Nerves:  cranial nerves II-XII are grossly intact  ROM:  normal  Motor Exam:  4+/5 throughout  Tone:  abnormal -increased tone at the leftt wrist especially with opening closing the righft hand  Muscle bulk: within normal limits  Sensory:  Decreased   Coordination:   abnormal -   Gait: not tested    Heart: normal rate, regular rhythm, normal S1, S2, no murmurs, rubs, clicks or gallops  Lungs: clear to auscultation without wheezes or rales  Abdomen: soft, non-tender, non-distended, normal bowel sounds, no masses or organomegaly    Skin: warm and dry, no rash or erythema  Peripheral vascular: Pulses: Normal upper and lower extremity pulses; Edema: no      Diagnostics:  Recent Results (from the past 24 hour(s))   Basic Metabolic Panel w/ Reflex to MG    Collection Time: 05/26/21  5:15 AM   Result Value Ref Range    Sodium 138 135 - 145 meq/L    Potassium reflex Magnesium 3.9 3.5 - 5.2 meq/L    Chloride 105 98 - 111 meq/L    CO2 26 23 - 33 meq/L    Glucose 98 70 - 108 mg/dL    BUN 25 (H) 7 - 22 mg/dL    CREATININE 1.2 0.4 - 1.2 mg/dL    Calcium 9.3 8.5 - 10.5 mg/dL   CBC    Collection Time: 05/26/21  5:15 AM   Result Value Ref Range    WBC 3.2 (L) 4.8 - 10.8 thou/mm3    RBC 4.88 4.70 - 6.10 mill/mm3    Hemoglobin 13.7 (L) 14.0 - 18.0 gm/dl    Hematocrit 45.2 42.0 - 52.0 %    MCV 92.6 80.0 - 94.0 fL    MCH 28.1 26.0 - 33.0 pg    MCHC 30.3 (L) 32.2 - 35.5 gm/dl    RDW-CV 14.1 11.5 - 14.5 %    RDW-SD 47.5 (H) 35.0 - 45.0 fL    Platelets 638 450 - 371 thou/mm3    MPV 9.4 9.4 - 12.4 fL   Anion Gap    Collection Time: 05/26/21  5:15 AM   Result Value Ref Range    Anion Gap 7.0 (L) 8.0 - 16.0 meq/L   Glomerular Filtration Rate, Estimated    Collection Time: 05/26/21  5:15 AM   Result Value Ref Range    Est, Glom Filt Rate 59 (A) ml/min/1.73m2           Impression:  · Parkinson's disease with bradykinesia, increased tone at left wrist, hushed voice, frequent falls  · Encephalopathy/AMS likely secondary to illicit drug use  · Antiphospholipid syndrome  · Central sleep apnea on BiPAP  · Troponin leak likely related to chronic kidney disease  · Acute kidney injury status post fluids in the emergency department  · Gastroesophageal reflux disease  · DVT prophylaxis on Xarelto  · Anemia and leukopenia  · COPD  · Obesity      Recommendations:  1. Complete OT evaluation  2. If OT's findings are similar to other therapies, believe patient would benefit from an admission to the inpatient rehabilitation unit to improve his mobility, ADLs, and cognitive/language skills prior to returning home. 3.  Trial of Sinemet 25/100 three times per day to hopefully improve his bradykinesia, balance, and cognitive skills. 4.  We will continue to follow. It was my pleasure to evaluate Henrry Subramanian today.   Please call with questions.  Joni Ballard MD

## 2021-05-26 NOTE — PROGRESS NOTES
[address] [address]  SPEECH THERAPY MISSED TREATMENT NOTE  [facility]LE[facility]      Date: 2021  Patient Name: Violeta Tan        MRN: [id number]    : 1945  (76 y.o.)    REASON FOR MISSED TREATMENT:    ST attempted to see patient 1107; however per RN, patient seen by PT for needed evaluation for d/c.    Silvia Acosta, [address] [place] [address]

## 2021-05-26 NOTE — PROGRESS NOTES
Hospitalist Progress Note    Patient:  Chantel [facility]      Unit/Bed:6K-17/017-A    YOB: 1945    MRN: [id number]       Acct: [de-identified]     PCP: Allison Azul MD    Date of Admission: 5/24/2021    Assessment/Plan:    1. Acute encephalopathy w/unwitnessed fall likely 2/2 Percocet overdose vs hypoglycemia -trauma team cleared. UDS positive for oxycodone which was not initially listed on home meds, review of PDMP confirmed Percocet use. EtOH negative. Continuous BiPAP while sleeping. Avoid sedative meds. Seizure and fall precautions. Bilateral carotid ultrasound NL. VBG, TSH, B12, folate, proBNP, LFTs, lactic, CT head/spine/abd are ok. Patient does not appear malnourished. Multiple admissions with the same presentation in the past, extensive work-up previously unrevealing. Continue home-dose pain meds and avoid increasing the dose or adding more narcotics or other sedatives/hypnotics due to apparent abuse potenintial.      5/26: Extensive w/u negative. Likely due to opioid use. 2. Neuropathic pain from extensive spine surgeries- cont home pain meds. Update med list.    3. Antiphospholipid syndrome -noted. No concern of DVT at this time, although notable history of prior. On rivaroxaban    4. Central sleep apnea -sleep study previously showed no benefit from CPAP. Continue BiPAP on current settings, with pulse oximetry study overnight. 5. Troponin leak of unclear significance -downtrending. EKG nonacute. Troponin  0.018 from 0.019. Probably prerenal demand and JOSE A. 6. JOSE A -received fluids in ED. 7. GERD -IV Pepcid. 8. DVT prophylaxis-already on Xarelto. 9. Anemia and leukopenia -WBC 3.9, hemoglobin 13.4. Noted. Trending. No e/o bleeding. 10. COPD -Albuterol if wheezing. 11. Obesity class II, borderline upper limit -may be a good candidate for bariatric intervention.   An early intervention when indicated leads to better long-term outcomes in some studies. Disposition: IPR evaluation. Might be a good candidate per therapy notes. Otherwise, stable and at baseline now. Expected discharge date:  5/26    Disposition:    [x] Home       [] TCU       [] Rehab       [] Psych       [] SNF       [] [place]       [] Other-    Chief Complaint: fall, [address] Course: This is a 77-year-old male who was admitted to [facility] after presenting by EMS from local drugstore confused, had unwitnessed fall presented with generalized AMS. Prior history of similar presentations in the past.  PMH include antiphospholipid syndrome, DVT, sleep apnea, COPD, CKD, HTN, hypothyroidism and RCC. Patient unable to provide more history. History obtained from chart review and ED accounts. Subjective (past 24 hours):   Does have chronic headache and neck pain asking for his percocet. Otherwise, no other issues. No Fevers or chills. No chest pain or SOB. ROS (12 point review of systems completed. Pertinent positives noted. Otherwise ROS is negative).     Medications:  Reviewed    Infusion Medications    sodium chloride      dextrose       Scheduled Medications    famotidine (PEPCID) injection  20 mg Intravenous BID    gabapentin  600 mg Oral TID    oxyCODONE-acetaminophen  1 tablet Oral 4x Daily    sodium chloride flush  5-40 mL Intravenous 2 times per day    calcium replacement protocol   Other RX Placeholder    atorvastatin  20 mg Oral Daily    losartan  50 mg Oral Daily    tamsulosin  0.4 mg Oral Daily    [Held by provider] meclizine  25 mg Oral Daily    rivaroxaban  10 mg Oral Q24H     PRN Meds: hydrOXYzine, sodium chloride flush, sodium chloride, promethazine **OR** ondansetron, polyethylene glycol, acetaminophen **OR** acetaminophen, potassium chloride **OR** potassium alternative oral replacement **OR** potassium chloride, magnesium sulfate, senna, glucose, dextrose, glucagon (rDNA), dextrose, albuterol      Intake/Output Summary (Last 24 hours) at 5/26/2021 1520  Last data filed at 5/26/2021 1117  Gross per 24 hour   Intake 240 ml   Output 200 ml   Net 40 ml       Diet:  DIET DYSPHAGIA SOFT AND BITE-SIZED; Exam:  /68   Pulse 58   Temp 97.8 °F (36.6 °C) (Oral)   Resp 16   Ht 5' 10\" (1.778 m)   Wt 235 lb 9.6 oz (106.9 kg)   SpO2 93%   BMI 33.81 kg/m²     General appearance: No apparent distress, appears obese and difficulty answering questions. HEENT: Normal cephalic, atraumatic without obvious deformity. Extra ocular muscles intact. Neck: Unable to perform due to AMS. No jugular venous distention. Trachea midline. Respiratory:  Normal respiratory effort. Clear to auscultation, bilaterally without Rales/Wheezes/Rhonchi. Cardiovascular: Bradycardic and regular rhythm with normal S1/S2   Abdomen: Soft, non-tender, distended but not tense with bowel sounds not appreciated. Musculoskeletal:  No clubbing, cyanosis or edema bilaterally. Skin: Skin color, texture, turgor normal.  No obvious rashes or lesions. Neurologic: Unable to perform due to AMS. Psychiatric: Alert to place only. Capillary Refill: Brisk,< 3 seconds   Peripheral Pulses: +2 palpable, equal bilaterally       Labs:   Recent Labs     05/24/21  1428 05/25/21  0516 05/26/21  0515   WBC 3.9* 2.8* 3.2*   HGB 13.4* 13.7* 13.7*   HCT 44.3 47.9 45.2    144 173     Recent Labs     05/24/21  1428 05/25/21  0516 05/26/21  0515    141 138   K 4.2 4.4 3.9    106 105   CO2 30 23 26   BUN 28* 23* 25*   CREATININE 1.8* 1.1 1.2   CALCIUM 9.4 9.5 9.3     Recent Labs     05/24/21  1615   AST 19   ALT 9*   BILIDIR 0.3   BILITOT 1.2   ALKPHOS 48     No results for input(s): INR in the last 72 hours. No results for input(s): Nolen [place] in the last 72 hours.     Microbiology:      Urinalysis:      Lab Results   Component Value Date    NITRU NEGATIVE 05/24/2021    WBCUA NONE SEEN 05/24/2021    BACTERIA NONE SEEN 05/24/2021    RBCUA NONE SEEN 05/24/2021    BLOODU NEGATIVE 05/24/2021    SPECGRAV 1.009 05/24/2021    GLUCOSEU NEGATIVE 02/19/2021       Radiology:  VL DUP CAROTID BILATERAL   Final Result   No hemodynamically significant stenosis throughout the internal carotid arteries. **This report has been created using voice recognition software. It may contain minor errors which are inherent in voice recognition technology. **      Final report electronically signed by Dr Shannan Barney on 5/25/2021 1:05 PM      XR CHEST PORTABLE   Final Result   1. Very poor inflation of lungs. Mild cardiac megaly. Prior lower cervical/thoracic fusion with anterior posterior metallic hardware. Left shoulder prosthesis. 2. Mild left basilar atelectasis/pneumonia. **This report has been created using voice recognition software. It may contain minor errors which are inherent in voice recognition technology. **      Final report electronically signed by Dr. Nacho Lott on 5/24/2021 1:41 PM      CT HEAD WO CONTRAST   Final Result   No acute process stable appearance            **This report has been created using voice recognition software. It may contain minor errors which are inherent in voice recognition technology. **      Final report electronically signed by Dr. Samina Valle on 5/24/2021 1:37 PM      CT CERVICAL SPINE WO CONTRAST   Final Result   Extensive postsurgical changes which obscure some detail. No acute process is identified. **This report has been created using voice recognition software. It may contain minor errors which are inherent in voice recognition technology. **      Final report electronically signed by Dr. Samina Valle on 5/24/2021 1:47 PM      CT ABDOMEN PELVIS WO CONTRAST Additional Contrast? None   Final Result   Stable CT scan abdomen and pelvis no acute process. Chronic findings detailed above report. **This report has been created using voice recognition software.   It may contain minor errors which are inherent in

## 2021-05-26 NOTE — PLAN OF CARE
Problem: Pain:  Goal: Pain level will decrease  Description: Pain level will decrease  5/26/2021 1556 by Jerica Fu RN  Outcome: Ongoing  5/26/2021 1555 by Yesy Poon RN  Outcome: Ongoing  5/26/2021 0610 by Tiffany Loera RN  Outcome: Ongoing  Goal: Control of acute pain  Description: Control of acute pain  5/26/2021 1556 by Jerica uF RN  Outcome: Ongoing  5/26/2021 1555 by Yesy Poon RN  Outcome: Ongoing  5/26/2021 0610 by Tiffany Loera RN  Outcome: Ongoing  Goal: Control of chronic pain  Description: Control of chronic pain  5/26/2021 1556 by Yesy Poon RN  Outcome: Ongoing  5/26/2021 1555 by Yesy Poon RN  Outcome: Ongoing  5/26/2021 0610 by Tiffany Loera RN  Outcome: Ongoing     Problem: Falls - Risk of:  Goal: Will remain free from falls  Description: Will remain free from falls  5/26/2021 1556 by Jerica Fu RN  Outcome: Ongoing  5/26/2021 1555 by Jerica Fu RN  Outcome: Ongoing  5/26/2021 0610 by Tiffany Loera RN  Outcome: Met This Shift  Goal: Absence of physical injury  Description: Absence of physical injury  5/26/2021 1556 by Jerica Fu RN  Outcome: Ongoing  5/26/2021 1555 by Yesy Poon RN  Outcome: Ongoing  5/26/2021 0610 by Tiffany Loera RN  Outcome: Met This Shift     Problem: Skin Integrity:  Goal: Will show no infection signs and symptoms  Description: Will show no infection signs and symptoms  5/26/2021 1556 by Jerica Fu RN  Outcome: Ongoing  5/26/2021 1555 by Yesy Poon RN  Outcome: Ongoing  5/26/2021 0610 by Tiffany Loera RN  Outcome: Met This Shift  Goal: Absence of new skin breakdown  Description: Absence of new skin breakdown  5/26/2021 1556 by Jerica Fu RN  Outcome: Ongoing  5/26/2021 1555 by Yesy Poon RN  Outcome: Ongoing  5/26/2021 0610 by Tiffany Loera RN  Outcome: Met This Shift     Problem: Nutrition  Goal: Optimal nutrition therapy  5/26/2021 1556 by Yesy Poon RN  Outcome: Ongoing  5/26/2021 1555 by Yesy Poon, RN  Outcome: Ongoing  5/26/2021 0610 by Elihu Klinefelter, RN  Outcome: Ongoing

## 2021-05-26 NOTE — PROGRESS NOTES
[facility]  OCCUPATIONAL THERAPY MISSED TREATMENT NOTE  [facility] RENAL TELEMETRY 6K  6K-17/017-A      Date: 2021  Patient Name: Shira Farias        CSN: [id number]   : 1945  (76 y.o.)  Gender: male   Referring Practitioner: Sloane Lyn MD  Diagnosis: Encephalopathy         REASON FOR MISSED TREATMENT: 1st attempt pt working with SLP, second attempt pt just finishing PT. Will check back tomorrow.

## 2021-05-27 ENCOUNTER — HOSPITAL ENCOUNTER (INPATIENT)
Age: 76
LOS: 12 days | Discharge: HOME HEALTH | DRG: 057 | End: 2021-06-08
Attending: PHYSICAL MEDICINE & REHABILITATION | Admitting: PHYSICAL MEDICINE & REHABILITATION
Payer: MEDICARE

## 2021-05-27 VITALS
BODY MASS INDEX: 34.23 KG/M2 | HEIGHT: 70 IN | WEIGHT: 239.1 LBS | OXYGEN SATURATION: 96 % | RESPIRATION RATE: 16 BRPM | HEART RATE: 90 BPM | DIASTOLIC BLOOD PRESSURE: 78 MMHG | SYSTOLIC BLOOD PRESSURE: 148 MMHG | TEMPERATURE: 97.9 F

## 2021-05-27 DIAGNOSIS — G93.40 ENCEPHALOPATHY: ICD-10-CM

## 2021-05-27 DIAGNOSIS — R10.84 GENERALIZED ABDOMINAL PAIN: ICD-10-CM

## 2021-05-27 DIAGNOSIS — M54.16 LUMBAR RADICULOPATHY: Primary | ICD-10-CM

## 2021-05-27 PROBLEM — G20.A1 PARKINSON'S DISEASE (HCC): Status: ACTIVE | Noted: 2021-05-27

## 2021-05-27 PROBLEM — G20 PARKINSON'S DISEASE (HCC): Status: ACTIVE | Noted: 2021-05-27

## 2021-05-27 LAB
ANION GAP SERPL CALCULATED.3IONS-SCNC: 7 MEQ/L (ref 8–16)
BUN BLDV-MCNC: 24 MG/DL (ref 7–22)
CALCIUM SERPL-MCNC: 9.2 MG/DL (ref 8.5–10.5)
CHLORIDE BLD-SCNC: 104 MEQ/L (ref 98–111)
CO2: 28 MEQ/L (ref 23–33)
CREAT SERPL-MCNC: 1.1 MG/DL (ref 0.4–1.2)
ERYTHROCYTE [DISTWIDTH] IN BLOOD BY AUTOMATED COUNT: 14 % (ref 11.5–14.5)
ERYTHROCYTE [DISTWIDTH] IN BLOOD BY AUTOMATED COUNT: 47.6 FL (ref 35–45)
GFR SERPL CREATININE-BSD FRML MDRD: 65 ML/MIN/1.73M2
GLUCOSE BLD-MCNC: 104 MG/DL (ref 70–108)
HCT VFR BLD CALC: 43.2 % (ref 42–52)
HEMOGLOBIN: 13.1 GM/DL (ref 14–18)
MCH RBC QN AUTO: 28.5 PG (ref 26–33)
MCHC RBC AUTO-ENTMCNC: 30.3 GM/DL (ref 32.2–35.5)
MCV RBC AUTO: 94.1 FL (ref 80–94)
PLATELET # BLD: 171 THOU/MM3 (ref 130–400)
PMV BLD AUTO: 10.1 FL (ref 9.4–12.4)
POTASSIUM REFLEX MAGNESIUM: 3.6 MEQ/L (ref 3.5–5.2)
RBC # BLD: 4.59 MILL/MM3 (ref 4.7–6.1)
SODIUM BLD-SCNC: 139 MEQ/L (ref 135–145)
WBC # BLD: 2.9 THOU/MM3 (ref 4.8–10.8)

## 2021-05-27 PROCEDURE — 2700000000 HC OXYGEN THERAPY PER DAY

## 2021-05-27 PROCEDURE — 36415 COLL VENOUS BLD VENIPUNCTURE: CPT

## 2021-05-27 PROCEDURE — 99239 HOSP IP/OBS DSCHRG MGMT >30: CPT | Performed by: INTERNAL MEDICINE

## 2021-05-27 PROCEDURE — 97166 OT EVAL MOD COMPLEX 45 MIN: CPT

## 2021-05-27 PROCEDURE — 6370000000 HC RX 637 (ALT 250 FOR IP): Performed by: STUDENT IN AN ORGANIZED HEALTH CARE EDUCATION/TRAINING PROGRAM

## 2021-05-27 PROCEDURE — 97530 THERAPEUTIC ACTIVITIES: CPT

## 2021-05-27 PROCEDURE — 99223 1ST HOSP IP/OBS HIGH 75: CPT | Performed by: PHYSICAL MEDICINE & REHABILITATION

## 2021-05-27 PROCEDURE — 94660 CPAP INITIATION&MGMT: CPT

## 2021-05-27 PROCEDURE — 85027 COMPLETE CBC AUTOMATED: CPT

## 2021-05-27 PROCEDURE — 80048 BASIC METABOLIC PNL TOTAL CA: CPT

## 2021-05-27 PROCEDURE — 1180000000 HC REHAB R&B

## 2021-05-27 PROCEDURE — 2580000003 HC RX 258: Performed by: STUDENT IN AN ORGANIZED HEALTH CARE EDUCATION/TRAINING PROGRAM

## 2021-05-27 PROCEDURE — 94761 N-INVAS EAR/PLS OXIMETRY MLT: CPT

## 2021-05-27 PROCEDURE — 2500000003 HC RX 250 WO HCPCS: Performed by: PHARMACIST

## 2021-05-27 PROCEDURE — 97116 GAIT TRAINING THERAPY: CPT

## 2021-05-27 PROCEDURE — 6370000000 HC RX 637 (ALT 250 FOR IP): Performed by: PHYSICAL MEDICINE & REHABILITATION

## 2021-05-27 PROCEDURE — 6370000000 HC RX 637 (ALT 250 FOR IP): Performed by: INTERNAL MEDICINE

## 2021-05-27 RX ORDER — GABAPENTIN 300 MG/1
600 CAPSULE ORAL 3 TIMES DAILY
Status: CANCELLED | OUTPATIENT
Start: 2021-05-27

## 2021-05-27 RX ORDER — ACETAMINOPHEN 325 MG/1
650 TABLET ORAL EVERY 6 HOURS PRN
Status: DISCONTINUED | OUTPATIENT
Start: 2021-05-27 | End: 2021-06-08 | Stop reason: HOSPADM

## 2021-05-27 RX ORDER — OXYCODONE AND ACETAMINOPHEN 10; 325 MG/1; MG/1
1 TABLET ORAL 4 TIMES DAILY
Status: DISCONTINUED | OUTPATIENT
Start: 2021-05-27 | End: 2021-06-08 | Stop reason: HOSPADM

## 2021-05-27 RX ORDER — FAMOTIDINE 20 MG/1
20 TABLET, FILM COATED ORAL 2 TIMES DAILY
Status: DISCONTINUED | OUTPATIENT
Start: 2021-05-27 | End: 2021-05-28

## 2021-05-27 RX ORDER — ATORVASTATIN CALCIUM 20 MG/1
20 TABLET, FILM COATED ORAL DAILY
Status: DISCONTINUED | OUTPATIENT
Start: 2021-05-28 | End: 2021-06-08 | Stop reason: HOSPADM

## 2021-05-27 RX ORDER — ACETAMINOPHEN 650 MG/1
650 SUPPOSITORY RECTAL EVERY 6 HOURS PRN
Status: DISCONTINUED | OUTPATIENT
Start: 2021-05-27 | End: 2021-05-27 | Stop reason: SDUPTHER

## 2021-05-27 RX ORDER — LOSARTAN POTASSIUM 50 MG/1
50 TABLET ORAL DAILY
Status: DISCONTINUED | OUTPATIENT
Start: 2021-05-28 | End: 2021-06-08 | Stop reason: HOSPADM

## 2021-05-27 RX ORDER — LOSARTAN POTASSIUM 50 MG/1
50 TABLET ORAL DAILY
Status: CANCELLED | OUTPATIENT
Start: 2021-05-27

## 2021-05-27 RX ORDER — ACETAMINOPHEN 650 MG/1
650 SUPPOSITORY RECTAL EVERY 6 HOURS PRN
Status: CANCELLED | OUTPATIENT
Start: 2021-05-27

## 2021-05-27 RX ORDER — ACETAMINOPHEN 325 MG/1
650 TABLET ORAL EVERY 6 HOURS PRN
Status: CANCELLED | OUTPATIENT
Start: 2021-05-27

## 2021-05-27 RX ORDER — ACETAMINOPHEN 325 MG/1
650 TABLET ORAL EVERY 4 HOURS PRN
Status: DISCONTINUED | OUTPATIENT
Start: 2021-05-27 | End: 2021-05-27

## 2021-05-27 RX ORDER — OXYCODONE AND ACETAMINOPHEN 10; 325 MG/1; MG/1
1 TABLET ORAL 4 TIMES DAILY
Status: CANCELLED | OUTPATIENT
Start: 2021-05-27

## 2021-05-27 RX ORDER — SENNOSIDES 8.8 MG/5ML
5 LIQUID ORAL 2 TIMES DAILY PRN
Status: CANCELLED | OUTPATIENT
Start: 2021-05-27

## 2021-05-27 RX ORDER — TAMSULOSIN HYDROCHLORIDE 0.4 MG/1
0.4 CAPSULE ORAL DAILY
Status: CANCELLED | OUTPATIENT
Start: 2021-05-27

## 2021-05-27 RX ORDER — ALBUTEROL SULFATE 2.5 MG/3ML
2.5 SOLUTION RESPIRATORY (INHALATION) EVERY 6 HOURS PRN
Status: DISCONTINUED | OUTPATIENT
Start: 2021-05-27 | End: 2021-06-08 | Stop reason: HOSPADM

## 2021-05-27 RX ORDER — GABAPENTIN 600 MG/1
600 TABLET ORAL 3 TIMES DAILY
Status: DISCONTINUED | OUTPATIENT
Start: 2021-05-27 | End: 2021-06-01

## 2021-05-27 RX ORDER — MECLIZINE HYDROCHLORIDE CHEWABLE TABLETS 25 MG/1
25 TABLET, CHEWABLE ORAL DAILY
Status: DISCONTINUED | OUTPATIENT
Start: 2021-05-28 | End: 2021-06-08 | Stop reason: HOSPADM

## 2021-05-27 RX ORDER — POLYETHYLENE GLYCOL 3350 17 G/17G
17 POWDER, FOR SOLUTION ORAL DAILY PRN
Status: CANCELLED | OUTPATIENT
Start: 2021-05-27

## 2021-05-27 RX ORDER — BISACODYL 10 MG
10 SUPPOSITORY, RECTAL RECTAL DAILY PRN
Status: DISCONTINUED | OUTPATIENT
Start: 2021-05-27 | End: 2021-06-08 | Stop reason: HOSPADM

## 2021-05-27 RX ORDER — ACETAMINOPHEN 650 MG/1
650 SUPPOSITORY RECTAL EVERY 6 HOURS PRN
Status: DISCONTINUED | OUTPATIENT
Start: 2021-05-27 | End: 2021-05-28

## 2021-05-27 RX ORDER — ATORVASTATIN CALCIUM 20 MG/1
20 TABLET, FILM COATED ORAL DAILY
Status: CANCELLED | OUTPATIENT
Start: 2021-05-27

## 2021-05-27 RX ORDER — BISACODYL 10 MG
10 SUPPOSITORY, RECTAL RECTAL DAILY PRN
Status: CANCELLED | OUTPATIENT
Start: 2021-05-27

## 2021-05-27 RX ORDER — SENNOSIDES 8.8 MG/5ML
5 LIQUID ORAL 2 TIMES DAILY PRN
Status: DISCONTINUED | OUTPATIENT
Start: 2021-05-27 | End: 2021-06-06

## 2021-05-27 RX ORDER — TAMSULOSIN HYDROCHLORIDE 0.4 MG/1
0.4 CAPSULE ORAL DAILY
Status: DISCONTINUED | OUTPATIENT
Start: 2021-05-28 | End: 2021-06-08 | Stop reason: HOSPADM

## 2021-05-27 RX ORDER — ALBUTEROL SULFATE 2.5 MG/3ML
2.5 SOLUTION RESPIRATORY (INHALATION) EVERY 6 HOURS PRN
Status: CANCELLED | OUTPATIENT
Start: 2021-05-27

## 2021-05-27 RX ORDER — ACETAMINOPHEN 325 MG/1
650 TABLET ORAL EVERY 4 HOURS PRN
Status: CANCELLED | OUTPATIENT
Start: 2021-05-27

## 2021-05-27 RX ORDER — MECLIZINE HCL 12.5 MG/1
25 TABLET ORAL DAILY
Status: CANCELLED | OUTPATIENT
Start: 2021-05-27

## 2021-05-27 RX ORDER — ACETAMINOPHEN 325 MG/1
650 TABLET ORAL EVERY 6 HOURS PRN
Status: DISCONTINUED | OUTPATIENT
Start: 2021-05-27 | End: 2021-05-27 | Stop reason: SDUPTHER

## 2021-05-27 RX ORDER — POLYETHYLENE GLYCOL 3350 17 G/17G
17 POWDER, FOR SOLUTION ORAL DAILY PRN
Status: DISCONTINUED | OUTPATIENT
Start: 2021-05-27 | End: 2021-06-08 | Stop reason: HOSPADM

## 2021-05-27 RX ADMIN — FAMOTIDINE 20 MG: 10 INJECTION, SOLUTION INTRAVENOUS at 09:08

## 2021-05-27 RX ADMIN — OXYCODONE HYDROCHLORIDE AND ACETAMINOPHEN 1 TABLET: 10; 325 TABLET ORAL at 09:08

## 2021-05-27 RX ADMIN — GABAPENTIN 600 MG: 600 TABLET, FILM COATED ORAL at 20:23

## 2021-05-27 RX ADMIN — TAMSULOSIN HYDROCHLORIDE 0.4 MG: 0.4 CAPSULE ORAL at 09:08

## 2021-05-27 RX ADMIN — SODIUM CHLORIDE, PRESERVATIVE FREE 10 ML: 5 INJECTION INTRAVENOUS at 09:08

## 2021-05-27 RX ADMIN — GABAPENTIN 600 MG: 300 CAPSULE ORAL at 09:08

## 2021-05-27 RX ADMIN — RIVAROXABAN 10 MG: 10 TABLET, FILM COATED ORAL at 09:08

## 2021-05-27 RX ADMIN — ATORVASTATIN CALCIUM 20 MG: 20 TABLET, FILM COATED ORAL at 09:08

## 2021-05-27 RX ADMIN — LOSARTAN POTASSIUM 50 MG: 50 TABLET, FILM COATED ORAL at 09:08

## 2021-05-27 RX ADMIN — FAMOTIDINE 20 MG: 20 TABLET, FILM COATED ORAL at 20:22

## 2021-05-27 RX ADMIN — OXYCODONE HYDROCHLORIDE AND ACETAMINOPHEN 1 TABLET: 10; 325 TABLET ORAL at 20:22

## 2021-05-27 RX ADMIN — OXYCODONE HYDROCHLORIDE AND ACETAMINOPHEN 1 TABLET: 10; 325 TABLET ORAL at 16:37

## 2021-05-27 ASSESSMENT — PAIN SCALES - GENERAL
PAINLEVEL_OUTOF10: 5
PAINLEVEL_OUTOF10: 8
PAINLEVEL_OUTOF10: 0
PAINLEVEL_OUTOF10: 8
PAINLEVEL_OUTOF10: 10
PAINLEVEL_OUTOF10: 8

## 2021-05-27 ASSESSMENT — PAIN DESCRIPTION - PAIN TYPE
TYPE: ACUTE PAIN
TYPE: ACUTE PAIN
TYPE: CHRONIC PAIN
TYPE: CHRONIC PAIN

## 2021-05-27 ASSESSMENT — PAIN DESCRIPTION - ORIENTATION
ORIENTATION: LEFT
ORIENTATION: LEFT

## 2021-05-27 ASSESSMENT — PAIN DESCRIPTION - DESCRIPTORS: DESCRIPTORS: ACHING

## 2021-05-27 ASSESSMENT — PAIN DESCRIPTION - ONSET
ONSET: ON-GOING
ONSET: ON-GOING

## 2021-05-27 ASSESSMENT — PAIN DESCRIPTION - FREQUENCY
FREQUENCY: CONTINUOUS
FREQUENCY: CONTINUOUS

## 2021-05-27 ASSESSMENT — PAIN - FUNCTIONAL ASSESSMENT: PAIN_FUNCTIONAL_ASSESSMENT: ACTIVITIES ARE NOT PREVENTED

## 2021-05-27 ASSESSMENT — PAIN DESCRIPTION - PROGRESSION: CLINICAL_PROGRESSION: NOT CHANGED

## 2021-05-27 ASSESSMENT — PAIN DESCRIPTION - LOCATION
LOCATION: HEAD
LOCATION: HEAD
LOCATION: BACK

## 2021-05-27 NOTE — PROGRESS NOTES
[facility]  INPATIENT PHYSICAL THERAPY  DAILY NOTE  [facility] RENAL TELEMETRY 6K - 9X-35/431-Q    Time In: 7799  Time Out: 1054  Timed Code Treatment Minutes: 23 Minutes  Minutes: 23          Date: 2021  Patient Name: Kenia Velez,  Gender:  male        MRN: [id number]  : 1945  (69 y.o.)     Referring Practitioner: Dr Tiago Montaño  Diagnosis: Encephalopathy  Additional Pertinent Hx: From admission note-This is a 75-year-old male who was admitted to [facility] after presenting by EMS from local drugstore confused, had unwitnessed fall presented with generalized AMS. Prior history of similar presentations in the past.  PMH include antiphospholipid syndrome, DVT, sleep apnea, COPD, CKD, HTN, hypothyroidism and RCC. Patient unable to provide more history. History obtained from chart review and ED accounts. Prior Level of Function:  Lives With: Other (comment) (rooming house)  Type of Home: House  Home Layout: One level (pt stays on the main floor)  Home Access: Level entry  Home Equipment: Cane   Bathroom Shower/Tub: Tub/Shower unit  Bathroom Toilet: Handicap height  Bathroom Equipment: Shower chair, Grab bars in shower    ADL Assistance: [facility] [address]: Independent  Homemaking Responsibilities: Yes  Ambulation Assistance: Independent  Transfer Assistance: Independent  Active : No  Additional Comments: has an aid 3x/wk to assist with bathing and homemkaing. Pt using cane PTA for mobility    Restrictions/Precautions:  Restrictions/Precautions: General Precautions     SUBJECTIVE: RN approved session. Pt resting in bed upon arrival, pleasant and agreeable to therapy. Pt walked out into ha and stood to look out window. Pt started to stutter and could would not answer therapist. Monica Taylor out for RN. Pt able oriented to name, birthdate, and place but very slow to respond and stuttered through all answers. Assisted pt back into room and RN took vitals.       PAIN: 0/10: stand with  S to get on and off various surfaces  Short term goal 3: Pt will ambulate with  feet with SBA to walk safely in community and household dstances  Long term goals  Time Frame for Long term goals : NA due to short ELOS    Following session, patient left in safe position with all fall risk precautions in place.

## 2021-05-27 NOTE — PROGRESS NOTES
[address] [address]  Acute Inpatient Rehab Preadmission Assessment    Patient Name: Mayi Nolen        MRN: [id number]    : 1945  (76 y.o.)  Gender: male     Admitted from:[address]  Initial Assessment    Date of admission to the hospital: 2021  1:02 PM  Date patient eligible for admission:2021    Primary Diagnosis: Parkinson's Disease    Did patient have surgery?  no    Physicians: Elissa Durant MD, Dr. Rashid Espinoza, Dr. Kiser Plush for clinical complications/co-morbidities:   Past Medical History:   Diagnosis Date    Anxiety     Chronic back pain     Chronic kidney disease (CKD), stage II (mild)     COPD (chronic obstructive pulmonary disease) [facility]     Depression     Diabetes mellitus ([facility]     DVT (deep venous thrombosis) ([facility] [facility]     Hyperlipidemia     Hypertension     Hypothyroidism     Lumbago with sciatica     Polyneuropathy     Renal cell carcinoma ([facility] [address]        Financial Information  Primary insurance: Medicare    Secondary Insurance:  Medicaid    Has the patient had two or more falls in the past year or any fall with injury in the past year? no    Did the patient have major surgery during the 100 days prior to admission? no    Precautions:   falls, infections and skin  Restrictions/Precautions: General Precautions    Isolation Precautions: None       Physiatrist: Dr. Rashid Espinoza    Patients Occupation: Retired  Reviewed Lab and Diagnostic reports from Current Admission: Yes    Patients Prior Functional  Level: Prior Function  ADL Assistance: Independent  Homemaking Assistance: Independent  Ambulation Assistance: Independent  Transfer Assistance: Independent  Additional Comments: has an aid 3x/wk to assist with bathing and homemkaing.  Pt using cane PTA for mobility    Current functional status for upper extremity ADLs: minimal assistance    Current functional status for lower extremity ADLs: dependent    Current functional status for bed, chair, wheelchair transfers: contact guard assistance    Current functional status for toilet transfers:   contact guard assistance    Current functional status for locomotion: Assistive Device: Rolling Walker  Assist Level:  [address] Distance: EOB to recliner  Min unsteadiness noted with short distance mobility, and min impulsiveness noted (may be d/t pt using cane at baseline)     Current functional status for bladder management: Modified independence    Current functional status for bowel management:Modified independence    Current functional status for comprehension: Minimal contact assistance    Current functional status for expression: Modified independence    Current functional status for social interaction: Modified independence    Current functional status for problem solving: Minimal contact assistance    Current functional status for memory: Minimal contact assistance    Expected level of Improvement in Self-Care:  Complete independence    Expected level of Improvement in Sphincter Control:  Complete independence    Expected level of Improvement in Transfers: Complete independence    Expected level of Improvement in Locomotion:  Complete independence    Expected level of Improvement in Communication and Social Cognition: Complete independence    Expected length of time to achieve that level of improvement: 2 weeks    Current rehab issues: ADL dysfunction,bladder management,bowel management,carry over of therapy techniques, discharge planning, disease and co-morbidity management, gait/mobility dysfunction, medication management, nutrition and hydration management,Ongoing assessment of safety, Pain management, Patient and family education, Prevention of secondary complications, Skin Integrity,cognitive impairment, communication impairment. Required therapy: Physical Therapy, Occupational Therapy and Speech Therapy 3 hours per day, 5-6 days per week. Recreational Therapy 1 hour per week.     Expected Discharge Destination: Home    Expected Post Discharge Treatments: Out Patient    Other information relevant to the care needs:   Lives With: Other (comment) (rooming house)  Type of Home: House  Home Layout: One level (pt stays on the main floor)  Home Access: Level entry  Bathroom Shower/Tub: Tub/Shower unit  Bathroom Toilet: Handicap height  Bathroom Equipment: Shower chair, Grab bars in shower  Home Equipment: Cane  ADL Assistance: Independent  Homemaking Assistance: Independent  Homemaking Responsibilities: Yes  Ambulation Assistance: Independent  Transfer Assistance: Independent  Active : No  Additional Comments: has an aid 3x/wk to assist with bathing and homemkaing. Pt using cane PTA for mobility    Acute Inpatient Rehabilitation Disclosure Statement provided to patient. Patient verbalized understanding. I have reviewed and concur with the findings and results of the pre-admission screening assessment completed by the Inpatient Rehabilitation Admissions Coordinator.         Alexa Shabazz M.D.

## 2021-05-27 NOTE — H&P
Physical Medicine & Rehabilitation   History and Physical    Chief Complaint and Reason for Rehabilitation Admission:       History of Present Illness:  Sree Wilcox  is a 76 y.o. male admitted to the inpatient rehabilitation unit on 5/27/2021. He was originally admitted to [address] [place] [address] on 5/24/2021 after presenting by EMS from [facility], confused, had unwitnessed fall, presented with generalized AMS. He arrived c/o headache and neck pain. He takes Xarelto. Prior history of similar presentations in the past.  PMH include antiphospholipid syndrome, DVT, sleep apnea, COPD, CKD, HTN, hypothyroidism and RCC.  Patient unable to provide more history.  History obtained from chart review and ED accounts.     During his hospitalization, diagnoses and plans of care:   1. Altered mental status with unwitnessed fall likely secondary to illicit drug use. UDS was positive for oxycodone which was not listed on his home meds. Alcohol was negative. Continuous BiPAP while sleeping. Avoid sedative meds.  Seizure and fall precautions.  Ordered bilateral carotid ultrasound.  VBG, TSH, B12, folate, proBNP, LFTs, lactic, CT head/spine/abd are ok.  Patient does not appear malnourished.  N.p.o. for now due to AMS.  Hypoglycemia orders. Multiple admissions with the same presentation in the past, extensive work-up previously unrevealing.  Avoid narcotics or other sedatives/hypnotics due to apparent abuse potential.  Unlikely DDx includes orthostatic, BPPV, myxoma. ? Meclizine on home med.  Get echo.  Cardiomegaly on AP film.  Acapella to recruit alveoli.  Good sats on RA.  On admission PDMP score 670, filled 30-day supply Percocet -fill date 5/14/2021.  Addiction services consulted. Alec Ocampo in case patient may be self-medicating while being hospitalized. 2.  Antiphospholipid syndrome -noted.  No concern of DVT at this time, although notable history of prior.  On rivaroxaban  3.   Central sleep apnea -sleep study previously showed no benefit from CPAP.  Continue BiPAP on current settings, with pulse oximetry study overnight. 4.  Troponin leak of unclear significance -downtrending.  EKG nonacute.  Troponin  0.018 from 0.019.  Probably prerenal demand and JOSE A. 5.  JOSE A -received fluids in ED. 6.  GERD -IV Pepcid. 7.  DVT prophylaxis-already on Xarelto. 8.  Anemia and leukopenia -WBC 3.9, hemoglobin 13.4.  Noted.  Trending.  No e/o bleeding. 9.  COPD -Albuterol if wheezing. 10. Obesity class II, borderline upper limit -may be a good candidate for bariatric intervention.  An early intervention when indicated leads to better long-term outcomes in some studies.  Dietary to see to establish reasonable goals and to provide appropriate education at this time. 6.  Diagnosed with Parkinson's disease per myself secondary to frequent falls, masked facies, bradykinesia, weakness, hushed voice, increased tone, short steps, posterior lean, dysphagia, impaired cognitive skills. Current Rehabilitation Assessments:  PT:      Diagnosis: Encephalopathy  Additional Pertinent Hx: From admission note-This is a 45-year-old male who was admitted to [facility] after presenting [facility] confused, had unwitnessed fall presented with generalized AMS. Prior history of similar presentations in the past.  PMH include antiphospholipid syndrome, DVT, sleep apnea, COPD, CKD, HTN, hypothyroidism and RCC. Patient unable to provide more history.   History obtained from chart review and ED accounts.      Prior Level of Function:  Lives With: Other (comment) (rooming house)  Type of Home: [address]: One level (pt stays on the main floor)  Home Access: Level entry  Home Equipment: Cane   Bathroom Shower/Tub: Tub/Shower unit  Bathroom Toilet: Handicap height  Bathroom Equipment: Shower chair, Grab bars in [address] [facility] [address]: [address]: Independent  Homemaking Responsibilities: Yes  Ambulation Assistance: Independent  Transfer Assistance: Independent  Active : No  Additional Comments: has an aid 3x/wk to assist with bathing and homemkaing. Pt using cane PTA for mobility     Restrictions/Precautions:  Restrictions/Precautions: General Precautions      SUBJECTIVE: RN approved session. Pt resting in bed upon arrival, pleasant and agreeable to therapy. Pt walked out into ha and stood to look out window. Pt started to stutter and could would not answer therapist. Zhane Banerjee out for RN. Pt able oriented to name, birthdate, and place but very slow to respond and stuttered through all answers. Assisted pt back into room and RN took vitals.       PAIN: 0/10: denies     Vitals: RN in to check vitals at end of session.      OBJECTIVE:  Bed Mobility:  Rolling to Right: Stand By Assistance   Supine to Sit: Contact Guard Assistance  Sit to Supine: Minimal Assistance      Transfers:  Sit to Stand: Minimal Assistance, From EOB. Cuing for hand placmeent. Stand to Sit:Contact Guard Assistance     Ambulation:  Contact Guard Assistance, Minimal Assistance, X 1, X 2, CGA x1 out into ha. CGA to Min x2 back to room as pt noting feeling light headed. Distance: 60 feet x1   Surface: Level Tile  Device:Rolling Walker  Gait Deviations:  R toe drop, dragging toe during swing phase, worse with returning to room. Slow jericho. Decreased B step lengths. Min instability but no LOB     Balance:  Dynamic Sitting Balance: Stand By Assistance  Static Standing Balance: Contact Guard Assistance, Minimal Assistance, Initially CGA, as pt became light headed he required min A due to slight posterior lean.      Exercise:  None     Functional Outcome Measures: Completed  -PAC Inpatient Mobility Raw Score : 17  AM-PAC Inpatient T-Scale Score : 42.13     ASSESSMENT:  Assessment: Patient progressing toward established goals. and Pt tolerated fair. Will require continued therapy at discharge.    Activity Tolerance:  Patient deconditioning 4/5  Left Upper Extremity:  weaker than R UE --d/t prior injuries 4-/5     SENSATION:   WFL     ADL:   Lower Extremity Dressing: Dependent. socks. Feeding: s/u for breakfast tray     BALANCE:  Sitting Balance:  Supervision. Standing Balance: Contact Guard Assistance.       BED MOBILITY:  Supine to Sit: Supervision, with increased time for completion    Scooting: Supervision, with increased time for completion       TRANSFERS:  Sit to Stand:  Contact Guard Assistance. Stand to Sit: Contact Guard Assistance.       FUNCTIONAL MOBILITY:  Assistive Device: Rolling Walker  Assist Level:  Contact Guard Assistance. Distance: EOB to recliner  Min unsteadiness noted with short distance mobility, and min impulsiveness noted (may be d/t pt using cane at baseline)      Activity Tolerance:  Patient tolerance of  treatment: good. Session limited this date d/t arrival of breakfast tray. Pt tolerating session well, and highly engaged.       Assessment:    Pt s/p admission for encephalopathy. Pt exhibiting deficits detailed below, requiring additional OT intervention to restore independent PLOF. Pt now requiring assist for ADLs, IADLs, mobility, t/fs, and standing balance. Pt with significant increase in burden of care. Without skilled OT intervention pt at risk for functional decline, increased falls, and readmission to hospital.     Performance deficits / Impairments: Decreased functional mobility , Decreased safe awareness, Decreased balance, Decreased ADL status, Decreased endurance, Decreased strength, Decreased high-level IADLs  Prognosis: Good  REQUIRES OT FOLLOW UP: Yes     Treatment Initiated: Treatment and education initiated within context of evaluation.   Evaluation time included review of current medical information, gathering information related to past medical, social and functional history, completion of standardized testing, formal and informal observation of tasks, assessment of data and development of plan of care and goals. Treatment time included skilled education and facilitation of tasks to increase safety and independence with ADL's for improved functional independence and quality of life.     Discharge Recommendations:  Patient would benefit from continued therapy after discharge     Patient Education:  OT Education: OT Role, Plan of Care, Transfer Training  Patient Education: continuum of care     Equipment Recommendations:  Equipment Needed: Yes  Other: pt has shower chair and RTS, may need walker?     ST:      Diagnosis: Encephalopathy   Secondary Diagnosis: dysphagia, cognitive deficit  Precautions: aspiration, fall risk  History of Present Illness/Injury: [facility] with above dx. Per H&P review: \"Henrry is a 76year old male presenting to the Emergency Department via EMS from [facility] for evaluation of confusion.  Per EMS report, the pharmacy staff reports that the patient was at the pharmacy, disoriented.  The patient reports that he had fallen although it was not witnessed. Keyla Lundy arrives complaining of a headache and neck pain.  He takes Xarelto.  Exact events surrounding fall are unclear and patient is not able to reliably provide much information. \" ST consulted to assess function of swallow and current level of cognitive functioning.            Past Medical History:   Diagnosis Date    Anxiety      Chronic back pain      Chronic kidney disease (CKD), stage II (mild)      COPD (chronic obstructive pulmonary disease) (HCC)      Depression      Diabetes mellitus (HCC)      DVT (deep venous thrombosis) (HCC)      Hyperlipidemia      Hypertension      Hypothyroidism      Lumbago with sciatica      Polyneuropathy      Renal cell carcinoma (HCC)           Pain: 4/10 - Pain location: Left side of face, nurse notified     Subjective:  ST saw patient with permission from RN.  Patient laying supine with bipap mask on upon ST arrival. RN approved removal of bipap mask. Patient required MAX support to begin tx with ST.     SOCIAL HISTORY:   Living Arrangements: independent  Work History: Retired  Education Level: high school  Driving Status: Drives with self-imposed restrictions  Finance Management: Independent  Medication Management: Independent  ADL's: Independent. Hobbies: Did not state; will further explore at next session  Vision Status: glasses - not in room  Hearing: [facility]     SPEECH / VOICE:  Speech: stammer present     LANGUAGE:  Receptive:  1 Step Commands: 2/2  Simple Yes/No Questions: 2/2     Expressive:  Confrontational Namin/3  Conversational Speech: impaired     COGNITION:  [place] Cognitive Assessment (MOCA) version 7.1 was attempted.  Unable to derive score at this date due to limited participation of task     Immediate Recall: 4/5  Problem Solving: impaired  Attention: 16  Math Computation: 0/3  Executive Functionin/5     SWALLOWING:     Respiratory Status:   Oxygen Mask       Behavioral Observation: Lethargic     ORAL MECHANISM EVALUATION:       Facial / Labial WFL     Lingual Impaired Poor ROM and coordination   Dentition WFL     Velum WFL     Vocal Quality WFL     Sensation WFL     Cough Not Tested        PATIENT WAS EVALUATED USING:  Thin liquid, puree, hard solid     ORAL PHASE:  Impaired:  prolonged mastication evident     PHARYNGEAL PHASE:  WFL:  Pharyngeal phase appears WFL but cannot rule out pharyngeal phase deficits from a bedside swallowing evaluation alone.     SIGNS AND SYMPTOMS OF LARYNGEAL PENETRATION / ASPIRATION:  No signs/symptoms of aspiration evident in this evaluation, but cannot rule out silent aspiration.     INSTRUMENTAL EVALUATION: Instrumental evaluation not indicated at this time.     DIET RECOMMENDATIONS:  Thin/soft and bite sized     STRATEGIES: Full Upright Position, Limit Distractions and Monitor for Fatigue             RECOMMENDATIONS/ASSESSMENT:  DIAGNOSTIC IMPRESSIONS:  Patient present with at least moderate cognitive impairment as evidenced by findings outlined above. ST attempted to complete MOCA on this date; however, due to poor attention to task and patient refusal, unable to gain derived score. Impairments were noted in the areas of: executive functioning, attention, problem solving, naming, and math computation. Receptive language appears to be largely intact at this time. Patient presents with moderate-severe stammer when speaking and states \"this is how I talk\", ST suspects baseline impairment. No dysphonia. As patient was previously living independently, ST warranted to improve overall cognitive functioning to return to OF. CSE completed on this date revealed mild lingual incoordination, but otherwise structures WFL. CSE completed utilizing: thin liquids via cup/straw, puree, and hard solid. Thin liquids and puree unremarkable; however, prolonged mastication noted with hard solids and patient self limiting stating, \"it hurts to swallow\". No overt s/s of penetration/aspiration at this time. At this time, ST recommends patient consume soft and bite sized diet with thin liquids. ST recommends skilled intervention for dysphagia management.    Rehabilitation Potential: fair         Past Medical History:      Diagnosis Date    Anxiety     Chronic back pain     Chronic kidney disease (CKD), stage II (mild)     COPD (chronic obstructive pulmonary disease) (HCC)     Depression     Diabetes mellitus (HCC)     DVT (deep venous thrombosis) (HCC)     Hyperlipidemia     Hypertension     Hypothyroidism     Lumbago with sciatica     Polyneuropathy     Renal cell carcinoma ([facility] [address]        Primary care provider: Kaity Gallegos MD     Past Surgical History:      Procedure Laterality Date    BACK SURGERY      CHOLECYSTECTOMY      SHOULDER SURGERY      SMALL INTESTINE SURGERY      partial bowel resection       Allergies:    Codeine, Effexor [venlafaxine], Ibuprofen, and Tramadol    Current Medications:    No current facility-administered medications for this encounter. Social History:  Social History     Socioeconomic History    Marital status: Single     Spouse name: Not on file    Number of children: Not on file    Years of education: Not on file    Highest education level: Not on file   Occupational History    Not on file   Tobacco Use    Smoking status: Never Smoker    Smokeless tobacco: Never Used   Substance and Sexual Activity    Alcohol use: Never    Drug use: Never    Sexual activity: Not on file   Other Topics Concern    Not on file   Social History Narrative    Not on file     Social Determinants of Health     Financial Resource Strain:     Difficulty of Paying Living Expenses:    Food Insecurity:     Worried About Running Out of Food in the Last Year:     [address] in the Last Year:    Transportation Needs:     Lack of Transportation (Medical):      Lack of Transportation (Non-Medical):    Physical Activity:     Days of Exercise per Week:     Minutes of Exercise per Session:    Stress:     Feeling of Stress :    Social Connections:     Frequency of Communication with Friends and Family:     Frequency of Social Gatherings with Friends and Family:     Attends [employer] Services:     Active Member of Clubs or Organizations:     Attends Club or Organization Meetings:     Marital Status:    Intimate Partner Violence:     Fear of Current or Ex-Partner:     Emotionally Abused:     Physically Abused:     Sexually Abused:      Lives With: Other (comment) (rooming house)  Type of Home: House  Home Layout: One level  Home Access: Level entry       ADL Assistance: Independent  Ambulation Assistance: Independent  Transfer Assistance: Independent     Active : No      Family History:       Family history unknown: Yes       Review of Systems:  CONSTITUTIONAL:  positive for  obesity with BMI of 33.81  EYES:  negative  HEENT:

## 2021-05-27 NOTE — DISCHARGE SUMMARY
Hospitalist Discharge Summary        Patient: Violeta Tan  YOB: 1945  MRN: [id number]   Acct: [de-identified]    Primary Care Physician: Kim Montano MD    Admit date  5/24/2021    Discharge date:  5/27/2021 8:47 AM    Chief Complaint on presentation :-  Fall, AMS    Discharge Assessment and Plan:-     1. Acute encephalopathy w/unwitnessed fall likely 2/2 Percocet overdose vs hypoglycemia -trauma team cleared.  UDS positive for oxycodone which was not initially listed on home meds, review of PDMP confirmed Percocet use.  EtOH negative.  Continuous BiPAP while sleeping.  Avoid sedative meds.  Seizure and fall precautions.  Bilateral carotid ultrasound NL.  VBG, TSH, B12, folate, proBNP, LFTs, lactic, CT head/spine/abd are ok. Multiple admissions with the same presentation in the past, extensive work-up previously unrevealing.  Continue home-dose pain meds and avoid increasing the dose or adding more narcotics or other sedatives/hypnotics due to apparent abuse potenintial.      5/26: Extensive w/u negative. Likely due to opioid use.  5/27: Mentation at baseline. Does c/o neck pain and headache that is chronic see #2. Still gets intermittent dizziness. Will restart home meclizine.     2. Neuropathic pain from extensive spine surgeries- cont home pain meds. Update med list.     3. Antiphospholipid syndrome -noted.  No concern of DVT at this time, although notable history of prior.  On rivaroxaban     4. Central sleep apnea -sleep study previously showed no benefit from CPAP.  Continue BiPAP on current settings, with pulse oximetry study overnight.     5. Elevated Troponin - likely due to JOSE A-downtrending.  EKG nonacute.  Troponin  0.018 from 0.019.     6. JOSE A -received fluids in ED.    5/27: Resolved     7. GERD -IV Pepcid.     8. DVT prophylaxis-already on Xarelto.     9. Anemia and leukopenia -chronicWBC 3.9, hemoglobin 13.4.  Noted.  Trending.  stable     10.  COPD -Albuterol if wheezing.        Disposition: IPR today    Initial H and P:-    80-year-old male who was admitted to [facility] after presenting by EMS from local drugstore confused, had unwitnessed fall presented with generalized AMS.  Prior history of similar presentations in the past.  PMH include antiphospholipid syndrome, DVT, sleep apnea, COPD, CKD, HTN, hypothyroidism and RCC.  Patient unable to provide more history.  History obtained from chart review and ED accounts. Had extensive w/u done on previous occasions for similar complaints. Patients opioids were held. He was back to his baseline mentation the following day. He was evaluated by PT/OT and was deemed a candidate for IPR. Will be transferred there. Physical Exam:-  Vitals:   Patient Vitals for the past 24 hrs:   BP Temp Temp src Pulse Resp SpO2 Weight   05/27/21 0420 -- -- -- -- 16 -- --   05/27/21 0302 (!) 153/77 98.8 °F (37.1 °C) Oral 59 16 92 % 239 lb 1.6 oz (108.5 kg)   05/27/21 0010 136/70 97.8 °F (36.6 °C) Axillary 54 14 98 % --   05/27/21 0005 -- -- -- -- 14 99 % --   05/26/21 2000 (!) 120/58 98.3 °F (36.8 °C) Oral 53 16 95 % --   05/26/21 1517 115/67 98.2 °F (36.8 °C) Oral 60 16 94 % --   05/26/21 1117 106/68 97.8 °F (36.6 °C) Oral 58 16 93 % --   05/26/21 0912 -- -- -- -- -- 100 % --     Weight:   Weight: 239 lb 1.6 oz (108.5 kg)   24 hour intake/output:     Intake/Output Summary (Last 24 hours) at 5/27/2021 0847  Last data filed at 5/27/2021 0302  Gross per 24 hour   Intake 560 ml   Output 220 ml   Net 340 ml       1. General appearance: No apparent distress, appears stated age and cooperative. 2. HEENT: Normal cephalic, atraumatic without obvious deformity. Pupils equal, round, and reactive to light. Extra ocular muscles intact. Conjunctivae/corneas clear. 3. Neck: Supple, with full range of motion. No jugular venous distention. Trachea midline. 4. Respiratory:  Normal respiratory effort.  Clear to auscultation, bilaterally without Rales/Wheezes/Rhonchi. 5. Cardiovascular: Regular rate and rhythm with normal S1/S2 without murmurs, rubs or gallops. 6. Abdomen: Soft, non-tender, non-distended with normal bowel sounds. 7. Musculoskeletal:  No clubbing, cyanosis or edema bilaterally. 8. Skin: Skin color, texture, turgor normal.  No rashes or lesions. 9. Neurologic:  Neurovascularly intact without any focal sensory/motor deficits. Cranial nerves: II-XII intact, grossly non-focal.  10. Psychiatric: Alert and oriented, thought content appropriate, normal insight  11. Capillary Refill: Brisk,< 3 seconds   12.  Peripheral Pulses: +2 palpable, equal bilaterally       Discharge Medications:-      Medication List      STOP taking these medications    HYDROcodone-acetaminophen 5-325 MG per tablet  Commonly known as: NORCO     hydrOXYzine 50 MG tablet  Commonly known as: ATARAX        ASK your doctor about these medications    acetaminophen 500 MG tablet  Commonly known as: TYLENOL     ammonium lactate 12 % cream  Commonly known as: AMLACTIN     Anoro Ellipta 62.5-25 MCG/INH Aepb inhaler  Generic drug: umeclidinium-vilanterol     atorvastatin 20 MG tablet  Commonly known as: LIPITOR     Calcium Ascorbate Powd     citalopram 40 MG tablet  Commonly known as: CELEXA     cyanocobalamin 1000 MCG tablet     dicyclomine 10 MG capsule  Commonly known as: BENTYL  Ask about: Which instructions should I use?     ferrous sulfate 325 (65 Fe) MG EC tablet  Commonly known as: FE TABS 325     furosemide 40 MG tablet  Commonly known as: LASIX     gabapentin 300 MG capsule  Commonly known as: NEURONTIN  Ask about: Which instructions should I use?     isosorbide mononitrate 60 MG extended release tablet  Commonly known as: IMDUR     losartan 50 MG tablet  Commonly known as: COZAAR     meclizine 25 MG tablet  Commonly known as: ANTIVERT     meloxicam 15 MG tablet  Commonly known as: MOBIC     oxyCODONE-acetaminophen  MG per tablet  Commonly known as: PERCOCET pantoprazole 40 MG tablet  Commonly known as: PROTONIX     senna 8.6 MG tablet  Commonly known as: SENOKOT     SM Fiber Laxative 500 MG Tabs  Generic drug: methylcellulose     tamsulosin 0.4 MG capsule  Commonly known as: FLOMAX     Ventolin  (90 Base) MCG/ACT inhaler  Generic drug: albuterol sulfate HFA     Xarelto 10 MG Tabs tablet  Generic drug: rivaroxaban     zinc gluconate 50 MG tablet             Labs :-  Recent Results (from the past 72 hour(s))   POCT Glucose    Collection Time: 05/24/21  1:08 PM   Result Value Ref Range    POC Glucose 92 70 - 108 mg/dl   EKG 12 Lead    Collection Time: 05/24/21  1:31 PM   Result Value Ref Range    Ventricular Rate 53 BPM    Atrial Rate 53 BPM    P-R Interval 180 ms    QRS Duration 86 ms    Q-T Interval 462 ms    QTc Calculation (Bazett) 433 ms    P Axis 70 degrees    R Axis 60 degrees    T Axis 62 degrees   CBC Auto Differential    Collection Time: 05/24/21  2:28 PM   Result Value Ref Range    WBC 3.9 (L) 4.8 - 10.8 thou/mm3    RBC 4.75 4.70 - 6.10 mill/mm3    Hemoglobin 13.4 (L) 14.0 - 18.0 gm/dl    Hematocrit 44.3 42.0 - 52.0 %    MCV 93.3 80.0 - 94.0 fL    MCH 28.2 26.0 - 33.0 pg    MCHC 30.2 (L) 32.2 - 35.5 gm/dl    RDW-CV 14.2 11.5 - 14.5 %    RDW-SD 48.2 (H) 35.0 - 45.0 fL    Platelets 540 595 - 216 thou/mm3    MPV 10.0 9.4 - 12.4 fL    Seg Neutrophils 47.2 %    Lymphocytes 33.2 %    Monocytes 13.4 %    Eosinophils 4.9 %    Basophils 1.0 %    Immature Granulocytes 0.3 %    Segs Absolute 1.8 1 - 7 thou/mm3    Lymphocytes Absolute 1.3 1.0 - 4.8 thou/mm3    Monocytes Absolute 0.5 0.4 - 1.3 thou/mm3    Eosinophils Absolute 0.2 0.0 - 0.4 thou/mm3    Basophils Absolute 0.0 0.0 - 0.1 thou/mm3    Immature Grans (Abs) 0.01 0.00 - 0.07 thou/mm3    nRBC 0 /100 wbc   Basic Metabolic Panel w/ Reflex to MG    Collection Time: 05/24/21  2:28 PM   Result Value Ref Range    Sodium 139 135 - 145 meq/L    Potassium reflex Magnesium 4.2 3.5 - 5.2 meq/L    Chloride 100 98 - 111 Time: 05/24/21  2:30 PM   Result Value Ref Range    Glucose, Urine NEGATIVE NEGATIVE mg/dl    Bilirubin Urine NEGATIVE NEGATIVE    Ketones, Urine NEGATIVE NEGATIVE    Specific Gravity, UA 1.009 1.002 - 1.030    Blood, Urine NEGATIVE NEGATIVE    pH, UA 5.0 5.0 - 9.0    Protein, UA NEGATIVE NEGATIVE mg/dl    Urobilinogen, Urine 0.2 0.0 - 1.0 eu/dl    Nitrite, Urine NEGATIVE NEGATIVE    Leukocyte Esterase, Urine NEGATIVE NEGATIVE    Color, UA YELLOW YELLOW-STRAW    Character, Urine CLEAR [id number].[id number]    RBC, UA NONE SEEN 0-2/hpf /hpf    WBC, UA NONE SEEN 0-4/hpf /hpf    Epithelial Cells, UA NONE SEEN 3-5/hpf /hpf    Bacteria, UA NONE SEEN FEW/NONE SEEN    Casts 4-8 HYALINE NONE SEEN /lpf    Crystals NONE SEEN NONE SEEN    Renal Epithelial, UA NONE SEEN NONE SEEN    Yeast, UA NONE SEEN NONE SEEN    Casts NONE SEEN /lpf    Miscellaneous Lab Test Result NONE SEEN    Vitamin D 25 hydroxy    Collection Time: 05/24/21  4:00 PM   Result Value Ref Range    Vit D, 25-Hydroxy 37 30 - 100 ng/ml   Vitamin B12 & folate    Collection Time: 05/24/21  4:00 PM   Result Value Ref Range    Vitamin B-12 809 211 - 911 pg/mL    Folate 14.1 4.8 - 24.2 ng/mL   Hepatic Function Panel    Collection Time: 05/24/21  4:15 PM   Result Value Ref Range    Albumin 4.0 3.5 - 5.1 g/dL    Total Bilirubin 1.2 0.3 - 1.2 mg/dL    Bilirubin, Direct 0.3 0.0 - 0.3 mg/dL    Alkaline Phosphatase 48 38 - 126 U/L    AST 19 5 - 40 U/L    ALT 9 (L) 11 - 66 U/L    Total Protein 7.0 6.1 - 8.0 g/dL   TSH with Reflex    Collection Time: 05/24/21  4:15 PM   Result Value Ref Range    TSH 0.542 0.400 - 4.200 uIU/mL   Troponin    Collection Time: 05/24/21  4:15 PM   Result Value Ref Range    Troponin T 0.018 (A) ng/ml   EKG 12 lead    Collection Time: 05/24/21  8:52 PM   Result Value Ref Range    Ventricular Rate 53 BPM    Atrial Rate 53 BPM    P-R Interval 152 ms    QRS Duration 84 ms    Q-T Interval 468 ms    QTc Calculation (Bazett) 439 ms    P Axis 50 degrees    R Axis 46 degrees    T Axis 43 degrees   POCT glucose    Collection Time: 05/24/21  8:53 PM   Result Value Ref Range    POC Glucose 74 70 - 108 mg/dl   POCT Glucose    Collection Time: 05/24/21  9:57 PM   Result Value Ref Range    POC Glucose 79 70 - 108 mg/dl   POCT Glucose    Collection Time: 05/25/21  4:35 AM   Result Value Ref Range    POC Glucose 79 70 - 108 mg/dl   Basic Metabolic Panel w/ Reflex to MG    Collection Time: 05/25/21  5:16 AM   Result Value Ref Range    Sodium 141 135 - 145 meq/L    Potassium reflex Magnesium 4.4 3.5 - 5.2 meq/L    Chloride 106 98 - 111 meq/L    CO2 23 23 - 33 meq/L    Glucose 82 70 - 108 mg/dL    BUN 23 (H) 7 - 22 mg/dL    CREATININE 1.1 0.4 - 1.2 mg/dL    Calcium 9.5 8.5 - 10.5 mg/dL   Magnesium    Collection Time: 05/25/21  5:16 AM   Result Value Ref Range    Magnesium 2.1 1.6 - 2.4 mg/dL   CBC    Collection Time: 05/25/21  5:16 AM   Result Value Ref Range    WBC 2.8 (L) 4.8 - 10.8 thou/mm3    RBC 4.81 4.70 - 6.10 mill/mm3    Hemoglobin 13.7 (L) 14.0 - 18.0 gm/dl    Hematocrit 47.9 42.0 - 52.0 %    MCV 99.6 (H) 80.0 - 94.0 fL    MCH 28.5 26.0 - 33.0 pg    MCHC 28.6 (L) 32.2 - 35.5 gm/dl    RDW-CV 14.2 11.5 - 14.5 %    RDW-SD 52.0 (H) 35.0 - 45.0 fL    Platelets 202 301 - 214 thou/mm3    MPV 10.0 9.4 - 12.4 fL   Anion Gap    Collection Time: 05/25/21  5:16 AM   Result Value Ref Range    Anion Gap 12.0 8.0 - 16.0 meq/L   Glomerular Filtration Rate, Estimated    Collection Time: 05/25/21  5:16 AM   Result Value Ref Range    Est, Glom Filt Rate 65 (A) ml/min/1.73m2   Osmolality    Collection Time: 05/25/21  5:16 AM   Result Value Ref Range    Osmolality Calc 284.0 275.0 - 300.0 mOsmol/kg   Basic Metabolic Panel w/ Reflex to MG    Collection Time: 05/26/21  5:15 AM   Result Value Ref Range    Sodium 138 135 - 145 meq/L    Potassium reflex Magnesium 3.9 3.5 - 5.2 meq/L    Chloride 105 98 - 111 meq/L    CO2 26 23 - 33 meq/L    Glucose 98 70 - 108 mg/dL    BUN 25 (H) 7 - 22 mg/dL CREATININE 1.2 0.4 - 1.2 mg/dL    Calcium 9.3 8.5 - 10.5 mg/dL   CBC    Collection Time: 05/26/21  5:15 AM   Result Value Ref Range    WBC 3.2 (L) 4.8 - 10.8 thou/mm3    RBC 4.88 4.70 - 6.10 mill/mm3    Hemoglobin 13.7 (L) 14.0 - 18.0 gm/dl    Hematocrit 45.2 42.0 - 52.0 %    MCV 92.6 80.0 - 94.0 fL    MCH 28.1 26.0 - 33.0 pg    MCHC 30.3 (L) 32.2 - 35.5 gm/dl    RDW-CV 14.1 11.5 - 14.5 %    RDW-SD 47.5 (H) 35.0 - 45.0 fL    Platelets 811 345 - 199 thou/mm3    MPV 9.4 9.4 - 12.4 fL   Anion Gap    Collection Time: 05/26/21  5:15 AM   Result Value Ref Range    Anion Gap 7.0 (L) 8.0 - 16.0 meq/L   Glomerular Filtration Rate, Estimated    Collection Time: 05/26/21  5:15 AM   Result Value Ref Range    Est, Glom Filt Rate 59 (A) JU/NHN/2.25K9   Basic Metabolic Panel w/ Reflex to MG    Collection Time: 05/27/21  5:21 AM   Result Value Ref Range    Sodium 139 135 - 145 meq/L    Potassium reflex Magnesium 3.6 3.5 - 5.2 meq/L    Chloride 104 98 - 111 meq/L    CO2 28 23 - 33 meq/L    Glucose 104 70 - 108 mg/dL    BUN 24 (H) 7 - 22 mg/dL    CREATININE 1.1 0.4 - 1.2 mg/dL    Calcium 9.2 8.5 - 10.5 mg/dL   CBC    Collection Time: 05/27/21  5:21 AM   Result Value Ref Range    WBC 2.9 (L) 4.8 - 10.8 thou/mm3    RBC 4.59 (L) 4.70 - 6.10 mill/mm3    Hemoglobin 13.1 (L) 14.0 - 18.0 gm/dl    Hematocrit 43.2 42.0 - 52.0 %    MCV 94.1 (H) 80.0 - 94.0 fL    MCH 28.5 26.0 - 33.0 pg    MCHC 30.3 (L) 32.2 - 35.5 gm/dl    RDW-CV 14.0 11.5 - 14.5 %    RDW-SD 47.6 (H) 35.0 - 45.0 fL    Platelets 642 402 - 291 thou/mm3    MPV 10.1 9.4 - 12.4 fL   Anion Gap    Collection Time: 05/27/21  5:21 AM   Result Value Ref Range    Anion Gap 7.0 (L) 8.0 - 16.0 meq/L   Glomerular Filtration Rate, Estimated    Collection Time: 05/27/21  5:21 AM   Result Value Ref Range    Est, Glom Filt Rate 65 (A) ml/min/1.73m2        Microbiology:    Blood culture #1: No results found for: BC    Blood culture #2:No results found for: BLOODCULT2    Organism:  No results found for: LABGRAM    MRSA culture only:No results found for: [facility]    Urine culture: No results found for: LABURIN  No results found for: ORG     Respiratory culture: No results found for: CULTRESP    Aerobic and Anaerobic :  No results found for: LABAERO  No results found for: LABANAE    Urinalysis:      Lab Results   Component Value Date    NITRU NEGATIVE 05/24/2021    WBCUA NONE SEEN 05/24/2021    BACTERIA NONE SEEN 05/24/2021    RBCUA NONE SEEN 05/24/2021    BLOODU NEGATIVE 05/24/2021    [address] 1.009 05/24/2021    [address] NEGATIVE 02/19/2021       Radiology:-  ECHO Complete 2D W Doppler W Color    Result Date: 5/25/2021  Transthoracic Echocardiography Report (TTE)  Demographics   Patient Name    [facility] Gender               Male   MR #            [id number]      Race                 Unknown                                  Ethnicity   Account #       [de-identified]      Room Number          0017   Accession       [id number]     Date of Study        05/25/2021  Number   Date of Birth   1945     Referring Physician  Albert Singer MD                                                      [facility]   Age             76 year(s)     [address] [facility]                                  Interpreting         Echo reader of the                                 Physician            janice Graham MD  Procedure Type of Study   TTE procedure:ECHOCARDIOGRAM COMPLETE 2D W DOPPLER W COLOR. Procedure Date Date: 05/25/2021 Start: 10:05 AM Study Location: Bedside Technical Quality: Limited visualization due to poor acoustical window. Indications:Syncope.  Additional Medical History:Hypertension, hyperlipidemia, DVT, diabetic, sleep apnea, GERD, COPD, altered mental status, chronic kidney disease Patient Status: Routine Height: 70 inches Weight: 230.01 pounds BSA: 2.22 m^2 BMI: 33 kg/m^2 BP: 137/84 mmHg  Conclusions   Summary Technically difficult examination. Left ventricle size is normal.  Normal left ventricular wall thickness. There was mild global hypokinesis of the left ventricle. Systolic function was mildly reduced. Ejection fraction is visually estimated in the range of 45% to 50%. Doppler parameters were consistent with abnormal left ventricular  relaxation (grade 1 diastolic dysfunction). Signature   ----------------------------------------------------------------  Electronically signed by Alek Pack MD (Interpreting  physician) on 05/25/2021 at 07:28 PM  ----------------------------------------------------------------   Findings   Mitral Valve  The mitral valve structure was normal with normal leaflet separation. DOPPLER: The transmitral velocity was within the normal range with no  evidence for mitral stenosis. There was no evidence of mitral  regurgitation. Aortic Valve  The aortic valve was trileaflet with normal thickness and cuspal  separation. DOPPLER: Transaortic velocity was within the normal range with  no evidence of aortic stenosis. There was no evidence of aortic  regurgitation. Tricuspid Valve  The tricuspid valve structure was normal with normal leaflet separation. DOPPLER: There was no evidence of tricuspid stenosis. Trivial tricuspid regurgitation visualized. Pulmonic Valve  The pulmonic valve leaflets exhibited normal thickness, no calcification,  and normal cuspal separation. DOPPLER: The transpulmonic velocity was  within the normal range with no evidence for regurgitation. Left Atrium  Left atrial size was normal.   Left Ventricle  Left ventricle size is normal.  Normal left ventricular wall thickness. There was mild global hypokinesis of the left ventricle. Systolic function was mildly reduced. Ejection fraction is visually estimated in the range of 45% to 50%. Doppler parameters were consistent with abnormal left ventricular  relaxation (grade 1 diastolic dysfunction).    Right Atrium  Right atrial size was normal.   Right Ventricle  The right ventricular size was normal with normal systolic function and  wall thickness. Pericardial Effusion  The pericardium was normal in appearance with no evidence of a pericardial  effusion. Pleural Effusion  No evidence of pleural effusion. Aorta / Great Vessels  -Aortic root dimension within normal limits.  -The Pulmonary artery is within normal limits. -IVC size is within normal limits with normal respiratory phasic changes.   M-Mode/2D Measurements & Calculations   LV Diastolic   LV Systolic Dimension:    AV Cusp Separation: 1.6 cmLA  Dimension: 4.8 3.5 cm                    Dimension: 3.2 cmAO Root  cm             LV Volume Diastolic: 722  Dimension: 3.7 cmLA Area: 15.5  LV FS:27.1 %   ml                        cm^2  LV PW          LV Volume Systolic: 09.4  Diastolic: 0.9 ml  cm             LV EDV/LV EDV Index: 108  Septum         ml/49 m^2LV ESV/LV ESV    RV Diastolic Dimension: 2.7 cm  Diastolic: 1   Index: 64.8 ml/23 m^2  cm             EF Calculated: 52.9 %     LA/Aorta: 0.86                                            LA volume/Index: 38.8 ml /17m^2  Doppler Measurements & Calculations   MV Peak E-Wave: 55.7 cm/s  AV Peak Velocity: 152 LVOT Peak Velocity: 108  MV Peak A-Wave: 77.6 cm/s  cm/s                  cm/s  MV E/A Ratio: 0.72         AV Peak Gradient:     LVOT Peak Gradient: 5  MV Peak Gradient: 1.24     9.24 mmHg             mmHg  mmHg                                                   TV Peak E-Wave: 54.4 cm/s  MV Deceleration Time: 345                        TV Peak A-Wave: 38.1 cm/s  msec  MV P1/2t: 101 msec         IVRT: 88 msec         TV Peak Gradient: 1.18  MVA by PHT:2.18 cm^2                             mmHg                                                   TR Velocity:228 cm/s  MV E' Septal Velocity: 5.9 AV DVI (Vmax):0.71    TR Gradient:20.79 mmHg  cm/s  MV A' Septal Velocity:  13.7 cm/s  MV E' Lateral Velocity:  5.7 cm/s MV A' Lateral Velocity:  10.2 cm/s  E/E' septal: 9.44  E/E' lateral: 9.77  [email or web address] [id number]    CT ABDOMEN PELVIS WO CONTRAST Additional Contrast? None    Result Date: 5/24/2021  PROCEDURE: CT ABDOMEN PELVIS WO CONTRAST CLINICAL INFORMATION: abd pain . COMPARISON: 5/27/2020 TECHNIQUE: 2-D multiplanar noncontrast CT abdomen pelvis All CT scans at this facility use dose modulation, iterative reconstruction, and/or weight-based dosing when appropriate to reduce radiation dose to as low as reasonably achievable. FINDINGS: Limitations: Solid organ and hollow viscera evaluation limited without contrast Extensive streak artifact from the patient's extremities in the field-of-view. Lung bases No airspace consolidation or pleural effusion is seen. Minimal coronary artery calcifications. Abdomen pelvis Extensive streak artifact. Liver appears normal. Previously identified gallstone within the cystic duct remains present. Spleen is within normal limits. Pancreas grossly normal. Adrenals are normal. Postsurgical changes left kidney. Stable 1.2 cm cyst lower pole left kidney right kidney unremarkable. Mild calcifications of the aorta. Few scattered prominent retroperitoneal lymph nodes. Postsurgical changes colon. No evidence of bowel obstruction. Prostate gland is enlarged. Urinary bladder appears normal. Bilateral fat-containing inguinal hernias. Mass effect on left hernia. Calcifications left groin from presumed prior surgery. Postsurgical changes lumbar spine. Post surgical changes left iliac wing. No suspicious bone lesions. Postsurgical changes left kidney. Stable CT scan abdomen and pelvis no acute process. Chronic findings detailed above report. **This report has been created using voice recognition software. It may contain minor errors which are inherent in voice recognition technology. ** Final laminectomy and hardware is new since the prior exam. There is no precervical soft tissue swelling. There is chronic mass effect on the left piriform sinus this is secondary to prominent common carotid artery, with a medial course. Extensive postsurgical changes which obscure some detail. No acute process is identified. **This report has been created using voice recognition software. It may contain minor errors which are inherent in voice recognition technology. ** Final report electronically signed by Dr. Huan Vides on 5/24/2021 1:47 PM    XR CHEST PORTABLE    Result Date: 5/24/2021  PROCEDURE: XR CHEST PORTABLE CLINICAL INFORMATION: Altered mental status. COMPARISON: 2/19/2021 TECHNIQUE: A single mobile view of the chest was obtained. 1. Very poor inflation of lungs. Mild cardiac megaly. Prior lower cervical/thoracic fusion with anterior posterior metallic hardware. Left shoulder prosthesis. 2. Mild left basilar atelectasis/pneumonia. **This report has been created using voice recognition software. It may contain minor errors which are inherent in voice recognition technology. ** Final report electronically signed by Dr. Yaw Shah on 5/24/2021 1:41 PM    VL DUP CAROTID BILATERAL    Result Date: 5/25/2021  PROCEDURE: VL DUP CAROTID BILATERAL CLINICAL INFORMATION: 20-year-old male with frequent falls COMPARISON: No prior study. TECHNIQUE: Bilateral carotid US performed including with gray scale, color flow and spectral doppler imaging. The vertebral arteries were also assessed. FINDINGS: RIGHT PSV/EDV DIST CCA-------->71/19cm/s PROX ICA-------->59/9cm/s ECA---------------->57/0cm/s VERT-------------->52/20cm/s LEFT PSV/EDV DIST CCA-------->63/15cm/s PROX ICA-------->76/14cm/s ECA---------------->57/10cm/s VERT-------------->35/11cm/s Right carotid system: There is no atherosclerotic plaque at the right carotid bulb on the grayscale images or color-flow images.   There is no elevation of the peak systolic velocity in the internal carotid artery. The systolic ratios are within acceptable limits. Left carotid system: There is no atherosclerotic plaque at the left carotid bulb on the grayscale images or on the color flow images. There is no elevation of the peak systolic velocities. The waveforms are normal. There is no elevation of the systolic  ratios. Vertebrals: There is antegrade flow in both vertebral arteries. No hemodynamically significant stenosis throughout the internal carotid arteries. **This report has been created using voice recognition software. It may contain minor errors which are inherent in voice recognition technology. ** Final report electronically signed by Dr Brent Coley on 5/25/2021 1:05 PM       Follow-up scheduled after discharge :-    in the next few days with Jayme Reid MD    Consultations during this hospital stay:-  [] NONE [] Cardiology  [] Nephrology  [] Hemo onco   [] GI   [] ID  [] Endocrine  [] Pulm    [] Neuro    [] Psych   [] Urology  [] ENT   [] G SURGERY   []Ortho    []CV surg    [] Palliative  [] Hospice [] Pain management   []    []TCU   [] PT/OT  OTHERS:-    Disposition: IPR  Condition at Discharge: Stable    Time Spent:- 35 minutes    Electronically signed by Lima Rain MD on 5/27/2021 at 8:47 AM  Discharging Hospitalist

## 2021-05-27 NOTE — PROGRESS NOTES
Admitted to the Inpatient Rehabilitation Unit via wheelchair. Patient was then oriented to room and unit. Education provided on the rehabilitation routine: three hours of therapy five days per week and dining room for lunch. Explained patients right to have family, representative or physician notified of their admission. Patient has Declined for physician to be notified. Patient has Declined for family/representative to be notified. Admitting medication orders compared with acute stay medications; home medication list reviewed with patient/family. Medication issues identified No  Medication issue: none  IBladder and Bowel Function Assessment:  1. Prior history of bladder problems: no problems with bladder  2. Number of pads used per day: none  3. Frequency of night time voiding: twice  4. Fluid intake volume and pattern: probably inadequate  5. Last BM: 5/26/21  6. Bowel problems (prior or current) No      Incontinence      Frequent diarrhea      No BM in 3 days this stay or history of constipation      Hemorrhoids      Diverticulitis      Bowel Surgery     Two nurse skin assessment performed by Gulshan Tinoco  and this nurse . Weight: 239.4 lbs      Care plan was created with patient's input and goals were agreed upon. Admission folder provided with education regarding patients diagnoses, fall prevention, skin care, and M in the box. \"Data Collection Information Summary for Patients in Inpatient Rehabilitation Facilities\" and \"Privacy Act Statement - Health Care Records\" provided. Please refer to the admission navigator for further information.

## 2021-05-27 NOTE — PROGRESS NOTES
WFL  Left Upper Extremity:  Impaired - prior shoulder injury'    STRENGTH:  Right Upper Extremity: general deconditioning 4/5  Left Upper Extremity:  weaker than R UE --d/t prior injuries 4-/5    SENSATION:   WFL    ADL:   Lower Extremity Dressing: Dependent. socks. Feeding: s/u for breakfast tray    BALANCE:  Sitting Balance:  Supervision. Standing Balance: Contact Guard Assistance. BED MOBILITY:  Supine to Sit: Supervision, with increased time for completion    Scooting: Supervision, with increased time for completion      TRANSFERS:  Sit to Stand:  Contact Guard Assistance. Stand to Sit: Contact Guard Assistance. FUNCTIONAL MOBILITY:  Assistive Device: Rolling Walker  Assist Level:  Contact Guard Assistance. Distance: EOB to recliner  Min unsteadiness noted with short distance mobility, and min impulsiveness noted (may be d/t pt using cane at baseline)     Activity Tolerance:  Patient tolerance of  treatment: good. Session limited this date d/t arrival of breakfast tray. Pt tolerating session well, and highly engaged. Assessment:    Pt s/p admission for encephalopathy. Pt exhibiting deficits detailed below, requiring additional OT intervention to restore independent PLOF. Pt now requiring assist for ADLs, IADLs, mobility, t/fs, and standing balance. Pt with significant increase in burden of care. Without skilled OT intervention pt at risk for functional decline, increased falls, and readmission to hospital.    Performance deficits / Impairments: Decreased functional mobility , Decreased safe awareness, Decreased balance, Decreased ADL status, Decreased endurance, Decreased strength, Decreased high-level IADLs  Prognosis: Good  REQUIRES OT FOLLOW UP: Yes    Treatment Initiated: Treatment and education initiated within context of evaluation.   Evaluation time included review of current medical information, gathering information related to past medical, social and functional history, completion of standardized testing, formal and informal observation of tasks, assessment of data and development of plan of care and goals. Treatment time included skilled education and facilitation of tasks to increase safety and independence with ADL's for improved functional independence and quality of life. Discharge Recommendations:  Patient would benefit from continued therapy after discharge    Patient Education:  OT Education: OT Role, Plan of Care, Transfer Training  Patient Education: continuum of care    Equipment Recommendations:  Equipment Needed: Yes  Other: pt has shower chair and RTS, may need walker? Plan:  Times per week: 5x  Current Treatment Recommendations: Strengthening, Balance Training, Functional Mobility Training, Endurance Training, Self-Care / ADL, Patient/Caregiver Education & Training, Safety Education & Training, Home Management Training. See long-term goal time frame for expected duration of plan of care. If no long-term goals established, a short length of stay is anticipated. Goals:     Short term goals  Time Frame for Short term goals: by discharge  Short term goal 1: Pt will complete LB ADL with Maddi and adaptive techs to increase indep with self care. Short term goal 2: Pt will complete dynamic standing task with B hand release and SBA to increase balance required for bathing routine. Short term goal 3: Pt will complete functional mobility with min cues safety to/from BR with SBA to increase balance required for ADLs. Short term goal 4: Pt will complete toileting routine with SBA to increase indep with self care. Following session, patient left in safe position with all fall risk precautions in place.

## 2021-05-28 LAB
ALBUMIN SERPL-MCNC: 3.2 G/DL (ref 3.5–5.1)
ALP BLD-CCNC: 59 U/L (ref 38–126)
ALT SERPL-CCNC: 7 U/L (ref 11–66)
ANION GAP SERPL CALCULATED.3IONS-SCNC: 6 MEQ/L (ref 8–16)
AST SERPL-CCNC: 13 U/L (ref 5–40)
BILIRUB SERPL-MCNC: 0.3 MG/DL (ref 0.3–1.2)
BILIRUBIN DIRECT: < 0.2 MG/DL (ref 0–0.3)
BUN BLDV-MCNC: 17 MG/DL (ref 7–22)
CALCIUM SERPL-MCNC: 9.2 MG/DL (ref 8.5–10.5)
CHLORIDE BLD-SCNC: 106 MEQ/L (ref 98–111)
CO2: 24 MEQ/L (ref 23–33)
CREAT SERPL-MCNC: 1 MG/DL (ref 0.4–1.2)
EKG ATRIAL RATE: 50 BPM
EKG P AXIS: 69 DEGREES
EKG P-R INTERVAL: 176 MS
EKG Q-T INTERVAL: 476 MS
EKG QRS DURATION: 86 MS
EKG QTC CALCULATION (BAZETT): 433 MS
EKG R AXIS: 52 DEGREES
EKG T AXIS: 59 DEGREES
EKG VENTRICULAR RATE: 50 BPM
ERYTHROCYTE [DISTWIDTH] IN BLOOD BY AUTOMATED COUNT: 13.6 % (ref 11.5–14.5)
ERYTHROCYTE [DISTWIDTH] IN BLOOD BY AUTOMATED COUNT: 46.3 FL (ref 35–45)
GFR SERPL CREATININE-BSD FRML MDRD: 73 ML/MIN/1.73M2
GLUCOSE BLD-MCNC: 89 MG/DL (ref 70–108)
HCT VFR BLD CALC: 42.9 % (ref 42–52)
HEMOGLOBIN: 13.2 GM/DL (ref 14–18)
MCH RBC QN AUTO: 28.8 PG (ref 26–33)
MCHC RBC AUTO-ENTMCNC: 30.8 GM/DL (ref 32.2–35.5)
MCV RBC AUTO: 93.7 FL (ref 80–94)
PLATELET # BLD: 175 THOU/MM3 (ref 130–400)
PMV BLD AUTO: 9.9 FL (ref 9.4–12.4)
POTASSIUM REFLEX MAGNESIUM: 4 MEQ/L (ref 3.5–5.2)
PREALBUMIN: 22.9 MG/DL (ref 20–40)
RBC # BLD: 4.58 MILL/MM3 (ref 4.7–6.1)
SODIUM BLD-SCNC: 136 MEQ/L (ref 135–145)
TOTAL PROTEIN: 6.3 G/DL (ref 6.1–8)
TROPONIN T: < 0.01 NG/ML
WBC # BLD: 2.9 THOU/MM3 (ref 4.8–10.8)

## 2021-05-28 PROCEDURE — 92610 EVALUATE SWALLOWING FUNCTION: CPT

## 2021-05-28 PROCEDURE — 97162 PT EVAL MOD COMPLEX 30 MIN: CPT

## 2021-05-28 PROCEDURE — 84134 ASSAY OF PREALBUMIN: CPT

## 2021-05-28 PROCEDURE — 1180000000 HC REHAB R&B

## 2021-05-28 PROCEDURE — 2700000000 HC OXYGEN THERAPY PER DAY

## 2021-05-28 PROCEDURE — 6370000000 HC RX 637 (ALT 250 FOR IP): Performed by: INTERNAL MEDICINE

## 2021-05-28 PROCEDURE — 97530 THERAPEUTIC ACTIVITIES: CPT

## 2021-05-28 PROCEDURE — 97116 GAIT TRAINING THERAPY: CPT

## 2021-05-28 PROCEDURE — 92523 SPEECH SOUND LANG COMPREHEN: CPT

## 2021-05-28 PROCEDURE — 80048 BASIC METABOLIC PNL TOTAL CA: CPT

## 2021-05-28 PROCEDURE — 6370000000 HC RX 637 (ALT 250 FOR IP): Performed by: NURSE PRACTITIONER

## 2021-05-28 PROCEDURE — 99233 SBSQ HOSP IP/OBS HIGH 50: CPT | Performed by: NURSE PRACTITIONER

## 2021-05-28 PROCEDURE — 94660 CPAP INITIATION&MGMT: CPT

## 2021-05-28 PROCEDURE — 36415 COLL VENOUS BLD VENIPUNCTURE: CPT

## 2021-05-28 PROCEDURE — 84484 ASSAY OF TROPONIN QUANT: CPT

## 2021-05-28 PROCEDURE — 94760 N-INVAS EAR/PLS OXIMETRY 1: CPT

## 2021-05-28 PROCEDURE — 97535 SELF CARE MNGMENT TRAINING: CPT

## 2021-05-28 PROCEDURE — 97110 THERAPEUTIC EXERCISES: CPT

## 2021-05-28 PROCEDURE — 80076 HEPATIC FUNCTION PANEL: CPT

## 2021-05-28 PROCEDURE — 6370000000 HC RX 637 (ALT 250 FOR IP): Performed by: PHYSICAL MEDICINE & REHABILITATION

## 2021-05-28 PROCEDURE — 93005 ELECTROCARDIOGRAM TRACING: CPT | Performed by: PHYSICAL MEDICINE & REHABILITATION

## 2021-05-28 PROCEDURE — 85027 COMPLETE CBC AUTOMATED: CPT

## 2021-05-28 RX ORDER — PANTOPRAZOLE SODIUM 40 MG/1
40 TABLET, DELAYED RELEASE ORAL
Status: DISCONTINUED | OUTPATIENT
Start: 2021-05-28 | End: 2021-06-08 | Stop reason: HOSPADM

## 2021-05-28 RX ORDER — LANOLIN ALCOHOL/MO/W.PET/CERES
6 CREAM (GRAM) TOPICAL NIGHTLY PRN
Status: DISCONTINUED | OUTPATIENT
Start: 2021-05-28 | End: 2021-06-08 | Stop reason: HOSPADM

## 2021-05-28 RX ORDER — CALCIUM CARBONATE 200(500)MG
500 TABLET,CHEWABLE ORAL 3 TIMES DAILY PRN
Status: DISCONTINUED | OUTPATIENT
Start: 2021-05-28 | End: 2021-06-08 | Stop reason: HOSPADM

## 2021-05-28 RX ADMIN — GABAPENTIN 600 MG: 600 TABLET, FILM COATED ORAL at 20:01

## 2021-05-28 RX ADMIN — GABAPENTIN 600 MG: 600 TABLET, FILM COATED ORAL at 13:32

## 2021-05-28 RX ADMIN — ATORVASTATIN CALCIUM 20 MG: 20 TABLET, FILM COATED ORAL at 07:55

## 2021-05-28 RX ADMIN — GABAPENTIN 600 MG: 600 TABLET, FILM COATED ORAL at 07:55

## 2021-05-28 RX ADMIN — OXYCODONE HYDROCHLORIDE AND ACETAMINOPHEN 1 TABLET: 10; 325 TABLET ORAL at 16:45

## 2021-05-28 RX ADMIN — PANTOPRAZOLE SODIUM 40 MG: 40 TABLET, DELAYED RELEASE ORAL at 16:45

## 2021-05-28 RX ADMIN — MECLIZINE HCL 25 MG: 25 TABLET, CHEWABLE ORAL at 07:54

## 2021-05-28 RX ADMIN — LOSARTAN POTASSIUM 50 MG: 50 TABLET, FILM COATED ORAL at 07:54

## 2021-05-28 RX ADMIN — OXYCODONE HYDROCHLORIDE AND ACETAMINOPHEN 1 TABLET: 10; 325 TABLET ORAL at 22:58

## 2021-05-28 RX ADMIN — TAMSULOSIN HYDROCHLORIDE 0.4 MG: 0.4 CAPSULE ORAL at 07:54

## 2021-05-28 RX ADMIN — RIVAROXABAN 10 MG: 10 TABLET, FILM COATED ORAL at 07:54

## 2021-05-28 RX ADMIN — OXYCODONE HYDROCHLORIDE AND ACETAMINOPHEN 1 TABLET: 10; 325 TABLET ORAL at 13:32

## 2021-05-28 RX ADMIN — FAMOTIDINE 20 MG: 20 TABLET, FILM COATED ORAL at 07:55

## 2021-05-28 RX ADMIN — ACETAMINOPHEN 650 MG: 325 TABLET ORAL at 06:09

## 2021-05-28 RX ADMIN — OXYCODONE HYDROCHLORIDE AND ACETAMINOPHEN 1 TABLET: 10; 325 TABLET ORAL at 07:54

## 2021-05-28 ASSESSMENT — PAIN DESCRIPTION - ONSET: ONSET: ON-GOING

## 2021-05-28 ASSESSMENT — PAIN SCALES - GENERAL
PAINLEVEL_OUTOF10: 8
PAINLEVEL_OUTOF10: 6
PAINLEVEL_OUTOF10: 0
PAINLEVEL_OUTOF10: 8
PAINLEVEL_OUTOF10: 9
PAINLEVEL_OUTOF10: 5

## 2021-05-28 ASSESSMENT — PAIN DESCRIPTION - FREQUENCY
FREQUENCY: CONTINUOUS
FREQUENCY: INTERMITTENT

## 2021-05-28 ASSESSMENT — PAIN DESCRIPTION - PAIN TYPE
TYPE: ACUTE PAIN
TYPE: CHRONIC PAIN
TYPE: CHRONIC PAIN

## 2021-05-28 ASSESSMENT — PAIN DESCRIPTION - ORIENTATION
ORIENTATION: LEFT
ORIENTATION: LOWER

## 2021-05-28 ASSESSMENT — PAIN DESCRIPTION - LOCATION
LOCATION: BACK
LOCATION: CHEST

## 2021-05-28 ASSESSMENT — PAIN DESCRIPTION - DESCRIPTORS: DESCRIPTORS: PINS AND NEEDLES;SHARP

## 2021-05-28 NOTE — PROGRESS NOTES
Your estimated length of stay is 14 days   Your insurance Coverage has been verified as follows:    Primary Insurance:Medicare    Deductible: $[id number]  Coverage: Active  Secondary Insurance: ;secondary insurance policies often cover co-pay amounts, but to ensure payment please contact your insurance company.     Alternative Resources: Please ask the  for more information [phone]

## 2021-05-28 NOTE — PROGRESS NOTES
[id number] [address]  INPATIENT PHYSICAL THERAPY  EVALUATION  [facility]    Time In: 9414  Time Out: 1232  Timed Code Treatment Minutes: 43 Minutes  Minutes: 61          Date: 2021  Patient Name: Gabriel Fam,  Gender:  male        MRN: [id number]  : 1945  (76 y.o.)  Referral Date : 21   Referring Practitioner: STEFANO Gallo CNP  Diagnosis: Parkinson's disease  Additional Pertinent Hx: Pt is a 54-year-old male who was admitted to [facility] after presenting by EMS from local drugstore confused, had unwitnessed fall presented with generalized AMS. Prior history of similar presentations in the past.  PMH include antiphospholipid syndrome, DVT, sleep apnea, COPD, CKD, HTN, hypothyroidism and RCC. Patient unable to provide more history. History obtained from chart review and ED accounts. Pt transferred to  rehab . Restrictions/Precautions:  Restrictions/Precautions: General Precautions, Fall Risk    Subjective:  Chart Reviewed: Yes  Patient assessed for rehabilitation services?: Yes  Family / Caregiver Present: No  Subjective: Patient in room in chair, agreeable to PT. RN present, okay with PT session, noting troponin okay. Pt cooperative and pleasant    General:  Overall Orientation Status:  (delayed processing)    Vision: Impaired  Vision Exceptions: Wears glasses at all times    Hearing: Within functional limits         Pain: right hip, left side of body.   Pt notes a few falls in last month or 2    Vitals: Vitals not assessed per clinical judgement, see nursing flowsheet    Social/Functional History:    Lives With: Other (comment) (2 roomates in apartment in  [address], common areas shared)  Type of Home: Apartment  Home Layout: One level  Home Access: Level entry  Home Equipment: Cane, Lift chair     Bathroom Shower/Tub: Tub/Shower unit  Bathroom Toilet: Handicap height (standard toilet with frame overlay)  Bathroom Equipment: Shower chair, Grab bars in shower  Bathroom Accessibility: Klarissa Ordoñez accessible  IADL Comments: He reports he usually orders food to be delivered or has staff at [facility] make his food, but states he will make himself a sandwich for lunch. States that staff does his laundry 1x/week in the apartment across the ha because he cannot go downstairs to use the resident laundry. ADL Assistance: Independent  Homemaking Assistance: Independent  Homemaking Responsibilities: Yes  Ambulation Assistance: Independent  Transfer Assistance: Independent    Active : Yes     Additional Comments: has an aid 3x/wk to assist with bathing and dressing BLEs, as well as homemaking. Pt using cane PTA for mobility. Patient reports no family or support system nearby, but has a step-brother who lives in [place] that he willl go visit. OBJECTIVE:  Range of Motion:  Right Lower Extremity: decreased hamstring flexibility noted seated  Left Lower Extremity: decreased hamstring flexibility noted seated    Strength:  Right Lower Extremity: Impaired - hip flexion 3/5, knee 3+/5, ankle 3+/5  Left Lower Extremity: Impaired - hip flexion 3+/5, knee 3+/5, ankle 3+/5    Balance:  Static Sitting Balance:  Independent  Static Standing Balance: Contact Guard Assistance  Dynamic Standing Balance: Contact Guard Assistance, Minimal Assistance    Bed Mobility:  Rolling to Left: Moderate Assistance --assist at trunk  Rolling to Right: Contact Guard Assistance   Supine to Sit: Contact Guard Assistance  Sit to Supine: Minimal Assistance--assist with right LE    Transfers:  Sit to Stand: Contact Guard Assistance  Stand to [address]  *Pt completing transfers from multiple surfaces    Ambulation:  Minimal Assistance, Moderate Assistance  Distance: 4', 10'  Surface: Level Tile  Device:Cane  Gait Deviations:   Forward Flexed Posture, Slow Ananya, Decreased Step Length development of plan of care and goals. Treatment time included skilled education and facilitation of tasks to increase safety and independence with functional mobility for improved independence and quality of life. Gait training completed to improve patient's ability to navigate his living environment. Therapeutic activity completed to improve patient's ability to complete functional transfers and mobility. Assessment: Body structures, Functions, Activity limitations: Decreased functional mobility , Decreased endurance, Decreased strength, Decreased balance  Assessment: The evaluation of Mr Subramanian indicates a decline in functional mobility compared to baseline. Patient independent at [facility] with cane, requiring min/mod assist with use of cane short distance and CGA/min assist with RW. Patient limited by weakness bilateral LE's and pain right hip and left side. Patient would benefit from skilled PT services to improve his ability to complete functional mobility, reduce his risk for falls and allow patient to return to [facility]. Prognosis: Good   Co-morbidities: anxiety, depression, DM, CKD, chronic back pain  Pt requiring CGA to moderate assist at this time, has pain in lower extremities.     REQUIRES PT FOLLOW UP: Yes    Discharge Recommendations:  Discharge Recommendations: Continue to assess pending progress    Patient Education:  PT Education: PT Role, Goals, Plan of Care, Transfer Training, Gait Training    Equipment Recommendations:  Equipment Needed: Yes (RW, monitor need for W/C)    Plan:  Times per week: 5x/wk 90min, 1x/wk 30min  Times per day: Twice a day  Current Treatment Recommendations: Strengthening, Transfer Training, Endurance Training, Neuromuscular Re-education, Patient/Caregiver Education & Training, Balance Training, Gait Training, Home Exercise Program, Functional Mobility Training, Stair training, Safety Education & Training    Goals:  Patient goals : reduce pain, get stronger  Short term goals  Time Frame for Short term goals: 1 week  Short term goal 1: Patient will complete supine < > sit with SBA to transfer in/out of bed with decreased difficulty. Short term goal 2: Patient will complete sit < > stand with SBA with RW to stand to ambulate with increased independence. Short term goal 3: Patient will ambulate 48' with a RW and CGA, clearing right LE >/=50% of the time to increase safety with ambulation. Short term goal 4: Patient will complete rolling with SBA to reposition self with increased independence. Short term goal 5: Patient will propel wheelchair 150' with modified independence to increase independence and mobility in current living environment. Long term goals  Time Frame for Long term goals : 4 weeks  Long term goal 1: Patient will complete supine < > sit and rolling with modified independence to transfer in/out of bed independently. Long term goal 2: Patient will complete sit < > stand and stand pivot transfer with RW and modified independence to transfer surface to surface safely. Long term goal 3: Patient will ambulate 80' with a RW and modified independence to navigate home safely. Long term goal 4: Patient will improve tinetti to greater than or equal to 19/28 to reduce his risk for falls. Long term goal 5: Patient will complete car transfer with supervision to transfer in/out of vehicle safely. Following session, patient left in safe position with all fall risk precautions in place.

## 2021-05-28 NOTE — PROGRESS NOTES
[facility]  Recreational Therapy  Evaluation  Inpatient Rehabilitation Unit      Time Spent with Patient: 60 minutes    Date:  5/28/2021       Patient Name: Dustin Leal      MRN: [id number]       YOB: 1945 (76 y.o.)       Gender: male  Diagnosis: Parkinson's Disease  Referring Practitioner: STEFANO Aguero CNP (ordering provider)  Mitra Nugent MD (attending)    RESTRICTIONS/PRECAUTIONS:  Restrictions/Precautions: General Precautions, Fall Risk  Vision: Impaired  Hearing: Within functional limits    PAIN: 9- Left side of face    SUBJECTIVE:  Pt lives alone- he is close with his brother and step brother (Juan and Sheridan Torres)- pt also has a sister- pt has 3 kids, 1 has passed away, but he does not know his children and they don't know him    VISION:  Glasses- only for reading    HEARING: Within Normal Limit    LEISURE INTERESTS:   Pt states that he loves making jewelry boxes out of popsicle sticks- he stated that he used to run 5 miles every other day until he was 79years old- pt enjoys buying old cars and working on them- pt stated that he has a desire to get his motorcycle license- he enjoys reading [employer] books as well as [employer], [facility] [place], and [employer] books- he loves listening to music and typically listens to jazz, country, 62s and 76s music- he enjoys watching [id number], [employer], and [employer] TV shows- pt was a - pt identified his spiritual preference as Mormon- he enjoys traveling and has been to 50 states and 2 other countries- he enjoys playing card games such as [employer] and also loves completing sudoku puzzles- pt asked for medium and hard sudoku puzzles to be brought to his room as well as some [employer] books- pt asked RT for supplies to make jewelry boxes- pt had a bright affect and was very social- RT student will bring pt Sudoku puzzles and a [employer] book    BARRIERS TO LEISURE INTERESTS:    Decreased endurance, Long standing deficits, Lower extremity weakness and Pain    Patient Education  New Education Provided: Importance of Leisure, RT Plan of Care    Plan:  Continue to follow patient through this admission  See patient individually    Electronically signed by: AMERICA Neal  Date: 5/28/2021

## 2021-05-28 NOTE — PROGRESS NOTES
[address]  EVALUATION    Time:   Time In: 0830  Time Out: 1000  Timed Code Treatment Minutes: 90 Minutes  Minutes: 90    Date: 2021  Patient Name: Mayi Nolen,   Gender: male      MRN: [id number]  : 1945  (76 y.o.)  Referring Practitioner: STEFANO Phillip CNP (ordering provider)  Juan Goetz MD (attending)  Diagnosis: Parkinson's Disease  Additional Pertinent Hx: Mayi Nolen is a 76 y.o. male who presents with complaint of altered mental status. Patient was found at local drugstore confused, patient states that he fell. Patient is on Xarelto. Mechanism of patient's fall is unclear, patient is overall confused and erratic, cannot reliably contribute to HPI/ROS. Admitted to IP Rehab on . Restrictions/Precautions:  Restrictions/Precautions: General Precautions, Fall Risk    Subjective  Chart Reviewed: Yes, Orders, History and Physical  Patient assessed for rehabilitation services?: Yes  Family / Caregiver Present: No    Subjective: Patient eating breakfast in bed upon OT arrival, reporting he just received his food due to an error in order. Patient agreeable to therapy after eating.     Pain:  Pain Assessment  Patient Currently in Pain: Yes  Pain Level: 6    Vitals: Vitals not assessed per clinical judgement, see nursing flowsheet    Social/Functional History:  Lives With: Other (comment) (2 roomates in apartment in  [address], common areas shared)  Type of Home: Apartment  Home Layout: One level  Home Access: Level entry  Home Equipment: Cane, Lift chair   Bathroom Shower/Tub: Tub/Shower unit  Bathroom Toilet: Handicap height (standard toilet with frame overlay)  Bathroom Equipment: Shower chair, Grab bars in shower  Bathroom Accessibility: Walker accessible  IADL Comments: He reports he usually orders food to be delivered or has staff at [facility] make his food, but states he will Short term goal 3: Pt will navigate to/from bathroom using RW with SBA and min cues for safety increase safety in toileting task. Short term goal 4: Patient will complete simple homemaking task with SBA and min cues for safety to increase indep in home environment. Short term goal 5: Patient will tolerate dynamic standing task with 1-2 UE release and SBA to increase safety and indep in sinkside grooming tasks. Long term goals  Time Frame for Long term goals : 2-3 weeks  Long term goal 1: Patient will complete simple meal prep task with SBA using compensatory techniques prn to increase indep in making a sandwich at home. Long term goal 2: Patient will complete BADL routine with SBA and using compensatory techniques prn to increase indep in home environment. Following session, patient left in safe position with all fall risk precautions in place.

## 2021-05-28 NOTE — PLAN OF CARE
Problem: Pain:  Description: Pain management should include both nonpharmacologic and pharmacologic interventions. Goal: Pain level will decrease  5/28/2021 0629 by Jose Navarro RN  Outcome: Not Met This Shift   Patient c/o stabbing chest pain 9 out of 10 in his chest. Stat EKG ordered. Dr Roscoe Neal was contacted by this RN. Troponin now and q6HR for total of 3 times was ordered.

## 2021-05-28 NOTE — PROGRESS NOTES
[facility]  Speech  Language  Cognitive Evaluation   + Clinical Swallow Evaluation & d/c    SLP Individual Minutes  Time In: 1400  Time Out: 4472  Minutes: 48  Timed Code Treatment Minutes: 0 Minutes     Cog Eval: 28 minutes   CSE: 20 minutes     Date: 2021  Patient Name: Alistair Lockett      CSN: [id number]   : 1945  (76 y.o.)  Gender: male   Referring Physician: Zackary García MD  Diagnosis: Parkinson's Disease   Secondary Diagnosis: cognitive impairment, dysphagia  Precautions: fall risk, aspiration precautions   History of Present Illness/Injury: Per chart review; Tyler Pollack is a 77 y/o male admitted to the inpatient rehabilitation unit on 2021. He was originally admitted to [facility] on 2021 after presenting by EMS from [facility], confused, had unwitnessed fall, presented with generalized AMS. He arrived c/o headache and neck pain.  He takes Xarelto.  Prior history of similar presentations in the past.  PMH include antiphospholipid syndrome, DVT, sleep apnea, COPD, CKD, HTN, hypothyroidism and RCC.  Patient unable to provide more history.  History obtained from chart review and ED accounts  Past Medical History:   Diagnosis Date    Anxiety     Chronic back pain     Chronic kidney disease (CKD), stage II (mild)     COPD (chronic obstructive pulmonary disease) ([address]     Depression     Diabetes mellitus [address] [address]     DVT (deep venous thrombosis) ([facility] [address]     Hyperlipidemia     Hypertension     Hypothyroidism     Lumbago with sciatica     Polyneuropathy     Renal cell carcinoma ([address]        Pain: No pain reported. Subjective:  Patient seen upright in recliner chair for ST evaluations. Pt was pleasant, cooperative, and agreeable.  No family present     SOCIAL HISTORY:   Living Arrangements: independent in apartment   Work History: Retired    Education Level: Associates degree in business management   Driving Status: Pt doesn't have a car. Pt has a driving permit to return to driving since previous driving suspension   Finance Management: Independent - cash and money orders  Medication Management: Assistance required: [place] RN fills up a daily pill box, which has a timer alarming when pills are due  ADL's: Independent   Hobbies: Use to make jewelry boxes out of popsicle stick and coffee stirrers   Vision Status: glasses  Hearing: WFL in quiet setting   Type of Home: Apartment  Home Layout: One level  Home Access: Level entry  Home Equipment: Cane, Lift chair    SPEECH / VOICE:  Speech and Voice appear to be grossly intact for basic and complex daily communication    LANGUAGE:  Receptive:  Receptive language skills appear to be grossly intact for basic and complex daily communication. Expressive:  Expressive language skills appear to be grossly intact for basic and complex daily communication. COGNITION:  Yogesh Cognitive Assessment (MOCA) version 7.2 completed. Pt scored 26/30. Normal is greater than or equal to 26/30.2  Orientation: /6   Immediate Recall: Trial 1: 3/5 Trial: /5   Short-Term Recall: 3/5 indep, 1/5 min via category cue, 1/5 MAX A via FO3   Divergent Namin word/min   Reasoning: verbal reasonin/2   Thought Organization: disorganized at the conversation level   Insight: fair to good   Attention: impaired complex attention   Math Computation: 3/3 indep and quick on serial 7's   Executive Functionin/5 on MOCA     SWALLOWING:    Respiratory Status: Independent      Behavioral Observation: Alert    ORAL MECHANISM EVALUATION:      Facial / Labial WFL    Lingual WFL    Dentition Impaired Bottom dentures not present.     Velum WFL    Vocal Quality WFL    Sensation WFL    Cough WFL      PATIENT WAS EVALUATED USING:  Thin, puree, soft, and advanced solids     ORAL PHASE:  WFL    PHARYNGEAL PHASE:  WFL:  Pharyngeal phase appears WFL but cannot rule out pharyngeal phase deficits from a bedside swallowing evaluation alone. SIGNS AND SYMPTOMS OF LARYNGEAL PENETRATION / ASPIRATION:  No signs/symptoms of aspiration evident in this evaluation, but cannot rule out silent aspiration. INSTRUMENTAL EVALUATION: Instrumental evaluation not indicated at this time. DIET RECOMMENDATIONS:  Regular solids/thin liquids     STRATEGIES: Full Upright Position     Comprehension: 5 - Patient understands basic needs (hungry/hot/pain)  Expression: 5 - Expresses basic ideas/needs only (hungry/hot/pain)  Social Interaction: 6 - Patient requires medication for mood and/or effect  Problem Solvin - Patient able to solve simple/routine tasks  Memory: 3 - Patient remembers 50%-74% of the time    RECOMMENDATIONS/ASSESSMENT:  DIAGNOSTIC IMPRESSIONS:  Patient evaluated with informal assessment compounded with MOCA cognitive screener. On this date, the pt presented with VERY MILD Cognitive linguistic impairment as evidenced by aforementioned data. Deficits are in the domains of higher level complex problem solving, executive functioning, STM (immediate, delayed recall), working memory, and complex attention and organization. Although the pt scores reflect nearly functional, however, pt is required to be fully independent with management of medications, finances, eventually return to driving, and minimal support upon return home, therefore, would benefit from continued skilled ST to aid in successful return to the home setting. Re: to swallowing [facility] swallow. The pt presented with timely mastication, adequate bolus control/formation, AP movement demonstrated by no anterior loss and/or oral residue. In addition, pt completed BSE with no overt s/s of aspiration/penetration.  ST cannot rule out pharyngeal dysfunction at the bedside alone, BUT does NOT clinically feel further evaluation is warranted as the pt is safely consuming a general diet without s/s of distress. ST is recommended to sign-off, no further dysphagia treatment as of 5/28/21. Please re-consult if the pt's medical status changes. Rehabilitation Potential: good    EDUCATION:  Learner: Patient and Significant Other  Education:  Reviewed results and recommendations of this evaluation, Reviewed ST goals and Plan of Care and Reviewed recommendations for follow-up  Evaluation of Education: Verbalizes understanding    PLAN:  Skilled SLP intervention on IP Rehab or TCU 30 minutes per day 5 days per week. Specific interventions for next session may include: complex cognitive tasks     PATIENT GOAL:    Return to prior level of function. SHORT TERM GOALS:  Short-term Goals  Timeframe for Short-term Goals: 1 weeks  Goal 1: Patient will complete STM tasks with 3-4 units (immediate/delayed) and working memory with 70% accuracy given MIN A to improve retention of personal, newly, and previously learned information. Goal 2: Patient will complete higher level complex problem solving & executive functioning (meds/finances) with 70% accuracy given MIN A to improve independence with completion of ADLs/IADLs  Goal 3: Patient will complete moderately complex organizational naming and sequential analysis tasks with 80% accuracy given MIN A to improve thought processing, and processing speed, and lexical retrieval.  Goal 4: Patient will complete complex attention tasks (divided, alternating, sustained) with no more than 3 errors in a given task to improve attentiveness and reduce tangential and disorganized conversational discourse. Goal 5: . LONG TERM GOALS:  Long-term Goals  Timeframe for Long-term Goals: 4 weeks  Goal 1: Patient will improve cognition to a mod I to independent level of functioning to encourage safe d/c home (apartment) at [facility] managing finances, medications, and hopeful return to driving. Issac Spann MA., CCC-SLP

## 2021-05-28 NOTE — PLAN OF CARE
Problem: Pain:  Description: Pain management should include both nonpharmacologic and pharmacologic interventions. Goal: Pain level will decrease  Outcome: Ongoing   Pain controlled with scheduled oxycodone.

## 2021-05-28 NOTE — PROGRESS NOTES
Physical Medicine & Rehabilitation   Progress Note    Chief Complaint:  Parkinson's, new diagnosis. Rehab needs    Subjective:  Patient seen in his room, sitting up in chair. Patient complains of acid reflux and chest discomfort, troponin negative. Patient states he doesn't feel that the pepcid is helping, discussed protonix and tums PRN. Patient states he isn't sleeping well. States feels different environment probably isn't helping, discussed PRN melatonin, patient requested it to be scheduled so he doesn't have to ask for it. When asked how he is feeling about everything, patient hesitant to talk or discuss what is on his mind. Offered psychology consult for coping and depression, patient very interested and appreciative of this option.      Rehabilitation:  PT: await eval on IPR  OT: await eval on IPR  ST: await eval on IPR    Review of Systems:  CONSTITUTIONAL:  positive for  fatigue  EYES:  negative  HEENT:  negative  RESPIRATORY:  bipap use at night  CARDIOVASCULAR:  positive for  fatigue  GASTROINTESTINAL:  positive for reflux  GENITOURINARY:  retention  SKIN:  negative  HEMATOLOGIC/LYMPHATIC:  positive for swelling/edema  MUSCULOSKELETAL:  positive for  pain  NEUROLOGICAL:  positive for gait problems, weakness, numbness and pain and sciatica   BEHAVIOR/PSYCH:  positive for depressed mood  System review otherwise negative      Objective:  BP (!) 149/76   Pulse 57   Temp 97.6 °F (36.4 °C) (Oral)   Resp 16   Ht 5' 10\" (1.778 m)   Wt 239 lb 6.4 oz (108.6 kg)   SpO2 94%   BMI 34.35 kg/m²   awake  Orientation:   person, place, time  Mood: depressed  Affect: calm  General appearance: Patient is well nourished, well developed, well groomed and in no acute distress    Memory:   normal,   Attention/Concentration: normal  Language:  normal    Cranial Nerves:  cranial nerves II-XII are grossly intact  ROM:  normal  Motor Exam:  Motor exam is symmetrical 5 out of 5 upper extremities bilaterally  Motor exam is symmetrical 3+ out of 5 lower extremities bilaterally  Tone:  increased  Muscle bulk: within normal limits  Sensory:  Decreased     Heart: normal rate, regular rhythm, normal S1, S2, no murmurs, rubs, clicks or gallops  Lungs: clear to auscultation without wheezes or rales  Abdomen: soft, non-tender, non-distended, normal bowel sounds, no masses or organomegaly    Skin: warm and dry, no rash or erythema  Peripheral vascular: Pulses: Normal upper and lower extremity pulses; Edema: trace      Diagnostics:   Recent Results (from the past 24 hour(s))   Basic Metabolic Panel w/ Reflex to MG    Collection Time: 05/28/21  5:50 AM   Result Value Ref Range    Sodium 136 135 - 145 meq/L    Potassium reflex Magnesium 4.0 3.5 - 5.2 meq/L    Chloride 106 98 - 111 meq/L    CO2 24 23 - 33 meq/L    Glucose 89 70 - 108 mg/dL    BUN 17 7 - 22 mg/dL    CREATININE 1.0 0.4 - 1.2 mg/dL    Calcium 9.2 8.5 - 10.5 mg/dL   CBC    Collection Time: 05/28/21  5:50 AM   Result Value Ref Range    WBC 2.9 (L) 4.8 - 10.8 thou/mm3    RBC 4.58 (L) 4.70 - 6.10 mill/mm3    Hemoglobin 13.2 (L) 14.0 - 18.0 gm/dl    Hematocrit 42.9 42.0 - 52.0 %    MCV 93.7 80.0 - 94.0 fL    MCH 28.8 26.0 - 33.0 pg    MCHC 30.8 (L) 32.2 - 35.5 gm/dl    RDW-CV 13.6 11.5 - 14.5 %    RDW-SD 46.3 (H) 35.0 - 45.0 fL    Platelets 873 921 - 440 thou/mm3    MPV 9.9 9.4 - 12.4 fL   Hepatic function panel    Collection Time: 05/28/21  5:50 AM   Result Value Ref Range    Albumin 3.2 (L) 3.5 - 5.1 g/dL    Total Bilirubin 0.3 0.3 - 1.2 mg/dL    Bilirubin, Direct <0.2 0.0 - 0.3 mg/dL    Alkaline Phosphatase 59 38 - 126 U/L    AST 13 5 - 40 U/L    ALT 7 (L) 11 - 66 U/L    Total Protein 6.3 6.1 - 8.0 g/dL   Prealbumin    Collection Time: 05/28/21  5:50 AM   Result Value Ref Range    Prealbumin 22.9 20.0 - 40.0 mg/dl   Troponin    Collection Time: 05/28/21  5:50 AM   Result Value Ref Range    Troponin T < 0.010 ng/ml   Anion Gap    Collection Time: 05/28/21  5:50 AM   Result Value Ref Range    Anion Gap 6.0 (L) 8.0 - 16.0 meq/L   Glomerular Filtration Rate, Estimated    Collection Time: 05/28/21  5:50 AM   Result Value Ref Range    Est, Glojulio Filt Rate 73 (A) ml/min/1.73m2   EKG 12 Lead    Collection Time: 05/28/21  6:15 AM   Result Value Ref Range    Ventricular Rate 50 BPM    Atrial Rate 50 BPM    P-R Interval 176 ms    QRS Duration 86 ms    Q-T Interval 476 ms    QTc Calculation (Bazett) 433 ms    P Axis 69 degrees    R Axis 52 degrees    T Axis 59 degrees       Impression:  · Parkinson's disease with bradykinesia, increased tone at left wrist, hushed voice, frequent falls with multiple admissions   · Encephalopathy/AMS likely secondary to illicit drug use  · Antiphospholipid syndrome  · Central sleep apnea on BiPAP  · Troponin leak likely related to chronic kidney disease  · Acute kidney injury   · Gastroesophageal reflux disease  · Anemia and leukopenia  · COPD  · Obesity  · Neuropathy   · Acid reflux     Plan:  Continue current therapies  Prophylaxis: DVT - xarelto, GI - stop pepcid, start protonix daily, add tums PRN  Pain: tylenol, percocet, neurontin  Bowels: dulcolax, miralax, senna  · Bladder: flomax  · Plan trial sinemet next week once therapy evals complete to monitor benefit   · Psychology consult for coping and depression  · Melatonin 6mg nightly (scheduled per patient request)  · Troponin x3, WNL thus far  · Team conference Tuesday  · IPOC to be completed today    Missed Therapy Time:  · None    Avril Cheung, APRN - CNP

## 2021-05-28 NOTE — PLAN OF CARE
Problem: Nutrition  Goal: Optimal nutrition therapy  Outcome: Ongoing   Nutrition Problem #1: No nutrition diagnosis at this time  Intervention: Food and/or Nutrient Delivery: Continue Current Diet  Nutritional Goals: Pt will safely consume 75% or more of meals during LOS

## 2021-05-28 NOTE — PLAN OF CARE
Individualized Plan of Care  [address] [address] Inpatient Rehabilitation Unit    Rehabilitation physician: Dr. Gerri Jacob Date: 5/27/2021     Rehabilitation Diagnosis: Parkinson's disease ([facility] [address] [G20]      Rehabilitation impairments: self care, mobility, motor dysfunction, bowel/bladder management, pain management, safety and cognitive function    Factors facilitating achievement of predicted outcomes:  Motivated, Cooperative, Pleasant and Good insight into deficits  Barriers to the achievement of predicted outcomes: Pain, Limited safety awareness, Depression, Decreased endurance, Upper extremity weakness, Lower extremity weakness, Long standing deficits, Medical complications, Stairs at home, Skin Care and Medication managment    Patient Goals: Improve independence with mobility, Increase overall strength and endurance, Increase balance, Increase endurance, Increase independence with activities of daily living, Improve cognition, Increase self-awareness, Increase safety awareness, Integrate appropriate pain management plan, Assure adequate nutritional option for discharge, Continence of bowel and bladder and Provide appropriate patient and family education      NURSING:  Nursing goals for [facility] while on the rehabilitation unit will include:  Continence of bowel and bladder, Adequate number of bowel movements, Urinate with no urinary retention >300ml in bladder, Maintain O2 SATs at an acceptable level during stay, Effective pain management while on the rehabilitation unit, Establish adequate pain control plan for discharge, Absence of skin breakdown while on the rehabilitation unit, Avoidance of any hospital acquired infections, Freedom from injury during hospitalization and Complete education with patient/family with understanding demonstrated regarding disease process and resultant impairment     In order to achieve these goals, nursing interventions may include bowel/bladder training, education for medical assistive devices, medication education, O2 saturation management, energy conservation, stress management techniques, fall prevention, alarms protocol, seating and positioning, skin/wound care, pressure relief instruction, dressing changes, infection protection, DVT prophylaxis, assistance with safe transfers , and/or assistance with bathroom activities and hygiene. PHYSICAL THERAPY:  Goals:        Short term goals  Time Frame for Short term goals: 1 week  Short term goal 1: Patient will complete supine < > sit with SBA to transfer in/out of bed with decreased difficulty. Short term goal 2: Patient will complete sit < > stand with SBA with RW to stand to ambulate with increased independence. Short term goal 3: Patient will ambulate 48' with a RW and CGA, clearing right LE >/=50% of the time to increase safety with ambulation. Short term goal 4: Patient will complete rolling with SBA to reposition self with increased independence. Short term goal 5: Patient will propel wheelchair 150' with modified independence to increase independence and mobility in current living environment. Long term goals  Time Frame for Long term goals : 4 weeks  Long term goal 1: Patient will complete supine < > sit and rolling with modified independence to transfer in/out of bed independently. Long term goal 2: Patient will complete sit < > stand and stand pivot transfer with RW and modified independence to transfer surface to surface safely. Long term goal 3: Patient will ambulate 80' with a RW and modified independence to navigate home safely. Long term goal 4: Patient will improve tinetti to greater than or equal to 19/28 to reduce his risk for falls. Long term goal 5: Patient will complete car transfer with supervision to transfer in/out of vehicle safely.     Plan of Care: Patient to be seen by physical therapy services 90 minutes per day Monday through Friday and 30 minutes on Saturday or Sunday information. Goal 2: Patient will complete higher level complex problem solving & executive functioning (meds/finances) with 70% accuracy given MIN A to improve independence with completion of ADLs/IADLs  Goal 3: Patient will complete moderately complex organizational naming and sequential analysis tasks with 80% accuracy given MIN A to improve thought processing, and processing speed, and lexical retrieval.  Goal 4: Patient will complete complex attention tasks (divided, alternating, sustained) with no more than 3 errors in a given task to improve attentiveness and reduce tangential and disorganized conversational discourse. Goal 5: .    Plan of Care: Pt to be seen by speech therapy services 30-60 minutes per day Monday through Friday . Anticipated interventions may include speech/language/communication therapy, cognitive training, group therapy, education, and/or dysphagia therapy based on the above goals. CASE MANAGEMENT:  Goals:   Assist patient/family with discharge planning, patient/family counseling,  and coordination with insurance during the inpatient rehabilitation stay. Other members of the multidisciplinary rehabilitation team that will be involved in the patient's plan of care include recreational therapy, dietary, respiratory therapy, and neuropsychology.     Medical issues being managed closely and that require 24 hour availability of a physician:  Bowel/Bladder function, Pain management, Infection protection, DVT prophylaxis, Fall precautions, Fluid/Electrolyte balance, Nutritional status, Respiratory needs and History of heart disease                                           Physician anticipated functional outcomes: Improved independence with functional measures   Estimated length of stay for this admission 2 weeks  Medical Prognosis: Good  Anticipated disposition: Home. The potential to achieve the above medical and rehabilitative goals is good. This plan of care has been developed with the assistance and input of the multidisciplinary rehabilitation team.  The plan was reviewed with the patient. The patient has had the opportunity to provide input to the therapy team.    I have reviewed this Individualized Plan of Care and agree with its contents. Above documentation has been expanded, modified, adjusted to reflect the findings of my evaluations and goals for the patient. Physician:    Tiffany Hanson M.D.

## 2021-05-28 NOTE — PROGRESS NOTES
[address] [facility] NOTE    Conference Date: 2021  Admit Date:  2021  3:05 PM  Patient Name: Russ Nguyen    MRN: [id number]    : 1945  (76 y.o.)  Rehabilitation Admitting Diagnosis:  Park[facility]'s disease ([facility] Shania Blanca  Referring Practitioner: STEFANO Waggoner CNP      CASE MANAGEMENT  Current issues/needs regarding patient and family discharge status: SW met with patient to introduce self and explain role, complete SW assessment, and initiate discharge planning. Prior to hospitalization, patient was independent with ADL's and personal care. Patient lives in a penitentiary house in [facility] after being released from custodial in 2020. Patient indicates that he originally from [facility], but no availability for housing in that area after release from custodial. Patient reports having home oxygen and using \"whenever needed\". Patient reports Ro Batters recently \"took back\" his cpap machine because he was not wearing it \"enough\". Patient reports having sleep study through [facility] within the last year. Patient's PCP is in [facility], Dr. Mary Brower. Patient with minimal support, as closest relative is tep-brother, Lorin Blackmon, that lives in [place]. Patient currently active with [facility] for nursing and HHA. Patient reports nursing fills pill box weekly. Patient has an automatic/alarm pill box. HHA assists with bathing, vacuuming, etc. HHA visits every Monday, Wednesday, Friday. Patient was previously ambulating with a cane. Patient reports ability to drive and manage finances. Anticipate patient would benefit from continued home health care services at discharge. SW reviewed with patient team conference on Tuesday, 2021, to further discuss progress and discharge recommendations. SW to follow and maintain involvement in discharge planning. PHYSICAL THERAPY   Pt showing improvement, meeting 3 short term goals.   Decreased strength noted on Rt participates as able. Pt states that he loves making jewelry boxes out of popsicle sticks- he stated that he used to run 5 miles every other day until he was 79years old- pt enjoys buying old cars and working on them- pt stated that he has a desire to get his motorcycle license- he enjoys reading [employer] books as well as mystery, western, and war books- he loves listening to music and typically listens to jazz, country, 62s and 76s music- he enjoys watching [employer], [employer], and [employer] TV shows- pt was a - pt identified his spiritual preference as Syn[employer]- he enjoys traveling and has been to 50 states and 2 other countries- he enjoys playing card games such as [employer] Mico and also loves completing sudoku puzzles- pt asked for medium and hard sudoku puzzles to be brought to his room as well as some [employer] NaZ-good books- pt asked RT for supplies to make jewelry boxes- pt had a bright affect and was very social- RT student will bring pt Sudoku puzzles and a [employer] Naegeli book     NUTRITION  Weight: 239 lb 6.4 oz (108.6 kg) / Body mass index is 34.35 kg/m². Current diet: DIET GENERAL;  Please see nutrition note for details. NURSING  Continent of Bowel: Yes. Frequency: Daily . Management: PRN Bowel Medications . Continent of Bladder: Yes. Frequency: Q2hrs . Management: Flomax . Pain is Managed:  Yes. Management: Percocet  . Frequency of Intervention: Q6hrs scheduled. .  Adequately Controlled: Yes  Sleep: Inadequate Melatonin   Signs and Symptoms of Infection:  No.   Signs and Symptoms of Skin Breakdown:  No.   Injury and Fall Free during Inpatient Rehabilitation Admission: No  Anticoagulants: Xarelto   Diabetic: No  Consultations/Labs/X-rays: None     No results for input(s): POCGLU in the last 72 hours.     Lab Results   Component Value Date    LDLCHOLESTEROL 47 04/14/2020         Vitals:    05/28/21 2006 05/29/21 0924 05/29/21 2109 05/30/21 0934   BP: (!) 155/72 134/76 133/77 (!) 143/61   Pulse: 62 60 61 63   Resp: 18 18 18 18   Temp: 97.6 °F (36.4 °C) 97.9 °F (36.6 °C) 98.4 °F (36.9 °C) 98.1 °F (36.7 °C)   TempSrc: Oral Oral Oral Oral   SpO2: 93% 95% 94% 95%   Weight:       Height:              Family Education: No family available for education  Fall Risk:  Falling star program initiated  Is the patient appropriate for a stay in the functional apartment? no    Discharge Plan   Estimated Discharge Date: Tuesday, 6/8/2021   Destination: discharge home with supervision  Services at Discharge: [address], Occupational Therapy, Speech Therapy, Nursing and HHA 3x week  Is patient appropriate for an outpatient driving evaluation? yes, if he plans to return to driving  Equipment at Discharge: RW,   Factors facilitating achievement of predicted outcomes: Motivated, Cooperative and Pleasant  Barriers to the achievement of predicted outcomes: No family support, Limited safety awareness, Limited insight into deficits, Decreased endurance and Lower extremity weakness    Team Members Present at Conference:  [facility] Radha Jarrell OTR/L [address], 5 [facility]  [address]   Nurse:Kelly  Psychologist: Le King, PhD.    I approve the established interdisciplinary plan of care as documented within the medical record of [facility].     Evelyn Salazar M.D.

## 2021-05-28 NOTE — FLOWSHEET NOTE
05/28/21 9482   Provider Notification   Reason for Communication Evaluate   Provider Name Dr Mona Mason   Provider Notification Physician   Method of Communication Call   Response See orders   Notification Time 0631   Shift Event Other (comment)  (chest pain)

## 2021-05-28 NOTE — DISCHARGE INSTR - ACTIVITY

## 2021-05-28 NOTE — PROGRESS NOTES
[facility] PHYSICAL THERAPY  DAILY NOTE  [facility]  Time In: 1330  Time Out: 1400  Timed Code Treatment Minutes: 30 Minutes  Minutes: 30          Date: 2021  Patient Name: Alistair Lockett,  Gender:  male        MRN: [id number]  : 1945  (76 y.o.)  Referral Date : 21  Referring Practitioner: Trisha Herrera, APRN - CNP  Diagnosis: Parkinson's disease  Additional Pertinent Hx: Pt is a 41-year-old male who was admitted to [facility] after presenting by EMS from local drugstore confused, had unwitnessed fall presented with generalized AMS. Prior history of similar presentations in the past.  PMH include antiphospholipid syndrome, DVT, sleep apnea, COPD, CKD, HTN, hypothyroidism and RCC. Patient unable to provide more history. History obtained from chart review and ED accounts. Pt transferred to  rehab . Prior Level of Function:  Lives With: Other (comment) (2 roomates in apartment in  [address], common areas shared)  Type of Home: Apartment  Home Layout: One level  Home Access: Level entry  Home Equipment: Cane, Lift chair   Bathroom Shower/Tub: Tub/Shower unit  Bathroom Toilet: Handicap height (standard toilet with frame overlay)  Bathroom Equipment: Shower chair, Grab bars in shower  Bathroom Accessibility: Walker accessible    ADL Assistance: Independent  Homemaking Assistance: Independent  Homemaking Responsibilities: Yes  Ambulation Assistance: Independent  Transfer Assistance: Independent  Active : Yes  IADL Comments: He reports he usually orders food to be delivered or has staff at [facility] make his food, but states he will make himself a sandwich for lunch. States that staff does his laundry 1x/week in the apartment across the ha because he cannot go downstairs to use the resident laundry. Additional Comments: has an aid 3x/wk to assist with bathing and dressing BLEs, as well as homemaking.   Pt using cane PTA for mobility. Patient reports no family or support system nearby, but has a step-brother who lives in [place] that he willl go visit. Restrictions/Precautions:  Restrictions/Precautions: General Precautions, Fall Risk     SUBJECTIVE: Patient in room in chair, agreeable to PT. Pt feeling tired. Pt cooperative and pleasant. PAIN: bilateral LE's and low back, not rated    Vitals: Vitals not assessed per clinical judgement, see nursing flowsheet    OBJECTIVE:  Bed Mobility:  Supine to Sit: Contact Guard Assistance  Sit to Supine: Minimal Assistance, assist with right LE     Transfers:  Sit to Stand: Contact Guard Assistance  Stand to Sit:Contact Guard Assistance    Ambulation:  Minimal Assistance  Distance: 5', 22'  Surface: Level Tile  Device:Rolling Walker  Gait Deviations: Forward Flexed Posture, Slow Ananya, Decreased Step Length on Right, Decreased Gait Speed, Decreased Heel Strike Bilaterally, Mild Path Deviations and Unsteady Gait  *Verbal cues for right step height. Pt with increased unsteadiness however slight improvement with step height following stretches    Balance:  Static Sitting Balance:  Independent  Static Standing Balance: Contact Guard Assistance  Dynamic Standing Balance:  Minimal Assistance    Exercise:  Patient was guided in 1 set(s) 10 reps of exercise to both lower extremities. Supine: passive single knee to chest 30\"x1, passive modified piriformis stretch 30\"x2, piriformis stretch figure 4 30\" x1 right only, straight leg raise hamstring stretch passive 30\"x2. Supine: heel slides, ankle pumps, glut sets 5\" hold, hip abduction. Exercises were completed for increased independence with functional mobility. Functional Outcome Measures: Not completed    ASSESSMENT:  Assessment: Patient progressing toward established goals. Activity Tolerance:  Patient tolerance of  treatment: good.      Equipment Recommendations:Equipment Needed: Yes (RW, monitor need for W/C)  Discharge transfer with supervision to transfer in/out of vehicle safely. Following session, patient left in safe position with all fall risk precautions in place.

## 2021-05-28 NOTE — CONSULTS
Comprehensive Nutrition Assessment    Type and Reason for Visit:  Initial, Consult (Oral Nutrition Supplements)    Nutrition Recommendations/Plan:   *Consider a Multivitamin w/minerals daily. *Continue current diet. Nutrition Assessment: Pt. adequately nourished on admit AEB pt report of good appetite consuming % of meals. At risk for further nutrition compromise r/t admit d/t AMS- improved, dysphagia (SLP following) and underlying medical condition (hx Parkinson's Diease, HTN, anxiety, depression, COPD, renal cancer, HLD, DM, CKD, partial bowel obstruction). Will continue to monitor for changes in nutritional status. Malnutrition Assessment:  Malnutrition Status:  No malnutrition    Context:  Chronic Illness     Findings of the 6 clinical characteristics of malnutrition:  Energy Intake:  No significant decrease in energy intake  Weight Loss:  Unable to assess (no weight hx per EMR)     Body Fat Loss:  No significant body fat loss     Muscle Mass Loss:  No significant muscle mass loss    Fluid Accumulation:  No significant fluid accumulation Extremities   Strength:  Not Performed    Estimated Daily Nutrient Needs:  Energy (kcal):  6633-3620 kcal/day (15-18 kcal/kg); Weight Used for Energy Requirements:   (108.6 kg)     Protein (g):  75-90 grams (1-1.2 grams/kg/day); Weight Used for Protein Requirements:   (IBW 75 kg)        Nutrition Related Findings: Pt admitted with encephalopathy, ? illicit drug use, hypoglyemia. Pt seen- he reports good appetite consuming all of his meals; pt is upset about getting food with pork since he is Taoism; added \"no pork\" into CBORD so pt does not receive any food items with pork. Pt declines need for ONS. Pt reports weight loss of 20-30 lbs over the year and is happy with the weight loss and would like to keep his weight down; pt denies N/V; last BM x1 on 5/26.  Reports history of hypoglycemia, stressed recommenation of eating small frequent meals to assist and prevent low blood sugars. SLP following. Rx includes: Lipitor. Wounds:  None       Current Nutrition Therapies:    DIET DYSPHAGIA SOFT AND BITE-SIZED; Anthropometric Measures:  · Height: 5' 10\" (177.8 cm)  · Current Body Weight: 239 lb 6.4 oz (108.6 kg) (5/27; no edema noted)   · Admission Body Weight: 239 lb 6.4 oz (108.6 kg) (5/27; no edema noted)    · Usual Body Weight:  (Per pt~ 246#. Per EMR: 239 lb 1.6 oz on 5/24/21)     · Ideal Body Weight: 166 lbs  · BMI: 34.4  · BMI Categories: Obese Class 1 (BMI 30.0-34. 9)       Nutrition Diagnosis:   No nutrition diagnosis at this time     Nutrition Interventions:   Food and/or Nutrient Delivery:  Continue Current Diet  Nutrition Education/Counseling:  Education completed (Soft and bite sized, carb controlled weight loss plan provided, 5 day menu idea 5/25/21.  Encouraged oral intake, small frequent meals to prevent hypoglycemia.)   Coordination of Nutrition Care:  Continue to monitor while inpatient, Speech Therapy    Goals:  Pt will safely consume 75% or more of meals during LOS       Nutrition Monitoring and Evaluation:   Behavioral-Environmental Outcomes:  None Identified   Food/Nutrient Intake Outcomes:  Diet Advancement/Tolerance, Food and Nutrient Intake, Supplement Intake, Vitamin/Mineral Intake  Physical Signs/Symptoms Outcomes:  Biochemical Data, Chewing or Swallowing, GI Status, Weight, Skin, Nutrition Focused Physical Findings     Discharge Planning:    Continue current diet     Electronically signed by Jonathan De La Rosa RD, LD on 5/28/21 at 12:45 PM EDT    Contact: [phone]

## 2021-05-29 PROCEDURE — 97116 GAIT TRAINING THERAPY: CPT

## 2021-05-29 PROCEDURE — 97140 MANUAL THERAPY 1/> REGIONS: CPT

## 2021-05-29 PROCEDURE — 6370000000 HC RX 637 (ALT 250 FOR IP): Performed by: INTERNAL MEDICINE

## 2021-05-29 PROCEDURE — 97130 THER IVNTJ EA ADDL 15 MIN: CPT

## 2021-05-29 PROCEDURE — 97129 THER IVNTJ 1ST 15 MIN: CPT

## 2021-05-29 PROCEDURE — 6370000000 HC RX 637 (ALT 250 FOR IP): Performed by: NURSE PRACTITIONER

## 2021-05-29 PROCEDURE — 97110 THERAPEUTIC EXERCISES: CPT

## 2021-05-29 PROCEDURE — 1180000000 HC REHAB R&B

## 2021-05-29 PROCEDURE — 97535 SELF CARE MNGMENT TRAINING: CPT

## 2021-05-29 PROCEDURE — 97530 THERAPEUTIC ACTIVITIES: CPT

## 2021-05-29 RX ADMIN — ACETAMINOPHEN 650 MG: 325 TABLET ORAL at 05:59

## 2021-05-29 RX ADMIN — OXYCODONE HYDROCHLORIDE AND ACETAMINOPHEN 1 TABLET: 10; 325 TABLET ORAL at 21:13

## 2021-05-29 RX ADMIN — OXYCODONE HYDROCHLORIDE AND ACETAMINOPHEN 1 TABLET: 10; 325 TABLET ORAL at 09:24

## 2021-05-29 RX ADMIN — LOSARTAN POTASSIUM 50 MG: 50 TABLET, FILM COATED ORAL at 09:24

## 2021-05-29 RX ADMIN — MECLIZINE HCL 25 MG: 25 TABLET, CHEWABLE ORAL at 09:24

## 2021-05-29 RX ADMIN — RIVAROXABAN 10 MG: 10 TABLET, FILM COATED ORAL at 09:24

## 2021-05-29 RX ADMIN — TAMSULOSIN HYDROCHLORIDE 0.4 MG: 0.4 CAPSULE ORAL at 09:24

## 2021-05-29 RX ADMIN — GABAPENTIN 600 MG: 600 TABLET, FILM COATED ORAL at 21:13

## 2021-05-29 RX ADMIN — OXYCODONE HYDROCHLORIDE AND ACETAMINOPHEN 1 TABLET: 10; 325 TABLET ORAL at 14:32

## 2021-05-29 RX ADMIN — GABAPENTIN 600 MG: 600 TABLET, FILM COATED ORAL at 14:32

## 2021-05-29 RX ADMIN — GABAPENTIN 600 MG: 600 TABLET, FILM COATED ORAL at 09:24

## 2021-05-29 RX ADMIN — PANTOPRAZOLE SODIUM 40 MG: 40 TABLET, DELAYED RELEASE ORAL at 05:59

## 2021-05-29 RX ADMIN — OXYCODONE HYDROCHLORIDE AND ACETAMINOPHEN 1 TABLET: 10; 325 TABLET ORAL at 17:25

## 2021-05-29 RX ADMIN — PANTOPRAZOLE SODIUM 40 MG: 40 TABLET, DELAYED RELEASE ORAL at 05:56

## 2021-05-29 RX ADMIN — Medication 6 MG: at 21:13

## 2021-05-29 RX ADMIN — ATORVASTATIN CALCIUM 20 MG: 20 TABLET, FILM COATED ORAL at 09:24

## 2021-05-29 ASSESSMENT — PAIN SCALES - GENERAL
PAINLEVEL_OUTOF10: 8
PAINLEVEL_OUTOF10: 8
PAINLEVEL_OUTOF10: 10
PAINLEVEL_OUTOF10: 8
PAINLEVEL_OUTOF10: 8
PAINLEVEL_OUTOF10: 0

## 2021-05-29 ASSESSMENT — PAIN - FUNCTIONAL ASSESSMENT: PAIN_FUNCTIONAL_ASSESSMENT: PREVENTS OR INTERFERES SOME ACTIVE ACTIVITIES AND ADLS

## 2021-05-29 ASSESSMENT — PAIN DESCRIPTION - LOCATION
LOCATION: BACK
LOCATION: BACK

## 2021-05-29 ASSESSMENT — PAIN DESCRIPTION - PROGRESSION: CLINICAL_PROGRESSION: NOT CHANGED

## 2021-05-29 ASSESSMENT — PAIN DESCRIPTION - DESCRIPTORS: DESCRIPTORS: SHARP

## 2021-05-29 ASSESSMENT — PAIN DESCRIPTION - ORIENTATION
ORIENTATION: LOWER
ORIENTATION: LOWER

## 2021-05-29 ASSESSMENT — PAIN DESCRIPTION - PAIN TYPE
TYPE: CHRONIC PAIN
TYPE: CHRONIC PAIN

## 2021-05-29 ASSESSMENT — PAIN DESCRIPTION - FREQUENCY: FREQUENCY: CONTINUOUS

## 2021-05-29 ASSESSMENT — PAIN DESCRIPTION - ONSET: ONSET: ON-GOING

## 2021-05-29 NOTE — PROGRESS NOTES
complex organizational naming and sequential analysis tasks with 80% accuracy given MIN A to improve thought processing, and processing speed, and lexical retrieval.  INTERVENTIONS: DNT d/t focus on other goals     SHORT TERM GOAL #4:  Goal 4: Patient will complete complex attention tasks (divided, alternating, sustained) with no more than 3 errors in a given task to improve attentiveness and reduce tangential and disorganized conversational discourse. INTERVENTIONS: DNT d/t focus on other goals     Long-Term Goals:  Timeframe for Long-term Goals: 4 weeks    LONG TERM GOAL #1:  Goal 1: Patient will improve cognition to a mod I to independent level of functioning to encourage safe d/c home (apartment) at [facility] managing finances, medications, and hopeful return to driving. Comprehension: 6 - Complex ideas 90% or device (hearing aid or glasses- if patient is primarily a visual learner)  Expression: 6 - Device used to express complex ideas/needs  Social Interaction: 6 - Patient requires medication for mood and/or effect  Problem Solvin - Patient able to solve simple/routine tasks  Memory: 4 - Patient remembers 75-90%+ of the time    EDUCATION:  Learner: Patient  Education:  Reviewed results and recommendations of this evaluation, Reviewed ST goals and Plan of Care and Reviewed recommendations for follow-up  Evaluation of Education: Verbalizes understanding and Family not present    ASSESSMENT/PLAN:  Activity Tolerance:  Patient tolerance of  treatment: good. Assessment/Plan: Patient progressing toward established goals. Continues to require skilled care of licensed speech pathologist to progress toward achievement of established goals and plan of care. .     Plan for Next Session: high level executive functioning, attention and memory      Connie Payne M.S. CCC-SLP [id number] 2021

## 2021-05-29 NOTE — PROGRESS NOTES
[address] [facility] PHYSICAL THERAPY  DAILY NOTE  [facility]    Time In: 1330  Time Out: 1400  Timed Code Treatment Minutes: 30 Minutes  Minutes: 30          Date: 2021  Patient Name: Jose Amato,  Gender:  male        MRN: [id number]  : 1945  (76 y.o.)  Referral Date : 21  Referring Practitioner: Minetta Holstein, APRN - CNP  Diagnosis: Parkinson's disease  Additional Pertinent Hx: Pt is a 79-year-old male who was admitted to [facility] after presenting by EMS from local drugstore confused, had unwitnessed fall presented with generalized AMS. Prior history of similar presentations in the past.  PMH include antiphospholipid syndrome, DVT, sleep apnea, COPD, CKD, HTN, hypothyroidism and RCC. Patient unable to provide more history. History obtained from chart review and ED accounts. Pt transferred to  rehab . Prior Level of Function:  Lives With: Other (comment) (2 roomates in [address] in [address], common areas shared)  Type of Home: Apartment  Home Layout: One level  Home Access: Level entry  Home Equipment: Cane, Lift chair   Bathroom Shower/Tub: Tub/Shower unit  Bathroom Toilet: Handicap height (standard toilet with frame overlay)  Bathroom Equipment: Shower chair, Grab bars in shower  Bathroom Accessibility: Walker accessible    ADL Assistance: Independent  Homemaking Assistance: Independent  Homemaking Responsibilities: Yes  Ambulation Assistance: Independent  Transfer Assistance: Independent  Active : Yes  IADL Comments: He reports he usually orders food to be delivered or has staff at [facility] make his food, but states he will make himself a sandwich for lunch. States that staff does his laundry 1x/week in the apartment across the ha because he cannot go downstairs to use the resident laundry. Additional Comments: has an aid 3x/wk to assist with bathing and dressing BLEs, as well as homemaking.   Pt Plan: Times per week: 5x/wk 90min, 1x/wk 30min  Times per day: Twice a day  Current Treatment Recommendations: Strengthening, Transfer Training, Endurance Training, Neuromuscular Re-education, Patient/Caregiver Education & Training, Balance Training, Gait Training, Home Exercise Program, Functional Mobility Training, Stair training, Safety Education & Training    Patient Education  Patient Education: Plan of Care, Gait, Verbal Exercise Instruction    Goals:  Patient goals : reduce pain, get stronger  Short term goals  Time Frame for Short term goals: 1 week  Short term goal 1: Patient will complete supine < > sit with SBA to transfer in/out of bed with decreased difficulty. Short term goal 2: Patient will complete sit < > stand with SBA with RW to stand to ambulate with increased independence. Short term goal 3: Patient will ambulate 48' with a RW and CGA, clearing right LE >/=50% of the time to increase safety with ambulation. Short term goal 4: Patient will complete rolling with SBA to reposition self with increased independence. Short term goal 5: Patient will propel wheelchair 150' with modified independence to increase independence and mobility in current living environment. Long term goals  Time Frame for Long term goals : 4 weeks  Long term goal 1: Patient will complete supine < > sit and rolling with modified independence to transfer in/out of bed independently. Long term goal 2: Patient will complete sit < > stand and stand pivot transfer with RW and modified independence to transfer surface to surface safely. Long term goal 3: Patient will ambulate 80' with a RW and modified independence to navigate home safely. Long term goal 4: Patient will improve tinetti to greater than or equal to 19/28 to reduce his risk for falls. Long term goal 5: Patient will complete car transfer with supervision to transfer in/out of vehicle safely.     Following session, patient left in safe position with all fall risk precautions in place.

## 2021-05-29 NOTE — CONSULTS
Department of Family Practice  Consult Note        Reason for Consult:  Medical management while on the Inpatient Rehab unit. Requesting Physician:  Dr Vivian Johnson:  The need to continue the time with therapies following the acute hospital stay. History Obtained From:  patient, EMR    HISTORY OF PRESENT ILLNESS:              The patient is a 76 y.o. male with significant past medical history of       Diagnosis Date    Anxiety     Chronic back pain     Chronic kidney disease (CKD), stage II (mild)     COPD [facility] obstructive [facility] disease) ([facility]     Depression     Diabetes mellitus [facility]     DVT (deep venous thrombosis) (HCC)     Hyperlipidemia     Hypertension     Hypothyroidism     Lumbago with sciatica     Polyneuropathy     Renal cell carcinoma ([facility]       who presents with an episode of weakness and altered speech. There was confusion noted in the ED too. Following the acute stay he was too deconditioned to return home so he presents to the Inpatient rehab unit for work at gaining strength and independence.     Past Medical History:        Diagnosis Date    Anxiety     Chronic back pain     Chronic kidney disease (CKD), stage II (mild)     COPD (chronic obstructive pulmonary disease) (HCC)     Depression     Diabetes mellitus (HCC)     DVT (deep venous thrombosis) (HCC)     Hyperlipidemia     Hypertension     Hypothyroidism     Lumbago with sciatica     Polyneuropathy     Renal cell carcinoma (HCC)      Past Surgical History:        Procedure Laterality Date    BACK SURGERY      CHOLECYSTECTOMY      SHOULDER SURGERY      SMALL INTESTINE SURGERY      partial bowel resection     Current Medications:   Current Facility-Administered Medications: pantoprazole (PROTONIX) tablet 40 mg, 40 mg, Oral, QAM AC  calcium carbonate (TUMS) chewable tablet 500 mg, 500 mg, Oral, TID PRN  melatonin tablet 6 mg, 6 mg, Oral, Nightly PRN  bisacodyl (DULCOLAX) suppository 10 mg, 10 Abd soft, non tender, normoactive BS and no mass  Ext without edema  Neuro weak  Psy pleasant            IMPRESSION/RECOMMENDATIONS:      Active Hospital Problems    Diagnosis Date Noted    [facility] Joe Brock 05/27/2021

## 2021-05-29 NOTE — PLAN OF CARE
Problem: DISCHARGE BARRIERS  Goal: Patient's continuum of care needs are met  Note:   [address] [address]  Physical Medicine Case Management Assessment    [x] Inpatient Rehabilitation Unit      Patient Name: Russ Nguyen        MRN: [id number]    : 1945  (76 y.o.)  Gender: male   Date of Admission: 2021      Family/Social/Home Environment: Prior to hospitalization, patient was independent with ADL's and personal care. Patient lives in a [facility] house in [facility] after being released from long term in 2020. Patient indicates that he originally from [place], but no availability for housing in that area after release from long term. Patient reports having home oxygen and using \"whenever needed\". Patient reports [facility] Batters recently \"took back\" his cpap machine because he was not wearing it \"enough\". Patient reports having sleep study through [facility]. [facility] within the last year. Patient's PCP is in [facility], Dr. Mary Brower. Patient with minimal support, as closest relative is tep-brother, Lorin Blackmon, that lives in [place]. Patient currently active with [facility] for nursing and HHA. Patient reports nursing fills pill box weekly. Patient has an automatic/alarm pill box. HHA assists with bathing, vacuuming, etc. HHA visits every Monday, Wednesday, Friday. Patient was previously ambulating with a cane. Patient reports ability to drive and manage finances.      Social/Functional History  Lives With: Other (comment) (2 roomates in apartment in [address], common areas shared)  Type of Home: [address] [place] [address] Drive: One level  Home Access: Level entry  Bathroom Shower/Tub: Tub/Shower unit  Bathroom Toilet: Handicap height (standard toilet with frame overlay)  Bathroom Equipment: Shower chair, Grab bars in shower  Bathroom Accessibility: Walker accessible  Home Equipment: [place], [address] chair  ADL Assistance: [address]: Independent  Homemaking Responsibilities: Yes  Ambulation Assistance: Independent  Transfer Assistance: Independent  Active : Yes  IADL Comments: He reports he usually orders food to be delivered or has staff at [employer] make his food, but states he will make himself a sandwich for lunch. States that staff does his laundry 1x/week in the apartment across the ha because he cannot go downstairs to use the resident laundry. Additional Comments: has an aid 3x/wk to assist with bathing and dressing BLEs, as well as homemaking. Pt using cane PTA for mobility. Patient reports no family or support system nearby, but has a step-brother who lives in [place] that he willl go visit. Contact/Guardian Information: Delvis Reynolds, [phone] or [phone]. Community Resources Utilized: [employer]. Sexuality/Intimacy: No issues or concerns identified at time of SW assessment. Complementary Health Approaches: Patient with no current interest in complementary health approaches at time of SW assessment. Anticipated Needs/Discharge Plans: Anticipate patient would benefit from continued home health care services at discharge. SW met with patient to introduce self and explain role, complete SW assessment, and initiate discharge planning. Prior to hospitalization, patient was independent with ADL's and personal care. Patient lives in a care home house in [facility] after being released from long-term in April 2020. Patient indicates that he originally from [place], but no availability for housing in that area after release from long-term. Patient reports having home oxygen and using \"whenever needed\". Patient reports [facility] recently \"took back\" his cpap machine because he was not wearing it \"enough\". Patient reports having sleep study through [facility]. [facility] within the last year. Patient's PCP is in [facility], Dr. Jannet Ayers. Patient with minimal support, as closest relative is tep-brother, Maritza Smith, that lives in [place].  Patient currently active with [id number] for nursing and HHA. Patient reports nursing fills pill box weekly. Patient has an automatic/alarm pill box. HHA assists with bathing, vacuuming, etc. HHA visits every Monday, Wednesday, Friday. Patient was previously ambulating with a cane. Patient reports ability to drive and manage finances. Anticipate patient would benefit from continued home health care services at discharge. SW reviewed with patient team conference on Tuesday, 06/01/2021, to further discuss progress and discharge recommendations. SW to follow and maintain involvement in discharge planning.             Discharge Planning  Living Arrangements: Friends  Support Systems: Friends/Neighbors, Family Members  Potential Assistance Needed: Home Care  Potential Assistance Purchasing Medications: No  Meds-to-Beds: Does the patient want to have any new prescriptions delivered to bedside prior to discharge?: No (Patient prefers to use [employer] on [address] for discharge medications.)  Type of Home Care Services: OT, PT, Nursing Services  Patient expects to be discharged to[de-identified] Home with Home Health  Expected Discharge Date:  (Undetermined)  Follow Up Appointment: Best Day/Time : Tuesday PM    Electronically signed by EDILIA Faith on 5/29/2021 at 12:09 PM

## 2021-05-29 NOTE — PROGRESS NOTES
[facility]  [address] [facility]  Occupational Therapy  Daily Note  Time:   Time In: 730  Time Out: 830  Timed Code Treatment Minutes: 60 Minutes  Minutes: 60          Date: 2021  Patient Name: Jose Davenport,   Gender: male      Room: [facility]56/056-A  MRN: [id number]  : 1945  (76 y.o.)  Referring Practitioner: STEFANO Joshi CNP (ordering provider)  Phuc Arenas MD (attending)  Diagnosis: Parkinson's Disease  Additional Pertinent Hx: Jose Davenport is a 76 y.o. male who presents with complaint of altered mental status. Patient was found at local drugstore confused, patient states that he fell. Patient is on Xarelto. Mechanism of patient's fall is unclear, patient is overall confused and erratic, cannot reliably contribute to HPI/ROS. Admitted to IP Rehab on . Restrictions/Precautions:  Restrictions/Precautions: General Precautions, Fall Risk     SUBJECTIVE: Pt in bed and reports not sleeping last night. He is agreeable to OT session. PAIN: 8/10: headache with pain on L side of head. Pt states he received pain meds at midnight and can't have anything until 9:00 and he doesn't understand why he is not able to have pain meds every 4-6 hours. He states that he did have tylenol this morning. He does verbalize that he knows [employer] are trying to make sure I don't get strung out on anything\". Vitals: Vitals not assessed per clinical judgement, see nursing flowsheet    COGNITION: Decreased Insight and Decreased Problem Solving    ADL:   Lower Extremity Dressing: Dependent. apply lotion to feet, don sofia hose and socks. Pt reports using a sock aid at home  Toileting: Moderate Assistance. clothing management  Toilet Transfer: [employer]. STS. Instruction provided for unilateral support on walker for clothing management due to pt stating \"You will have to hold me up if I let go with both hands. \" No LOB, pt moves slowly through task. Recommendations: Strengthening, Balance Training, Functional Mobility Training, Endurance Training, Self-Care / ADL, Patient/Caregiver Education & Training, Safety Education & Training, Home Management Training    Patient Education  Patient Education: Role of OT, Plan of Care, ADL's and Home Exercise Program    Goals  Short term goals  Time Frame for Short term goals: 1 week  Short term goal 1: Pt will complete LB ADL with SBA and LHAE prn to increase indep with self care. Short term goal 2: Patient will increase LUE AROM >90 degrees with min cues to increase indep in ADL tasks. Short term goal 3: Pt will navigate to/from bathroom using RW with SBA and min cues for safety increase safety in toileting task. Short term goal 4: Patient will complete simple homemaking task with SBA and min cues for safety to increase indep in home environment. Short term goal 5: Patient will tolerate dynamic standing task with 1-2 UE release and SBA to increase safety and indep in sinkside grooming tasks. Long term goals  Time Frame for Long term goals : 2-3 weeks  Long term goal 1: Patient will complete simple meal prep task with SBA using compensatory techniques prn to increase indep in making a sandwich at home. Long term goal 2: Patient will complete BADL routine with SBA and using compensatory techniques prn to increase indep in home environment. Following session, patient left in safe position with all fall risk precautions in place.

## 2021-05-29 NOTE — PROGRESS NOTES
AROM. Myofascial/soft tissue mobilization techniques to anterior/lateral/posterior aspects of upper arm and shoulder girdle as well as the scapula. Performed AAROM and AROM 10x for elevation with pt stating this feels much better following techniques. ASSESSMENT:     Activity Tolerance:  Patient tolerance of  treatment: good. Discharge Recommendations: Home with Home health OT   Equipment Recommendations: Equipment Needed: Yes  Other: Patient has shower chair and frame over toilet. PTA patient reports using a cane, may benefit from a walker. Patient may benefit from [facility] to increase indep in dressing tasks. Plan: Times per week: 90 minutes 5x/week, 30 minutes 1x/week  Current Treatment Recommendations: Strengthening, Balance Training, Functional Mobility Training, Endurance Training, Self-Care / ADL, Patient/Caregiver Education & Training, Safety Education & Training, Home Management Training    Patient Education  Patient Education: Role of OT and Home Exercise Program    Goals  Short term goals  Time Frame for Short term goals: 1 week  Short term goal 1: Pt will complete LB ADL with SBA and LHAE prn to increase indep with self care. Short term goal 2: Patient will increase LUE AROM >90 degrees with min cues to increase indep in ADL tasks. Short term goal 3: Pt will navigate to/from bathroom using RW with SBA and min cues for safety increase safety in toileting task. Short term goal 4: Patient will complete simple homemaking task with SBA and min cues for safety to increase indep in home environment. Short term goal 5: Patient will tolerate dynamic standing task with 1-2 UE release and SBA to increase safety and indep in sinkside grooming tasks. Long term goals  Time Frame for Long term goals : 2-3 weeks  Long term goal 1: Patient will complete simple meal prep task with SBA using compensatory techniques prn to increase indep in making a sandwich at home.   Long term goal 2: Patient will complete BADL routine with SBA and using compensatory techniques prn to increase indep in home environment. Following session, patient left in safe position with all fall risk precautions in place.

## 2021-05-29 NOTE — PLAN OF CARE
Problem: Falls - Risk of:  Goal: Will remain free from falls  Description: Will remain free from falls  5/29/2021 1250 by Chantal Longoria LPN  Outcome: Ongoing  Note: Patient remains free from falls this shift. Fall precautions in place. Non skid footwear used with transferring. Educated patient to use call light when needed assistance with ambulation. Patient used call light appropriate this shift. Problem: Skin Integrity:  Goal: Will show no infection signs and symptoms  Description: Will show no infection signs and symptoms  5/29/2021 1250 by Chantal Longoria LPN  Outcome: Ongoing  Note: No skin breakdown during hospitalization. Problem: Pain:  Goal: Control of chronic pain  Description: Control of chronic pain  5/29/2021 1250 by Chantal Longoria LPN  Outcome: Ongoing  Note: Continues to have chronic back pain. Problem: Mobility - Impaired:  Goal: Mobility will improve  Description: Mobility will improve  Outcome: Ongoing  Note: Patient is a one assist to transfer. Problem: DISCHARGE BARRIERS  Goal: Patient's continuum of care needs are met  5/29/2021 1250 by Chantal Longoria LPN  Outcome: Ongoing  Note: Patients needs met this shift. Patients discharge to be determined. Assess at discharge.

## 2021-05-29 NOTE — PROGRESS NOTES
Training    Patient Education  Patient Education: Plan of Care, Transfers, Gait, Verbal Exercise Instruction    Goals:  Patient goals : reduce pain, get stronger  Short term goals  Time Frame for Short term goals: 1 week  Short term goal 1: Patient will complete supine < > sit with SBA to transfer in/out of bed with decreased difficulty. Short term goal 2: Patient will complete sit < > stand with SBA with RW to stand to ambulate with increased independence. Short term goal 3: Patient will ambulate 48' with a RW and CGA, clearing right LE >/=50% of the time to increase safety with ambulation. Short term goal 4: Patient will complete rolling with SBA to reposition self with increased independence. Short term goal 5: Patient will propel wheelchair 150' with modified independence to increase independence and mobility in current living environment. Long term goals  Time Frame for Long term goals : 4 weeks  Long term goal 1: Patient will complete supine < > sit and rolling with modified independence to transfer in/out of bed independently. Long term goal 2: Patient will complete sit < > stand and stand pivot transfer with RW and modified independence to transfer surface to surface safely. Long term goal 3: Patient will ambulate 80' with a RW and modified independence to navigate home safely. Long term goal 4: Patient will improve tinetti to greater than or equal to 19/28 to reduce his risk for falls. Long term goal 5: Patient will complete car transfer with supervision to transfer in/out of vehicle safely. Following session, patient left in safe position with all fall risk precautions in place.

## 2021-05-30 PROCEDURE — 6370000000 HC RX 637 (ALT 250 FOR IP): Performed by: INTERNAL MEDICINE

## 2021-05-30 PROCEDURE — 97130 THER IVNTJ EA ADDL 15 MIN: CPT

## 2021-05-30 PROCEDURE — 97110 THERAPEUTIC EXERCISES: CPT

## 2021-05-30 PROCEDURE — 97530 THERAPEUTIC ACTIVITIES: CPT

## 2021-05-30 PROCEDURE — 97116 GAIT TRAINING THERAPY: CPT

## 2021-05-30 PROCEDURE — 97535 SELF CARE MNGMENT TRAINING: CPT

## 2021-05-30 PROCEDURE — 6370000000 HC RX 637 (ALT 250 FOR IP): Performed by: NURSE PRACTITIONER

## 2021-05-30 PROCEDURE — 1180000000 HC REHAB R&B

## 2021-05-30 PROCEDURE — 97129 THER IVNTJ 1ST 15 MIN: CPT

## 2021-05-30 RX ADMIN — TAMSULOSIN HYDROCHLORIDE 0.4 MG: 0.4 CAPSULE ORAL at 09:33

## 2021-05-30 RX ADMIN — OXYCODONE HYDROCHLORIDE AND ACETAMINOPHEN 1 TABLET: 10; 325 TABLET ORAL at 21:37

## 2021-05-30 RX ADMIN — MECLIZINE HCL 25 MG: 25 TABLET, CHEWABLE ORAL at 09:33

## 2021-05-30 RX ADMIN — GABAPENTIN 600 MG: 600 TABLET, FILM COATED ORAL at 21:37

## 2021-05-30 RX ADMIN — OXYCODONE HYDROCHLORIDE AND ACETAMINOPHEN 1 TABLET: 10; 325 TABLET ORAL at 17:11

## 2021-05-30 RX ADMIN — ATORVASTATIN CALCIUM 20 MG: 20 TABLET, FILM COATED ORAL at 09:38

## 2021-05-30 RX ADMIN — Medication 8.8 MG: at 17:10

## 2021-05-30 RX ADMIN — GABAPENTIN 600 MG: 600 TABLET, FILM COATED ORAL at 13:31

## 2021-05-30 RX ADMIN — PANTOPRAZOLE SODIUM 40 MG: 40 TABLET, DELAYED RELEASE ORAL at 06:30

## 2021-05-30 RX ADMIN — OXYCODONE HYDROCHLORIDE AND ACETAMINOPHEN 1 TABLET: 10; 325 TABLET ORAL at 09:34

## 2021-05-30 RX ADMIN — Medication 6 MG: at 21:37

## 2021-05-30 RX ADMIN — LOSARTAN POTASSIUM 50 MG: 50 TABLET, FILM COATED ORAL at 09:38

## 2021-05-30 RX ADMIN — GABAPENTIN 600 MG: 600 TABLET, FILM COATED ORAL at 09:33

## 2021-05-30 RX ADMIN — RIVAROXABAN 10 MG: 10 TABLET, FILM COATED ORAL at 09:33

## 2021-05-30 RX ADMIN — ACETAMINOPHEN 650 MG: 325 TABLET ORAL at 06:30

## 2021-05-30 RX ADMIN — OXYCODONE HYDROCHLORIDE AND ACETAMINOPHEN 1 TABLET: 10; 325 TABLET ORAL at 13:32

## 2021-05-30 ASSESSMENT — PAIN DESCRIPTION - ONSET: ONSET: ON-GOING

## 2021-05-30 ASSESSMENT — PAIN DESCRIPTION - PAIN TYPE: TYPE: CHRONIC PAIN

## 2021-05-30 ASSESSMENT — PAIN SCALES - GENERAL
PAINLEVEL_OUTOF10: 8
PAINLEVEL_OUTOF10: 7
PAINLEVEL_OUTOF10: 7

## 2021-05-30 ASSESSMENT — PAIN DESCRIPTION - LOCATION: LOCATION: BACK

## 2021-05-30 ASSESSMENT — PAIN DESCRIPTION - FREQUENCY: FREQUENCY: CONTINUOUS

## 2021-05-30 ASSESSMENT — PAIN DESCRIPTION - ORIENTATION: ORIENTATION: LOWER

## 2021-05-30 ASSESSMENT — PAIN - FUNCTIONAL ASSESSMENT: PAIN_FUNCTIONAL_ASSESSMENT: PREVENTS OR INTERFERES SOME ACTIVE ACTIVITIES AND ADLS

## 2021-05-30 ASSESSMENT — PAIN DESCRIPTION - PROGRESSION: CLINICAL_PROGRESSION: NOT CHANGED

## 2021-05-30 ASSESSMENT — PAIN DESCRIPTION - DESCRIPTORS: DESCRIPTORS: SHARP

## 2021-05-30 NOTE — PROGRESS NOTES
[address]  Occupational Therapy  Daily Note  Time:   Time In: 0700  Time Out: 0830  Timed Code Treatment Minutes: 90 Minutes  Minutes: 90          Date: 2021  Patient Name: Angelina Gamez,   Gender: male      Room: [facility]56/056-A  MRN: [id number]  : 1945  (76 y.o.)  Referring Practitioner: STEFANO Moody CNP (ordering provider)  Estela An MD (attending)  Diagnosis: Parkinson's Disease  Additional Pertinent Hx: Angelina Gamez is a 76 y.o. male who presents with complaint of altered mental status. Patient was found at local drugstore confused, patient states that he fell. Patient is on Xarelto. Mechanism of patient's fall is unclear, patient is overall confused and erratic, cannot reliably contribute to HPI/ROS. Admitted to IP Rehab on . Restrictions/Precautions:  Restrictions/Precautions: General Precautions, Fall Risk     SUBJECTIVE: Pt agreeable to OT session. He feels sleepy and reports that he had a sleeping pill and it worked well, just still a little drowsy. Pt seemed very alert throughout tx. PAIN: 5-6/10:     Vitals: Vitals not assessed per clinical judgement, see nursing flowsheet    COGNITION: Decreased Recall, Decreased Insight, Decreased Problem Solving and Decreased Safety Awareness    ADL:   Bathing: Minimal Assistance for washing/drying back and Moderate Assistance for washing/drying bottom and below knees. Upper Extremity Dressing: Supervision and with set-up. to don pullover shirt  Lower Extremity Dressing: Minimal Assistance, with set-up, with verbal cues  and with increased time for completion. used dressing stick to don shorts with instruction provided. Dependant to don socks due to no sockaid available for trial at this time. Pt reports this is what he typically used at home. Toileting: Minimal Assistance. clothing management. Toilet Transfer: Minimal Assistance.  STS  Shower Transfer: Contact Guard Assistance, with verbal cues  and with increased time for completion. Cues for safety for hand placement and transitioning from walker to grab bars. BALANCE:  Sitting Balance:  Supervision, Contact Guard Assistance. 1x due to pt leaning forward to wash feet and shower bench tipping with pt unaware. Therapist provided CGA and stabilized bench. Standing Balance: Contact Guard Assistance during shower with unilateral and brief bilateral release. Pt experienced 1 LOB corrected with CGA during bilateral release for clothing managment. BED MOBILITY:  Sit to Supine: Minimal Assistance R LE only    TRANSFERS:  Sit to Stand:  Stand By Assistance, [employer]. Stand to Sit: Stand By Assistance, [employer]. FUNCTIONAL MOBILITY:  Assistive Device: Rolling Walker  Assist Level:  Contact Guard Assistance. Distance: To and from shower room  No LOB, pt is making good effort to clear floor and is improving but unable to complete fully. He reports that his R toe feels tender due to this. No redness/skinbreakdown noted at this time. ADDITIONAL ACTIVITIES:  Focus of tx on improving safety with ADLs/showering. Also educated re: utilizing E[employer]ters to improve independence. Pt responded well to suggestions and feels that using a dressing stick would be beneficial.     ASSESSMENT:     Activity Tolerance:  Patient tolerance of  treatment: good. Discharge Recommendations: Home with Home health OT   Equipment Recommendations: Equipment Needed: Yes  Other: Patient has shower chair and frame over toilet. PTA patient reports using a cane, may benefit from a walker. Patient may benefit from E[employer]lyn Netters to increase indep in dressing tasks.   Plan: Times per week: 90 minutes 5x/week, 30 minutes 1x/week  Current Treatment Recommendations: Strengthening, Balance Training, Functional Mobility Training, Endurance Training, Self-Care / ADL, Patient/Caregiver Education & Training, Safety Education & Training, Home Management Training    Patient Education  Patient Education: ADL's, Equipment Education, Education Related to [employer] and Wellness and Home Safety Education    Goals  Short term goals  Time Frame for Short term goals: 1 week  Short term goal 1: Pt will complete LB ADL with SBA and LHAE prn to increase indep with self care. Short term goal 2: Patient will increase LUE AROM >90 degrees with min cues to increase indep in ADL tasks. Short term goal 3: Pt will navigate to/from bathroom using RW with SBA and min cues for safety increase safety in toileting task. Short term goal 4: Patient will complete simple homemaking task with SBA and min cues for safety to increase indep in home environment. Short term goal 5: Patient will tolerate dynamic standing task with 1-2 UE release and SBA to increase safety and indep in sinkside grooming tasks. Long term goals  Time Frame for Long term goals : 2-3 weeks  Long term goal 1: Patient will complete simple meal prep task with SBA using compensatory techniques prn to increase indep in making a sandwich at home. Long term goal 2: Patient will complete BADL routine with SBA and using compensatory techniques prn to increase indep in home environment. Following session, patient left in safe position with all fall risk precautions in place.

## 2021-05-30 NOTE — PROGRESS NOTES
[address] [facility]  [address] [facility]  DAILY NOTE    TIME   SLP Individual Minutes  Time In: 3594  Time Out: 1100  Minutes: 30  Timed Code Treatment Minutes: 30 Minutes       Date: 2021  Patient Name: Sherice Ng      CSN: [id number]   : 1945  (76 y.o.)  Gender: male   Referring Physician: Jaydon Patel MD  Diagnosis: Parkinson's Disease    Secondary Diagnosis: High level cognitive deficits   Precautions: Fall risk   Current Diet: Regular diet and thin liquids   Swallowing Strategies: Standard Universal Swallow Precautions  Date of Last MBS/FEES: Not Applicable    Pain:  No pain reported. Subjective:  Pt seen resting in bed. Easily awakened and agreeable to cognitive therapy. Pleasant and cooperative. No family present. Short-Term Goals:  SHORT TERM GOAL #1:  Goal 1: Patient will complete STM tasks with 3-4 units (immediate/delayed) and working memory with 70% accuracy given MIN A to improve retention of personal, newly, and previously learned information. INTERVENTIONS: Functional carryover of 5 item grocery list:   24 hour delay:  indep/Mod I (extra time), 1/5 min cues     Prior session: Functional carryover of 5 item grocery list:   Completed EXTENSIVE review of STM strategies using the acronym WRAP--Write it down, Repeat it, Associate it, and Pictures it.  Pt benefiting for use of repetition and visual imagery   Immediate recall:  indep   7 minute delay:  indep     Orientation:  indep     Working memory (complex change making via mental math):  indep, 1/6 extra time, 1/6 min cues  *independent utilization of \"self talk\" to enhance retention and mental manipulation     SHORT TERM GOAL #2:  Goal 2: Patient will complete higher level complex problem solving & executive functioning (meds/finances) with 70% accuracy given MIN A to improve independence with completion of ADLs/IADLs  INTERVENTIONS: Complex attention. Significantly improved carryover with utilization of skilled memory strategies (I.e. visual imagery, repetition). Pt requires assist with set up of task containing multiple variables/components given impaired task analysis and attention to details. No barriers towards goal achievement. Pt highly motivated/engaged within therapeutic sessions. Progression of diet to regular and thin with no overt difficulties. Overall function deemed to be [facility]. No further dysphagia management warranted at this time. Given high prior level of independence (I.e. organization of medications, finances, driving) pt would benefit from continued skilled ST to aid in successful return to the home setting and return management of IADL's post discharge. EDUCATION:  Learner: Patient  Education:  Reviewed results and recommendations of this evaluation, Reviewed ST goals and Plan of Care and Reviewed recommendations for follow-up  Evaluation of Education: Verbalizes understanding and Family not present    ASSESSMENT/PLAN:  Activity Tolerance:  Patient tolerance of  treatment: good. Assessment/Plan: Patient progressing toward established goals. Continues to require skilled care of licensed speech pathologist to progress toward achievement of established goals and plan of care. .     Plan for Next Session: high level executive functioning, attention and memory      Anna Lockett M.S. CCC-SLP [id number] 5/30/2021

## 2021-05-30 NOTE — PROGRESS NOTES
[address] [facility] PHYSICAL THERAPY  DAILY NOTE  [facility]    Time In: 1130  Time Out: 1240  Timed Code Treatment Minutes: 79 Minutes  Minutes: 70          Date: 2021  Patient Name: Luis Alberto Campos,  Gender:  male        MRN: [id number]  : 1945  (76 y.o.)  Referral Date : 21  Referring Practitioner: STEFANO Carranza CNP  Diagnosis: Parkinson's disease  Additional Pertinent Hx: Pt is a 57-year-old male who was admitted to [facility] after presenting by EMS from local drugstore confused, had unwitnessed fall presented with generalized AMS. Prior history of similar presentations in the past.  PMH include antiphospholipid syndrome, DVT, sleep apnea, COPD, CKD, HTN, hypothyroidism and RCC. Patient unable to provide more history. History obtained from chart review and ED accounts. Pt transferred to  rehab . Prior Level of Function:  Lives With: Other (comment) (2 roomates in apartment in [address], common areas shared)  Type of Home: Apartment  Home Layout: One level  Home Access: Level entry  Home Equipment: Cane, Lift chair   Bathroom Shower/Tub: Tub/Shower unit  Bathroom Toilet: Handicap height (standard toilet with frame overlay)  Bathroom Equipment: Shower chair, Grab bars in shower  Bathroom Accessibility: Walker accessible    ADL Assistance: Independent  Homemaking Assistance: Independent  Homemaking Responsibilities: Yes  Ambulation Assistance: Independent  Transfer Assistance: Independent  Active : Yes  IADL Comments: He reports he usually orders food to be delivered or has staff at [facility] make his food, but states he will make himself a sandwich for lunch. States that staff does his laundry 1x/week in the apartment across the ha because he cannot go downstairs to use the resident laundry. Additional Comments: has an aid 3x/wk to assist with bathing and dressing BLEs, as well as homemaking.   Pt using cane PTA for mobility. Patient reports no family or support system nearby, but has a step-brother who lives in [place] that he willl go visit. Restrictions/Precautions:  Restrictions/Precautions: General Precautions, Fall Risk     SUBJECTIVE: Pt. Laying in bed upon arrival and pleasantly agrees to therapy session. Pt. Reports mild swelling in R LE this date. Pt. Talkative throughout session requiring cues for attention to task. PAIN: Not rated: R LE radiating into lower back    Vitals: Vitals not assessed per clinical judgement, see nursing flowsheet    OBJECTIVE:  Bed Mobility:  Rolling to Right: Stand By Assistance   Supine to Sit: Stand By Assistance  Sit to Supine: Stand By Assistance     Transfers:  Sit to Stand: Contact Guard Assistance  Stand to [employer] Assistance    Ambulation:  Stand By Assistance, Contact Guard Assistance  Distance: 150' x 2   Surface: Level Tile  Device:Rolling Walker, Dorsiflexion assist strap trialed on R LE   Gait Deviations:  Slow Ananya, Decreased Step Length Bilaterally, Decreased Gait Speed and Mild toe drag on R at times but improvement noted with DF assist strap, Pt. Fatigued at end of second bout requiring standing rest break. Balance:  Pt. sat at EOB while donning shoes with DF assist strap on R. Pt. also sat unsupported while conversing with therapist for extended period of time. Pt. steady with no LOB throughout. Exercise:  Patient was guided in 1 set(s) 10 reps of exercise to both lower extremities. Glut sets, Seated marches, Seated hamstring curls, Seated heel/toe raises, Long arc quads, Seated isometric hip adduction, Seated abduction/adduction and abdominal isometrics. Also completed supine B LE HS, piriformis, glut, and heelcord stretching 2x30\" each to improve joint mobility. Exercises were completed for increased independence with functional mobility.     Functional Outcome Measures: Not completed       ASSESSMENT:  Assessment: Patient progressing toward established goals. Activity Tolerance:  Patient tolerance of  treatment: good. Equipment Recommendations:Equipment Needed: Yes (RW, monitor need for W/C)  Discharge Recommendations:   Continue to assess pending progress    Plan: Times per week: 5x/wk 90min, 1x/wk 30min  Times per day: Twice a day  Current Treatment Recommendations: Strengthening, Transfer Training, Endurance Training, Neuromuscular Re-education, Patient/Caregiver Education & Training, Balance Training, Gait Training, Home Exercise Program, Functional Mobility Training, Stair training, Safety Education & Training    Patient Education  Patient Education: Plan of Care, Transfers, Reviewed Prior Education, Gait, Verbal Exercise Instruction    Goals:  Patient goals : reduce pain, get stronger  Short term goals  Time Frame for Short term goals: 1 week  Short term goal 1: Patient will complete supine < > sit with SBA to transfer in/out of bed with decreased difficulty. Short term goal 2: Patient will complete sit < > stand with SBA with RW to stand to ambulate with increased independence. Short term goal 3: Patient will ambulate 48' with a RW and CGA, clearing right LE >/=50% of the time to increase safety with ambulation. Short term goal 4: Patient will complete rolling with SBA to reposition self with increased independence. Short term goal 5: Patient will propel wheelchair 150' with modified independence to increase independence and mobility in current living environment. Long term goals  Time Frame for Long term goals : 4 weeks  Long term goal 1: Patient will complete supine < > sit and rolling with modified independence to transfer in/out of bed independently. Long term goal 2: Patient will complete sit < > stand and stand pivot transfer with RW and modified independence to transfer surface to surface safely. Long term goal 3: Patient will ambulate 80' with a RW and modified independence to navigate home safely.   Long term goal 4: Patient will improve tinetti to greater than or equal to 19/28 to reduce his risk for falls. Long term goal 5: Patient will complete car transfer with supervision to transfer in/out of vehicle safely. Following session, patient left in safe position with all fall risk precautions in place.

## 2021-05-30 NOTE — PLAN OF CARE
Problem: Falls - Risk of:  Goal: Will remain free from falls  Description: Will remain free from falls  Outcome: Ongoing  Note: Patient remains free from falls this shift. Fall precautions in place. Non skid footwear used with transferring. Educated patient to use call light when needed assistance with ambulation. Patient used call light appropriate this shift. Problem: Skin Integrity:  Goal: Will show no infection signs and symptoms  Description: Will show no infection signs and symptoms  Outcome: Ongoing  Note: No skin breakdown during hospitalization. Problem: Pain:  Goal: Pain level will decrease  Description: Pain level will decrease  Outcome: Ongoing  Note: Patient receives 1 Percocet four times a day. Problem: Mobility - Impaired:  Goal: Mobility will improve  Description: Mobility will improve  Outcome: Ongoing  Note: Patient was a one assist with walker     Problem: DISCHARGE BARRIERS  Goal: Patient's continuum of care needs are met  Outcome: Ongoing  Note: Patients needs met this shift. Patients discharge to be determined. Assess at discharge.

## 2021-05-31 PROCEDURE — 6370000000 HC RX 637 (ALT 250 FOR IP): Performed by: NURSE PRACTITIONER

## 2021-05-31 PROCEDURE — 6370000000 HC RX 637 (ALT 250 FOR IP): Performed by: INTERNAL MEDICINE

## 2021-05-31 PROCEDURE — 94660 CPAP INITIATION&MGMT: CPT

## 2021-05-31 PROCEDURE — 1180000000 HC REHAB R&B

## 2021-05-31 RX ADMIN — OXYCODONE HYDROCHLORIDE AND ACETAMINOPHEN 1 TABLET: 10; 325 TABLET ORAL at 17:21

## 2021-05-31 RX ADMIN — TAMSULOSIN HYDROCHLORIDE 0.4 MG: 0.4 CAPSULE ORAL at 09:09

## 2021-05-31 RX ADMIN — GABAPENTIN 600 MG: 600 TABLET, FILM COATED ORAL at 21:27

## 2021-05-31 RX ADMIN — GABAPENTIN 600 MG: 600 TABLET, FILM COATED ORAL at 12:59

## 2021-05-31 RX ADMIN — ACETAMINOPHEN 650 MG: 325 TABLET ORAL at 06:57

## 2021-05-31 RX ADMIN — ACETAMINOPHEN 650 MG: 325 TABLET ORAL at 02:42

## 2021-05-31 RX ADMIN — GABAPENTIN 600 MG: 600 TABLET, FILM COATED ORAL at 09:09

## 2021-05-31 RX ADMIN — LOSARTAN POTASSIUM 50 MG: 50 TABLET, FILM COATED ORAL at 09:09

## 2021-05-31 RX ADMIN — OXYCODONE HYDROCHLORIDE AND ACETAMINOPHEN 1 TABLET: 10; 325 TABLET ORAL at 12:59

## 2021-05-31 RX ADMIN — ATORVASTATIN CALCIUM 20 MG: 20 TABLET, FILM COATED ORAL at 09:09

## 2021-05-31 RX ADMIN — MECLIZINE HCL 25 MG: 25 TABLET, CHEWABLE ORAL at 09:09

## 2021-05-31 RX ADMIN — Medication 6 MG: at 21:28

## 2021-05-31 RX ADMIN — OXYCODONE HYDROCHLORIDE AND ACETAMINOPHEN 1 TABLET: 10; 325 TABLET ORAL at 09:10

## 2021-05-31 RX ADMIN — RIVAROXABAN 10 MG: 10 TABLET, FILM COATED ORAL at 09:09

## 2021-05-31 RX ADMIN — OXYCODONE HYDROCHLORIDE AND ACETAMINOPHEN 1 TABLET: 10; 325 TABLET ORAL at 21:27

## 2021-05-31 RX ADMIN — PANTOPRAZOLE SODIUM 40 MG: 40 TABLET, DELAYED RELEASE ORAL at 06:55

## 2021-05-31 ASSESSMENT — PAIN DESCRIPTION - PAIN TYPE
TYPE: CHRONIC PAIN
TYPE: CHRONIC PAIN

## 2021-05-31 ASSESSMENT — PAIN SCALES - GENERAL
PAINLEVEL_OUTOF10: 8
PAINLEVEL_OUTOF10: 8
PAINLEVEL_OUTOF10: 9
PAINLEVEL_OUTOF10: 5
PAINLEVEL_OUTOF10: 8
PAINLEVEL_OUTOF10: 7
PAINLEVEL_OUTOF10: 6

## 2021-05-31 ASSESSMENT — PAIN DESCRIPTION - ONSET: ONSET: ON-GOING

## 2021-05-31 ASSESSMENT — PAIN DESCRIPTION - FREQUENCY
FREQUENCY: CONTINUOUS
FREQUENCY: CONTINUOUS

## 2021-05-31 ASSESSMENT — PAIN DESCRIPTION - LOCATION
LOCATION: NECK
LOCATION: BACK

## 2021-05-31 ASSESSMENT — PAIN - FUNCTIONAL ASSESSMENT: PAIN_FUNCTIONAL_ASSESSMENT: PREVENTS OR INTERFERES SOME ACTIVE ACTIVITIES AND ADLS

## 2021-05-31 ASSESSMENT — PAIN DESCRIPTION - ORIENTATION
ORIENTATION: LEFT
ORIENTATION: LOWER

## 2021-05-31 ASSESSMENT — PAIN DESCRIPTION - DESCRIPTORS
DESCRIPTORS: SHARP
DESCRIPTORS: SHARP

## 2021-05-31 ASSESSMENT — PAIN DESCRIPTION - PROGRESSION: CLINICAL_PROGRESSION: NOT CHANGED

## 2021-05-31 NOTE — PLAN OF CARE
Problem: Falls - Risk of:  Goal: Will remain free from falls  Description: Will remain free from falls  5/31/2021 1400 by Amando Keller LPN  Outcome: Ongoing  Note: Patient to remain free from falls. Bed and chair alarms in use at all times. Patient ambulates with front wheel walker and gait belt. Patient alert, oriented, and able to use call light appropriately in advance of needs. Problem: Skin Integrity:  Goal: Will show no infection signs and symptoms  Description: Will show no infection signs and symptoms  5/31/2021 1400 by Amando Keller LPN  Outcome: Ongoing  Note: Patient free from signs and symptoms of skin infection. Patient is afebrile. Problem: Skin Integrity:  Goal: Absence of new skin breakdown  Description: Absence of new skin breakdown  5/31/2021 1400 by Amando Keller LPN  Outcome: Ongoing  Note: Patient to remain free from new skin breakdown. Patient encouraged to turn and reposition every two hours. Problem: Pain:  Goal: Pain level will decrease  Description: Pain level will decrease  5/31/2021 1400 by Amando Keller LPN  Outcome: Ongoing  Note: Patient given Percocet  every 6 hours for pain management. Patient consistently states pain level 8 out of 10. Problem: Bleeding:  Goal: Will show no signs and symptoms of excessive bleeding  Description: Will show no signs and symptoms of excessive bleeding  5/31/2021 1400 by Amando Keller LPN  Outcome: Ongoing  Note: Patient free from signs and symptoms of bleeding. Patient on Xarelto for DVT prevention.

## 2021-06-01 ENCOUNTER — APPOINTMENT (OUTPATIENT)
Dept: INTERVENTIONAL RADIOLOGY/VASCULAR | Age: 76
DRG: 057 | End: 2021-06-01
Attending: PHYSICAL MEDICINE & REHABILITATION
Payer: MEDICARE

## 2021-06-01 PROCEDURE — 97110 THERAPEUTIC EXERCISES: CPT

## 2021-06-01 PROCEDURE — 97535 SELF CARE MNGMENT TRAINING: CPT

## 2021-06-01 PROCEDURE — 6370000000 HC RX 637 (ALT 250 FOR IP): Performed by: FAMILY MEDICINE

## 2021-06-01 PROCEDURE — 1180000000 HC REHAB R&B

## 2021-06-01 PROCEDURE — 93971 EXTREMITY STUDY: CPT

## 2021-06-01 PROCEDURE — 99233 SBSQ HOSP IP/OBS HIGH 50: CPT | Performed by: PHYSICAL MEDICINE & REHABILITATION

## 2021-06-01 PROCEDURE — 6370000000 HC RX 637 (ALT 250 FOR IP): Performed by: PHYSICAL MEDICINE & REHABILITATION

## 2021-06-01 PROCEDURE — 97530 THERAPEUTIC ACTIVITIES: CPT

## 2021-06-01 PROCEDURE — 97130 THER IVNTJ EA ADDL 15 MIN: CPT

## 2021-06-01 PROCEDURE — 6370000000 HC RX 637 (ALT 250 FOR IP): Performed by: INTERNAL MEDICINE

## 2021-06-01 PROCEDURE — 97116 GAIT TRAINING THERAPY: CPT

## 2021-06-01 PROCEDURE — 6370000000 HC RX 637 (ALT 250 FOR IP): Performed by: NURSE PRACTITIONER

## 2021-06-01 PROCEDURE — 97129 THER IVNTJ 1ST 15 MIN: CPT

## 2021-06-01 RX ORDER — DICYCLOMINE HYDROCHLORIDE 10 MG/1
10 CAPSULE ORAL
Status: DISCONTINUED | OUTPATIENT
Start: 2021-06-01 | End: 2021-06-08 | Stop reason: HOSPADM

## 2021-06-01 RX ORDER — PREGABALIN 50 MG/1
50 CAPSULE ORAL 3 TIMES DAILY
Status: DISCONTINUED | OUTPATIENT
Start: 2021-06-01 | End: 2021-06-02

## 2021-06-01 RX ADMIN — PREGABALIN 50 MG: 50 CAPSULE ORAL at 12:39

## 2021-06-01 RX ADMIN — PANTOPRAZOLE SODIUM 40 MG: 40 TABLET, DELAYED RELEASE ORAL at 05:29

## 2021-06-01 RX ADMIN — DICYCLOMINE HYDROCHLORIDE 10 MG: 10 CAPSULE ORAL at 11:39

## 2021-06-01 RX ADMIN — TAMSULOSIN HYDROCHLORIDE 0.4 MG: 0.4 CAPSULE ORAL at 07:52

## 2021-06-01 RX ADMIN — MECLIZINE HCL 25 MG: 25 TABLET, CHEWABLE ORAL at 07:53

## 2021-06-01 RX ADMIN — OXYCODONE HYDROCHLORIDE AND ACETAMINOPHEN 1 TABLET: 10; 325 TABLET ORAL at 21:59

## 2021-06-01 RX ADMIN — LOSARTAN POTASSIUM 50 MG: 50 TABLET, FILM COATED ORAL at 07:53

## 2021-06-01 RX ADMIN — DICYCLOMINE HYDROCHLORIDE 10 MG: 10 CAPSULE ORAL at 17:15

## 2021-06-01 RX ADMIN — GABAPENTIN 600 MG: 600 TABLET, FILM COATED ORAL at 07:53

## 2021-06-01 RX ADMIN — OXYCODONE HYDROCHLORIDE AND ACETAMINOPHEN 1 TABLET: 10; 325 TABLET ORAL at 12:39

## 2021-06-01 RX ADMIN — OXYCODONE HYDROCHLORIDE AND ACETAMINOPHEN 1 TABLET: 10; 325 TABLET ORAL at 07:53

## 2021-06-01 RX ADMIN — Medication 8.8 MG: at 22:04

## 2021-06-01 RX ADMIN — RIVAROXABAN 10 MG: 10 TABLET, FILM COATED ORAL at 07:52

## 2021-06-01 RX ADMIN — ATORVASTATIN CALCIUM 20 MG: 20 TABLET, FILM COATED ORAL at 07:52

## 2021-06-01 RX ADMIN — OXYCODONE HYDROCHLORIDE AND ACETAMINOPHEN 1 TABLET: 10; 325 TABLET ORAL at 17:15

## 2021-06-01 RX ADMIN — PREGABALIN 50 MG: 50 CAPSULE ORAL at 21:59

## 2021-06-01 RX ADMIN — Medication 6 MG: at 21:59

## 2021-06-01 ASSESSMENT — PAIN DESCRIPTION - PAIN TYPE
TYPE: CHRONIC PAIN
TYPE: CHRONIC PAIN

## 2021-06-01 ASSESSMENT — PAIN SCALES - GENERAL
PAINLEVEL_OUTOF10: 9
PAINLEVEL_OUTOF10: 8
PAINLEVEL_OUTOF10: 9

## 2021-06-01 ASSESSMENT — PAIN DESCRIPTION - LOCATION
LOCATION: BACK
LOCATION: BACK

## 2021-06-01 NOTE — PROGRESS NOTES
[address] [address]  Recreational Therapy  Daily Note  [address]    Time Spent with Patient: 25 minutes    Date:  6/1/2021       Patient Name: Kenia Velez      MRN: [id number]      YOB: 1945 (69 y.o.)       Gender: male  Diagnosis: Parkinson's Disease  Referring Practitioner: STEFANO Haney CNP (ordering provider)  Domenic Reese MD (attending)    RESTRICTIONS/PRECAUTIONS:  Restrictions/Precautions: General Precautions, Fall Risk  Vision: Impaired  Hearing: Within functional limits    PAIN: 0-no c/o pain     SUBJECTIVE:  I will write down measurements for you    OBJECTIVE:  Pt asking for supplies to write down measurements for RT to make a jewelry box out of popsicle sticks for the unit some day-affect bright and social-very talkative -gave pt journal he won for the highest score in bowling today on [address]-pt appreciative       Patient Education  New Education Provided: Importance of Leisure,     Electronically signed by: AMERICA Aaron  Date: 6/1/2021

## 2021-06-01 NOTE — PROGRESS NOTES
[address] [address]  INPATIENT PHYSICAL THERAPY  PROGRESS NOTE  [facility]    Time In: 1030  Time Out: 1130  Timed Code Treatment Minutes: 61 Minutes  Minutes: 60          Date: 2021  Patient Name: Melissa Jordan,  Gender:  male        MRN: [id number]  : 1945  (76 y.o.)  Referral Date : 21  Referring Practitioner: STEFANO Dillard CNP  Diagnosis: Parkinson's disease  Additional Pertinent Hx: Pt is a 66-year-old male who was admitted to [facility] after presenting by EMS from local drugstore confused, had unwitnessed fall presented with generalized AMS. Prior history of similar presentations in the past.  PMH include antiphospholipid syndrome, DVT, sleep apnea, COPD, CKD, HTN, hypothyroidism and RCC. Patient unable to provide more history. History obtained from chart review and ED accounts. Pt transferred to  rehab . Prior Level of Function:  Lives With: Other (comment) (2 roomates in apartment in [address], common areas shared)  Type of Home: Apartment  Home Layout: One level  Home Access: Level entry  Home Equipment: Cane, Lift chair   Bathroom Shower/Tub: Tub/Shower unit  Bathroom Toilet: Handicap height (standard toilet with frame overlay)  Bathroom Equipment: Shower chair, Grab bars in shower  Bathroom Accessibility: Walker accessible    ADL Assistance: Independent  Homemaking Assistance: Independent  Homemaking Responsibilities: Yes  Ambulation Assistance: Independent  Transfer Assistance: Independent  Active : Yes  IADL Comments: He reports he usually orders food to be delivered or has staff at [facility] make his food, but states he will make himself a sandwich for lunch. States that staff does his laundry 1x/week in the apartment across the ha because he cannot go downstairs to use the resident laundry. Additional Comments: has an aid 3x/wk to assist with bathing and dressing BLEs, as well as homemaking.   Pt using cane PTA for mobility. Patient reports no family or support system nearby, but has a step-brother who lives in [place] that he willl go visit. Restrictions/Precautions:  Restrictions/Precautions: General Precautions, Fall Risk     SUBJECTIVE: Pt seated in recliner. Pleasant and cooperative. Therapist assisted with donning VIVIAN hose and shoes with air cast and df assist strap on Rt. PAIN: 6/10: low back, apolonia LEs. Pt reported legs felt better with use of df assist.    Vitals: Vitals not assessed per clinical judgement, see nursing flowsheet    OBJECTIVE:  Bed Mobility:  Not Tested    Transfers:  Sit to Stand: Contact Guard Assistance  Stand to [employer], cues for hand placement  Stand Pivot:Contact [employer], with [address] [place] [address], sit <->stand. Pt lifted LEs in/out with UEs, takes extra time and effort to complete    Ambulation:  Contact Guard Assistance  Distance: 20 ft with use of air cast and df assist strap.  75 ft on tile with use of Walk on AFO RLE andn 30 ft on carpet with same. Surface: Level Tile and Carpet  Device:RW, Rt AFO  Gait Deviations:  Slow Ananya, Decreased Step Length Bilaterally and minimal inversion Rt foot with swing noted with use of aircast and df assist strap. Good alignment and foot clearance noted with walk on AFO. EXERCISES:  The following exercises were completed to improve functional mobility: heel cord stretch apolonia 2x30 sec holds. Standing with parallel bar support: Forward step ups onto 4\" step, x5 reps ea LE leading. Decreased eccentric control noted BLes, more on Rt as trail leg. Functional Outcome Measures: Not completed       ASSESSMENT:  Assessment: Patient progressing toward established goals, meeting 1 short term goal.  Pt showing functional weakness, jhon in RLE df showing improved foot clearance and gait with use of walk on AFO. Pt requires use of RW for improved balance and safety with gait also.   Pt will benefit from continued skilled PT to further improved overall strength, endurance, balance and safety for the discharge setting. Activity Tolerance:  Patient tolerance of  treatment: good. Equipment Recommendations:Equipment Needed: Yes (RW, monitor need for W/C)  Discharge Recommendations:  Continue to assess pending progress    Plan: Times per week: 5x/wk 90min, 1x/wk 30min  Times per day: Twice a day  Current Treatment Recommendations: Strengthening, Transfer Training, Endurance Training, Neuromuscular Re-education, Patient/Caregiver Education & Training, Balance Training, Gait Training, Home Exercise Program, Functional Mobility Training, Stair training, Safety Education & Training    Patient Education  Patient Education: Transfers, Gait,  - Patient Verbalized Understanding, - Patient Requires Continued Education    Goals:  Patient goals : reduce pain, get stronger  Short term goals  Time Frame for Short term goals: 1 week  Short term goal 1: Patient will complete supine < > sit with SBA to transfer in/out of bed with decreased difficulty. Short term goal 2: Patient will complete sit < > stand with SBA with RW to stand to ambulate with increased independence. Short term goal 3: Patient will ambulate 48' with a RW and CGA, clearing right LE >/=50% of the time to increase safety with ambulation. MET, see LTG  Short term goal 4: Patient will complete rolling with SBA to reposition self with increased independence. Short term goal 5: Patient will propel wheelchair 150' with modified independence to increase independence and mobility in current living environment. Long term goals  Time Frame for Long term goals : 2 wks  Long term goal 1: Patient will complete supine < > sit and rolling with modified independence to transfer in/out of bed independently.   Long term goal 2: Patient will complete sit < > stand and stand pivot transfer with RW and modified independence to transfer surface to surface

## 2021-06-01 NOTE — PROGRESS NOTES
[address] [address]  Inpatient Rehabilitation  Occupational Therapy  Progress Note  Time:  Time In: 1200  Time Out: 1230  Timed Code Treatment Minutes: 30 Minutes  Minutes: 30          Date: 2021  Patient Name: Mirza Duque,   Gender: male      Room: [facility]/056-A  MRN: [id number]  : 1945  (76 y.o.)  Referring Practitioner: STEFANO Bryson CNP (ordering provider)  Mila Golden MD (attending)  Diagnosis: Parkinson's Disease  Additional Pertinent Hx: Mirza Duque is a 76 y.o. male who presents with complaint of altered mental status. Patient was found at local drugstore confused, patient states that he fell. Patient is on Xarelto. Mechanism of patient's fall is unclear, patient is overall confused and erratic, cannot reliably contribute to HPI/ROS. Admitted to IP Rehab on . Restrictions/Precautions:  Restrictions/Precautions: General Precautions, Fall Risk    SUBJECTIVE: Up in chair upon arrival, agreeable to OT session, Very talkative and distracted    PAIN: 8/10: L LE    Vitals: Vitals not assessed per clinical judgement, see nursing flowsheet    COGNITION: Decreased Insight, Decreased Problem Solving, Decreased Safety Awareness and Impulsive    ADL:   Lower Extremity Dressing: Dependent. for donning R shoe sitting in bedside chair. BALANCE:  Standing Balance: Contact Guard Assistance. approx 3 minutes with B UE support    BED MOBILITY:  Not Tested    TRANSFERS:  Sit to Stand:  Contact Guard Assistance. bedside chair  Stand to Sit: [employer]. ADDITIONAL ACTIVITIES:  Guided patient through L UE AARNIDIA, x10 reps x1 set in chair, in order to improve UE ROM & challenge activity tolerance required for UB dressing skills. ASSESSMENT:  Activity Tolerance:  Patient tolerance of  treatment: good. Assessment: Assessment: Patient is progressing towards his goals, however limited goal attainment due to short length of stay.  Pt demonstrates impairments in functional mobility, strength, endurance, safety awareness, and overall safety and indep in ADL/IADL tasks requiring skilled OT services in intensive rehabilitation setting to return to PLOF safely. Discharge Recommendations: Home with Home health OT  Equipment Recommendations: Equipment Needed: Yes  Other: Patient has shower chair and frame over toilet. PTA patient reports using a cane, may benefit from a walker. Patient may benefit from [facility] to increase indep in dressing tasks. Plan: Times per week: 90 minutes 5x/week, 30 minutes 1x/week  Current Treatment Recommendations: Strengthening, Balance Training, Functional Mobility Training, Endurance Training, Self-Care / ADL, Patient/Caregiver Education & Training, Safety Education & Training, Home Management Training    Patient Education  Patient Education: Home Exercise Program    Goals  Short term goals  Time Frame for Short term goals: 1 week  Short term goal 1: Pt will complete LB ADL with SBA and LHAE prn to increase indep with self care. -NOT MET CONTINUE  Short term goal 2: Patient will increase LUE AROM >90 degrees with min cues to increase indep in ADL tasks. -NOT MET CONTINUE  Short term goal 3: Pt will navigate to/from bathroom using RW with SBA and min cues for safety increase safety in toileting task. -NOT MET CONTINUE  Short term goal 4: Patient will complete simple homemaking task with SBA and min cues for safety to increase indep in home environment. -NOT MET CONTINUE  Short term goal 5: Patient will tolerate dynamic standing task with 1-2 UE release and SBA to increase safety and indep in sinkside grooming tasks. -NOT MET CONTINUE  Long term goals  Time Frame for Long term goals : 2-3 weeks  Long term goal 1: Patient will complete simple meal prep task with SBA using compensatory techniques prn to increase indep in making a sandwich at home. -NOT MET CONTINUE  Long term goal 2: Patient will complete BADL routine with SBA and using compensatory techniques prn to increase indep in home environment. -NOT MET CONTINUE    Following session, patient left in safe position with all fall risk precautions in place.

## 2021-06-01 NOTE — PROGRESS NOTES
[address] [address]  INPATIENT PHYSICAL THERAPY  DAILY NOTE  [facility]  Time In: 6871  Time Out: 1513  Timed Code Treatment Minutes: 39 Minutes  Minutes: 41          Date: 2021  Patient Name: Paulo Lan,  Gender:  male        MRN: [id number]  : 1945  (76 y.o.)  Referral Date : 21  Referring Practitioner: STEFANO Garner CNP  Diagnosis: Parkinson's disease  Additional Pertinent Hx: Pt is a 77-year-old male who was admitted to [facility] after presenting by EMS from local drugstore confused, had unwitnessed fall presented with generalized AMS. Prior history of similar presentations in the past.  PMH include antiphospholipid syndrome, DVT, sleep apnea, COPD, CKD, HTN, hypothyroidism and RCC. Patient unable to provide more history. History obtained from chart review and ED accounts. Pt transferred to  rehab . Prior Level of Function:  Lives With: Other (comment) (2 roomates in apartment in [address], common areas shared)  Type of Home: Apartment  Home Layout: One level  Home Access: Level entry  Home Equipment: Cane, Lift chair   Bathroom Shower/Tub: Tub/Shower unit  Bathroom Toilet: Handicap height (standard toilet with frame overlay)  Bathroom Equipment: Shower chair, Grab bars in shower  Bathroom Accessibility: Walker accessible    ADL Assistance: Independent  Homemaking Assistance: Independent  Homemaking Responsibilities: Yes  Ambulation Assistance: Independent  Transfer Assistance: Independent  Active : Yes  IADL Comments: He reports he usually orders food to be delivered or has staff at [facility] make his food, but states he will make himself a sandwich for lunch. States that staff does his laundry 1x/week in the apartment across the ha because he cannot go downstairs to use the resident laundry. Additional Comments: has an aid 3x/wk to assist with bathing and dressing BLEs, as well as homemaking.   Pt using cane PTA for mobility. Patient reports no family or support system nearby, but has a step-brother who lives in [place] that he willl go visit. Restrictions/Precautions:  Restrictions/Precautions: General Precautions, Fall Risk     SUBJECTIVE: Patient in room in chair, agreeable to PT. Pt cooperative and pleasant. Pt notes significant gas this date. RN notified that pt okay to use AFO when up with staff, but to be removed with rest.  No redness noted on skin with removal of AFO. PAIN: right hip and low back, not rated    Vitals: Vitals not assessed per clinical judgement, see nursing flowsheet    OBJECTIVE:  Bed Mobility:  Rolling to Left: Stand By Assistance   Supine to Sit: Stand By Assistance  Sit to Supine: Minimal Assistance --assist with right LE    Transfers:  Sit to Stand: Contact Guard Assistance  Stand to [employer] Assistance    Ambulation:  Contact Guard Assistance  Distance: 5', 140' with AFO, 110' without AFO  Surface: Level Tile  Device:Rolling Walker  Gait Deviations: Forward Flexed Posture, Slow Ananya, Decreased Step Length on Left, Decreased Weight Shift Right, Decreased Gait Speed and Decreased Heel Strike Bilaterally  -Trialled gait with no AFO, improved step height compared to evaluation on 5/28, however did drag toe x3 initially  *Verbal cues for upright posture    Balance:  Static Sitting Balance:  Independent  Static Standing Balance: Contact Guard Assistance  Dynamic Standing Balance: Contact Guard Assistance    Exercise:  Patient was guided in 1 set(s) 5-10 reps of exercise to both lower extremities, stretches on right only. Supine: single knee to chest right 20-30\" hold passive x5, modified piriformis stretch 20\" hold x5, figure 4 piriformis stretch 20\" x3, heel slides x10, bridges x10, short arc quad right x10, resisted knee extension/hip extension x10 right, sidelying on left and right hip extension stretch plus mild resistance x10.   Exercises were completed for increased independence with functional mobility. Functional Outcome Measures: Not completed     Education on ice, however pt not wishing to use at this time due to feeling cold. ASSESSMENT:  Assessment: Patient progressing toward established goals. Activity Tolerance:  Patient tolerance of  treatment: good. Equipment Recommendations:Equipment Needed: Yes (RW, monitor need for W/C)  Discharge Recommendations:  Continue to assess pending progress    Plan: Times per week: 5x/wk 90min, 1x/wk 30min  Times per day: Twice a day  Current Treatment Recommendations: Strengthening, Transfer Training, Endurance Training, Neuromuscular Re-education, Patient/Caregiver Education & Training, Balance Training, Gait Training, Home Exercise Program, Functional Mobility Training, Stair training, Safety Education & Training    Patient Education  Patient Education: Gait, Verbal Exercise Instruction,  - Patient Verbalized Understanding, - Patient Requires Continued Education    Goals:  Patient goals : reduce pain, get stronger  Short term goals  Time Frame for Short term goals: 1 week  Short term goal 1: Patient will complete supine < > sit with SBA to transfer in/out of bed with decreased difficulty. Short term goal 2: Patient will complete sit < > stand with SBA with RW to stand to ambulate with increased independence. Short term goal 3: Patient will ambulate 48' with a RW and CGA, clearing right LE >/=50% of the time to increase safety with ambulation. MET, see LTG  Short term goal 4: Patient will complete rolling with SBA to reposition self with increased independence. Short term goal 5: Patient will propel wheelchair 150' with modified independence to increase independence and mobility in current living environment. Long term goals  Time Frame for Long term goals : 2 wks  Long term goal 1: Patient will complete supine < > sit and rolling with modified independence to transfer in/out of bed independently.   Long term goal 2: Patient will complete sit < > stand and stand pivot transfer with RW and modified independence to transfer surface to surface safely. Long term goal 3: Patient will ambulate 80' with a RW and Rt AFO and modified independence to navigate home safely. Long term goal 4: Patient will improve tinetti to greater than or equal to 19/28 to reduce his risk for falls. Long term goal 5: Patient will complete car transfer with supervision to transfer in/out of vehicle safely. Following session, patient left in safe position with all fall risk precautions in place.

## 2021-06-01 NOTE — PROGRESS NOTES
Physical Medicine & Rehabilitation   Progress Note    Chief Complaint:  Parkinson's, new diagnosis. Rehab needs    Subjective:   Patient seen on rounds with EDILIA Nicolas, following his inpatient rehab team conference. His roommate Carloz was also present. He complains of swelling and tenderness in his right thigh and has history of blood clots. On Xarelto but will check dopplers to R/O DVT. His ambulation has improved with left AFO. Stated he has been sleeping and his bowels have been moving.     Rehabilitation:  PT: reviewed during team conference  OT: reviewed during team conference  ST:  reviewed during team conference    Review of Systems:  CONSTITUTIONAL:  positive for  fatigue  EYES:  negative  HEENT:  negative  RESPIRATORY:  bipap use at night  CARDIOVASCULAR:  positive for  fatigue  GASTROINTESTINAL:  positive for reflux  GENITOURINARY:  retention  SKIN:  negative  HEMATOLOGIC/LYMPHATIC:  positive for swelling/edema  MUSCULOSKELETAL:  positive for  pain  NEUROLOGICAL:  positive for gait problems, weakness, numbness and pain and sciatica   BEHAVIOR/PSYCH:  positive for depressed mood  System review otherwise negative      Objective:  Vitals:    06/01/21 0740   BP: 100/62   Pulse: 67   Resp: 18   Temp: 98.2 °F (36.8 °C)   SpO2: 95%       awake  Orientation:   person, place, time  Mood: depressed  Affect: calm  General appearance: Patient is well nourished, well developed, well groomed and in no acute distress    Memory:   normal,   Attention/Concentration: normal  Language:  normal    Cranial Nerves:  cranial nerves II-XII are grossly intact  ROM:  normal  Motor Exam:  Motor exam is symmetrical 5 out of 5 upper extremities bilaterally  Motor exam is symmetrical 3+ out of 5 lower extremities bilaterally  Tone:  increased  Muscle bulk: within normal limits  Sensory:  Decreased     Heart: normal rate, regular rhythm, normal S1, S2, no murmurs, rubs, clicks or gallops  Lungs: clear to auscultation

## 2021-06-01 NOTE — PLAN OF CARE
Problem: Falls - Risk of:  Goal: Will remain free from falls  Description: Will remain free from falls  6/1/2021 0249 by Lai Vinson LPN  Outcome: Ongoing  No falls sustained at this time. Patient alert to call light and uses appropriately to alert staff to needs. Bed/chair alarms in use. Problem: Skin Integrity:  Goal: Absence of new skin breakdown  Description: Absence of new skin breakdown  6/1/2021 0249 by Lai Vinson LPN  Outcome: Ongoing  No new skin issues noted this shift. Care plan reviewed with patient. Patient verbalize understanding of the plan of care and contribute to goal setting.

## 2021-06-01 NOTE — PROGRESS NOTES
[address]  Occupational Therapy  Daily Note  Time:   Time In: 0830  Time Out: 0900  Timed Code Treatment Minutes: 30 Minutes  Minutes: 30          Date: 2021  Patient Name: Neno Hinton,   Gender: male      Room: [facility]56/056-A  MRN: [id number]  : 1945  (69 y.o.)  Referring Practitioner: STEFANO Patel CNP (ordering provider)  Radha Sanchez MD (attending)  Diagnosis: Parkinson's Disease  Additional Pertinent Hx: Neno Hinton is a 76 y.o. male who presents with complaint of altered mental status. Patient was found at local drugstore confused, patient states that he fell. Patient is on Xarelto. Mechanism of patient's fall is unclear, patient is overall confused and erratic, cannot reliably contribute to HPI/ROS. Admitted to IP Rehab on . Restrictions/Precautions:  Restrictions/Precautions: General Precautions, Fall Risk     SUBJECTIVE: pt lying supine upon arrival, agreeable to OT session. PAIN: Min c/o abdominal pain at start of session. Vitals: Vitals not assessed per clinical judgement, see nursing flowsheet    COGNITION: Decreased Insight and Decreased Safety Awareness    ADL:   Grooming: Contact Guard Assistance. CGA standing sink side for oral care, shaving face/head, and washing face. Lower Extremity Dressing: Moderate Assistance. ModA donning brace/R shoe. Silvia for L shoe. Emelia Herrera BALANCE:  Sitting Balance:  Stand By Assistance. EOB  Standing Balance: Contact Guard Assistance, Minimal Assistance. x12 minutes 30 secs within grooming task- one slight LOB noted requiring Silvia to correct. BED MOBILITY:  Supine to Sit: Stand By Assistance    Scooting: Stand By Assistance EOB    TRANSFERS:  Sit to Stand:  Contact Guard Assistance. Stand to Sit: Contact Guard Assistance. FUNCTIONAL MOBILITY:  Assistive Device: Rolling Walker  Assist Level:  Contact Guard Assistance.    Distance: Completed functional mobility to/from BR and within pt room at slow pace, one LOB noted and able to self correct. Pt requires seated rest break after trial of mobility, min fatigue noted. ASSESSMENT:     Activity Tolerance:  Patient tolerance of  treatment: fair. Discharge Recommendations: Home with Home health OT   Equipment Recommendations: Equipment Needed: Yes  Other: Patient has shower chair and frame over toilet. PTA patient reports using a cane, may benefit from a walker. Patient may benefit from [facility] to increase indep in dressing tasks. Plan: Times per week: 90 minutes 5x/week, 30 minutes 1x/week  Current Treatment Recommendations: Strengthening, Balance Training, Functional Mobility Training, Endurance Training, Self-Care / ADL, Patient/Caregiver Education & Training, Safety Education & Training, Home Management Training    Patient Education  Patient Education: ADL's, Assistive Device Safety and Safety with transfers and mobility. Goals  Short term goals  Time Frame for Short term goals: 1 week  Short term goal 1: Pt will complete LB ADL with SBA and LHAE prn to increase indep with self care. Short term goal 2: Patient will increase LUE AROM >90 degrees with min cues to increase indep in ADL tasks. Short term goal 3: Pt will navigate to/from bathroom using RW with SBA and min cues for safety increase safety in toileting task. Short term goal 4: Patient will complete simple homemaking task with SBA and min cues for safety to increase indep in home environment. Short term goal 5: Patient will tolerate dynamic standing task with 1-2 UE release and SBA to increase safety and indep in sinkside grooming tasks. Long term goals  Time Frame for Long term goals : 2-3 weeks  Long term goal 1: Patient will complete simple meal prep task with SBA using compensatory techniques prn to increase indep in making a sandwich at home.   Long term goal 2: Patient will complete BADL routine with SBA and using compensatory techniques prn to increase indep in home environment. Following session, patient left in safe position with all fall risk precautions in place.

## 2021-06-01 NOTE — PROGRESS NOTES
[facility]  [address] [facility]  Occupational Therapy  Daily Note  Time:   Time In: 1330  Time Out: 1400  Timed Code Treatment Minutes: 30 Minutes  Minutes: 30          Date: 2021  Patient Name: Chantel Truong,   Gender: male      Room: [facility]56/056-A  MRN: [id number]  : 1945  (76 y.o.)  Referring Practitioner: STEFANO Tovar CNP (ordering provider)  Prasanna Saravia MD (attending)  Diagnosis: Parkinson's Disease  Additional Pertinent Hx: Chantel Truong is a 76 y.o. male who presents with complaint of altered mental status. Patient was found at local drugstore confused, patient states that he fell. Patient is on Xarelto. Mechanism of patient's fall is unclear, patient is overall confused and erratic, cannot reliably contribute to HPI/ROS. Admitted to IP Rehab on . Restrictions/Precautions:  Restrictions/Precautions: General Precautions, Fall Risk     SUBJECTIVE: Up in chair upon arrival, agreeable to OT session, talkative    PAIN: 6/10: generalized    Vitals: Vitals not assessed per clinical judgement, see nursing flowsheet    COGNITION: Decreased Insight, Impaired Memory, Decreased Problem Solving, Decreased Safety Awareness and Impulsive    ADL:   No ADL's completed this session. German Simmons BALANCE:  Standing Balance: Contact Guard Assistance. x 8 minutes with 0-1 UE support while bowling     BED MOBILITY:  Not Tested    TRANSFERS:  Sit to Stand:  Contact Guard Assistance. bedside chair and w/c  Stand to Sit: Contact Kristopher Schwab. FUNCTIONAL MOBILITY:  Assistive Device: Rolling Walker  Assist Level:  Contact Guard Assistance. Distance: to easy street  Impulsive with walker safety     ADDITIONAL ACTIVITIES:  Propelled w/c with B UE greater than 75 feet with SBA    ASSESSMENT:  Assessment: Patient is progressing towards his goals, however limited goal attainment due to short length of stay.  Pt demonstrates impairments in functional mobility, strength, endurance, safety awareness, and overall safety and indep in ADL/IADL tasks requiring skilled OT services in intensive rehabilitation setting to return to PLOF safely. Activity Tolerance:  Patient tolerance of  treatment: good. Discharge Recommendations: Home with Home health OT   Equipment Recommendations: Equipment Needed: Yes  Other: Patient has shower chair and frame over toilet. PTA patient reports using a cane, may benefit from a walker. Patient may benefit from [facility] to increase indep in dressing tasks. Plan: Times per week: 90 minutes 5x/week, 30 minutes 1x/week  Current Treatment Recommendations: Strengthening, Balance Training, Functional Mobility Training, Endurance Training, Self-Care / ADL, Patient/Caregiver Education & Training, Safety Education & Training, Home Management Training    Patient Education  Patient Education: Reviewed Prior Education    Goals  Short term goals  Time Frame for Short term goals: 1 week  Short term goal 1: Pt will complete LB ADL with SBA and LHAE prn to increase indep with self care. Short term goal 2: Patient will increase LUE AROM >90 degrees with min cues to increase indep in ADL tasks. Short term goal 3: Pt will navigate to/from bathroom using RW with SBA and min cues for safety increase safety in toileting task. Short term goal 4: Patient will complete simple homemaking task with SBA and min cues for safety to increase indep in home environment. Short term goal 5: Patient will tolerate dynamic standing task with 1-2 UE release and SBA to increase safety and indep in sinkside grooming tasks. Long term goals  Time Frame for Long term goals : 2-3 weeks  Long term goal 1: Patient will complete simple meal prep task with SBA using compensatory techniques prn to increase indep in making a sandwich at home. Long term goal 2: Patient will complete BADL routine with SBA and using compensatory techniques prn to increase indep in home environment. Following session, patient left in safe position with all fall risk precautions in place.

## 2021-06-01 NOTE — PROGRESS NOTES
[address] THERAPY  [address] [facility]  PROGRESS NOTE    TIME   SLP Individual Minutes  Time In: 1000  Time Out: 1030  Minutes: 30  Timed Code Treatment Minutes: 30 Minutes       Date: 2021  Patient Name: [facility]      CSN: [id number]   : 1945  (76 y.o.)  Gender: male   Referring Physician: Naty Elkins MD  Diagnosis: Parkinson's Disease    Secondary Diagnosis: High level cognitive deficits   Precautions: Fall risk   Current Diet: Regular diet and thin liquids   Swallowing Strategies: Standard Universal Swallow Precautions  Date of Last MBS/FEES: Not Applicable    Pain:  No pain reported. Subjective:  Pt seen sitting upright in recliner, awake. Patient agreeable to cognitive therapy. Pleasant and cooperative. No family present. Short-Term Goals:  SHORT TERM GOAL #1:  Goal 1: Patient will complete STM tasks with 3-4 units (immediate/delayed) and working memory with 70% accuracy given MIN A to improve retention of personal, newly, and previously learned information. - GOAL MET; REVISE   REVISED Goal 1: Patient will complete STM tasks with 3-4 units (immediate/delayed) and working memory with 85% accuracy given MIN A to improve retention of personal, newly, and previously learned information. INTERVENTIONS: Did not address this date d/t focus on other goals. Prior Session   Functional carryover of 5 item grocery list:   24 hour delay:  indep/Mod I (extra time), 1/5 min cues     Functional carryover of 5 item grocery list:   Completed EXTENSIVE review of STM strategies using the acronym WRAP--Write it down, Repeat it, Associate it, and Pictures it.  Pt benefiting for use of repetition and visual imagery   Immediate recall:  indep   7 minute delay:  indep     Orientation:  indep     Working memory (complex change making via mental math):  indep, 1/6 extra time, 1/6 min cues  *independent utilization of \"self talk\" to enhance retention and mental manipulation     SHORT TERM GOAL #2:  Goal 2: Patient will complete higher level complex problem solving & executive functioning (meds/finances) with 70% accuracy given MIN A to improve independence with completion of ADLs/IADLs - GOAL MET; REVISE   REVISED Goal 2: Patient will complete higher level complex problem solving & executive functioning (meds/finances) with 85% accuracy given MIN A to improve independence with completion of ADLs/IADLs   INTERVENTIONS:   Money Management - Domain Surgical Menu  8/9 Independently, 1/9 given min cues   *excellent scanning   *Cues for working memory   **good math computation via pen and paper     Prior Session  Complex organization/selection of order from [employer] D's with compliance/adherence to heart heathy diet parameters:   Identification of items: 4/4 indep   Calculating levels of sodium/fat/saturated fat: 3/3 indep   Compliance with parameters: good success  Insight into selection of items for pt: decreased       SHORT TERM GOAL #3:  Goal 3: Patient will complete moderately complex organizational naming and sequential analysis tasks with 80% accuracy given MIN A to improve thought processing, and processing speed, and lexical retrieval. - GOAL NOT MET; CONTINUE   INTERVENTIONS: DNT d/t focus on other goals     SHORT TERM GOAL #4:  Goal 4: Patient will complete complex attention tasks (divided, alternating, sustained) with no more than 3 errors in a given task to improve attentiveness and reduce tangential and disorganized conversational discourse. - GOAL NOT MET; CONTINUE   INTERVENTIONS:   Pt was prompted to say \"elephant\" for even, \"octopus\" for odd, and \"ta\" for face cards. Pt independently identified animal with corresponding card 26/26 cards, and completed task in 1:30 minutes.   *excellent success  *good sustained, selective, and alternating attention within task this date      Long-Term Goals:  Timeframe for Long-term Goals: 4 weeks    LONG TERM tolerance of  treatment: good. Assessment/Plan: Patient progressing toward established goals. Continues to require skilled care of licensed speech pathologist to progress toward achievement of established goals and plan of care. .     Plan for Next Session: high level executive functioning and thought organization     [facility]) [address] Shirin Silver M.A[address]

## 2021-06-02 PROCEDURE — 94660 CPAP INITIATION&MGMT: CPT

## 2021-06-02 PROCEDURE — 97530 THERAPEUTIC ACTIVITIES: CPT

## 2021-06-02 PROCEDURE — 97116 GAIT TRAINING THERAPY: CPT

## 2021-06-02 PROCEDURE — 6370000000 HC RX 637 (ALT 250 FOR IP): Performed by: FAMILY MEDICINE

## 2021-06-02 PROCEDURE — 6370000000 HC RX 637 (ALT 250 FOR IP): Performed by: PHYSICAL MEDICINE & REHABILITATION

## 2021-06-02 PROCEDURE — 97129 THER IVNTJ 1ST 15 MIN: CPT

## 2021-06-02 PROCEDURE — 99232 SBSQ HOSP IP/OBS MODERATE 35: CPT | Performed by: PHYSICAL MEDICINE & REHABILITATION

## 2021-06-02 PROCEDURE — 6370000000 HC RX 637 (ALT 250 FOR IP): Performed by: NURSE PRACTITIONER

## 2021-06-02 PROCEDURE — 6370000000 HC RX 637 (ALT 250 FOR IP): Performed by: INTERNAL MEDICINE

## 2021-06-02 PROCEDURE — 97110 THERAPEUTIC EXERCISES: CPT

## 2021-06-02 PROCEDURE — 94761 N-INVAS EAR/PLS OXIMETRY MLT: CPT

## 2021-06-02 PROCEDURE — 97535 SELF CARE MNGMENT TRAINING: CPT

## 2021-06-02 PROCEDURE — 1180000000 HC REHAB R&B

## 2021-06-02 PROCEDURE — 97130 THER IVNTJ EA ADDL 15 MIN: CPT

## 2021-06-02 RX ORDER — SIMETHICONE 80 MG
80 TABLET,CHEWABLE ORAL EVERY 6 HOURS PRN
Status: DISCONTINUED | OUTPATIENT
Start: 2021-06-02 | End: 2021-06-08 | Stop reason: HOSPADM

## 2021-06-02 RX ORDER — PREGABALIN 50 MG/1
150 CAPSULE ORAL 2 TIMES DAILY
Status: DISCONTINUED | OUTPATIENT
Start: 2021-06-02 | End: 2021-06-08 | Stop reason: HOSPADM

## 2021-06-02 RX ADMIN — OXYCODONE HYDROCHLORIDE AND ACETAMINOPHEN 1 TABLET: 10; 325 TABLET ORAL at 21:41

## 2021-06-02 RX ADMIN — ANTACID TABLETS 500 MG: 500 TABLET, CHEWABLE ORAL at 21:42

## 2021-06-02 RX ADMIN — MECLIZINE HCL 25 MG: 25 TABLET, CHEWABLE ORAL at 08:27

## 2021-06-02 RX ADMIN — SIMETHICONE 80 MG: 80 TABLET, CHEWABLE ORAL at 15:38

## 2021-06-02 RX ADMIN — TAMSULOSIN HYDROCHLORIDE 0.4 MG: 0.4 CAPSULE ORAL at 08:27

## 2021-06-02 RX ADMIN — PANTOPRAZOLE SODIUM 40 MG: 40 TABLET, DELAYED RELEASE ORAL at 05:51

## 2021-06-02 RX ADMIN — ATORVASTATIN CALCIUM 20 MG: 20 TABLET, FILM COATED ORAL at 08:28

## 2021-06-02 RX ADMIN — RIVAROXABAN 10 MG: 10 TABLET, FILM COATED ORAL at 08:27

## 2021-06-02 RX ADMIN — OXYCODONE HYDROCHLORIDE AND ACETAMINOPHEN 1 TABLET: 10; 325 TABLET ORAL at 12:42

## 2021-06-02 RX ADMIN — OXYCODONE HYDROCHLORIDE AND ACETAMINOPHEN 1 TABLET: 10; 325 TABLET ORAL at 08:32

## 2021-06-02 RX ADMIN — DICYCLOMINE HYDROCHLORIDE 10 MG: 10 CAPSULE ORAL at 05:51

## 2021-06-02 RX ADMIN — OXYCODONE HYDROCHLORIDE AND ACETAMINOPHEN 1 TABLET: 10; 325 TABLET ORAL at 17:40

## 2021-06-02 RX ADMIN — DICYCLOMINE HYDROCHLORIDE 10 MG: 10 CAPSULE ORAL at 15:38

## 2021-06-02 RX ADMIN — Medication 6 MG: at 21:45

## 2021-06-02 RX ADMIN — DICYCLOMINE HYDROCHLORIDE 10 MG: 10 CAPSULE ORAL at 11:00

## 2021-06-02 RX ADMIN — PREGABALIN 50 MG: 50 CAPSULE ORAL at 08:27

## 2021-06-02 RX ADMIN — PREGABALIN 150 MG: 50 CAPSULE ORAL at 21:41

## 2021-06-02 ASSESSMENT — PAIN DESCRIPTION - PROGRESSION: CLINICAL_PROGRESSION: NOT CHANGED

## 2021-06-02 ASSESSMENT — PAIN DESCRIPTION - LOCATION
LOCATION: BACK
LOCATION: LEG

## 2021-06-02 ASSESSMENT — PAIN DESCRIPTION - ONSET: ONSET: ON-GOING

## 2021-06-02 ASSESSMENT — PAIN SCALES - GENERAL
PAINLEVEL_OUTOF10: 7
PAINLEVEL_OUTOF10: 8

## 2021-06-02 ASSESSMENT — PAIN DESCRIPTION - DESCRIPTORS: DESCRIPTORS: ACHING

## 2021-06-02 ASSESSMENT — PAIN DESCRIPTION - PAIN TYPE
TYPE: CHRONIC PAIN
TYPE: CHRONIC PAIN

## 2021-06-02 ASSESSMENT — PAIN DESCRIPTION - FREQUENCY: FREQUENCY: CONTINUOUS

## 2021-06-02 ASSESSMENT — PAIN DESCRIPTION - ORIENTATION: ORIENTATION: RIGHT;LEFT

## 2021-06-02 NOTE — PROGRESS NOTES
[address] [facility] PHYSICAL THERAPY  DAILY NOTE  [facility]  Time In: 2819  Time Out: 1502  Timed Code Treatment Minutes: 30 Minutes  Minutes: 30          Date: 2021  Patient Name: Evelyn Gramajo,  Gender:  male        MRN: [id number]  : 1945  (76 y.o.)  Referral Date : 21  Referring Practitioner: STEFANO Oglesby CNP  Diagnosis: Parkinson's disease  Additional Pertinent Hx: Pt is a 79-year-old male who was admitted to [facility] after presenting by EMS from local drugstore confused, had unwitnessed fall presented with generalized AMS. Prior history of similar presentations in the past.  PMH include antiphospholipid syndrome, DVT, sleep apnea, COPD, CKD, HTN, hypothyroidism and RCC. Patient unable to provide more history. History obtained from chart review and ED accounts. Pt transferred to  rehab . Prior Level of Function:  Lives With: Other (comment) (2 roomates in apartment in [address], common areas shared)  Type of Home: Apartment  Home Layout: One level  Home Access: Level entry  Home Equipment: Cane, Lift chair   Bathroom Shower/Tub: Tub/Shower unit  Bathroom Toilet: Handicap height (standard toilet with frame overlay)  Bathroom Equipment: Shower chair, Grab bars in shower  Bathroom Accessibility: Walker accessible    ADL Assistance: Independent  Homemaking Assistance: Independent  Homemaking Responsibilities: Yes  Ambulation Assistance: Independent  Transfer Assistance: Independent  Active : Yes  IADL Comments: He reports he usually orders food to be delivered or has staff at [facility] make his food, but states he will make himself a sandwich for lunch. States that staff does his laundry 1x/week in the apartment across the ha because he cannot go downstairs to use the resident laundry. Additional Comments: has an aid 3x/wk to assist with bathing and dressing BLEs, as well as homemaking.   Pt using cane PTA for mobility. Patient reports no family or support system nearby, but has a step-brother who lives in [place] that he willl go visit. Restrictions/Precautions:  Restrictions/Precautions: General Precautions, Fall Risk     SUBJECTIVE: Patient in room in chair, agreeable to PT. Pt cooperative and pleasant. Difficulty abducting and adducting right LE noted this session. PAIN: right low back    Vitals: Vitals not assessed per clinical judgement, see nursing flowsheet    OBJECTIVE:  Bed Mobility:  Sit to Supine: Stand By Assistance     Transfers:  Sit to Stand: Contact Guard Assistance  Stand to [facility] 68   *Attempting to stand before walker in front of him    Ambulation:  Contact Guard Assistance  Distance: 80', 140'  Surface: Level Tile   Device:Rolling Walker  Gait Deviations: Forward Flexed Posture, Slow Ananya, Decreased Step Length on Right, Decreased Weight Shift Right, Decreased Gait Speed and Decreased Heel Strike Bilaterally  *Improved step length right noted this session, however pt continues to demonstrate decreased SLS with mild instability. Verbal cues for increased quad contraction for improved gait fluidity and control. Balance:  Static Sitting Balance:  Independent  Static Standing Balance: Contact Guard Assistance  Dynamic Standing Balance: Contact Guard Assistance, Minimal Assistance    Exercise:  Exercises were completed for increased independence with functional mobility. 1. Forward step taps on balance pods on 2\" step and side step taps on level ground with RW support with verbal cues such as \"right foot to yellow\" and \"left foot to 4\" for example. Pt demonstrates difficulty with right hip abduction and adduction  With attempt to complete right hip adduction pt unable to move right LE back to neutral without moderate assistance from PT. Weakness noted left hip abduction as well.   2. NuStep complete x 4 minutes, one minute level 1, 1 minute level 3, 2 minutes 2: Patient will complete sit < > stand and stand pivot transfer with RW and modified independence to transfer surface to surface safely. Long term goal 3: Patient will ambulate 80' with a RW and Rt AFO and modified independence to navigate home safely. Long term goal 4: Patient will improve tinetti to greater than or equal to 19/28 to reduce his risk for falls. Long term goal 5: Patient will complete car transfer with supervision to transfer in/out of vehicle safely. Following session, patient left in safe position with all fall risk precautions in place.

## 2021-06-02 NOTE — PROGRESS NOTES
[address] THERAPY  [address] [facility]  DAILY NOTE    TIME   SLP Individual Minutes  Time In: 1130  Time Out: 1200  Minutes: 30  Timed Code Treatment Minutes: 30 Minutes       Date: 2021  Patient Name: Sherice Ng      CSN: [id number]   : 1945  (76 y.o.)  Gender: male   Referring Physician: Jaydon Patel MD  Diagnosis: Parkinson's Disease    Secondary Diagnosis: High level cognitive deficits   Precautions: Fall risk   Current Diet: Regular diet and thin liquids   Swallowing Strategies: Standard Universal Swallow Precautions  Date of Last MBS/FEES: Not Applicable    Pain:  No pain reported. Subjective:  Pt seen sitting upright in recliner, awake. Patient agreeable to cognitive therapy. Pleasant and cooperative. No family present. Short-Term Goals:  SHORT TERM GOAL #1:  REVISED Goal 1: Patient will complete STM tasks with 3-4 units (immediate/delayed) and working memory with 85% accuracy given MIN A to improve retention of personal, newly, and previously learned information. INTERVENTIONS:   Immediate/Delayed Recall  Patient unable to identify STM strategies independently despite strategies being written on whiteboard. ST completed review of STM strategies using the acronym WRAP--Write it down, Repeat it, Associate it, and Pictures it. ST then prompted patient to generate grocery list for meal. Patient identified ground beef, tomato sauce, garlic, peppers, onion, chili powder. Patient identified that he would utilize write it down to recall grocery list. ST then encouraged patient to utilize repeat it and picture it to improve retention and recall of information.      Immediate Recall: 6/6 independently   Delayed Recall (15 minutes): 6/6 independently withOUT list   *excellent recall of list following implementation of memory strategies      Working Memory - mental manipulation of 3 units   5/8 independently, 3/8 min cues   *verbal cue required for repetition of word list  *excellent mental manipulation of 3 units   *improved success with task following repetition of word list x2     SHORT TERM GOAL #2:  REVISED Goal 2: Patient will complete higher level complex problem solving & executive functioning (meds/finances) with 85% accuracy given MIN A to improve independence with completion of ADLs/IADLs   INTERVENTIONS:   Time Management - Word Problems (verbally presented)   10/10 independently     Prior Session  Money Management - [place] Menu  8/9 Independently, 1/9 given min cues   *excellent scanning   *Cues for working memory   **good math computation via pen and paper      SHORT TERM GOAL #3:  Goal 3: Patient will complete moderately complex organizational naming and sequential analysis tasks with 80% accuracy given MIN A to improve thought processing, and processing speed, and lexical retrieval.   INTERVENTIONS: DNT d/t focus on other goals     SHORT TERM GOAL #4:  Goal 4: Patient will complete complex attention tasks (divided, alternating, sustained) with no more than 3 errors in a given task to improve attentiveness and reduce tangential and disorganized conversational discourse. INTERVENTIONS: Did not address this date d/t focus on other goals. Prior Session   Pt was prompted to say \"elephant\" for even, \"octopus\" for odd, and \"ta\" for face cards. Pt independently identified animal with corresponding card 26/26 cards, and completed task in 1:30 minutes. *excellent success  *good sustained, selective, and alternating attention within task this date      Long-Term Goals:  Timeframe for Long-term Goals: 4 weeks    LONG TERM GOAL #1:  Goal 1: Patient will improve cognition to a mod I to independent level of functioning to encourage safe d/c home (apartment) at [facility] managing finances, medications, and hopeful return to driving.      Comprehension: 6 - Complex ideas 90% or device (hearing aid or glasses- if patient is primarily a visual learner)  Expression: 6 - Device used to express complex ideas/needs  Social Interaction: 6 - Patient requires medication for mood and/or effect  Problem Solvin - Patient able to solve simple/routine tasks  Memory: 4 - Patient remembers 75-90%+ of the time       EDUCATION:  Learner: Patient  Education:  Reviewed results and recommendations of this evaluation, Reviewed ST goals and Plan of Care and Reviewed recommendations for follow-up  Evaluation of Education: Verbalizes understanding and Family not present    ASSESSMENT/PLAN:  Activity Tolerance:  Patient tolerance of  treatment: good. Assessment/Plan: Patient progressing toward established goals. Continues to require skilled care of licensed speech pathologist to progress toward achievement of established goals and plan of care.     Plan for Next Session: thought organization and attention      [facility]) [address] M.A[address]

## 2021-06-02 NOTE — PLAN OF CARE
Problem: Falls - Risk of:  Goal: Will remain free from falls  Description: Will remain free from falls  Outcome: Ongoing  Note: Patient remains free from falls this shift. Fall precautions in place. Non skid footwear used with transferring. Educated patient to use call light when needed assistance with ambulation. Patient used call light appropriate this shift. Problem: Skin Integrity:  Goal: Will show no infection signs and symptoms  Description: Will show no infection signs and symptoms  Outcome: Ongoing  Note: No skin breakdown during hospitalization. Problem: Pain:  Goal: Pain level will decrease  Description: Pain level will decrease  Outcome: Ongoing  Note: Patient has scheduled pain medication and PRN Tylenol. Problem: Discharge Planning:  Goal: Patients continuum of care needs are met  Description: Patients continuum of care needs are met  Outcome: Ongoing  Note: Patients needs met this shift. Patients discharge to be determined. Assess at discharge.
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
## 2021-06-02 NOTE — PROGRESS NOTES
[facility]  Recreational Therapy  Daily Note  [address]    Time Spent with Patient: 0 minutes    Date:  6/2/2021       Patient Name: Violeta Tan      MRN: [id number]      YOB: 1945 (69 y.o.)       Gender: male  Diagnosis: Parkinson's Disease  Referring Practitioner: STEFANO Soto CNP (ordering provider)  Katelin Gallegos MD (attending)    Patient enjoyed spending time with our pet therapy dogs.  Pt was with P.T. and walked into Recreational Therapy room- pt immediately saw Nelida Tabor our pet therapy dog and wanted to sit down and pet her- Nelida Tabor sat on pt's lap while patient petted her- pt was social and talked about his jewelry box he wanted to make- bright affect    Electronically signed by: AMERICA Leonard  Date: 6/2/2021

## 2021-06-02 NOTE — PROGRESS NOTES
[address] [facility] PHYSICAL THERAPY  DAILY NOTE  [facility]    Time In: 1030  Time Out: 1130  Timed Code Treatment Minutes: 61 Minutes  Minutes: 60          Date: 2021  Patient Name: Portia Saleh,  Gender:  male        MRN: [id number]  : 1945  (76 y.o.)  Referral Date : 21  Referring Practitioner: STEFANO Castañeda CNP  Diagnosis: Parkinson's disease  Additional Pertinent Hx: Pt is a 77-year-old male who was admitted to [facility] after presenting by EMS from local drugstore confused, had unwitnessed fall presented with generalized AMS. Prior history of similar presentations in the past.  PMH include antiphospholipid syndrome, DVT, sleep apnea, COPD, CKD, HTN, hypothyroidism and RCC. Patient unable to provide more history. History obtained from chart review and ED accounts. Pt transferred to  rehab . Prior Level of Function:  Lives With: Other (comment) (2 roomates in apartment in  [address], common areas shared)  Type of Home: Apartment  Home Layout: One level  Home Access: Level entry  Home Equipment: Cane, Lift chair   Bathroom Shower/Tub: Tub/Shower unit  Bathroom Toilet: Handicap height (standard toilet with frame overlay)  Bathroom Equipment: Shower chair, Grab bars in shower  Bathroom Accessibility: Walker accessible    ADL Assistance: Independent  Homemaking Assistance: Independent  Homemaking Responsibilities: Yes  Ambulation Assistance: Independent  Transfer Assistance: Independent  Active : Yes  IADL Comments: He reports he usually orders food to be delivered or has staff at [facility] make his food, but states he will make himself a sandwich for lunch. States that staff does his laundry 1x/week in the apartment across the ha because he cannot go downstairs to use the resident laundry. Additional Comments: has an aid 3x/wk to assist with bathing and dressing BLEs, as well as homemaking.   Pt using cane PTA for mobility. Patient reports no family or support system nearby, but has a step-brother who lives in [place] that he willl go visit. Restrictions/Precautions:  Restrictions/Precautions: General Precautions, Fall Risk     SUBJECTIVE: Patient in room in chair, agreeable to PT. Pt cooperative and pleasant. Pt presents with some decreased insight of impairments. PAIN: bilateral LE's and low back, pain appears to be decreased compared to yesterday 6/1    Vitals: Vitals not assessed per clinical judgement, see nursing flowsheet    OBJECTIVE:  Bed Mobility:  Rolling to Right: Stand By Assistance   Supine to Sit: Stand By Assistance  Sit to Supine: Stand By Assistance    Increased time to complete    Transfers:  Sit to Stand: Stand By Assistance, [employer] Assistance  Stand to [facility] 68   *Pt attempting to stand without RW x2 times. Decreased insight noted, pt feels he could stand and ambulate without RW at this time. Education on benefits of continued use of RW at this time. *Verbal cues for hand placement    Ambulation:  Contact Guard Assistance  Distance: 6'x2, 70', 75', 80', 75'  Surface: Level Tile  Device:Rolling Walker + AFO80' and 75' distances  Gait Deviations: Forward Flexed Posture, Slow Ananya, Decreased Step Length on Right, Decreased Weight Shift Right, Decreased Gait Speed, Decreased Heel Strike Bilaterally and Mild Path Deviations  *Verbal cues and tactile cues for increased muscle contraction during stance phase for improved stability and gait pattern. When pt focusing on right LE control, improved fluidity noted. *Pt initially ambulating with no AFO right LE after stretches. Pt able to clear foot consistently first 28' to 36' then occasional slight catch. Pt with increased right toe catching/drag with increased fatigue. Improved gait fluidity with AFO noted.     Balance:  Static Standing Balance: Stand By Assistance, Contact Guard Assistance  Dynamic Standing Balance: Contact Guard Assistance    Exercise:  Patient was guided in 1 set(s) 8-10 reps of exercise to both lower extremities. Exercises were completed for increased independence with functional mobility. Parallel bars:  1. Standing step ups 4\" step x 8 each LE, increased difficulty with right, requiring verbal cues for improved eccentric control on right LE   2. Side step 8'x2 each direction  Supine:  1. Single knee to chest right active 20-30\"x5, x3 left  2. Modified piriformis stretch 30\"x3 right  3. Figure 4 piriformis stretch 30\"x1 right active  4. Straight leg raise hamstring stretch 30\"  5. Straight leg raise hamstring stretch 15\" then 5\" resistance hip extension. Increased range noted ~10 degrees. x10 reps completed  6. Resisted knee/hip extension from knee and hip flexed x10.  7. Heel slides x10 right  8. Hip Abduction x10 right  9. Bridges x3--discontinued due to low back discomfort    Functional Outcome Measures: Not completed       ASSESSMENT:  Assessment: Patient progressing toward established goals. Activity Tolerance:  Patient tolerance of  treatment: good.      Equipment Recommendations:Equipment Needed: Yes (RW, monitor need for W/C)  Discharge Recommendations:  Continue to assess pending progress    Plan: Times per week: 5x/wk 90min, 1x/wk 30min  Times per day: Twice a day  Current Treatment Recommendations: Strengthening, Transfer Training, Endurance Training, Neuromuscular Re-education, Patient/Caregiver Education & Training, Balance Training, Gait Training, Home Exercise Program, Functional Mobility Training, Stair training, Safety Education & Training    Patient Education  Patient Education: Transfers, Gait, Verbal Exercise Instruction,  - Patient Verbalized Understanding, - Patient Requires Continued Education    Goals:  Patient goals : reduce pain, get stronger  Short term goals  Time Frame for Short term goals: 1 week  Short term goal 1: Patient will complete supine < > sit with SBA to transfer in/out of bed with decreased difficulty. Short term goal 2: Patient will complete sit < > stand with SBA with RW to stand to ambulate with increased independence. Short term goal 3: Patient will ambulate 48' with a RW and CGA, clearing right LE >/=50% of the time to increase safety with ambulation. MET, see LTG  Short term goal 4: Patient will complete rolling with SBA to reposition self with increased independence. Short term goal 5: Patient will propel wheelchair 150' with modified independence to increase independence and mobility in current living environment. Long term goals  Time Frame for Long term goals : 2 wks  Long term goal 1: Patient will complete supine < > sit and rolling with modified independence to transfer in/out of bed independently. Long term goal 2: Patient will complete sit < > stand and stand pivot transfer with RW and modified independence to transfer surface to surface safely. Long term goal 3: Patient will ambulate 80' with a RW and Rt AFO and modified independence to navigate home safely. Long term goal 4: Patient will improve tinetti to greater than or equal to 19/28 to reduce his risk for falls. Long term goal 5: Patient will complete car transfer with supervision to transfer in/out of vehicle safely. Following session, patient left in safe position with all fall risk precautions in place.

## 2021-06-02 NOTE — PROGRESS NOTES
[address] [facility] THERAPY  [address] [facility]  DAILY NOTE    TIME   SLP Individual Minutes  Time In: 1330  Time Out: 1400  Minutes: 30  Timed Code Treatment Minutes: 30 Minutes       Date: 2021  Patient Name: Titus Perez      CSN: [id number]   : 1945  (76 y.o.)  Gender: male   Referring Physician: Chaparro Cano MD  Diagnosis: Parkinson's Disease    Secondary Diagnosis: High level cognitive deficits   Precautions: Fall risk   Current Diet: Regular diet and thin liquids   Swallowing Strategies: Standard Universal Swallow Precautions  Date of Last MBS/FEES: Not Applicable    Pain:  No pain reported. Subjective:  Pt seen sitting upright in recliner, awake. Patient agreeable to cognitive therapy. Pleasant and cooperative. No family present. Short-Term Goals:  SHORT TERM GOAL #1:  REVISED Goal 1: Patient will complete STM tasks with 3-4 units (immediate/delayed) and working memory with 85% accuracy given MIN A to improve retention of personal, newly, and previously learned information. INTERVENTIONS: See goal 2 for details. AM Session  Immediate/Delayed Recall  Patient unable to identify STM strategies independently despite strategies being written on whiteboard. ST completed review of STM strategies using the acronym WRAP--Write it down, Repeat it, Associate it, and Pictures it. ST then prompted patient to generate grocery list for meal. Patient identified ground beef, tomato sauce, garlic, peppers, onion, chili powder. Patient identified that he would utilize write it down to recall grocery list. ST then encouraged patient to utilize repeat it and picture it to improve retention and recall of information.      Immediate Recall: 6/6 independently   Delayed Recall (15 minutes): 6/6 independently withOUT list   *excellent recall of list following implementation of memory strategies      Working Memory - mental manipulation of 3 units    independently, 3/8 min cues   *verbal cue required for repetition of word list  *excellent mental manipulation of 3 units   *improved success with task following repetition of word list x2     SHORT TERM GOAL #2:  REVISED Goal 2: Patient will complete higher level complex problem solving & executive functioning (meds/finances) with 85% accuracy given MIN A to improve independence with completion of ADLs/IADLs   INTERVENTIONS:   Card Game - [id number] [address] [facility] explained set-up and rules for a new card game. Following explanation of card game, patient required intermittent min verbal cues regarding when to  a card and when to play a card. Otherwise patient demonstrated excellent retention, carryover, strategy, and execution of card game resulting in patient winning first game. For second game, ST prompted patient describe set up and rules to card game. Patient independently set up second card game and correctly explained rules to card game. Patient required min verbal cues to provide additional details for rules. Patient did not require cues regarding rules in second game. Prior Session  Time Management - Word Problems (verbally presented)   10/10 independently   * excellent mental math computation with time. Money Management - [place] [employer]  8/9 Independently, 1/9 given min cues   *excellent scanning   *Cues for working memory   **good math computation via pen and paper      SHORT TERM GOAL #3:  Goal 3: Patient will complete moderately complex organizational naming and sequential analysis tasks with 80% accuracy given MIN A to improve thought processing, and processing speed, and lexical retrieval.   INTERVENTIONS: DNT d/t focus on other goals     SHORT TERM GOAL #4:  Goal 4: Patient will complete complex attention tasks (divided, alternating, sustained) with no more than 3 errors in a given task to improve attentiveness and reduce tangential and disorganized conversational discourse. INTERVENTIONS: Excellent sustained and selective attention during card game this date. Prior Session   Pt was prompted to say \"elephant\" for even, \"octopus\" for odd, and \"ta\" for face cards. Pt independently identified animal with corresponding card 26/26 cards, and completed task in 1:30 minutes. *excellent success  *good sustained, selective, and alternating attention within task this date      Long-Term Goals:  Timeframe for Long-term Goals: 4 weeks    LONG TERM GOAL #1:  Goal 1: Patient will improve cognition to a mod I to independent level of functioning to encourage safe d/c home (apartment) at [facility] managing finances, medications, and hopeful return to driving. Comprehension: 6 - Complex ideas 90% or device (hearing aid or glasses- if patient is primarily a visual learner)  Expression: 6 - Device used to express complex ideas/needs  Social Interaction: 6 - Patient requires medication for mood and/or effect  Problem Solvin - Patient able to solve simple/routine tasks  Memory: 4 - Patient remembers 75-90%+ of the time       EDUCATION:  Learner: Patient  Education:  Reviewed results and recommendations of this evaluation, Reviewed ST goals and Plan of Care and Reviewed recommendations for follow-up  Evaluation of Education: Verbalizes understanding and Family not present    ASSESSMENT/PLAN:  Activity Tolerance:  Patient tolerance of  treatment: good. Assessment/Plan: Patient progressing toward established goals. Continues to require skilled care of licensed speech pathologist to progress toward achievement of established goals and plan of care.     Plan for Next Session: thought organization, memory, attention     Rosalva S[facility]back Memorial Medical CenterMarlen Villegas M.A., [address]

## 2021-06-02 NOTE — PROGRESS NOTES
[address]  Occupational Therapy  Daily Note  Time:   Time In: 830  Time Out: 930  Timed Code Treatment Minutes: 60 Minutes  Minutes: 60          Date: 2021  Patient Name: Shira Farias,   Gender: male      Room: [facility]56/056-A  MRN: [id number]  : 1945  (76 y.o.)  Referring Practitioner: STEFANO Lim CNP (ordering provider)  Sunny Vallecillo MD (attending)  Diagnosis: Parkinson's Disease  Additional Pertinent Hx: Shira Farias is a 76 y.o. male who presents with complaint of altered mental status. Patient was found at local drugstore confused, patient states that he fell. Patient is on Xarelto. Mechanism of patient's fall is unclear, patient is overall confused and erratic, cannot reliably contribute to HPI/ROS. Admitted to IP Rehab on . Restrictions/Precautions:  Restrictions/Precautions: General Precautions, Fall Risk     SUBJECTIVE: Patient supine in bed with HOB elevated, eating breakfast upon arrival, agreeable to therapy this date. Patient pleasant and cooperative throughout session. When patient was finished with all ADL tasks, patient states \"oh I need to put on my shirt yet\" although patient had completed tasks requiring verbal reminder to which patient states oh I forgot. Patient able to orient to year, month with increased time with ability to verbalize problem solving however patient states \"I do not know the date\" requiring verbal cues to orient; verbalizing understanding. Patient mentioned to this therapist that he needs to purchase two 100 dollar gift cards for a girl in [facility]. Patient states this girl is using the gift cards to buy a bus ticket to come visit. Patient also notes he has sent this girl money before however \"she buys other things and has not came to see me yet\".   Patient states \"my nephew\" set the girl up with him and she used to live in [place] however her and her entire family currently now reside in [place] [facility]. This therapist advised patient that bus tickets can not be purchased with [employer] cards and is concerned the patient may be sending money to a person that is not real.  Patient states he will call said person later today and ask her as he is concerned now as well.  notified of said conversation. PAIN: 7/10: back    Vitals: Nurse checked vitals prior to session    COGNITION: Decreased Recall, Decreased Insight, Decreased Problem Solving and Decreased Safety Awareness    ADL:   Feeding: Modified Independent. Grooming: Stand By Assistance. standing at sink in order to wash head/face  Bathing: Stand By Assistance and [address] Patient completed UB bathing standing at sink with SBA, requires CGA for safety in order to decrease fall risk in order to wash upper portion of B LE as well as front/rear periarea requiring increased time. Patient completed lower portion of B LE and B feet with reacher seated in chair with arms with SBA requiring verbal/visual cues for technique with reacher to inrease indpendence with bathing task; demonstrating understanding requiring incresaed time. Upper Extremity Dressing: Stand By Assistance. standing at sink in order to doff/[facility] shirt requiring increased time d/t decreased AROM of B UE. Lower Extremity Dressing: Moderate Assistance. patient able to [facility] shorts standing with CGA for safety, no LOB. patient doff B socks with reacher seated in chair with arms with verbal/visual cues in order to complete with reacher, demonstrating understanding. completed [facility] shorts seated in chair with arms with verbal cues for technique to ease ability to complete; demonstrating understanding. Patient requires assistance with evelyn B sofia hose/socks seated in chair with arms. Toileting: Stand By Assistance. standing, standard toilet  Toilet Transfer: Stand By Assistance. standing, standard toilet utilized RW. BALANCE:  Sitting Balance:  Modified Independent. seated EOB, chair with arms  Standing Balance: Stand By Assistance, [address] with RW, no LOB     Patient identified one of their personal goals is to be able to sustain functional standing positions in order to complete various ADL and IADL skills in standing position, such as sinkside grooming or washing dishes. Dynamic standing task was then facilitated to challenge balance, endurance, strength. Patient required SBA-CGA, and demo'ed an endurance of ~25 minutes. BED MOBILITY:  Supine to Sit: Stand By Assistance log roll technique, assistance with bed positioning to flat    TRANSFERS:  Sit to Stand:  Stand By Assistance, [address] Patient completed EOB to RW with SBA, chair with arms to RW, CGA for safety with increased time  Stand to Sit: Stand By Assistance. RW to chair with arms x 2 trials, RW to bedside chair    FUNCTIONAL MOBILITY:  Assistive Device: Rolling Walker  Assist Level:  Stand By Assistance. Distance: To and from bathroom  Steady pace, no LOB     ASSESSMENT:     Activity Tolerance:  Patient tolerance of  treatment: good. Discharge Recommendations: Home with Home health OT   Equipment Recommendations: Equipment Needed: Yes  Other: Patient has shower chair and frame over toilet. PTA patient reports using a cane, may benefit from a walker. Patient may benefit from Rosalita Sieve to increase indep in dressing tasks.   Plan: Times per week: 90 minutes 5x/week, 30 minutes 1x/week  Current Treatment Recommendations: Strengthening, Balance Training, Functional Mobility Training, Endurance Training, Self-Care / ADL, Patient/Caregiver Education & Training, Safety Education & Training, Home Management Training    Patient Education  Patient Education: Plan of Care, ADL's, IADL's, Equipment Education, Home Safety and Importance of Increasing Activity    Goals  Short term goals  Time Frame for Short term goals: 1 week  Short term goal 1: Pt will complete LB ADL with SBA and LHAE prn to increase indep with self care. Short term goal 2: Patient will increase LUE AROM >90 degrees with min cues to increase indep in ADL tasks. Short term goal 3: Pt will navigate to/from bathroom using RW with SBA and min cues for safety increase safety in toileting task. Short term goal 4: Patient will complete simple homemaking task with SBA and min cues for safety to increase indep in home environment. Short term goal 5: Patient will tolerate dynamic standing task with 1-2 UE release and SBA to increase safety and indep in sinkside grooming tasks. Long term goals  Time Frame for Long term goals : 2-3 weeks  Long term goal 1: Patient will complete simple meal prep task with SBA using compensatory techniques prn to increase indep in making a sandwich at home. Long term goal 2: Patient will complete BADL routine with SBA and using compensatory techniques prn to increase indep in home environment. Following session, patient left in safe position with all fall risk precautions in place.

## 2021-06-02 NOTE — PROGRESS NOTES
[address]  Occupational Therapy  Daily Note  Time:   Time In: 1200  Time Out: 1230  Timed Code Treatment Minutes: 30 Minutes  Minutes: 30    Date: 2021  Patient Name: Chantel Truong,   Gender: male      Room: [facility]56/056-A  MRN: [id number]  : 1945  (76 y.o.)  Referring Practitioner: STEFANO Tovar CNP (ordering provider)  Prasanna Saravia MD (attending)  Diagnosis: Parkinson's Disease  Additional Pertinent Hx: Chantel Truong is a 76 y.o. male who presents with complaint of altered mental status. Patient was found at local drugstore confused, patient states that he fell. Patient is on Xarelto. Mechanism of patient's fall is unclear, patient is overall confused and erratic, cannot reliably contribute to HPI/ROS. Admitted to IP Rehab on . Restrictions/Precautions:  Restrictions/Precautions: General Precautions, Fall Risk     SUBJECTIVE: Patient in chair upon arrival agreeable to OT treatment. PAIN: 8/10: back right leg     Vitals: Vitals not assessed per clinical judgement, see nursing flowsheet    COGNITION: Decreased Insight    ADL:   No ADL's completed this session. German Simmons BALANCE:  Sitting Balance:  Supervision. Standing Balance: Contact Guard Assistance. BED MOBILITY:  Not Tested    TRANSFERS:  Sit to Stand:  Contact Guard Assistance. Stand to Sit: Contact Guard Assistance. FUNCTIONAL MOBILITY:  Assistive Device: Rolling Walker  Assist Level:  Contact Guard Assistance. Distance: around unit      ADDITIONAL ACTIVITIES:  Guided patient through BUE AROM this date x10 reps x 1set in all planes and joints available  in order to increase BUE strength and improve activity tolerance for ADLs and homemaking tasks. ASSESSMENT:     Activity Tolerance:  Patient tolerance of  treatment: fair.        Discharge Recommendations: Home with Home health OT   Equipment Recommendations: Equipment Needed: Yes  Other: Patient has shower chair and frame over toilet. PTA patient reports using a cane, may benefit from a walker. Patient may benefit from [facility] to increase indep in dressing tasks. Plan: Times per week: 90 minutes 5x/week, 30 minutes 1x/week  Current Treatment Recommendations: Strengthening, Balance Training, Functional Mobility Training, Endurance Training, Self-Care / ADL, Patient/Caregiver Education & Training, Safety Education & Training, Home Management Training    Patient Education  Patient Education: Home Exercise Program    Goals  Short term goals  Time Frame for Short term goals: 1 week  Short term goal 1: Pt will complete LB ADL with SBA and LHAE prn to increase indep with self care. Short term goal 2: Patient will increase LUE AROM >90 degrees with min cues to increase indep in ADL tasks. Short term goal 3: Pt will navigate to/from bathroom using RW with SBA and min cues for safety increase safety in toileting task. Short term goal 4: Patient will complete simple homemaking task with SBA and min cues for safety to increase indep in home environment. Short term goal 5: Patient will tolerate dynamic standing task with 1-2 UE release and SBA to increase safety and indep in sinkside grooming tasks. Long term goals  Time Frame for Long term goals : 2-3 weeks  Long term goal 1: Patient will complete simple meal prep task with SBA using compensatory techniques prn to increase indep in making a sandwich at home. Long term goal 2: Patient will complete BADL routine with SBA and using compensatory techniques prn to increase indep in home environment. Following session, patient left in safe position with all fall risk precautions in place.

## 2021-06-02 NOTE — PROGRESS NOTES
[facility]  Unit[facility]56/056-A  YOB: 1945  MRN: [id number] Acct: [de-identified]   Admitting Diagnosis: Parkinson's disease ([facility] Tatianna Angeles Date:  5/27/2021  Hospital Day: 6    Assessment:     Active Problems:    Parkinson's disease ([facility].)  Resolved Problems:    * No resolved hospital problems. *      Plan:     Encouraged continued ambulation for his gas feelings        Subjective:     Patient has no complaint of CP or SOB over his normal and is passing gas today. .   Medication side effects: none    Scheduled Meds:   pregabalin  150 mg Oral BID    dicyclomine  10 mg Oral TID AC    pantoprazole  40 mg Oral QAM AC    atorvastatin  20 mg Oral Daily    losartan  50 mg Oral Daily    meclizine  25 mg Oral Daily    oxyCODONE-acetaminophen  1 tablet Oral 4x Daily    rivaroxaban  10 mg Oral Q24H    tamsulosin  0.4 mg Oral Daily     Continuous Infusions:  PRN Meds:simethicone, calcium carbonate, melatonin, bisacodyl, polyethylene glycol, senna, albuterol, acetaminophen **OR** [DISCONTINUED] acetaminophen    Review of Systems  Pertinent items are noted in HPI. Objective:     Patient Vitals for the past 8 hrs:   BP Temp src Pulse Resp SpO2   06/02/21 0832 (!) 168/67 Oral 53 18 94 %     I/O last 3 completed shifts:   In: 1140 [P.O.:1140]  Out: -   I/O this shift:  In: 360 [P.O.:360]  Out: -     BP (!) 168/67   Pulse 53   Temp 97.8 °F (36.6 °C) (Oral)   Resp 18   Ht 5' 10\" (1.778 m)   Wt 239 lb 6.4 oz (108.6 kg)   SpO2 94%   BMI 34.35 kg/m²     General appearance: alert, appears stated age and cooperative  Head: Normocephalic, without obvious abnormality, atraumatic  Lungs: clear to auscultation bilaterally  Chest wall: no tenderness  Heart: regular rate and rhythm, S1, S2 normal, no murmur, click, rub or gallop  Abdomen: soft, non-tender; bowel sounds normal; no masses,  no organomegaly  Extremities: edema 1+ bilaterally  Skin: atrophic  Neuro: weak      Electronically signed by Ivy Collins MD on 6/2/2021 at 1:31 PM

## 2021-06-02 NOTE — PROGRESS NOTES
sandwich for lunch. States that staff does his laundry 1x/week in the apartment across the ha because he cannot go downstairs to use the resident laundry. Additional Comments: has an aid 3x/wk to assist with bathing and dressing BLEs, as well as homemaking. Pt using cane PTA for mobility. Patient reports no family or support system nearby, but has a step-brother who lives in [place] that he willl go visit.     Restrictions/Precautions:  Restrictions/Precautions: General Precautions, Fall Risk      SUBJECTIVE: Patient in room in chair, agreeable to PT. Pt cooperative and pleasant. Pt notes significant gas this date. RN notified that pt okay to use AFO when up with staff, but to be removed with rest.  No redness noted on skin with removal of AFO.    PAIN: right hip and low back, not rated     Vitals: Vitals not assessed per clinical judgement, see nursing flowsheet     OBJECTIVE:  Bed Mobility:  Rolling to Left: Stand By Assistance   Supine to Sit: Stand By Assistance  Sit to Supine: Minimal Assistance --assist with right LE     Transfers:  Sit to Stand: Contact Guard Assistance  Stand to [employer] Assistance     Ambulation:  Contact Guard Assistance  Distance: 5', 140' with AFO, 110' without AFO  Surface: Level Tile  Device:Rolling Walker  Gait Deviations: Forward Flexed Posture, Slow Ananya, Decreased Step Length on Left, Decreased Weight Shift Right, Decreased Gait Speed and Decreased Heel Strike Bilaterally  -Trialled gait with no AFO, improved step height compared to evaluation on 5/28, however did drag toe x3 initially  *Verbal cues for upright posture     Balance:  Static Sitting Balance:  Independent  Static Standing Balance: Contact Guard Assistance  Dynamic Standing Balance: Contact Guard Assistance     Exercise:  Patient was guided in 1 set(s) 5-10 reps of exercise to both lower extremities, stretches on right only.   Supine: single knee to chest right 20-30\" hold passive x5, modified piriformis stretch 20\" hold x5, figure 4 piriformis stretch 20\" x3, heel slides x10, bridges x10, short arc quad right x10, resisted knee extension/hip extension x10 right, sidelying on left and right hip extension stretch plus mild resistance x10. Exercises were completed for increased independence with functional mobility.     OT:     Diagnosis: Parkinson's Disease  Additional Pertinent Hx: Cindy Díaz is a 76 y.o. male who presents with complaint of altered mental status. Patient was found at local drugstore confused, patient states that he fell. Patient is on Xarelto. Mechanism of patient's fall is unclear, patient is overall confused and erratic, cannot reliably contribute to HPI/ROS. Admitted to IP Rehab on 5/27.     Restrictions/Precautions:  Restrictions/Precautions: General Precautions, Fall Risk      SUBJECTIVE: Patient supine in bed with HOB elevated, eating breakfast upon arrival, agreeable to therapy this date. Patient pleasant and cooperative throughout session. When patient was finished with all ADL tasks, patient states \"oh I need to put on my shirt yet\" although patient had completed tasks requiring verbal reminder to which patient states oh I forgot. Patient able to orient to year, month with increased time with ability to verbalize problem solving however patient states \"I do not know the date\" requiring verbal cues to orient; verbalizing understanding. Patient mentioned to this therapist that he needs to purchase two 100 dollar gift cards for a girl in [facility]. Patient states this girl is using the gift cards to buy a bus ticket to come visit. Patient also notes he has sent this girl money before however \"she buys other things and has not came to see me yet\". Patient states \"my nephew\" set the girl up with him and she used to live in [place] however her and her entire family currently now reside in [facility] [place].   This therapist advised patient that bus tickets can not be purchased with [employer] cards and is concerned the patient may be sending money to a person that is not real.  Patient states he will call said person later today and ask her as he is concerned now as well.  notified of said conversation.         PAIN: 7/10: back     Vitals: Nurse checked vitals prior to session     COGNITION: Decreased Recall, Decreased Insight, Decreased Problem Solving and Decreased Safety Awareness     ADL:   Feeding: Modified Independent. Grooming: Stand By Assistance. standing at sink in order to wash head/face  Bathing: Stand By Assistance and [employer]. Patient completed UB bathing standing at sink with SBA, requires CGA for safety in order to decrease fall risk in order to wash upper portion of B LE as well as front/rear periarea requiring increased time. Patient completed lower portion of B LE and B feet with reacher seated in chair with arms with SBA requiring verbal/visual cues for technique with reacher to inrease indpendence with bathing task; demonstrating understanding requiring incresaed time. Upper Extremity Dressing: Stand By Assistance. standing at sink in order to doff/[facility] hospital shirt requiring increased time d/t decreased AROM of B UE. Lower Extremity Dressing: Moderate Assistance. patient able to [facility] hospital shorts standing with CGA for safety, no LOB. patient doff B socks with reacher seated in chair with arms with verbal/visual cues in order to complete with reacher, demonstrating understanding. completed [facility] hospital shorts seated in chair with arms with verbal cues for technique to ease ability to complete; demonstrating understanding. Patient requires assistance with evelyn B sofia hose/socks seated in chair with arms. Toileting: Stand By Assistance. standing, standard toilet  Toilet Transfer: Stand By Assistance. standing, standard toilet utilized RW.     BALANCE:  Sitting Balance:  Modified Independent.  seated EOB, chair with arms  Standing Balance: Stand By Assistance, [employer]. with RW, no LOB      Patient identified one of their personal goals is to be able to sustain functional standing positions in order to complete various ADL and IADL skills in standing position, such as sinkside grooming or washing dishes. Dynamic standing task was then facilitated to challenge balance, endurance, strength. Patient required SBA-CGA, and [facility] an endurance of ~25 minutes.      BED MOBILITY:  Supine to Sit: Stand By Assistance log roll technique, assistance with bed positioning to flat     TRANSFERS:  Sit to Stand:  Stand By Assistance, [employer]. Patient completed EOB to RW with SBA, chair with arms to RW, CGA for safety with increased time  Stand to Sit: Stand By Assistance. RW to chair with arms x 2 trials, RW to bedside chair     FUNCTIONAL MOBILITY:  Assistive Device: Rolling Walker  Assist Level:  Stand By Assistance. Distance: To and from bathroom  Steady pace, no LOB      ASSESSMENT:  Activity Tolerance:  Patient tolerance of  treatment: good. Discharge Recommendations: Home with Home health OT   Equipment Recommendations: Equipment Needed: Yes  Other: Patient has shower chair and frame over toilet. PTA patient reports using a cane, may benefit from a walker. Patient may benefit from [facility] to increase indep in dressing tasks.   Plan: Times per week: 90 minutes 5x/week, 30 minutes 1x/week  Current Treatment Recommendations: Strengthening, Balance Training, Functional Mobility Training, Endurance Training, Self-Care / ADL, Patient/Caregiver Education & Training, Safety Education & Training, Home Management Training     ST:     Diagnosis: Parkinson's Disease    Secondary Diagnosis: High level cognitive deficits   Precautions: Fall risk   Current Diet:  Regular diet and thin liquids   Swallowing Strategies: Standard Universal Swallow Precautions  Date of Last MBS/FEES: Not Applicable     Pain:  No pain reported.    Subjective:  Pt seen sitting upright in recliner, awake. Patient agreeable to cognitive therapy. Pleasant and cooperative. No family present.      Short-Term Goals:  SHORT TERM GOAL #1:  Goal 1: Patient will complete STM tasks with 3-4 units (immediate/delayed) and working memory with 70% accuracy given MIN A to improve retention of personal, newly, and previously learned information. - GOAL MET; REVISE   REVISED Goal 1: Patient will complete STM tasks with 3-4 units (immediate/delayed) and working memory with 85% accuracy given MIN A to improve retention of personal, newly, and previously learned information. INTERVENTIONS: Did not address this date d/t focus on other goals.      Prior Session   Functional carryover of 5 item grocery list:   24 hour delay: 4/5 indep/Mod I (extra time), 1/5 min cues      Functional carryover of 5 item grocery list:   Completed EXTENSIVE review of STM strategies using the acronym WRAP--Write it down, Repeat it, Associate it, and Pictures it.  Pt benefiting for use of repetition and visual imagery   Immediate recall: 5/5 indep   7 minute delay: 5/5 indep      Orientation: 7/7 indep      Working memory (complex change making via mental math): 4/6 indep, 1/6 extra time, 1/6 min cues  *independent utilization of \"self talk\" to enhance retention and mental manipulation      SHORT TERM GOAL #2:  Goal 2: Patient will complete higher level complex problem solving & executive functioning (meds/finances) with 70% accuracy given MIN A to improve independence with completion of ADLs/IADLs - GOAL MET; REVISE   REVISED Goal 2: Patient will complete higher level complex problem solving & executive functioning (meds/finances) with 85% accuracy given MIN A to improve independence with completion of ADLs/IADLs   INTERVENTIONS:   Money Management - [place] [employer]  8/9 Independently, 1/9 given min cues   *excellent scanning   *Cues for working memory   **good math computation via pen and paper      Prior Session  Complex organization/selection of order from [employer] with compliance/adherence to heart heathy diet parameters:   Identification of items: / indep   Calculating levels of sodium/fat/saturated fat: 3/3 indep   Compliance with parameters: good success  Insight into selection of items for pt: decreased         SHORT TERM GOAL #3:  Goal 3: Patient will complete moderately complex organizational naming and sequential analysis tasks with 80% accuracy given MIN A to improve thought processing, and processing speed, and lexical retrieval. - GOAL NOT MET; CONTINUE   INTERVENTIONS: DNT d/t focus on other goals      SHORT TERM GOAL #4:  Goal 4: Patient will complete complex attention tasks (divided, alternating, sustained) with no more than 3 errors in a given task to improve attentiveness and reduce tangential and disorganized conversational discourse. - GOAL NOT MET; CONTINUE   INTERVENTIONS:   Pt was prompted to say \"elephant\" for even, \"octopus\" for odd, and \"ta\" for face cards. Pt independently identified animal with corresponding card 26/26 cards, and completed task in 1:30 minutes. *excellent success  *good sustained, selective, and alternating attention within task this date        Long-Term Goals:  Timeframe for Long-term Goals: 4 weeks     LONG TERM GOAL #1:  Goal 1: Patient will improve cognition to a mod I to independent level of functioning to encourage safe d/c home (apartment) at [facility] managing finances, medications, and hopeful return to driving. - ONGOING      Comprehension: 6 - Complex ideas 90% or device (hearing aid or glasses- if patient is primarily a visual learner)  Expression: 6 - Device used to express complex ideas/needs  Social Interaction: 6 - Patient requires medication for mood and/or effect  Problem Solvin - Patient able to solve simple/routine tasks  Memory: 4 - Patient remembers 75-90%+ of the time      SUMMARY: Patient has met 2/4 STGs and 0/1 LTGs during reporting period. Patient  continues to present with mild high level cognitive deficits. Impairments within the domains of higher level complex problem solving, executive functioning, complex STM (immediate, delayed recall), and complex attention. Significantly improved carryover with utilization of skilled memory strategies (I.e. visual imagery, repetition). Patient requires assist with set up of task containing multiple variables/components given impaired task analysis and attention to details. No barriers towards goal achievement. Patient highly motivated/engaged within therapeutic sessions. Progression of diet to regular and thin with no overt difficulties. Overall function deemed to be [facility]. No further dysphagia management warranted at this time.  Given high prior level of independence (I.e. organization of medications, finances, driving) patient would benefit from continued skilled ST to aid in successful return to the home setting and return management of IADL's post discharge.      EDUCATION:    Review of Systems:  CONSTITUTIONAL:  positive for  fatigue  EYES:  negative  HEENT:  negative  RESPIRATORY:  bipap use at night  CARDIOVASCULAR:  positive for  fatigue  GASTROINTESTINAL:  positive for reflux  GENITOURINARY:  retention  SKIN:  negative  HEMATOLOGIC/LYMPHATIC:  positive for swelling/edema  MUSCULOSKELETAL:  positive for  pain  NEUROLOGICAL:  positive for gait problems, weakness, numbness and pain and sciatica   BEHAVIOR/PSYCH:  positive for depressed mood  System review otherwise negative      Objective:  Vitals:    06/02/21 0832   BP: (!) 168/67   Pulse: 53   Resp: 18   Temp:    SpO2: 94%       awake  Orientation:   person, place, time  Mood: depressed  Affect: calm  General appearance: Patient is well nourished, well developed, well groomed and in no acute distress    Memory:   normal,   Attention/Concentration: normal  Language:  normal    Cranial Nerves:  cranial nerves II-XII are grossly intact  ROM:  normal  Motor Exam:  Motor exam is symmetrical 5 out of 5 upper extremities bilaterally  Motor exam is symmetrical 3+ out of 5 lower extremities bilaterally  Tone:  increased  Muscle bulk: within normal limits  Sensory:  Decreased     Heart: normal rate, regular rhythm, normal S1, S2, no murmurs, rubs, clicks or gallops  Lungs: clear to auscultation without wheezes or rales  Abdomen: soft, non-tender, non-distended, normal bowel sounds, no masses or organomegaly    Skin: warm and dry, no rash or erythema  Peripheral vascular: Pulses: Normal upper and lower extremity pulses; Edema: trace      Diagnostics:   No results found for this or any previous visit (from the past 24 hour(s)).     Impression:  · Parkinson's disease with bradykinesia, increased tone at left wrist, hushed voice, frequent falls with multiple admissions   · Encephalopathy/AMS likely secondary to illicit drug use  · Antiphospholipid syndrome  · Central sleep apnea on BiPAP  · Troponin leak likely related to chronic kidney disease  · Acute kidney injury   · Gastroesophageal reflux disease  · Anemia and leukopenia  · COPD  · Obesity  · Neuropathy   · Acid reflux   · Gassiness    Plan:  Continue current therapies  Prophylaxis: DVT - xarelto, GI - continue protonix daily, and tums PRN  Pain: tylenol, percocet, neurontin  Bowels: dulcolax, miralax, senna  · Bladder: flomax  · Plan trial sinemet this week once therapy evals complete to monitor benefit   · Psychology consult for coping and depression  · Melatonin 6mg nightly (scheduled per patient request)  · Add Simethicone 80 mg po q 6 hours prn for gassiness  · Change Lyrica dose to 150 mg po 2x/day as that has been effective for him in the past.      Missed Therapy Time:  · None    Thania Gill MD

## 2021-06-03 PROCEDURE — 2700000000 HC OXYGEN THERAPY PER DAY

## 2021-06-03 PROCEDURE — 97130 THER IVNTJ EA ADDL 15 MIN: CPT

## 2021-06-03 PROCEDURE — 1180000000 HC REHAB R&B

## 2021-06-03 PROCEDURE — 94761 N-INVAS EAR/PLS OXIMETRY MLT: CPT

## 2021-06-03 PROCEDURE — 94660 CPAP INITIATION&MGMT: CPT

## 2021-06-03 PROCEDURE — 97530 THERAPEUTIC ACTIVITIES: CPT

## 2021-06-03 PROCEDURE — 6370000000 HC RX 637 (ALT 250 FOR IP): Performed by: NURSE PRACTITIONER

## 2021-06-03 PROCEDURE — 99232 SBSQ HOSP IP/OBS MODERATE 35: CPT | Performed by: NURSE PRACTITIONER

## 2021-06-03 PROCEDURE — 6370000000 HC RX 637 (ALT 250 FOR IP): Performed by: PHYSICAL MEDICINE & REHABILITATION

## 2021-06-03 PROCEDURE — 6370000000 HC RX 637 (ALT 250 FOR IP): Performed by: INTERNAL MEDICINE

## 2021-06-03 PROCEDURE — 97110 THERAPEUTIC EXERCISES: CPT

## 2021-06-03 PROCEDURE — 97116 GAIT TRAINING THERAPY: CPT

## 2021-06-03 PROCEDURE — 97535 SELF CARE MNGMENT TRAINING: CPT

## 2021-06-03 PROCEDURE — 6370000000 HC RX 637 (ALT 250 FOR IP): Performed by: FAMILY MEDICINE

## 2021-06-03 PROCEDURE — 97129 THER IVNTJ 1ST 15 MIN: CPT

## 2021-06-03 RX ADMIN — MECLIZINE HCL 25 MG: 25 TABLET, CHEWABLE ORAL at 08:29

## 2021-06-03 RX ADMIN — ATORVASTATIN CALCIUM 20 MG: 20 TABLET, FILM COATED ORAL at 08:27

## 2021-06-03 RX ADMIN — LOSARTAN POTASSIUM 50 MG: 50 TABLET, FILM COATED ORAL at 08:27

## 2021-06-03 RX ADMIN — RIVAROXABAN 10 MG: 10 TABLET, FILM COATED ORAL at 08:27

## 2021-06-03 RX ADMIN — DICYCLOMINE HYDROCHLORIDE 10 MG: 10 CAPSULE ORAL at 11:00

## 2021-06-03 RX ADMIN — Medication 6 MG: at 20:24

## 2021-06-03 RX ADMIN — OXYCODONE HYDROCHLORIDE AND ACETAMINOPHEN 1 TABLET: 10; 325 TABLET ORAL at 20:25

## 2021-06-03 RX ADMIN — OXYCODONE HYDROCHLORIDE AND ACETAMINOPHEN 1 TABLET: 10; 325 TABLET ORAL at 12:37

## 2021-06-03 RX ADMIN — PREGABALIN 150 MG: 50 CAPSULE ORAL at 08:28

## 2021-06-03 RX ADMIN — ANTACID TABLETS 500 MG: 500 TABLET, CHEWABLE ORAL at 06:24

## 2021-06-03 RX ADMIN — PREGABALIN 150 MG: 50 CAPSULE ORAL at 20:25

## 2021-06-03 RX ADMIN — DICYCLOMINE HYDROCHLORIDE 10 MG: 10 CAPSULE ORAL at 06:22

## 2021-06-03 RX ADMIN — DICYCLOMINE HYDROCHLORIDE 10 MG: 10 CAPSULE ORAL at 16:00

## 2021-06-03 RX ADMIN — PANTOPRAZOLE SODIUM 40 MG: 40 TABLET, DELAYED RELEASE ORAL at 06:23

## 2021-06-03 RX ADMIN — OXYCODONE HYDROCHLORIDE AND ACETAMINOPHEN 1 TABLET: 10; 325 TABLET ORAL at 08:29

## 2021-06-03 RX ADMIN — Medication 8.8 MG: at 17:20

## 2021-06-03 RX ADMIN — TAMSULOSIN HYDROCHLORIDE 0.4 MG: 0.4 CAPSULE ORAL at 08:27

## 2021-06-03 RX ADMIN — OXYCODONE HYDROCHLORIDE AND ACETAMINOPHEN 1 TABLET: 10; 325 TABLET ORAL at 17:11

## 2021-06-03 ASSESSMENT — PAIN DESCRIPTION - PROGRESSION
CLINICAL_PROGRESSION: NOT CHANGED
CLINICAL_PROGRESSION: NOT CHANGED

## 2021-06-03 ASSESSMENT — PAIN SCALES - GENERAL
PAINLEVEL_OUTOF10: 9
PAINLEVEL_OUTOF10: 7
PAINLEVEL_OUTOF10: 7
PAINLEVEL_OUTOF10: 8

## 2021-06-03 ASSESSMENT — PAIN DESCRIPTION - DESCRIPTORS: DESCRIPTORS: ACHING

## 2021-06-03 ASSESSMENT — PAIN DESCRIPTION - ONSET: ONSET: ON-GOING

## 2021-06-03 ASSESSMENT — PAIN DESCRIPTION - LOCATION
LOCATION: BACK
LOCATION: BACK

## 2021-06-03 ASSESSMENT — PAIN - FUNCTIONAL ASSESSMENT: PAIN_FUNCTIONAL_ASSESSMENT: PREVENTS OR INTERFERES SOME ACTIVE ACTIVITIES AND ADLS

## 2021-06-03 ASSESSMENT — PAIN DESCRIPTION - FREQUENCY: FREQUENCY: CONTINUOUS

## 2021-06-03 ASSESSMENT — PAIN DESCRIPTION - PAIN TYPE
TYPE: CHRONIC PAIN
TYPE: CHRONIC PAIN

## 2021-06-03 NOTE — PROGRESS NOTES
[address]  Occupational Therapy  Daily Note  Time:   Time In: 1000  Time Out: 1100  Timed Code Treatment Minutes: 60 Minutes  Minutes: 60          Date: 6/3/2021  Patient Name: Lisette Ellis,   Gender: male      Room: [facility]56/056-A  MRN: [id number]  : 1945  (69 y.o.)  Referring Practitioner: STEFANO Earl CNP (ordering provider)  Alexa Shabazz MD (attending)  Diagnosis: Parkinson's Disease  Additional Pertinent Hx: Lisette Ellis is a 76 y.o. male who presents with complaint of altered mental status. Patient was found at local drugstore confused, patient states that he fell. Patient is on Xarelto. Mechanism of patient's fall is unclear, patient is overall confused and erratic, cannot reliably contribute to HPI/ROS. Admitted to IP Rehab on . Restrictions/Precautions:  Restrictions/Precautions: General Precautions, Fall Risk     SUBJECTIVE: Patient seated in bedside chair upon arrival. Agreeable to OT session    PAIN: Complains of pain in back did not rate    Vitals: Vitals not assessed per clinical judgement, see nursing flowsheet    COGNITION: Decreased Insight and Decreased Safety Awareness    ADL:   Bathing: Minimal Assistance. To wash B feet only. Able to wash rashida area/bottom while standing with CGA. SBA to wash remaining areas seated on tub bench  Upper Extremity Dressing: with set-up. To doff/don tshirt in seated position with increased time  Lower Extremity Dressing: Moderate Assistance. To don R AFO, B VIVIAN hose, and shoes seated in chair. Able to don shorts with increased time. Pulled up over hips with CGA for balance  Tub Transfer: Minimal Assistance. Bringing RLE over ledge. Able to sit on tub bench and bring LEs over ledge with increased time and cueing for safe technique. CGA for sit/stand and stand/sit using grab bar. BALANCE:  Sitting Balance:  Stand By Assistance.     Standing Balance: Contact Guard Assistance. BED MOBILITY:  Not Tested    TRANSFERS:  Sit to Stand:  Contact Guard Assistance. From various surfaces  Stand to Sit: Contact Guard Assistance. To various surfaces    FUNCTIONAL MOBILITY:  Assistive Device: Rolling Walker  Assist Level:  Contact Guard Assistance. Distance: To and from shower room  Slow pace, no LOB noted      ASSESSMENT:     Activity Tolerance:  Patient tolerance of  treatment: fair. Discharge Recommendations: Home with Home health OT   Equipment Recommendations: Equipment Needed: Yes  Other: Patient has shower chair and frame over toilet. PTA patient reports using a cane, may benefit from a walker. Patient may benefit from [facility] to increase indep in dressing tasks. Plan: Times per week: 90 minutes 5x/week, 30 minutes 1x/week  Current Treatment Recommendations: Strengthening, Balance Training, Functional Mobility Training, Endurance Training, Self-Care / ADL, Patient/Caregiver Education & Training, Safety Education & Training, Home Management Training    Patient Education  Patient Education: safety with transfers and mobility, ADL strategies    Goals  Short term goals  Time Frame for Short term goals: 1 week  Short term goal 1: Pt will complete LB ADL with SBA and LHAE prn to increase indep with self care. Short term goal 2: Patient will increase LUE AROM >90 degrees with min cues to increase indep in ADL tasks. Short term goal 3: Pt will navigate to/from bathroom using RW with SBA and min cues for safety increase safety in toileting task. Short term goal 4: Patient will complete simple homemaking task with SBA and min cues for safety to increase indep in home environment. Short term goal 5: Patient will tolerate dynamic standing task with 1-2 UE release and SBA to increase safety and indep in sinkside grooming tasks.   Long term goals  Time Frame for Long term goals : 2-3 weeks  Long term goal 1: Patient will complete simple meal prep task with SBA using compensatory techniques prn to increase indep in making a sandwich at home. Long term goal 2: Patient will complete BADL routine with SBA and using compensatory techniques prn to increase indep in home environment. Following session, patient left in safe position with all fall risk precautions in place.

## 2021-06-03 NOTE — PLAN OF CARE
Problem: Falls - Risk of:  Goal: Will remain free from falls  Description: Will remain free from falls  6/3/2021 0037 by Roseann Turk RN  Outcome: Ongoing  No falls noted this shift. Uses the call light appropriately. Bed Alarm in place. Problem: Skin Integrity:  Goal: Will show no infection signs and symptoms  Description: Will show no infection signs and symptoms  6/3/2021 0037 by Roseann Turk RN  Outcome: Ongoing  No skin breakdown noted this shift. Problem: Pain:  Goal: Pain level will decrease  Description: Pain level will decrease  6/3/2021 0037 by Roseann Turk RN  Outcome: Ongoing  6/2/2021 1526 by Colton Velazquez LPN  Outcome: Ongoing  Note: Patient has scheduled pain medication and PRN Tylenol.

## 2021-06-03 NOTE — PROGRESS NOTES
[address] [place] [facility]  [facility]  DAILY NOTE    TIME   SLP Individual Minutes  Time In: 1330  Time Out: 1400  Minutes: 30  Timed Code Treatment Minutes: 30 Minutes       Date: 6/3/2021  Patient Name: Evelyn Gramajo      CSN: [id number]   : 1945  (76 y.o.)  Gender: male   Referring Physician: Raymon Catalan MD  Diagnosis: Parkinson's Disease    Secondary Diagnosis: High level cognitive deficits   Precautions: Fall risk   Current Diet: Regular diet and thin liquids   Swallowing Strategies: Standard Universal Swallow Precautions  Date of Last MBS/FEES: Not Applicable    Pain:  No pain reported. Subjective:  Pt seen sitting upright in recliner, awake. Patient agreeable to cognitive therapy. Pleasant and cooperative. No family present. Short-Term Goals:  SHORT TERM GOAL #1:  REVISED Goal 1: Patient will complete STM tasks with 3-4 units (immediate/delayed) and working memory with 85% accuracy given MIN A to improve retention of personal, newly, and previously learned information. INTERVENTIONS:   Delayed Recall of Grocery List from :  independently,  given min cues   *excellent recall following an extended period of time. Working Memory   Pt was prompted to say \"elephant\" for even, \"octopus\" for odd, and \"ta\" for face cards. Pt independently identified animal with corresponding card 25/25 cards    Pt was prompted to say opposite color of suit, \"elephant\" for even, \"octopus\" for odd, and \"ta\" for face cards.  Pt independently identified animal with corresponding card /26 independently,  given min cues  *Excellent success with both tasks this date      SHORT TERM GOAL #2:  REVISED Goal 2: Patient will complete higher level complex problem solving & executive functioning (meds/finances) with 85% accuracy given MIN A to improve independence with completion of ADLs/IADLs   INTERVENTIONS: Did not address this date d/t focus on other goals. Prior Session   Card Game - [address] in the [address] explained set-up and rules for a new card game. Following explanation of card game, patient required intermittent min verbal cues regarding when to  a card and when to play a card. Otherwise patient demonstrated excellent retention, carryover, strategy, and execution of card game resulting in patient winning first game. For second game, ST prompted patient describe set up and rules to card game. Patient independently set up second card game and correctly explained rules to card game. Patient required min verbal cues to provide additional details for rules. Patient did not require cues regarding rules in second game. Time Management - Word Problems (verbally presented)   10/10 independently   * excellent mental math computation with time. Money Management - [place] Menu  8/9 Independently, 1/9 given min cues   *excellent scanning   *Cues for working memory   **good math computation via pen and paper      SHORT TERM GOAL #3:  Goal 3: Patient will complete moderately complex organizational naming and sequential analysis tasks with 80% accuracy given MIN A to improve thought processing, and processing speed, and lexical retrieval.   INTERVENTIONS:   Divergent Naming - Abstract (target 12 words)   Trial 1: 11 words/minute independent  Trial 2: 12 words/minute independent  Trial 3: 12 words/minute independent  *excellent success with task this date. SHORT TERM GOAL #4:  Goal 4: Patient will complete complex attention tasks (divided, alternating, sustained) with no more than 3 errors in a given task to improve attentiveness and reduce tangential and disorganized conversational discourse. INTERVENTIONS:   Excellent sustained, selective, and alternating within working memory task this date. *Patient did require intermittent cues throughout tx session this date to maintain attention to task and topic.          Long-Term Goals:

## 2021-06-03 NOTE — PROGRESS NOTES
[address] [address]  INPATIENT PHYSICAL THERAPY  DAILY NOTE  [facility]    Time In: 08  Time Out: 930  Timed Code Treatment Minutes: 61 Minutes  Minutes: 60          Date: 6/3/2021  Patient Name: George Rose,  Gender:  male        MRN: [id number]  : 1945  (76 y.o.)  Referral Date : 21  Referring Practitioner: STEFANO Kline CNP  Diagnosis: Parkinson's disease  Additional Pertinent Hx: Pt is a 75-year-old male who was admitted to [facility] after presenting by EMS from local drugstore confused, had unwitnessed fall presented with generalized AMS. Prior history of similar presentations in the past.  PMH include antiphospholipid syndrome, DVT, sleep apnea, COPD, CKD, HTN, hypothyroidism and RCC. Patient unable to provide more history. History obtained from chart review and ED accounts. Pt transferred to  rehab . Prior Level of Function:  Lives With: Other (comment) (2 roomates in apartment in [address], common areas shared)  Type of Home: Apartment  Home Layout: One level  Home Access: Level entry  Home Equipment: Cane, Lift chair   Bathroom Shower/Tub: Tub/Shower unit  Bathroom Toilet: Handicap height (standard toilet with frame overlay)  Bathroom Equipment: Shower chair, Grab bars in shower  Bathroom Accessibility: Walker accessible    ADL Assistance: Independent  Homemaking Assistance: Independent  Homemaking Responsibilities: Yes  Ambulation Assistance: Independent  Transfer Assistance: Independent  Active : Yes  IADL Comments: He reports he usually orders food to be delivered or has staff at [facility] make his food, but states he will make himself a sandwich for lunch. States that staff does his laundry 1x/week in the apartment across the ha because he cannot go downstairs to use the resident laundry. Additional Comments: has an aid 3x/wk to assist with bathing and dressing BLEs, as well as homemaking.   Pt using Assessment: Patient progressing toward established goals. Activity Tolerance:  Patient tolerance of  treatment: good. Equipment Recommendations:Equipment Needed: Yes (RW, monitor need for W/C)  Discharge Recommendations:  Continue to assess pending progress    Plan: Times per week: 5x/wk 90min, 1x/wk 30min  Times per day: Twice a day  Current Treatment Recommendations: Strengthening, Transfer Training, Endurance Training, Neuromuscular Re-education, Patient/Caregiver Education & Training, Balance Training, Gait Training, Home Exercise Program, Functional Mobility Training, Stair training, Safety Education & Training    Patient Education  Patient Education: Plan of Care, Precautions/Restrictions, Transfers, Reviewed Prior Education, Gait, Health Promotion and Wellness Education, - Patient Requires Continued Education    Goals:  Patient goals : reduce pain, get stronger  Short term goals  Time Frame for Short term goals: 1 week  Short term goal 1: Patient will complete supine < > sit with SBA to transfer in/out of bed with decreased difficulty. Short term goal 2: Patient will complete sit < > stand with SBA with RW to stand to ambulate with increased independence. Short term goal 3: Patient will ambulate 48' with a RW and CGA, clearing right LE >/=50% of the time to increase safety with ambulation. MET, see LTG  Short term goal 4: Patient will complete rolling with SBA to reposition self with increased independence. Short term goal 5: Patient will propel wheelchair 150' with modified independence to increase independence and mobility in current living environment. Long term goals  Time Frame for Long term goals : 2 wks  Long term goal 1: Patient will complete supine < > sit and rolling with modified independence to transfer in/out of bed independently. Long term goal 2: Patient will complete sit < > stand and stand pivot transfer with RW and modified independence to transfer surface to surface safely.   Long term goal 3: Patient will ambulate 80' with a RW and Rt AFO and modified independence to navigate home safely. Long term goal 4: Patient will improve tinetti to greater than or equal to 19/28 to reduce his risk for falls. Long term goal 5: Patient will complete car transfer with supervision to transfer in/out of vehicle safely. Following session, patient left in safe position with all fall risk precautions in place.

## 2021-06-03 NOTE — PROGRESS NOTES
[address]  Occupational Therapy  Daily Note  Time:   Time In: 1400  Time Out: 1430  Timed Code Treatment Minutes: 30 Minutes  Minutes: 30          Date: 6/3/2021  Patient Name: Michaela Nickerson,   Gender: male      Room: -56/056-A  MRN: [id number]  : 1945  (69 y.o.)  Referring Practitioner: STEFANO Deras CNP (ordering provider)  Hortencia Soto MD (attending)  Diagnosis: Parkinson's Disease  Additional Pertinent Hx: Michaela Nickerson is a 76 y.o. male who presents with complaint of altered mental status. Patient was found at local drugstore confused, patient states that he fell. Patient is on Xarelto. Mechanism of patient's fall is unclear, patient is overall confused and erratic, cannot reliably contribute to HPI/ROS. Admitted to [facility] on . Restrictions/Precautions:  Restrictions/Precautions: General Precautions, Fall Risk     SUBJECTIVE: Patient seated in bedside chair upon arrival. Agreeable to OT session    PAIN: Denies    Vitals: Vitals not assessed per clinical judgement, see nursing flowsheet    COGNITION: Decreased Safety Awareness    ADL:   No ADL's completed this session. Clement Fly BALANCE:  Sitting Balance:  Supervision. Standing Balance: Contact Guard Assistance. BED MOBILITY:  Not Tested    TRANSFERS:  Sit to Stand:  Contact Guard Assistance. From w/c and bedside chair  Stand to Sit: Contact Guard Assistance. To w/c and bedside chair  Stand Pivot: Contact Guard Assistance. From bedside chair to w/c and w/c to bedside chair at slow pace    FUNCTIONAL MOBILITY:  Assistive Device: Wheelchair  Assist Level:  Stand By Assistance. Distance: Around [employer] and approx 50 ft x1 trial within hallway  Using LLE and occasional use of BUEs. Patient required minimal cues for path finding way to gift shop. ADDITIONAL ACTIVITIES:  Patient propelled self in wheelchair around [employer] to locate [employer] puzzle.  Patient able to routine with SBA and using compensatory techniques prn to increase indep in home environment. Following session, patient left in safe position with all fall risk precautions in place.

## 2021-06-03 NOTE — PLAN OF CARE
Problem: Falls - Risk of:  Goal: Will remain free from falls  Description: Will remain free from falls  6/3/2021 1548 by Belen Chavarria LPN  Outcome: Ongoing  Note: Patient remains free from falls this shift. Fall precautions in place. Non skid footwear used with transferring. Educated patient to use call light when needed assistance with ambulation. Patient used call light appropriate this shift. Problem: Falls - Risk of:  Goal: Will remain free from falls  Description: Will remain free from falls  6/3/2021 1501 by Belen Chvaarria LPN  Outcome: Ongoing  Note: Patient remains free from falls this shift. Fall precautions in place. Non skid footwear used with transferring. Educated patient to use call light when needed assistance with ambulation. Patient used call light appropriate this shift. Problem: Pain:  Goal: Pain level will decrease  Description: Pain level will decrease  6/3/2021 1548 by Belen Chavarria LPN  Outcome: Ongoing  Note: Scheduled pain meds given per order. Problem: DISCHARGE BARRIERS  Goal: Patient's continuum of care needs are met  6/3/2021 1548 by Belen Chavarria LPN  Outcome: Ongoing  Note: Patients needs met this shift. Patients discharge to be determined. Assess at discharge.

## 2021-06-03 NOTE — PROGRESS NOTES
[address] [facility] PHYSICAL THERAPY  DAILY NOTE  [facility]    Time In: 0700  Time Out: 0730  Timed Code Treatment Minutes: 27 Minutes  Minutes: 30          Date: 6/3/2021  Patient Name: Kaya Reaves,  Gender:  male        MRN: [id number]  : 1945  (76 y.o.)  Referral Date : 21  Referring Practitioner: Dana Lesch, APRN - CNP  Diagnosis: Parkinson's disease  Additional Pertinent Hx: Pt is a 80-year-old male who was admitted to [facility] after presenting by EMS from local drugstore confused, had unwitnessed fall presented with generalized AMS. Prior history of similar presentations in the past.  PMH include antiphospholipid syndrome, DVT, sleep apnea, COPD, CKD, HTN, hypothyroidism and RCC. Patient unable to provide more history. History obtained from chart review and ED accounts. Pt transferred to  rehab . Prior Level of Function:  Lives With: Other (comment) (2 roomates in apartment in [address], common areas shared)  Type of Home: Apartment  Home Layout: One level  Home Access: Level entry  Home Equipment: Cane, Lift chair   Bathroom Shower/Tub: Tub/Shower unit  Bathroom Toilet: Handicap height (standard toilet with frame overlay)  Bathroom Equipment: Shower chair, Grab bars in shower  Bathroom Accessibility: Walker accessible    ADL Assistance: Independent  Homemaking Assistance: Independent  Homemaking Responsibilities: Yes  Ambulation Assistance: Independent  Transfer Assistance: Independent  Active : Yes  IADL Comments: He reports he usually orders food to be delivered or has staff at [facility] make his food, but states he will make himself a sandwich for lunch. States that staff does his laundry 1x/week in the apartment across the ha because he cannot go downstairs to use the resident laundry. Additional Comments: has an aid 3x/wk to assist with bathing and dressing BLEs, as well as homemaking.   Pt using cane PTA for mobility. Patient reports no family or support system nearby, but has a step-brother who lives in [place] that he willl go visit. Restrictions/Precautions:  Restrictions/Precautions: General Precautions, Fall Risk     SUBJECTIVE: Patient in bed upon arrival, agreed and cooperative for therapy. Reports feeling very stiff in left shoulder and BLE's this morning. PAIN: Yes, pain in left shoulder and BLE's. Vitals: Vitals not assessed per clinical judgement, see nursing flowsheet    OBJECTIVE:  Bed Mobility:  Supine to Sit: Stand By Assistance, with verbal cues , with increased time for completion    Transfers:  Sit to Stand: [employer], cues for hand placement  Stand to [place] [address], cues for hand placement    Ambulation:  Contact Guard Assistance, with verbal cues , with increased time for completion  Distance: 10 ft. X1; 8 ft. x1   Surface: Level Tile  Device:Rolling Walker  Gait Deviations: Forward Flexed Posture, Slow Ananya, Decreased Step Length Bilaterally, Decreased Gait Speed, Decreased Heel Strike Bilaterally and decreased foot clearance on RLE during swing phase. Balance:  Dynamic Standing Balance: Contact Guard Assistance, with verbal cues while completing functional bathroom tasks. Anterior/posterior sway noted but no LOB. Exercise:  Patient was guided in 1 set(s) 3 reps, hold 20-30 seconds of stretches to both lower extremities. Bilateral heelcord stretch, piriformis stretch, modified figure 4 stretch, quad stretch bilaterally. .  Exercises were completed for increased independence with functional mobility. Functional Outcome Measures: Not completed       ASSESSMENT:  Assessment: Patient progressing toward established goals. Activity Tolerance:  Patient tolerance of  treatment: good.       Equipment Recommendations:Equipment Needed: Yes (RW, monitor need for W/C)  Discharge Recommendations:  Continue to assess pending progress    Plan: Times per week: 5x/wk 90min, 1x/wk 30min  Times per day: Twice a day  Current Treatment Recommendations: Strengthening, Transfer Training, Endurance Training, Neuromuscular Re-education, Patient/Caregiver Education & Training, Balance Training, Gait Training, Home Exercise Program, Functional Mobility Training, Stair training, Safety Education & Training    Patient Education  Patient Education: Plan of Care, Precautions/Restrictions, [employer] Mobility, Transfers, Reviewed Prior Education, Gait, Health Promotion and Wellness Education, Home Safety Education, - Patient Requires Continued Education    Goals:  Patient goals : reduce pain, get stronger  Short term goals  Time Frame for Short term goals: 1 week  Short term goal 1: Patient will complete supine < > sit with SBA to transfer in/out of bed with decreased difficulty. Short term goal 2: Patient will complete sit < > stand with SBA with RW to stand to ambulate with increased independence. Short term goal 3: Patient will ambulate 48' with a RW and CGA, clearing right LE >/=50% of the time to increase safety with ambulation. MET, see LTG  Short term goal 4: Patient will complete rolling with SBA to reposition self with increased independence. Short term goal 5: Patient will propel wheelchair 150' with modified independence to increase independence and mobility in current living environment. Long term goals  Time Frame for Long term goals : 2 wks  Long term goal 1: Patient will complete supine < > sit and rolling with modified independence to transfer in/out of bed independently. Long term goal 2: Patient will complete sit < > stand and stand pivot transfer with RW and modified independence to transfer surface to surface safely. Long term goal 3: Patient will ambulate 80' with a RW and Rt AFO and modified independence to navigate home safely. Long term goal 4: Patient will improve tinetti to greater than or equal to 19/28 to reduce his risk for falls.   Long term goal 5: Patient will complete car transfer with supervision to transfer in/out of vehicle safely. Following session, patient left in safe position with all fall risk precautions in place.

## 2021-06-03 NOTE — PROGRESS NOTES
Physical Medicine & Rehabilitation   Progress Note    Chief Complaint:  Parkinson's, new diagnosis. Rehab needs    Subjective:  Patient seen up in chair. Patient feels Ines Hones is working better that Neurontin. Denies drowsiness. patient asking about seafood as hospital doesn't have much on the menu, discussed allowing roommate to bring in food for patient's preference. Patient sleeping well with melatonin. Bowels moving well. Patient still with some distention, on simethicone PRN. Patient with chronic back pain with some radiation into the right hip area, tolerable with medications, states he needs back surgery at some point. Patient denies any other needs or concerns at this time. Rehabilitation:  PT:   Bed Mobility:  Sit to Supine: Stand By Assistance   Transfers:  Sit to Stand: Contact Guard Assistance  Stand to [facility]   *Attempting to stand before walker in front of him  Ambulation:  Contact Guard Assistance  Distance: 80', 140'  Surface: Level Tile   Device:Rolling Walker  Gait Deviations: Forward Flexed Posture, Slow Ananya, Decreased Step Length on Right, Decreased Weight Shift Right, Decreased Gait Speed and Decreased Heel Strike Bilaterally  *Improved step length right noted this session, however pt continues to demonstrate decreased SLS with mild instability. Verbal cues for increased quad contraction for improved gait fluidity and control. Balance:  Static Sitting Balance:  Independent  Static Standing Balance: Contact Guard Assistance  Dynamic Standing Balance: Contact Guard Assistance, Minimal Assistance  Exercise:  Exercises were completed for increased independence with functional mobility. 1. Forward step taps on balance pods on 2\" step and side step taps on level ground with RW support with verbal cues such as \"right foot to yellow\" and \"left foot to 4\" for example.  Pt demonstrates difficulty with right hip abduction and adduction  With attempt to complete right hip the Corner  ST explained set-up and rules for a new card game. Following explanation of card game, patient required intermittent min verbal cues regarding when to  a card and when to play a card. Otherwise patient demonstrated excellent retention, carryover, strategy, and execution of card game resulting in patient winning first game. For second game, ST prompted patient describe set up and rules to card game. Patient independently set up second card game and correctly explained rules to card game. Patient required min verbal cues to provide additional details for rules.  Patient did not require cues regarding rules in second game.      Prior Session  Time Management - Word Problems (verbally presented)   10/10 independently   * excellent mental math computation with time.     Money Management - [employer] Menu  8/9 Independently, 1/9 given min cues   *excellent scanning   *Cues for working memory   **good math computation via pen and paper      Review of Systems:  CONSTITUTIONAL:  positive for  fatigue  EYES:  negative  HEENT:  negative  RESPIRATORY:  bipap use at night  CARDIOVASCULAR:  positive for  fatigue  GASTROINTESTINAL:  positive for reflux  GENITOURINARY:  retention  SKIN:  negative  HEMATOLOGIC/LYMPHATIC:  positive for swelling/edema  MUSCULOSKELETAL:  positive for  pain  NEUROLOGICAL:  positive for gait problems, weakness, numbness and pain and sciatica   BEHAVIOR/PSYCH:  positive for depressed mood  System review otherwise negative      Objective:  BP (!) 129/56   Pulse 64   Temp 98.1 °F (36.7 °C) (Oral)   Resp 16   Ht 5' 10\" (1.778 m)   Wt 239 lb 6.4 oz (108.6 kg)   SpO2 96%   BMI 34.35 kg/m²   awake  Orientation:   person, place, time  Mood: depressed  Affect: calm  General appearance: Patient is well nourished, well developed, well groomed and in no acute distress    Memory:   normal,   Attention/Concentration: normal  Language:  normal    Cranial Nerves:  cranial nerves II-XII are grossly intact  ROM:  normal  Motor Exam:  Motor exam is symmetrical 5 out of 5 upper extremities bilaterally  Right walk-on AFO noted  Tone:  increased  Muscle bulk: within normal limits  Sensory:  Decreased   Abdomen: slight distention      Skin: warm and dry, no rash or erythema  Peripheral vascular: Pulses: Normal upper and lower extremity pulses; Edema: trace      Diagnostics:   No results found for this or any previous visit (from the past 24 hour(s)).     Impression:  · Parkinson's disease with bradykinesia, increased tone at left wrist, hushed voice, frequent falls with multiple admissions   · Encephalopathy/AMS likely secondary to illicit drug use  · Antiphospholipid syndrome  · Central sleep apnea on BiPAP  · Troponin leak likely related to chronic kidney disease  · Acute kidney injury   · Gastroesophageal reflux disease  · Anemia and leukopenia  · COPD  · Obesity  · Neuropathy   · Acid reflux   · Right foot drop     Plan:  Continue current therapies  Prophylaxis: DVT - xarelto, GI - protonix daily, tums PRN  Pain: tylenol, percocet, lyrica   Bowels: dulcolax, miralax, senna  · Bladder: flomax  · Psychology consult for coping and depression  · Melatonin 6mg nightly - working well  · Right walk on AFO ordered   · Continue simethicone for gas and distention   · [id number] for roommate to bring in seafood for patient  · Team conference Tuesday  · discharge planning for 6/8/21    Missed Therapy Time:  · None    Avril Cheung, APRN - CNP

## 2021-06-03 NOTE — CARE COORDINATION
Nathalia Ball was evaluated today and a DME order was entered for a wheeled walker because he requires this to successfully complete daily living tasks of ambulating. A wheeled walker is necessary due to the patient's unsteady gait, upper body weakness, and inability to  an ambulation device; and he can ambulate only by pushing a walker instead of a lesser assistive device such as a cane, crutch, or standard walker. The need for this equipment was discussed with the patient and he understands and is in agreement.

## 2021-06-04 PROCEDURE — 94660 CPAP INITIATION&MGMT: CPT

## 2021-06-04 PROCEDURE — 97530 THERAPEUTIC ACTIVITIES: CPT

## 2021-06-04 PROCEDURE — 6370000000 HC RX 637 (ALT 250 FOR IP): Performed by: NURSE PRACTITIONER

## 2021-06-04 PROCEDURE — 6370000000 HC RX 637 (ALT 250 FOR IP): Performed by: FAMILY MEDICINE

## 2021-06-04 PROCEDURE — 99232 SBSQ HOSP IP/OBS MODERATE 35: CPT | Performed by: NURSE PRACTITIONER

## 2021-06-04 PROCEDURE — 6370000000 HC RX 637 (ALT 250 FOR IP): Performed by: PHYSICAL MEDICINE & REHABILITATION

## 2021-06-04 PROCEDURE — 1180000000 HC REHAB R&B

## 2021-06-04 PROCEDURE — 2700000000 HC OXYGEN THERAPY PER DAY

## 2021-06-04 PROCEDURE — 97130 THER IVNTJ EA ADDL 15 MIN: CPT

## 2021-06-04 PROCEDURE — 97112 NEUROMUSCULAR REEDUCATION: CPT

## 2021-06-04 PROCEDURE — 97116 GAIT TRAINING THERAPY: CPT

## 2021-06-04 PROCEDURE — 97129 THER IVNTJ 1ST 15 MIN: CPT

## 2021-06-04 PROCEDURE — 6370000000 HC RX 637 (ALT 250 FOR IP): Performed by: INTERNAL MEDICINE

## 2021-06-04 PROCEDURE — 97535 SELF CARE MNGMENT TRAINING: CPT

## 2021-06-04 PROCEDURE — 94761 N-INVAS EAR/PLS OXIMETRY MLT: CPT

## 2021-06-04 RX ORDER — LIDOCAINE 40 MG/G
CREAM TOPICAL PRN
Status: DISCONTINUED | OUTPATIENT
Start: 2021-06-04 | End: 2021-06-08 | Stop reason: HOSPADM

## 2021-06-04 RX ADMIN — PREGABALIN 150 MG: 50 CAPSULE ORAL at 08:56

## 2021-06-04 RX ADMIN — OXYCODONE HYDROCHLORIDE AND ACETAMINOPHEN 1 TABLET: 10; 325 TABLET ORAL at 20:22

## 2021-06-04 RX ADMIN — OXYCODONE HYDROCHLORIDE AND ACETAMINOPHEN 1 TABLET: 10; 325 TABLET ORAL at 17:15

## 2021-06-04 RX ADMIN — DICYCLOMINE HYDROCHLORIDE 10 MG: 10 CAPSULE ORAL at 06:08

## 2021-06-04 RX ADMIN — OXYCODONE HYDROCHLORIDE AND ACETAMINOPHEN 1 TABLET: 10; 325 TABLET ORAL at 08:56

## 2021-06-04 RX ADMIN — MECLIZINE HCL 25 MG: 25 TABLET, CHEWABLE ORAL at 08:56

## 2021-06-04 RX ADMIN — OXYCODONE HYDROCHLORIDE AND ACETAMINOPHEN 1 TABLET: 10; 325 TABLET ORAL at 13:29

## 2021-06-04 RX ADMIN — DICYCLOMINE HYDROCHLORIDE 10 MG: 10 CAPSULE ORAL at 16:13

## 2021-06-04 RX ADMIN — PREGABALIN 150 MG: 50 CAPSULE ORAL at 20:21

## 2021-06-04 RX ADMIN — ATORVASTATIN CALCIUM 20 MG: 20 TABLET, FILM COATED ORAL at 08:56

## 2021-06-04 RX ADMIN — SIMETHICONE 80 MG: 80 TABLET, CHEWABLE ORAL at 12:08

## 2021-06-04 RX ADMIN — LOSARTAN POTASSIUM 50 MG: 50 TABLET, FILM COATED ORAL at 08:56

## 2021-06-04 RX ADMIN — ACETAMINOPHEN 650 MG: 325 TABLET ORAL at 06:04

## 2021-06-04 RX ADMIN — DICYCLOMINE HYDROCHLORIDE 10 MG: 10 CAPSULE ORAL at 12:06

## 2021-06-04 RX ADMIN — TAMSULOSIN HYDROCHLORIDE 0.4 MG: 0.4 CAPSULE ORAL at 08:56

## 2021-06-04 RX ADMIN — PANTOPRAZOLE SODIUM 40 MG: 40 TABLET, DELAYED RELEASE ORAL at 06:04

## 2021-06-04 RX ADMIN — RIVAROXABAN 10 MG: 10 TABLET, FILM COATED ORAL at 08:56

## 2021-06-04 ASSESSMENT — PAIN SCALES - GENERAL
PAINLEVEL_OUTOF10: 6
PAINLEVEL_OUTOF10: 10
PAINLEVEL_OUTOF10: 7
PAINLEVEL_OUTOF10: 7
PAINLEVEL_OUTOF10: 8
PAINLEVEL_OUTOF10: 9
PAINLEVEL_OUTOF10: 7

## 2021-06-04 ASSESSMENT — PAIN DESCRIPTION - PROGRESSION
CLINICAL_PROGRESSION: NOT CHANGED

## 2021-06-04 ASSESSMENT — PAIN DESCRIPTION - ORIENTATION
ORIENTATION: LEFT
ORIENTATION: MID

## 2021-06-04 ASSESSMENT — PAIN DESCRIPTION - FREQUENCY
FREQUENCY: CONTINUOUS
FREQUENCY: CONTINUOUS

## 2021-06-04 ASSESSMENT — PAIN DESCRIPTION - DESCRIPTORS: DESCRIPTORS: OTHER (COMMENT)

## 2021-06-04 ASSESSMENT — PAIN - FUNCTIONAL ASSESSMENT
PAIN_FUNCTIONAL_ASSESSMENT: PREVENTS OR INTERFERES SOME ACTIVE ACTIVITIES AND ADLS
PAIN_FUNCTIONAL_ASSESSMENT: PREVENTS OR INTERFERES SOME ACTIVE ACTIVITIES AND ADLS

## 2021-06-04 ASSESSMENT — PAIN DESCRIPTION - LOCATION
LOCATION: OTHER (COMMENT)
LOCATION: BACK

## 2021-06-04 ASSESSMENT — PAIN DESCRIPTION - PAIN TYPE
TYPE: ACUTE PAIN
TYPE: ACUTE PAIN

## 2021-06-04 ASSESSMENT — PAIN DESCRIPTION - ONSET: ONSET: SUDDEN

## 2021-06-04 NOTE — PROGRESS NOTES
RECREATIONAL THERAPY  MISSED TREATMENT    []Transitional Care Unit  [x]Inpatient Rehabilitation    Date:  6/4/2021            Patient Name: Dung Mcgregor           MRN: [id number]  Acct: [de-identified]          YOB: 1945 (69 y.o.)       Gender: male   Diagnosis: Parkinson's Disease  Physician: Referring Practitioner: STEFANO Prakash CNP (ordering provider)  Yonis Bosch MD (attending)    REASON FOR MISSED TREATMENT:    []Hold treatment per nursing request  []Patient refusal  []Family declined treatment  []Patient at testing and/or off the floor  []Patient unavailable, with PT/OT/nursing, etc.  [x]Other pt on phone most of morning when not in therapy-he purchased some clippers from the gift shop yesterday and is putting his jewelry box together made from popsicle sticks and coffee stirs that RT gave him in his free time   Juan Diego Duckworth, [address]    6/4/2021

## 2021-06-04 NOTE — PROGRESS NOTES
[address] [facility] PHYSICAL THERAPY  DAILY NOTE  [facility]    Time In: 1400  Time Out: 1430  Timed Code Treatment Minutes: 30 Minutes  Minutes: 30       Date: 2021  Patient Name: Sanket Painting,  Gender:  male        MRN: [id number]  : 1945  (76 y.o.)  Referral Date : 21  Referring Practitioner: STEFANO Beth CNP  Diagnosis: Parkinson's disease  Additional Pertinent Hx: Pt is a 68-year-old male who was admitted to [facility] after presenting by EMS from local drugstore confused, had unwitnessed fall presented with generalized AMS. Prior history of similar presentations in the past.  PMH include antiphospholipid syndrome, DVT, sleep apnea, COPD, CKD, HTN, hypothyroidism and RCC. Patient unable to provide more history. History obtained from chart review and ED accounts. Pt transferred to  rehab . Prior Level of Function:  Lives With: Other (comment) (2 roomates in apartment in [address], common areas shared)  Type of Home: Apartment  Home Layout: One level  Home Access: Level entry  Home Equipment: Cane, Lift chair   Bathroom Shower/Tub: Tub/Shower unit  Bathroom Toilet: Handicap height (standard toilet with frame overlay)  Bathroom Equipment: Shower chair, Grab bars in shower  Bathroom Accessibility: Walker accessible    ADL Assistance: Independent  Homemaking Assistance: Independent  Homemaking Responsibilities: Yes  Ambulation Assistance: Independent  Transfer Assistance: Independent  Active : Yes  IADL Comments: He reports he usually orders food to be delivered or has staff at [facility] make his food, but states he will make himself a sandwich for lunch. States that staff does his laundry 1x/week in the apartment across the ha because he cannot go downstairs to use the resident laundry. Additional Comments: has an aid 3x/wk to assist with bathing and dressing BLEs, as well as homemaking.   Pt using cane PTA for mobility. Patient reports no family or support system nearby, but has a step-brother who lives in [place] that he willl go visit. Restrictions/Precautions:  Restrictions/Precautions: General Precautions, Fall Risk     SUBJECTIVE: Patient in recliner upon arrival, agreed and cooperative for therapy. PAIN: No pain noted. Vitals: Vitals not assessed per clinical judgement, see nursing flowsheet    OBJECTIVE:    Transfers:  Sit to Stand: Contact Guard Assistance  Stand to [place] [id number], cues for hand placement    Ambulation:  Contact Guard Assistance, Minimal Assistance  Distance: 150 ft. X2; 20 ft. x1 ramp   Surface: Level Tile and Ramp  Device:Rolling Walker  Gait Deviations: Forward Flexed Posture, Slow Ananya, Decreased Step Length Bilaterally, Decreased Weight Shift Right, Decreased Gait Speed, Decreased Heel Strike on Right, Mild Path Deviations, Unsteady Gait and Decreased Terminal Knee Extension bilaterally during stance time, cues for quad activation on LLE to prevent instability and improve fluidity of gait. Stairs:  Platform:  6\" platform X 1 using Rolling Walker and Minimal Assistance, with verbal cues , with increased time for completion, difficulty with clearing foot on RLE, needing min. A to clear R heel when descending step. .    Balance:  Dynamic Standing Balance: Contact Guard Assistance, intermittent UE support on AD  while completing clothing management before toileting    Neuromuscular Education:   -Standing at staircase, patient tapped numbered pods with bilateral LE's to work on improving foot clearance and stability in LE's while in SLS. Decreased motor planning noted when tapping with RLE, required extra time to complete, also needed verbal cues for more lateral weight shift onto LLE. Functional Outcome Measures: Not completed       ASSESSMENT:  Assessment: Patient progressing toward established goals.   Activity Tolerance:  Patient tolerance of treatment: good. Limited by fatigue. Equipment Recommendations:Equipment Needed: Yes (RW, monitor need for W/C)  Discharge Recommendations:  Continue to assess pending progress    Plan: Times per week: 5x/wk 90min, 1x/wk 30min  Times per day: Twice a day  Current Treatment Recommendations: Strengthening, Transfer Training, Endurance Training, Neuromuscular Re-education, Patient/Caregiver Education & Training, Balance Training, Gait Training, Home Exercise Program, Functional Mobility Training, Stair training, Safety Education & Training    Patient Education  Patient Education: Plan of Care, Precautions/Restrictions, Transfers, Reviewed Prior Education, Gait, Education Related to Prevention of Recurrence of Impairment/Illness/Injury, Home Safety Education, - Patient Requires Continued Education    Goals:  Patient goals : reduce pain, get stronger  Short term goals  Time Frame for Short term goals: 1 week  Short term goal 1: Patient will complete supine < > sit with SBA to transfer in/out of bed with decreased difficulty. Short term goal 2: Patient will complete sit < > stand with SBA with RW to stand to ambulate with increased independence. Short term goal 3: Patient will ambulate 48' with a RW and CGA, clearing right LE >/=50% of the time to increase safety with ambulation. MET, see LTG  Short term goal 4: Patient will complete rolling with SBA to reposition self with increased independence. Short term goal 5: Patient will propel wheelchair 150' with modified independence to increase independence and mobility in current living environment. Long term goals  Time Frame for Long term goals : 2 wks  Long term goal 1: Patient will complete supine < > sit and rolling with modified independence to transfer in/out of bed independently. Long term goal 2: Patient will complete sit < > stand and stand pivot transfer with RW and modified independence to transfer surface to surface safely.   Long term goal 3: Patient

## 2021-06-04 NOTE — PROGRESS NOTES
Physical Medicine & Rehabilitation   Progress Note    Chief Complaint:  Parkinson's, new diagnosis. Rehab needs    Subjective:  Patient seen resting in bed. Patient states he awoke this morning with left heel pain, worse when applying pressure to the heel. RN assessment with mushy heel and some color changes, she has asked wound to see, agree. Patient denies any history of gout, discussed monitoring for redness as well. Patient understanding. Patient asking again about swelling in the right leg, discussed with back pain and radiculopathy, swelling can occur, patient remembers this conversation now and wonders if he should call Dr Roro Hoffman in [address] to let him know. Encouraged patient to make a f/u appt with him once IPR complete, as Dr Daryle Riser will not be able to do anything IP. Patient agreeable. Patient knows he needs back surgery, just unsure when at this time. Rehabilitation:  PT:   Transfers:  Sit to Stand: Contact Guard Assistance  Stand to [place] [id number], cues for hand placement  Ambulation:  Contact Guard Assistance  Distance: 150 ft. X2; several shorter distances around gym. Surface: Level Tile  Device:Rolling Walker with R AFO. Gait Deviations: Forward Flexed Posture, Slow Ananya, Decreased Step Length on Right, Decreased Weight Shift Right, Decreased Gait Speed, Decreased Heel Strike Bilaterally, Decreased Terminal Knee Extension on RLE during stance phase to improve fluidity of gait. Occasionally catching R foot during swing phase but improved with further distance. Balance:  Dynamic Standing Balance: Contact Guard Assistance   *Pod taps bilaterally to work on improving foot clearance on RLE and also stability while in SLS on RLE. Decreased motor planning when tapping with RLE, required extra time to complete. *Side-stepping down and back in // bars to work on facilitating hip ABD/ADD, completed multiple trials of activity. Improved with repetition.    *Lateral stepping over fly-swatter to work on improving foot clearance and facilitating hip abductor/adductor muscles. *Stood on blue balance pad while reaching in/outside JEFFERY for rings to challenge dynamic standing balance. *Stood on blue balance pad with eyes closed for 2 trials of ~10 seconds each, increased instability and trunk sway noted, min improvements with 2nd trial.    OT:   BALANCE:  Sitting Balance:  Supervision. Standing Balance: Contact Guard Assistance. TRANSFERS:  Sit to Stand:  Contact Guard Assistance. From w/c and bedside chair  Stand to Sit: Contact Guard Assistance. To w/c and bedside chair  Stand Pivot: Contact Guard Assistance. From bedside chair to w/c and w/c to bedside chair at slow pace  FUNCTIONAL MOBILITY:  Assistive Device: Wheelchair  Assist Level:  Stand By Assistance. Distance: Around [employer] shop and approx 50 ft x1 trial within hallway  Using LLE and occasional use of BUEs. Patient required minimal cues for path finding way to gift shop. ADDITIONAL ACTIVITIES:  Patient propelled self in wheelchair around [employer] shop to locate [employer] puzzle. Patient able to maneuver self in small spaces with SBA. Patient able to locate book and pay with appropriate amount of money giving  $20.00 for total of $4.22. Required occasional rest breaks throughout. Completed task for re integration back into community. ST:   INTERVENTIONS:   Delayed Recall of Grocery List from 6/2: 5/6 independently, 1/6 given min cues   *excellent recall following an extended period of time.      Working Memory   Pt was prompted to say \"elephant\" for even, \"octopus\" for odd, and \"ta\" for face cards. Pt independently identified animal with corresponding card 25/25 cards     Pt was prompted to say opposite color of suit, \"elephant\" for even, \"octopus\" for odd, and \"ta\" for face cards.  Pt independently identified animal with corresponding card 25/26 independently, 1/26 given min cues  *Excellent success with both tasks this date     INTERVENTIONS: Did not address this date d/t focus on other goals.      Prior [facility] in [facility] explained set-up and rules for a new card game. Following explanation of card game, patient required intermittent min verbal cues regarding when to  a card and when to play a card. Otherwise patient demonstrated excellent retention, carryover, strategy, and execution of card game resulting in patient winning first game. For second game, ST prompted patient describe set up and rules to card game. Patient independently set up second card game and correctly explained rules to card game. Patient required min verbal cues to provide additional details for rules. Patient did not require cues regarding rules in second game.      Time Management - Word Problems (verbally presented)   10/10 independently   * excellent mental math computation with time.     Money Management - [place] [employer]  8/9 Independently, 1/9 given min cues   *excellent scanning   *Cues for working memory   **good math computation via pen and paper      INTERVENTIONS:   Divergent Naming - Abstract (target 12 words)   Trial 1: 11 words/minute independent  Trial 2: 12 words/minute independent  Trial 3: 12 words/minute independent  *excellent success with task this date.        Review of Systems:  CONSTITUTIONAL:  positive for  fatigue  EYES:  negative  HEENT:  negative  RESPIRATORY:  bipap use at night  CARDIOVASCULAR:  positive for  fatigue  GASTROINTESTINAL:  Negative   GENITOURINARY:  retention  SKIN:  negative  HEMATOLOGIC/LYMPHATIC:  positive for swelling/edema  MUSCULOSKELETAL:  positive for  pain  NEUROLOGICAL:  positive for gait problems, weakness, numbness and pain and sciatica   BEHAVIOR/PSYCH:  positive for depressed mood  System review otherwise negative      Objective:  BP (!) 143/72   Pulse 66   Temp 98.2 °F (36.8 °C) (Oral)   Resp 18   Ht 5' 10\" (1.778 m)   Wt 239 lb 6.4 oz (108.6 kg)   SpO2 97% BMI 34.35 kg/m²   awake  Orientation:   person, place, time  Mood: depressed  Affect: calm  General appearance: Patient is well nourished, well developed, well groomed and in no acute distress    Memory:   normal,   Attention/Concentration: normal  Language:  normal    Cranial Nerves:  cranial nerves II-XII are grossly intact  ROM:  normal  Motor Exam:  Motor exam is symmetrical 5 out of 5 upper extremities bilaterally. RLE foot drop  Tone:  increased  Muscle bulk: within normal limits  Sensory:  Decreased   Abdomen: slight distention      Skin: warm and dry, no rash or erythema  Peripheral vascular: Pulses: Normal upper and lower extremity pulses; Edema: trace      Diagnostics:   No results found for this or any previous visit (from the past 24 hour(s)).     Impression:  · Parkinson's disease with bradykinesia, increased tone at left wrist, hushed voice, frequent falls with multiple admissions   · Encephalopathy/AMS likely secondary to illicit drug use  · Antiphospholipid syndrome  · Central sleep apnea on BiPAP  · Troponin leak likely related to chronic kidney disease  · Acute kidney injury   · Gastroesophageal reflux disease  · Anemia and leukopenia  · COPD  · Obesity  · Neuropathy   · Acid reflux   · Right foot drop     Plan:  Continue current therapies  Prophylaxis: DVT - xarelto, GI - protonix daily, tums PRN  Pain: tylenol, percocet, lyrica   Bowels: dulcolax, miralax, senna  · Bladder: flomax  · Psychology consult for coping and depression  · Melatonin 6mg nightly - working well  · Wheeled walker for OP DME   · Continue right walk on AFO  · Continue simethicone for gas and distention   · Ok for roommate to bring in seafood for patient  · Monitor left heel pain - lidocaine PRN  · Team conference Tuesday  · discharge planning for 6/8/21    Missed Therapy Time:  · None    Avril L Jonatan, APRN - CNP       Henrry Subramanian was evaluated today and a DME order was entered for a wheeled walker because he requires this to successfully complete daily living tasks of ambulating. A wheeled walker is necessary due to the patient's unsteady gait, upper body weakness, and inability to  an ambulation device; and he can ambulate only by pushing a walker instead of a lesser assistive device such as a cane, crutch, or standard walker. The need for this equipment was discussed with the patient and he understands and is in agreement.      Electronically signed by STEFANO Prakash CNP on 6/4/2021 at 8:26 AM

## 2021-06-04 NOTE — PROGRESS NOTES
[address]  Occupational Therapy  Daily Note  Time:   Time In: 1000  Time Out: 1100  Timed Code Treatment Minutes: 60 Minutes  Minutes: 60          Date: 2021  Patient Name: Alistair Lockett,   Gender: male      Room: -56/056-A  MRN: [id number]  : 1945  (69 y.o.)  Referring Practitioner: STEFANO Evans CNP (ordering provider)  Zackary García MD (attending)  Diagnosis: Parkinson's Disease  Additional Pertinent Hx: Alistair Lockett is a 76 y.o. male who presents with complaint of altered mental status. Patient was found at local drugstore confused, patient states that he fell. Patient is on Xarelto. Mechanism of patient's fall is unclear, patient is overall confused and erratic, cannot reliably contribute to HPI/ROS. Admitted to IP Rehab on . Restrictions/Precautions:  Restrictions/Precautions: General Precautions, Fall Risk     SUBJECTIVE: Patient in bed upon arrival agreeable to OT treatment     PAIN: no    Vitals: Vitals not assessed per clinical judgement, see nursing flowsheet    COGNITION: Decreased Insight    ADL:   Grooming: Contact Guard Assistance. standing at sink to shave face and head   Bathing: Contact Guard Assistance and with verbal cues . for safe technique   Upper Extremity Dressing: Supervision. Lower Extremity Dressin [address] and with verbal cues . patient with decreased insight required vcs to complete LB dressing seated . BALANCE:  Sitting Balance:  Supervision. Standing Balance: Contact Guard Assistance. BED MOBILITY:  Supine to Sit: Contact Guard Assistance      TRANSFERS:  Sit to Stand:  Contact Guard Assistance. Stand to Sit: Contact Guard Assistance. FUNCTIONAL MOBILITY:  Assistive Device: Rolling Walker  Assist Level:  Contact Guard Assistance. Distance: To and from bathroom     ASSESSMENT:     Activity Tolerance:  Patient tolerance of  treatment: fair. Discharge Recommendations: Home with Home health OT   Equipment Recommendations: Equipment Needed: Yes  Other: Patient has shower chair and frame over toilet. PTA patient reports using a cane, may benefit from a walker. Patient may benefit from [facility] to increase indep in dressing tasks. Plan: Times per week: 90 minutes 5x/week, 30 minutes 1x/week  Current Treatment Recommendations: Strengthening, Balance Training, Functional Mobility Training, Endurance Training, Self-Care / ADL, Patient/Caregiver Education & Training, Safety Education & Training, Home Management Training    Patient Education  Patient Education: ADL's    Goals  Short term goals  Time Frame for Short term goals: 1 week  Short term goal 1: Pt will complete LB ADL with SBA and LHAE prn to increase indep with self care. Short term goal 2: Patient will increase LUE AROM >90 degrees with min cues to increase indep in ADL tasks. Short term goal 3: Pt will navigate to/from bathroom using RW with SBA and min cues for safety increase safety in toileting task. Short term goal 4: Patient will complete simple homemaking task with SBA and min cues for safety to increase indep in home environment. Short term goal 5: Patient will tolerate dynamic standing task with 1-2 UE release and SBA to increase safety and indep in sinkside grooming tasks. Long term goals  Time Frame for Long term goals : 2-3 weeks  Long term goal 1: Patient will complete simple meal prep task with SBA using compensatory techniques prn to increase indep in making a sandwich at home. Long term goal 2: Patient will complete BADL routine with SBA and using compensatory techniques prn to increase indep in home environment. Following session, patient left in safe position with all fall risk precautions in place.

## 2021-06-04 NOTE — PLAN OF CARE
Problem: Falls - Risk of:  Goal: Will remain free from falls  Description: Will remain free from falls  6/4/2021 1319 by Niranjan Shepard RN  Outcome: Ongoing  Note: No noted falls. Legs weak. C/o R lower back pain when pulling self up off of toilet. Pain management continues. Problem: Skin Integrity:  Goal: Absence of new skin breakdown  Description: Absence of new skin breakdown  6/4/2021 1319 by Niranjan Shepard RN  Outcome: Ongoing  Note: C/o L heel pain. Noted discoloration. Wound/ostomy consult ordered. ADAM Cheung CNP assessed- does not note pressure injury but is awaiting assessment from wound/ostomy. See new orders to manage pain. Problem: Pain:  Goal: Control of chronic pain  Description: Control of chronic pain  6/4/2021 1319 by Niranjan Shepard RN  Outcome: Ongoing  Note: Has continuous pain. On a routine pain management program.     Problem: Bleeding:  Goal: Will show no signs and symptoms of excessive bleeding  Description: Will show no signs and symptoms of excessive bleeding  6/4/2021 1319 by Niranjan Shepard RN  Outcome: Ongoing  Note: No s/s of bleeding. Problem: Mobility - Impaired:  Goal: Mobility will improve  Description: Mobility will improve  6/4/2021 1319 by Niranjan Shepard RN  Outcome: Ongoing  Note: Gait unsteady. Legs weak. Knees buckled d/t c/o R back pain when returning from bathroom x1. Able to self correct with 1 Min A and 1 SBA. Problem: Respiratory  Goal: No pulmonary complications  1/2/6045 [id number] by Niranjan Shepard RN  Outcome: Ongoing  Note: No noted respiratory symptoms. Problem: GI  Goal: Bowel movement at least every other day  6/4/2021 1319 by Niranjan Shepard RN  Outcome: Ongoing  Note: BM x2. Problem:   Goal: Adequate urinary output  6/4/2021 1319 by Niranjan Shepard RN  Outcome: Ongoing  Note: Urinating without difficulty.      Problem: Nutrition  Goal: Optimal nutrition therapy  6/4/2021 1319 by Niranjan Shepard RN  Outcome: Ongoing  Note: Good

## 2021-06-04 NOTE — PROGRESS NOTES
[address] [place] THERAPY  [facility]  DAILY NOTE    TIME   SLP Individual Minutes  Time In: 1330  Time Out: 1400  Minutes: 30  Timed Code Treatment Minutes: 30 Minutes       Date: 2021  Patient Name: Sherice Ng      CSN: [id number]   : 1945  (76 y.o.)  Gender: male   Referring Physician: Jaydon Patel MD  Diagnosis: Parkinson's Disease    Secondary Diagnosis: High level cognitive deficits   Precautions: Fall risk   Current Diet: Regular diet and thin liquids   Swallowing Strategies: Standard Universal Swallow Precautions  Date of Last MBS/FEES: Not Applicable    Pain:  No pain reported. Subjective:  Pt seen sitting upright in recliner, awake. Patient agreeable to cognitive therapy. Pleasant and cooperative. No family present. Short-Term Goals:  SHORT TERM GOAL #1:  REVISED Goal 1: Patient will complete STM tasks with 3-4 units (immediate/delayed) and working memory with 85% accuracy given MIN A to improve retention of personal, newly, and previously learned information. INTERVENTIONS: Did not address this date d/t focus on other goals. Prior Session   Delayed Recall of Grocery List from : 5/6 independently,  given min cues   *excellent recall following an extended period of time. Working Memory   Pt was prompted to say \"elephant\" for even, \"octopus\" for odd, and \"ta\" for face cards. Pt independently identified animal with corresponding card 25/25 cards    Pt was prompted to say opposite color of suit, \"elephant\" for even, \"octopus\" for odd, and \"ta\" for face cards.  Pt independently identified animal with corresponding card 25/26 independently,  given min cues  *Excellent success with both tasks this date      SHORT TERM GOAL #2:  REVISED Goal 2: Patient will complete higher level complex problem solving & executive functioning (meds/finances) with 85% accuracy given MIN A to improve independence with completion of ADLs/IADLs   INTERVENTIONS:   Daily Problem Solving   18/20 independently, 1/20 min, 1/20 total assist  *good success with task this date  *patient often identifying safe solution; however, reporting that he does not apply safe solution within his ADL/IADLs     Prior [facility] in the . Tiffanie Glover 37 explained set-up and rules for a new card game. Following explanation of card game, patient required intermittent min verbal cues regarding when to  a card and when to play a card. Otherwise patient demonstrated excellent retention, carryover, strategy, and execution of card game resulting in patient winning first game. For second game, ST prompted patient describe set up and rules to card game. Patient independently set up second card game and correctly explained rules to card game. Patient required min verbal cues to provide additional details for rules. Patient did not require cues regarding rules in second game. Time Management - Word Problems (verbally presented)   10/10 independently   *excellent mental math computation with time. Money Management - [place] [employer]  8/9 Independently, 1/9 given min cues   *excellent scanning   *Cues for working memory   **good math computation via pen and paper      SHORT TERM GOAL #3:  Goal 3: Patient will complete moderately complex organizational naming and sequential analysis tasks with 80% accuracy given MIN A to improve thought processing, and processing speed, and lexical retrieval.   INTERVENTIONS: Did not address this date d/t focus on other goals. Prior Session   Divergent Naming - Abstract (target 12 words)   Trial 1: 11 words/minute independent  Trial 2: 12 words/minute independent  Trial 3: 12 words/minute independent  *excellent success with task this date.      SHORT TERM GOAL #4:  Goal 4: Patient will complete complex attention tasks (divided, alternating, sustained) with no more than 3 errors in a given task to improve attentiveness and

## 2021-06-04 NOTE — PROGRESS NOTES
[address]  Occupational Therapy  Daily Note  Time:    Time In: 1200  Time Out: 1230  Timed Code Treatment Minutes: 30 Minutes  Minutes: 30          Date: 2021  Patient Name: Nathalia Ball,   Gender: male      Room: [facility]/056-A  MRN: [id number]  : 1945  (76 y.o.)  Referring Practitioner: STEFANO Tello CNP (ordering provider)  Mary Stoddard MD (attending)  Diagnosis: Parkinson's Disease  Additional Pertinent Hx: Nathalia Ball is a 76 y.o. male who presents with complaint of altered mental status. Patient was found at local drugstore confused, patient states that he fell. Patient is on Xarelto. Mechanism of patient's fall is unclear, patient is overall confused and erratic, cannot reliably contribute to HPI/ROS. Admitted to IP Rehab on . Restrictions/Precautions:  Restrictions/Precautions: General Precautions, Fall Risk      SUBJECTIVE: Pt was up in recliner talking on phone, pushing session 5 min back. Pt pleasant and cooperative. PAIN: denies    Vitals: Vitals not assessed per clinical judgement, see nursing flowsheet    COGNITION: Slow Processing, Decreased Insight, Decreased Problem Solving, Decreased Safety Awareness and Impulsive    ADL:   Lower Extremity Dressing: Moderate Assistance. for donning/doffing shoes and AFO while seated in recliner. BALANCE:  Sitting Balance:  Stand By Assistance. sitting in chair for rest break after mobility from room to therapy apartment  Standing Balance: [employer]. with RW for support with 1-2 hand release. Pt stood during IADL task x15 min     BED MOBILITY:  Not Tested    TRANSFERS:  Sit to Stand:  Minimal Assistance. from recliner and dining chair w/o arms  Stand to Sit: Minimal Assistance. to control descent to recliner and chair    FUNCTIONAL MOBILITY:  Assistive Device: Rolling Walker  Assist Level:  Contact Guard Assistance and Minimal Assistance.    Distance: To and from therapy apartment and within apartment  Pt required CGA for mobility to therapy apartment. Increased fatigue noted at end of session, requiring Min A mobility to return to room. Pt with tendencies for R knee to buckle and for R foot to get caught behind L. Pt VERY impulsive with mobility with RW throughout apartment when retrieving cooking tools. ADDITIONAL ACTIVITIES:  Pt completed IADL task of retrieving material for frying eggs within the kitchen and then carrying out task. Pt slightly impulsive, making multiple trips to the same location. Pt educated on energy conservation strategies, including planning route ahead of time and retrieving more than one item at a time. Pt tolerated standing x15 min with CGA for task. Min VC for problem solving and safety awareness throughout. ASSESSMENT:     Activity Tolerance:  Patient tolerance of  treatment: good. Discharge Recommendations: Home with Home health OT    Equipment Recommendations: Equipment Needed: Yes  Other: Patient has shower chair and frame over toilet. PTA patient reports using a cane, may benefit from a walker. Patient may benefit from [facility] to increase indep in dressing tasks. Plan: Times per week: 90 minutes 5x/week, 30 minutes 1x/week  Current Treatment Recommendations: Strengthening, Balance Training, Functional Mobility Training, Endurance Training, Self-Care / ADL, Patient/Caregiver Education & Training, Safety Education & Training, Home Management Training    Patient Education  Patient Education: Plan of Care, Energy Conservation, Reviewed Prior Education and Importance of Increasing Activity    Goals  Short term goals  Time Frame for Short term goals: 1 week  Short term goal 1: Pt will complete LB ADL with SBA and LHAE prn to increase indep with self care. Short term goal 2: Patient will increase LUE AROM >90 degrees with min cues to increase indep in ADL tasks.   Short term goal 3: Pt will navigate to/from bathroom using RW with SBA and min cues for safety increase safety in toileting task. Short term goal 4: Patient will complete simple homemaking task with SBA and min cues for safety to increase indep in home environment. Short term goal 5: Patient will tolerate dynamic standing task with 1-2 UE release and SBA to increase safety and indep in sinkside grooming tasks. Long term goals  Time Frame for Long term goals : 2-3 weeks  Long term goal 1: Patient will complete simple meal prep task with SBA using compensatory techniques prn to increase indep in making a sandwich at home. Long term goal 2: Patient will complete BADL routine with SBA and using compensatory techniques prn to increase indep in home environment. Following session, patient left in safe position with all fall risk precautions in place.

## 2021-06-04 NOTE — DISCHARGE INSTR - COC
Continuity of Care Form    Patient Name: Michaela Nickerson   :  1945  MRN:  [id number]    Admit date:  2021  Discharge date:  21    Code Status Order: Full Code   Advance Directives:   [facility] Documentation     Date/Time Healthcare Directive Type of Healthcare Directive Copy in [facility] [address] Agent's Name Healthcare Agent's Phone Number    21 1507  No, patient does not have an advance directive for healthcare treatment -- -- -- -- --          Admitting Physician: Hortencia Soto MD  PCP: [address] [place] [address]    Discharging Nurse:  [address] Unit/Room#: 7E-56/056-A  Discharging Unit Phone Number: [phone]    Emergency Contact:   Extended Emergency Contact Information  Primary Emergency Contact: dlaila delaney  Address: [address] [place], [address] Marisela Ditto of [address] Phone: [phone]  Mobile Phone: [phone]  Relation: Brother/Sister  Preferred language: English   needed? No    Past Surgical History:  Past Surgical History:   Procedure Laterality Date    BACK SURGERY      CHOLECYSTECTOMY      SHOULDER SURGERY      SMALL INTESTINE SURGERY      partial bowel resection       Immunization History: There is no immunization history on file for this patient. Active Problems:  Patient Active Problem List   Diagnosis Code    Encephalopathy G93.40    Moderate malnutrition ([address] E44.0    Hypertension I10    Hyperlipidemia E78.5    History of DVT (deep vein thrombosis) Z86.718    Diabetes ([address] E11.9    Acute encephalopathy G93.40    Fall W19. Guilherme Ficks    Disorientation R41.0    Somnolence R40.0    Parkinson's disease ([address] G20       Isolation/Infection:   Isolation          No Isolation        Patient Infection Status     Infection Onset Added Last Indicated Last Indicated By Review Planned Expiration Resolved Resolved By    None active    Resolved    COVID-19 Rule Out 21 05/24/21 COVID-19, Rapid (Ordered)   05/24/21 Rule-Out Test Resulted          Nurse Assessment:  Last Vital Signs: /68   Pulse 56   Temp 98.1 °F (36.7 °C) (Oral)   Resp 16   Ht 5' 10\" (1.778 m)   Wt 239 lb 6.4 oz (108.6 kg)   SpO2 98%   BMI 34.35 kg/m²     Last documented pain score (0-10 scale): Pain Level: 8  Last Weight:   Wt Readings from Last 1 Encounters:   05/27/21 239 lb 6.4 oz (108.6 kg)     Mental Status:  oriented, alert, coherent, logical, thought processes intact and able to concentrate and follow conversation    IV Access:  - None    Nursing Mobility/ADLs:  Walking   Independent  Transfer  Independent  Bathing  Independent  Dressing  Assisted  Toileting  Assisted  Feeding  Independent  Med Admin  Assisted  Med Delivery   none    Wound Care Documentation and Therapy:  Wound 02/19/21 Perineum Posterior (Active)   Number of days: 104        Elimination:  Continence:   · Bowel: Yes  · Bladder: Yes  Urinary Catheter: None   Colostomy/Ileostomy/Ileal Conduit: No       Date of Last BM: 6/6    Intake/Output Summary (Last 24 hours) at 6/4/2021 0935  Last data filed at 6/4/2021 0855  Gross per 24 hour   Intake 972 ml   Output 1100 ml   Net -128 ml     I/O last 3 completed shifts: In: 952 [P.O.:952]  Out: 1100 [Urine:1100]    Safety Concerns:     History of Falls (last 30 days) and At Risk for Falls    Impairments/Disabilities:      None    Nutrition Therapy:  Current Nutrition Therapy:   - Oral Diet:  General    Routes of Feeding: Oral  Liquids: Thin Liquids  Daily Fluid Restriction: no  Last Modified Barium Swallow with Video (Video Swallowing Test): not done    Treatments at the Time of Hospital Discharge:   Respiratory Treatments: na  Oxygen Therapy:  is not on home oxygen therapy. Ventilator:    - No ventilator support    Rehab Therapies: Physical Therapy, Occupational Therapy, Speech/Language Therapy, RN, and home health aid.   Weight Bearing Status/Restrictions: No weight bearing restirctions Other Medical Equipment (for information only, NOT a DME order):  wheelchair and walker  Other Treatments: n/a    Patient's personal belongings (please select all that are sent with patient):  Glasses    RN SIGNATURE:

## 2021-06-04 NOTE — PROGRESS NOTES
[address]  INPATIENT PHYSICAL THERAPY  DAILY NOTE  [facility]    Time In: 848  Time Out: 930  Timed Code Treatment Minutes: 43 Minutes  Minutes: 42     Minute Variance  Variance: -18  Reason:  (late breakfast)    Date: 2021  Patient Name: George Rose,  Gender:  male        MRN: [id number]  : 1945  (76 y.o.)  Referral Date : 21  Referring Practitioner: STEFANO Kline CNP  Diagnosis: Parkinson's disease  Additional Pertinent Hx: Pt is a 44-year-old male who was admitted to [facility] after presenting by EMS from local drugstore confused, had unwitnessed fall presented with generalized AMS. Prior history of similar presentations in the past.  PMH include antiphospholipid syndrome, DVT, sleep apnea, COPD, CKD, HTN, hypothyroidism and RCC. Patient unable to provide more history. History obtained from chart review and ED accounts. Pt transferred to  rehab . Prior Level of Function:  Lives With: Other (comment) (2 roomates in apartment in [address], common areas shared)  Type of Home: Apartment  Home Layout: One level  Home Access: Level entry  Home Equipment: Cane, Lift chair   Bathroom Shower/Tub: Tub/Shower unit  Bathroom Toilet: Handicap height (standard toilet with frame overlay)  Bathroom Equipment: Shower chair, Grab bars in shower  Bathroom Accessibility: Walker accessible    ADL Assistance: Independent  Homemaking Assistance: Independent  Homemaking Responsibilities: Yes  Ambulation Assistance: Independent  Transfer Assistance: Independent  Active : Yes  IADL Comments: He reports he usually orders food to be delivered or has staff at [facility] make his food, but states he will make himself a sandwich for lunch. States that staff does his laundry 1x/week in the apartment across the ha because he cannot go downstairs to use the resident laundry.   Additional Comments: has an aid 3x/wk to assist with bathing and dressing BLEs, as well as homemaking. Pt using cane PTA for mobility. Patient reports no family or support system nearby, but has a step-brother who lives in [place] that he willl go visit. Restrictions/Precautions:  Restrictions/Precautions: General Precautions, Fall Risk     SUBJECTIVE: Patient in bed sleeping upon arrival, hadn't started eating breakfast yet, missed 18 minutes of PT while patient ate breakfast. Agreed and cooperative for therapy once finished eating. Requesting to use bathroom once getting started, required extra time and also receiving meds during session. PAIN: 7-8/10: pain in Left heel, RN aware. Vitals: Vitals not assessed per clinical judgement, see nursing flowsheet    OBJECTIVE:  Bed Mobility:  Supine to Sit: Stand By Assistance, with increased time for completion    Transfers:  Sit to Stand: [employer], cues for hand placement  Stand to [place] [id number], cues for hand placement    Ambulation:  Contact Guard Assistance, with verbal cues   Distance: 150 ft. X2; 10 ft. x2   Surface: Level Tile  Device:Rolling Walker  Gait Deviations: Forward Flexed Posture, Slow Ananya, Decreased Step Length on Right, Decreased Weight Shift Right, Decreased Gait Speed, Decreased Heel Strike on Right, Decreased Terminal Knee Extension bilaterally but more so on LLE during stance time, verbal cues for quad activation on LLE during stance time to improve fluidity of gait, did improve with verbal cueing. Balance:  Dynamic Standing Balance: Contact Guard Assistance, stood with intermittent UE support on AD while completing functional bathroom tasks. Min instability noted but no LOB. Neuromuscular Education: Forward and lateral step taps onto 4\" step. Activity completed to improve foot clearance on RLE and also work on quad activation bilaterally while in SLS. Completed 10 reps on each LE.      Functional Outcome Measures: Not completed       ASSESSMENT: Assessment: Patient progressing toward established goals. Activity Tolerance:  Patient tolerance of  treatment: good. Equipment Recommendations:Equipment Needed: Yes (RW, monitor need for W/C)  Discharge Recommendations:  Continue to assess pending progress    Plan: Times per week: 5x/wk 90min, 1x/wk 30min  Times per day: Twice a day  Current Treatment Recommendations: Strengthening, Transfer Training, Endurance Training, Neuromuscular Re-education, Patient/Caregiver Education & Training, Balance Training, Gait Training, Home Exercise Program, Functional Mobility Training, Stair training, Safety Education & Training    Patient Education  Patient Education: Plan of Care, Precautions/Restrictions, [employer] Mobility, Transfers, Reviewed Prior Education, Gait, Health Promotion and Wellness Education, - Patient Requires Continued Education    Goals:  Patient goals : reduce pain, get stronger  Short term goals  Time Frame for Short term goals: 1 week  Short term goal 1: Patient will complete supine < > sit with SBA to transfer in/out of bed with decreased difficulty. Short term goal 2: Patient will complete sit < > stand with SBA with RW to stand to ambulate with increased independence. Short term goal 3: Patient will ambulate 48' with a RW and CGA, clearing right LE >/=50% of the time to increase safety with ambulation. MET, see LTG  Short term goal 4: Patient will complete rolling with SBA to reposition self with increased independence. Short term goal 5: Patient will propel wheelchair 150' with modified independence to increase independence and mobility in current living environment. Long term goals  Time Frame for Long term goals : 2 wks  Long term goal 1: Patient will complete supine < > sit and rolling with modified independence to transfer in/out of bed independently.   Long term goal 2: Patient will complete sit < > stand and stand pivot transfer with RW and modified independence to transfer surface to surface safely. Long term goal 3: Patient will ambulate 80' with a RW and Rt AFO and modified independence to navigate home safely. Long term goal 4: Patient will improve tinetti to greater than or equal to 19/28 to reduce his risk for falls. Long term goal 5: Patient will complete car transfer with supervision to transfer in/out of vehicle safely. Following session, patient left in safe position with all fall risk precautions in place.

## 2021-06-05 PROCEDURE — 1180000000 HC REHAB R&B

## 2021-06-05 PROCEDURE — 94660 CPAP INITIATION&MGMT: CPT

## 2021-06-05 PROCEDURE — 94761 N-INVAS EAR/PLS OXIMETRY MLT: CPT

## 2021-06-05 PROCEDURE — 6370000000 HC RX 637 (ALT 250 FOR IP): Performed by: FAMILY MEDICINE

## 2021-06-05 PROCEDURE — 97110 THERAPEUTIC EXERCISES: CPT

## 2021-06-05 PROCEDURE — 97116 GAIT TRAINING THERAPY: CPT

## 2021-06-05 PROCEDURE — 6370000000 HC RX 637 (ALT 250 FOR IP): Performed by: NURSE PRACTITIONER

## 2021-06-05 PROCEDURE — 97535 SELF CARE MNGMENT TRAINING: CPT

## 2021-06-05 PROCEDURE — 2700000000 HC OXYGEN THERAPY PER DAY

## 2021-06-05 PROCEDURE — 6370000000 HC RX 637 (ALT 250 FOR IP): Performed by: INTERNAL MEDICINE

## 2021-06-05 PROCEDURE — 6370000000 HC RX 637 (ALT 250 FOR IP): Performed by: PHYSICAL MEDICINE & REHABILITATION

## 2021-06-05 RX ADMIN — PREGABALIN 150 MG: 50 CAPSULE ORAL at 21:29

## 2021-06-05 RX ADMIN — PANTOPRAZOLE SODIUM 40 MG: 40 TABLET, DELAYED RELEASE ORAL at 06:12

## 2021-06-05 RX ADMIN — Medication 6 MG: at 00:14

## 2021-06-05 RX ADMIN — DICYCLOMINE HYDROCHLORIDE 10 MG: 10 CAPSULE ORAL at 12:25

## 2021-06-05 RX ADMIN — LOSARTAN POTASSIUM 50 MG: 50 TABLET, FILM COATED ORAL at 08:52

## 2021-06-05 RX ADMIN — ATORVASTATIN CALCIUM 20 MG: 20 TABLET, FILM COATED ORAL at 08:52

## 2021-06-05 RX ADMIN — MECLIZINE HCL 25 MG: 25 TABLET, CHEWABLE ORAL at 08:52

## 2021-06-05 RX ADMIN — OXYCODONE HYDROCHLORIDE AND ACETAMINOPHEN 1 TABLET: 10; 325 TABLET ORAL at 08:52

## 2021-06-05 RX ADMIN — PREGABALIN 150 MG: 50 CAPSULE ORAL at 08:51

## 2021-06-05 RX ADMIN — RIVAROXABAN 10 MG: 10 TABLET, FILM COATED ORAL at 08:51

## 2021-06-05 RX ADMIN — OXYCODONE HYDROCHLORIDE AND ACETAMINOPHEN 1 TABLET: 10; 325 TABLET ORAL at 12:25

## 2021-06-05 RX ADMIN — OXYCODONE HYDROCHLORIDE AND ACETAMINOPHEN 1 TABLET: 10; 325 TABLET ORAL at 17:48

## 2021-06-05 RX ADMIN — Medication 6 MG: at 21:29

## 2021-06-05 RX ADMIN — OXYCODONE HYDROCHLORIDE AND ACETAMINOPHEN 1 TABLET: 10; 325 TABLET ORAL at 21:28

## 2021-06-05 RX ADMIN — DICYCLOMINE HYDROCHLORIDE 10 MG: 10 CAPSULE ORAL at 06:12

## 2021-06-05 RX ADMIN — TAMSULOSIN HYDROCHLORIDE 0.4 MG: 0.4 CAPSULE ORAL at 08:51

## 2021-06-05 RX ADMIN — SIMETHICONE 80 MG: 80 TABLET, CHEWABLE ORAL at 23:50

## 2021-06-05 RX ADMIN — DICYCLOMINE HYDROCHLORIDE 10 MG: 10 CAPSULE ORAL at 17:48

## 2021-06-05 ASSESSMENT — PAIN DESCRIPTION - DESCRIPTORS
DESCRIPTORS: THROBBING
DESCRIPTORS: THROBBING

## 2021-06-05 ASSESSMENT — PAIN SCALES - GENERAL
PAINLEVEL_OUTOF10: 8
PAINLEVEL_OUTOF10: 10
PAINLEVEL_OUTOF10: 8
PAINLEVEL_OUTOF10: 9
PAINLEVEL_OUTOF10: 10
PAINLEVEL_OUTOF10: 8

## 2021-06-05 ASSESSMENT — PAIN DESCRIPTION - ORIENTATION
ORIENTATION: MID;LOWER;UPPER
ORIENTATION: MID;UPPER;LOWER;RIGHT

## 2021-06-05 ASSESSMENT — PAIN DESCRIPTION - DIRECTION: RADIATING_TOWARDS: BACK TO LEGS

## 2021-06-05 ASSESSMENT — PAIN DESCRIPTION - PROGRESSION
CLINICAL_PROGRESSION: NOT CHANGED
CLINICAL_PROGRESSION: NOT CHANGED

## 2021-06-05 ASSESSMENT — PAIN DESCRIPTION - ONSET
ONSET: ON-GOING
ONSET: ON-GOING

## 2021-06-05 ASSESSMENT — PAIN DESCRIPTION - LOCATION
LOCATION: BACK;LEG
LOCATION: BACK;LEG

## 2021-06-05 ASSESSMENT — PAIN DESCRIPTION - PAIN TYPE
TYPE: CHRONIC PAIN
TYPE: CHRONIC PAIN

## 2021-06-05 ASSESSMENT — PAIN DESCRIPTION - FREQUENCY
FREQUENCY: CONTINUOUS
FREQUENCY: CONTINUOUS

## 2021-06-05 NOTE — PROGRESS NOTES
assistance in order to tie R shoe. Patient educated regarding elastic shoe laces in order to increase independence with LB dressing task; verbalizing understanding. Lilia Joe BALANCE:  Sitting Balance:  Supervision. bedside chair    BED MOBILITY:  Not Tested    ADDITIONAL ACTIVITIES:  Patient completed AAROM in order to increase AROM of L shoulder with patient's R UE to assist in order to complete B shoulder flex/ext x 10 reps with 2 sec hold x 1 set at end range as well as B AROM of shoulder horizontal abd/add, elbow flex/ext in order to increase independence with ADLs/IADLs. Patient required min verbal/visual cues in order to complete correctly initially with ability to demonstrate carryover with remainder of reps. ASSESSMENT:     Activity Tolerance:  Patient tolerance of  treatment: good. Discharge Recommendations: Home with Home health OT   Equipment Recommendations: Equipment Needed: Yes  Other: Patient has shower chair and frame over toilet. PTA patient reports using a cane, may benefit from a walker. Patient may benefit from [facility] to increase indep in dressing tasks. Plan: Times per week: 90 minutes 5x/week, 30 minutes 1x/week  Current Treatment Recommendations: Strengthening, Balance Training, Functional Mobility Training, Endurance Training, Self-Care / ADL, Patient/Caregiver Education & Training, Safety Education & Training, Home Management Training    Patient Education  Patient Education: Plan of Care, ADL's, Home Exercise Program, Home Safety and Importance of Increasing Activity    Goals  Short term goals  Time Frame for Short term goals: 1 week  Short term goal 1: Pt will complete LB ADL with SBA and LHAE prn to increase indep with self care. Short term goal 2: Patient will increase LUE AROM >90 degrees with min cues to increase indep in ADL tasks. Short term goal 3: Pt will navigate to/from bathroom using RW with SBA and min cues for safety increase safety in toileting task.   Short term goal 4:

## 2021-06-05 NOTE — PROGRESS NOTES
[address] [facility] PHYSICAL THERAPY  DAILY NOTE  [facility]      Time In: 0700  Time Out: 0730  Timed Code Treatment Minutes: 27 Minutes  Minutes: 30          Date: 2021  Patient Name: Jonas Jimenez,  Gender:  male        MRN: [id number]  : 1945  (76 y.o.)  Referral Date : 21  Referring Practitioner: STEFANO Chiang CNP  Diagnosis: Parkinson's disease  Additional Pertinent Hx: Pt is a 66-year-old male who was admitted to [facility], had unwitnessed fall presented with generalized AMS. Prior history of similar presentations in the past.  PMH include antiphospholipid syndrome, DVT, sleep apnea, COPD, CKD, HTN, hypothyroidism and RCC. Patient unable to provide more history. History obtained from chart review and ED accounts. Pt transferred to  rehab . Prior Level of Function:  Lives With: Other (comment) (2 roomates in apartment in [address], common areas shared)  Type of Home: Apartment  Home Layout: One level  Home Access: Level entry  Home Equipment: Cane, Lift chair   Bathroom Shower/Tub: Tub/Shower unit  Bathroom Toilet: Handicap height (standard toilet with frame overlay)  Bathroom Equipment: Shower chair, Grab bars in shower  Bathroom Accessibility: Walker accessible    ADL Assistance: Independent  Homemaking Assistance: Independent  Homemaking Responsibilities: Yes  Ambulation Assistance: Independent  Transfer Assistance: Independent  Active : Yes  IADL Comments: He reports he usually orders food to be delivered or has staff at [facility] make his food, but states he will make himself a sandwich for lunch. States that staff does his laundry 1x/week in the apartment across the ha because he cannot go downstairs to use the resident laundry. Additional Comments: has an aid 3x/wk to assist with bathing and dressing BLEs, as well as homemaking.   Pt using cane PTA for mobility. Patient reports no family or support system nearby, but has a step-brother who lives in [place] that he willl go visit. Restrictions/Precautions:  Restrictions/Precautions: General Precautions, Fall Risk       SUBJECTIVE: pt in bed on arrival he reported that he didn't sleep well at all last night     PAIN: 10/10: in back and left side- he wasn't due for pain meds for a couple of hours     Vitals: Vitals not assessed per clinical judgement, see nursing flowsheet    OBJECTIVE:  Bed Mobility:  Supine to Sit: Supervision    Transfers:  Sit to Stand: Contact Guard Assistance  Stand to [address] cue for hand placement and to control descend  Ambulation:  Contact Guard Assistance  Distance: 80x1  Surface: Level Tile  Device:rolling walker- walk on AFOon right   Gait Deviations:  Slow Ananya and flexed posture with heavy reliance on the walker- even with the walk on AFO he was catching his foot at start of swing phase and was dragging it through - however with verbal cues he was able correct somewhat but still would catch his foot mid swing on occ, he also required verbal cues for right TKE at stance phase - noted decreased wt shifting over left LE and shortened stance time at right LE    Balance:  Pt sitting edge of bed with mod I however he needed assist to evelyn VIVIAN hose and shoes on prior to session   Pt completed standing task in bathroom mostly with one UE at support- on one occ he did free [place]ol[place] UEs and had a loss of balance forward and tended to lean forward on sink to catch his balance - pt needed cues for TKE while completing standing task     Exercise:  Patient was guided in 1 set(s) 10 reps of exercise to both lower extremities. Long arc quads- graded lifts 2x5 reps with CGA, and graded squats with CGA and UE at support . Exercises were completed for increased independence with functional mobility.     Functional Outcome Measures: Not completed ASSESSMENT:  Assessment: Patient progressing toward established goals. Activity Tolerance:  Patient tolerance of  treatment: good. Equipment Recommendations:Equipment Needed: Yes (RW, monitor need for W/C)  Discharge Recommendations:    Continue to assess pending progress    Plan: Times per week: 5x/wk 90min, 1x/wk 30min  Times per day: Twice a day  Current Treatment Recommendations: Strengthening, Transfer Training, Endurance Training, Neuromuscular Re-education, Patient/Caregiver Education & Training, Balance Training, Gait Training, Home Exercise Program, Functional Mobility Training, Stair training, Safety Education & Training    Patient Education  Patient Education: Plan of Care    Goals:  Patient goals : reduce pain, get stronger  Short term goals  Time Frame for Short term goals: 1 week  Short term goal 1: Patient will complete supine < > sit with SBA to transfer in/out of bed with decreased difficulty. Short term goal 2: Patient will complete sit < > stand with SBA with RW to stand to ambulate with increased independence. Short term goal 3: Patient will ambulate 48' with a RW and CGA, clearing right LE >/=50% of the time to increase safety with ambulation. MET, see LTG  Short term goal 4: Patient will complete rolling with SBA to reposition self with increased independence. Short term goal 5: Patient will propel wheelchair 150' with modified independence to increase independence and mobility in current living environment. Long term goals  Time Frame for Long term goals : 2 wks  Long term goal 1: Patient will complete supine < > sit and rolling with modified independence to transfer in/out of bed independently. Long term goal 2: Patient will complete sit < > stand and stand pivot transfer with RW and modified independence to transfer surface to surface safely. Long term goal 3: Patient will ambulate 80' with a RW and Rt AFO and modified independence to navigate home safely.   Long term goal 4:

## 2021-06-05 NOTE — PLAN OF CARE
Problem: Falls - Risk of:  Goal: Will remain free from falls  Description: Will remain free from falls  6/5/2021 1544 by Reny Taylor RN  Outcome: Ongoing  Note: No noted falls. Has hx of falls. Continues with dragging R foot while ambulating. Gait unsteady with use of walker. Problem: Skin Integrity:  Goal: Absence of new skin breakdown  Description: Absence of new skin breakdown  6/5/2021 1544 by Reny Taylor RN  Outcome: Ongoing  Note: No noted pressure areas. Continue to float heels. Area on L heel from 6/4 resolved. Problem: Pain:  Goal: Control of chronic pain  Description: Control of chronic pain  6/5/2021 1544 by Reny Taylor RN  Outcome: Ongoing  Note: Has ongoing chronic pain- takes routine Percocet. Problem: Bleeding:  Goal: Will show no signs and symptoms of excessive bleeding  Description: Will show no signs and symptoms of excessive bleeding  6/5/2021 1544 by Reny Taylor RN  Outcome: Ongoing  Note: Anticoagulation continues. No s/s of excessive bleeding. Problem: Respiratory  Goal: O2 Sat > 90%  6/5/2021 1544 by Reny Taylor RN  Note: Pulse ox 98% RA. No respiratory s/s noted. Problem: GI  Goal: No bowel complications  8/3/0946 1768 by Reny Taylor RN  Outcome: Ongoing  Note: Medium BM today without difficulty. Problem:   Goal: No urinary complication  3/8/0569 5803 by Reny Taylor RN  Outcome: Ongoing  Note: No noted urinary complications. Problem: Discharge Planning:  Goal: Patients continuum of care needs are met  Description: Patients continuum of care needs are met  6/5/2021 1544 by Reny Taylor RN  Outcome: Ongoing  Note: Plans to discharge home with home health on 6/8/21.

## 2021-06-05 NOTE — PLAN OF CARE
Problem: Falls - Risk of:  Goal: Will remain free from falls  Description: Will remain free from falls  6/5/2021 0149 by Justin Whelan RN  Outcome: Ongoing     Problem: Falls - Risk of:  Goal: Absence of physical injury  Description: Absence of physical injury  Outcome: Ongoing     Problem: Skin Integrity:  Goal: Will show no infection signs and symptoms  Description: Will show no infection signs and symptoms  Outcome: Ongoing     Problem: Skin Integrity:  Goal: Absence of new skin breakdown  Description: Absence of new skin breakdown  6/5/2021 0149 by Justin Whelan RN  Outcome: Ongoing   Care plan reviewed with patient. Patient verbalizes understanding of care plan and treatment goals.

## 2021-06-06 ENCOUNTER — APPOINTMENT (OUTPATIENT)
Dept: CT IMAGING | Age: 76
DRG: 057 | End: 2021-06-06
Attending: PHYSICAL MEDICINE & REHABILITATION
Payer: MEDICARE

## 2021-06-06 ENCOUNTER — APPOINTMENT (OUTPATIENT)
Dept: GENERAL RADIOLOGY | Age: 76
DRG: 057 | End: 2021-06-06
Attending: PHYSICAL MEDICINE & REHABILITATION
Payer: MEDICARE

## 2021-06-06 LAB
ALBUMIN SERPL-MCNC: 3.2 G/DL (ref 3.5–5.1)
ALP BLD-CCNC: 93 U/L (ref 38–126)
ALT SERPL-CCNC: 11 U/L (ref 11–66)
ANION GAP SERPL CALCULATED.3IONS-SCNC: 6 MEQ/L (ref 8–16)
AST SERPL-CCNC: 12 U/L (ref 5–40)
BASOPHILS # BLD: 0.8 %
BASOPHILS ABSOLUTE: 0 THOU/MM3 (ref 0–0.1)
BILIRUB SERPL-MCNC: 0.4 MG/DL (ref 0.3–1.2)
BILIRUBIN DIRECT: < 0.2 MG/DL (ref 0–0.3)
BUN BLDV-MCNC: 14 MG/DL (ref 7–22)
C DIFF TOXIN/ANTIGEN: NEGATIVE
CALCIUM SERPL-MCNC: 9.4 MG/DL (ref 8.5–10.5)
CHLORIDE BLD-SCNC: 107 MEQ/L (ref 98–111)
CO2: 28 MEQ/L (ref 23–33)
CREAT SERPL-MCNC: 1.2 MG/DL (ref 0.4–1.2)
EOSINOPHIL # BLD: 4.7 %
EOSINOPHILS ABSOLUTE: 0.1 THOU/MM3 (ref 0–0.4)
ERYTHROCYTE [DISTWIDTH] IN BLOOD BY AUTOMATED COUNT: 13.2 % (ref 11.5–14.5)
ERYTHROCYTE [DISTWIDTH] IN BLOOD BY AUTOMATED COUNT: 45.1 FL (ref 35–45)
FECAL LEUKOCYTES: NORMAL
GLUCOSE BLD-MCNC: 111 MG/DL (ref 70–108)
GLUCOSE BLD-MCNC: 81 MG/DL (ref 70–108)
HCT VFR BLD CALC: 41.8 % (ref 42–52)
HEMOGLOBIN: 12.5 GM/DL (ref 14–18)
IMMATURE GRANS (ABS): 0 THOU/MM3 (ref 0–0.07)
IMMATURE GRANULOCYTES: 0 %
LACTIC ACID: 1.7 MMOL/L (ref 0.5–2)
LYMPHOCYTES # BLD: 37.4 %
LYMPHOCYTES ABSOLUTE: 1 THOU/MM3 (ref 1–4.8)
MCH RBC QN AUTO: 27.9 PG (ref 26–33)
MCHC RBC AUTO-ENTMCNC: 29.9 GM/DL (ref 32.2–35.5)
MCV RBC AUTO: 93.3 FL (ref 80–94)
MONOCYTES # BLD: 12.5 %
MONOCYTES ABSOLUTE: 0.3 THOU/MM3 (ref 0.4–1.3)
NUCLEATED RED BLOOD CELLS: 0 /100 WBC
PLATELET # BLD: 158 THOU/MM3 (ref 130–400)
PMV BLD AUTO: 9.8 FL (ref 9.4–12.4)
POTASSIUM SERPL-SCNC: 4.1 MEQ/L (ref 3.5–5.2)
RBC # BLD: 4.48 MILL/MM3 (ref 4.7–6.1)
SEG NEUTROPHILS: 44.6 %
SEGMENTED NEUTROPHILS ABSOLUTE COUNT: 1.2 THOU/MM3 (ref 1.8–7.7)
SODIUM BLD-SCNC: 141 MEQ/L (ref 135–145)
TOTAL PROTEIN: 5.9 G/DL (ref 6.1–8)
WBC # BLD: 2.6 THOU/MM3 (ref 4.8–10.8)

## 2021-06-06 PROCEDURE — 1180000000 HC REHAB R&B

## 2021-06-06 PROCEDURE — 83605 ASSAY OF LACTIC ACID: CPT

## 2021-06-06 PROCEDURE — 6360000004 HC RX CONTRAST MEDICATION: Performed by: PHYSICAL MEDICINE & REHABILITATION

## 2021-06-06 PROCEDURE — 82248 BILIRUBIN DIRECT: CPT

## 2021-06-06 PROCEDURE — 36415 COLL VENOUS BLD VENIPUNCTURE: CPT

## 2021-06-06 PROCEDURE — 6360000002 HC RX W HCPCS: Performed by: PHYSICAL MEDICINE & REHABILITATION

## 2021-06-06 PROCEDURE — 6370000000 HC RX 637 (ALT 250 FOR IP): Performed by: FAMILY MEDICINE

## 2021-06-06 PROCEDURE — 74018 RADEX ABDOMEN 1 VIEW: CPT

## 2021-06-06 PROCEDURE — 82948 REAGENT STRIP/BLOOD GLUCOSE: CPT

## 2021-06-06 PROCEDURE — 80053 COMPREHEN METABOLIC PANEL: CPT

## 2021-06-06 PROCEDURE — 6370000000 HC RX 637 (ALT 250 FOR IP): Performed by: INTERNAL MEDICINE

## 2021-06-06 PROCEDURE — 2580000003 HC RX 258: Performed by: PHYSICIAN ASSISTANT

## 2021-06-06 PROCEDURE — 87427 SHIGA-LIKE TOXIN AG IA: CPT

## 2021-06-06 PROCEDURE — 87449 NOS EACH ORGANISM AG IA: CPT

## 2021-06-06 PROCEDURE — 74177 CT ABD & PELVIS W/CONTRAST: CPT

## 2021-06-06 PROCEDURE — 87045 FECES CULTURE AEROBIC BACT: CPT

## 2021-06-06 PROCEDURE — 6370000000 HC RX 637 (ALT 250 FOR IP): Performed by: PHYSICAL MEDICINE & REHABILITATION

## 2021-06-06 PROCEDURE — 89055 LEUKOCYTE ASSESSMENT FECAL: CPT

## 2021-06-06 PROCEDURE — APPSS60 APP SPLIT SHARED TIME 46-60 MINUTES: Performed by: PHYSICIAN ASSISTANT

## 2021-06-06 PROCEDURE — 6370000000 HC RX 637 (ALT 250 FOR IP): Performed by: NURSE PRACTITIONER

## 2021-06-06 PROCEDURE — 6370000000 HC RX 637 (ALT 250 FOR IP): Performed by: PHYSICIAN ASSISTANT

## 2021-06-06 PROCEDURE — 85025 COMPLETE CBC W/AUTO DIFF WBC: CPT

## 2021-06-06 RX ORDER — SIMETHICONE 80 MG
80 TABLET,CHEWABLE ORAL ONCE
Status: COMPLETED | OUTPATIENT
Start: 2021-06-06 | End: 2021-06-06

## 2021-06-06 RX ORDER — FUROSEMIDE 40 MG/1
40 TABLET ORAL DAILY
Status: DISCONTINUED | OUTPATIENT
Start: 2021-06-07 | End: 2021-06-08 | Stop reason: HOSPADM

## 2021-06-06 RX ORDER — MORPHINE SULFATE 2 MG/ML
5 INJECTION, SOLUTION INTRAMUSCULAR; INTRAVENOUS ONCE
Status: COMPLETED | OUTPATIENT
Start: 2021-06-06 | End: 2021-06-06

## 2021-06-06 RX ORDER — DOCUSATE SODIUM 100 MG/1
100 CAPSULE, LIQUID FILLED ORAL DAILY
Status: DISCONTINUED | OUTPATIENT
Start: 2021-06-06 | End: 2021-06-08 | Stop reason: HOSPADM

## 2021-06-06 RX ORDER — SENNA PLUS 8.6 MG/1
1 TABLET ORAL NIGHTLY
Status: DISCONTINUED | OUTPATIENT
Start: 2021-06-06 | End: 2021-06-08 | Stop reason: HOSPADM

## 2021-06-06 RX ORDER — SODIUM CHLORIDE 9 MG/ML
INJECTION, SOLUTION INTRAVENOUS CONTINUOUS
Status: DISCONTINUED | OUTPATIENT
Start: 2021-06-06 | End: 2021-06-07

## 2021-06-06 RX ADMIN — SODIUM CHLORIDE: 9 INJECTION, SOLUTION INTRAVENOUS at 21:34

## 2021-06-06 RX ADMIN — MECLIZINE HCL 25 MG: 25 TABLET, CHEWABLE ORAL at 11:46

## 2021-06-06 RX ADMIN — LOSARTAN POTASSIUM 50 MG: 50 TABLET, FILM COATED ORAL at 11:47

## 2021-06-06 RX ADMIN — SENNOSIDES 8.6 MG: 8.6 TABLET, FILM COATED ORAL at 21:36

## 2021-06-06 RX ADMIN — DICYCLOMINE HYDROCHLORIDE 10 MG: 10 CAPSULE ORAL at 17:52

## 2021-06-06 RX ADMIN — ATORVASTATIN CALCIUM 20 MG: 20 TABLET, FILM COATED ORAL at 11:47

## 2021-06-06 RX ADMIN — SIMETHICONE 80 MG: 80 TABLET, CHEWABLE ORAL at 11:45

## 2021-06-06 RX ADMIN — TAMSULOSIN HYDROCHLORIDE 0.4 MG: 0.4 CAPSULE ORAL at 21:34

## 2021-06-06 RX ADMIN — DICYCLOMINE HYDROCHLORIDE 10 MG: 10 CAPSULE ORAL at 11:47

## 2021-06-06 RX ADMIN — Medication 80 MG: at 02:26

## 2021-06-06 RX ADMIN — PREGABALIN 150 MG: 50 CAPSULE ORAL at 21:34

## 2021-06-06 RX ADMIN — MORPHINE SULFATE 5 MG: 2 INJECTION, SOLUTION INTRAMUSCULAR; INTRAVENOUS at 09:54

## 2021-06-06 RX ADMIN — Medication 6 MG: at 23:04

## 2021-06-06 RX ADMIN — DOCUSATE SODIUM 100 MG: 100 CAPSULE, LIQUID FILLED ORAL at 11:46

## 2021-06-06 RX ADMIN — OXYCODONE HYDROCHLORIDE AND ACETAMINOPHEN 1 TABLET: 10; 325 TABLET ORAL at 21:34

## 2021-06-06 RX ADMIN — IOPAMIDOL 80 ML: 755 INJECTION, SOLUTION INTRAVENOUS at 08:19

## 2021-06-06 RX ADMIN — NALOXEGOL OXALATE 12.5 MG: 12.5 TABLET, FILM COATED ORAL at 12:22

## 2021-06-06 RX ADMIN — OXYCODONE HYDROCHLORIDE AND ACETAMINOPHEN 1 TABLET: 10; 325 TABLET ORAL at 11:45

## 2021-06-06 RX ADMIN — PREGABALIN 150 MG: 50 CAPSULE ORAL at 11:46

## 2021-06-06 RX ADMIN — TAMSULOSIN HYDROCHLORIDE 0.4 MG: 0.4 CAPSULE ORAL at 11:46

## 2021-06-06 RX ADMIN — SIMETHICONE 80 MG: 80 TABLET, CHEWABLE ORAL at 17:52

## 2021-06-06 RX ADMIN — RIVAROXABAN 10 MG: 10 TABLET, FILM COATED ORAL at 11:46

## 2021-06-06 RX ADMIN — OXYCODONE HYDROCHLORIDE AND ACETAMINOPHEN 1 TABLET: 10; 325 TABLET ORAL at 17:52

## 2021-06-06 ASSESSMENT — PAIN - FUNCTIONAL ASSESSMENT
PAIN_FUNCTIONAL_ASSESSMENT: PREVENTS OR INTERFERES SOME ACTIVE ACTIVITIES AND ADLS
PAIN_FUNCTIONAL_ASSESSMENT: PREVENTS OR INTERFERES WITH MANY ACTIVE NOT PASSIVE ACTIVITIES

## 2021-06-06 ASSESSMENT — PAIN DESCRIPTION - PAIN TYPE
TYPE: ACUTE PAIN

## 2021-06-06 ASSESSMENT — PAIN DESCRIPTION - LOCATION
LOCATION: ABDOMEN
LOCATION: ABDOMEN
LOCATION: BACK

## 2021-06-06 ASSESSMENT — PAIN DESCRIPTION - ORIENTATION
ORIENTATION: ANTERIOR;MID;LOWER
ORIENTATION: RIGHT;LEFT;MID;UPPER;LOWER

## 2021-06-06 ASSESSMENT — PAIN DESCRIPTION - FREQUENCY
FREQUENCY: CONTINUOUS

## 2021-06-06 ASSESSMENT — PAIN DESCRIPTION - PROGRESSION
CLINICAL_PROGRESSION: NOT CHANGED
CLINICAL_PROGRESSION: RAPIDLY WORSENING
CLINICAL_PROGRESSION: NOT CHANGED

## 2021-06-06 ASSESSMENT — PAIN SCALES - GENERAL
PAINLEVEL_OUTOF10: 10
PAINLEVEL_OUTOF10: 9
PAINLEVEL_OUTOF10: 7
PAINLEVEL_OUTOF10: 10
PAINLEVEL_OUTOF10: 9
PAINLEVEL_OUTOF10: 10
PAINLEVEL_OUTOF10: 0

## 2021-06-06 ASSESSMENT — PAIN DESCRIPTION - ONSET
ONSET: SUDDEN
ONSET: AWAKENED FROM SLEEP

## 2021-06-06 ASSESSMENT — PAIN DESCRIPTION - DESCRIPTORS
DESCRIPTORS: ACHING
DESCRIPTORS: SHARP
DESCRIPTORS: CRAMPING;CONSTANT

## 2021-06-06 ASSESSMENT — PAIN DESCRIPTION - DIRECTION: RADIATING_TOWARDS: BACK

## 2021-06-06 NOTE — PROGRESS NOTES
[facility]  [facility] 7E-56/056-A  YOB: 1945  MRN: [id number] Acct: [de-identified]   Admitting Diagnosis: Parkinson's disease ([facility] Marci Pac Date:  5/27/2021  Hospital Day: 10    Assessment:     Active Problems:    Parkinson's disease ([facility]  Resolved Problems:    * No resolved hospital problems. *      Plan:     Continue to follow and diet to be advanced by general surgery        Subjective:     Patient has no complaint of CP or SOB over his baseline. .   Medication side effects: none    Scheduled Meds:   docusate sodium  100 mg Oral Daily    naloxegol  12.5 mg Oral QAM    senna  1 tablet Oral Nightly    [START ON 6/7/2021] furosemide  40 mg Oral Daily    pregabalin  150 mg Oral BID    dicyclomine  10 mg Oral TID AC    pantoprazole  40 mg Oral QAM AC    atorvastatin  20 mg Oral Daily    losartan  50 mg Oral Daily    meclizine  25 mg Oral Daily    oxyCODONE-acetaminophen  1 tablet Oral 4x Daily    rivaroxaban  10 mg Oral Q24H    tamsulosin  0.4 mg Oral Daily     Continuous Infusions:   sodium chloride       PRN Meds:lidocaine, simethicone, calcium carbonate, melatonin, bisacodyl, polyethylene glycol, albuterol, acetaminophen **OR** [DISCONTINUED] acetaminophen    Review of Systems  Pertinent items are noted in HPI. Objective:     No data found. I/O last 3 completed shifts: In: 222 [P.O.:222]  Out: 550 [Urine:550]  I/O this shift:   In: 549.5 [I.V.:549.5]  Out: 900 [Urine:900]    /71   Pulse 58   Temp 98.2 °F (36.8 °C) (Oral)   Resp 16   Ht 5' 10\" (1.778 m)   Wt 239 lb 6.4 oz (108.6 kg)   SpO2 95%   BMI 34.35 kg/m²     General appearance: alert, appears stated age and cooperative  Head: Normocephalic, without obvious abnormality, atraumatic  Lungs: clear to auscultation bilaterally  Chest wall: no tenderness  Heart: regular rate and rhythm, S1, S2 normal, no murmur, click, rub or gallop  Abdomen: soft, non-tender; bowel sounds normal; no masses,  no organomegaly  Extremities: edema 1-2+ bilaterally  Skin: Skin color, texture, turgor normal. No rashes or lesions  Neurologic: weak    Electronically signed by Johnny Meraz MD on 6/6/2021 at 5:35 PM

## 2021-06-06 NOTE — CONSULTS
[facility]  General Surgery Consultation  Esperanza Kennedy PA-C  On behalf of Dr. Nae Diehl    Pt Name: Sree Wilcox  MRN: [id number]  YOB: 1945  Date of evaluation: 6/6/2021  Primary Care Physician: [facility]  Patient evaluated at the request of  Dr. Shelbi Poon  Reason for evaluation: Abdominal pain and distention  IMPRESSIONS:   1. KUB shows dilated bowel loops without definite obstruction  2. Diffuse abdominal pain and distention   3. Reports loose bowel movements today  4. History of cholecystectomy, partial small bowel resection  5.  has a past medical history of Anxiety, Chronic back pain, Chronic kidney disease (CKD), stage II (mild), COPD (chronic obstructive pulmonary disease) ([facility] Depression, Diabetes mellitus ([facility] DVT (deep venous thrombosis) ([facility] Hyperlipidemia, Hypertension, Hypothyroidism, Lumbago with sciatica, Polyneuropathy, and Renal cell carcinoma ([facility] RECOMMENDATIONS:   1. Stat CT abdomen pelvis ordered, pending  2. NPO  3. IV fluids  4. CBC, CMP, Lactic, and Stool studies ordered  5. Bowel regimen ordered to include Movantik, Colace, Senna scheduled  6. Serial abdominal exams  SUBJECTIVE:   History of Chief Complaint:    Sidney Worley is a 76 y. o.male who we are asked to see on 7E for evaluation of abdominal pain and distention which began around 2.5 hours ago at midnight. Patient began complaining of pain and per nursing staff his abdomen has become more distended and \"hard to the touch\" over the last 2.5 hours. He rates his pain at 8/10 at this time. Patient states he has been passing lots of gas and had a few loose bowel movements earlier this evening. Denies blood in the stool. He denies any vomiting. He has been tolerating a diet and per his nurse he has been snacking throughout the evening. He has not had any fevers or chills.  He reports a history of multiple prior small bowel obstructions, multiple prior abdominal surgeries including cholecystectomy, partial small bowel resection with colostomy, reversal of colostomy, and lysis of adhesions. He is currently on scheduled Percocet. Past Medical History   has a past medical history of Anxiety, Chronic back pain, Chronic kidney disease (CKD), stage II (mild), COPD (chronic obstructive pulmonary disease) ([facility] Depression, [facility] D[facility] (deep venous thrombosis) [facility] Hyperlipidemia, Hypertension, Hypothyroidism, Lumbago with sciatica, Polyneuropathy, and Renal cell carcinoma ([facility] Past Surgical History   has a past surgical history that includes Cholecystectomy; back surgery; shoulder surgery; and Small intestine surgery. Medications  Prior to Admission medications    Medication Sig Start Date End Date Taking? Authorizing Provider   citalopram (CELEXA) 40 MG tablet Take 40 mg by mouth daily   Yes Historical Provider, MD   dicyclomine (BENTYL) 10 MG capsule Take 10 mg by mouth every 6 hours 9/15/20  Yes Historical Provider, MD   ferrous sulfate (FE TABS 325) 325 (65 Fe) MG EC tablet Take 325 mg by mouth 3 times daily (with meals) 5/12/21  Yes Historical Provider, MD   furosemide (LASIX) 40 MG tablet Take 40 mg by mouth daily 3/4/21  Yes Historical Provider, MD   gabapentin (NEURONTIN) 300 MG capsule Take 600 mg by mouth 3 times daily. 5/10/21  Yes Historical Provider, MD   isosorbide mononitrate (IMDUR) 60 MG extended release tablet Take 60 mg by mouth daily 4/15/21  Yes Historical Provider, MD   pantoprazole (PROTONIX) 40 MG tablet Take 40 mg by mouth daily 4/15/21  Yes Historical Provider, MD   senna (SENOKOT) 8.6 MG tablet Take 1 tablet by mouth 2 times daily 12/14/20  Yes Historical Provider, MD   cyanocobalamin 1000 MCG tablet Take 1,000 mcg by mouth daily 12/15/20  Yes Historical Provider, MD   methylcellulose (SM FIBER LAXATIVE) 500 MG TABS Take 1,000 mg by mouth daily as needed   Yes Historical Provider, MD   oxyCODONE-acetaminophen (PERCOCET)  MG per tablet Take 1 tablet by mouth 4 times daily. 5/14/21  Yes Historical Provider, MD   zinc gluconate 50 MG tablet Take 50 mg by mouth daily   Yes Historical Provider, MD   Calcium Ascorbate POWD Take 1 tablet by mouth daily   Yes Historical Provider, MD   meloxicam (MOBIC) 15 MG tablet Take 15 mg by mouth daily   Yes Historical Provider, MD   umeclidinium-vilanterol (ANORO ELLIPTA) 62.5-25 MCG/INH AEPB inhaler Inhale 1 puff into the lungs daily   Yes Historical Provider, MD   ammonium lactate (AMLACTIN) 12 % cream Apply topically as needed for Dry Skin Apply topically as needed.    Yes Historical Provider, MD   rivaroxaban (XARELTO) 10 MG TABS tablet Take 10 mg by mouth every 24 hours   Yes Historical Provider, MD   albuterol sulfate HFA (VENTOLIN HFA) 108 (90 Base) MCG/ACT inhaler Inhale 2 puffs into the lungs every 6 hours as needed for Wheezing   Yes Historical Provider, MD   losartan (COZAAR) 50 MG tablet Take 50 mg by mouth daily Am   Yes Historical Provider, MD   meclizine (ANTIVERT) 25 MG tablet Take 25 mg by mouth 2 times daily as needed    Yes Historical Provider, MD   acetaminophen (TYLENOL) 500 MG tablet Take 500 mg by mouth every 8 hours as needed for Pain    Yes Historical Provider, MD   tamsulosin (FLOMAX) 0.4 MG capsule Take 0.4 mg by mouth daily pm   Yes Historical Provider, MD   atorvastatin (LIPITOR) 20 MG tablet Take 20 mg by mouth daily pm   Yes Historical Provider, MD    Scheduled Meds:   pregabalin  150 mg Oral BID    dicyclomine  10 mg Oral TID AC    pantoprazole  40 mg Oral QAM AC    atorvastatin  20 mg Oral Daily    losartan  50 mg Oral Daily    meclizine  25 mg Oral Daily    oxyCODONE-acetaminophen  1 tablet Oral 4x Daily    rivaroxaban  10 mg Oral Q24H    tamsulosin  0.4 mg Oral Daily     Continuous Infusions:  PRN Meds:.lidocaine, simethicone, calcium carbonate, melatonin, bisacodyl, polyethylene glycol, senna, albuterol, acetaminophen **OR** [DISCONTINUED] acetaminophen  Allergies  is allergic to codeine, effexor [venlafaxine], ibuprofen, and tramadol. Family History  Family history is unknown by patient. Social History   reports that he has never smoked. He has never used smokeless tobacco. He reports that he does not drink alcohol and does not use drugs. Review of Systems  General Denies fevers or chills. HEENT Denies any diplopia, tinnitus or vertigo. Resp Denies any shortness of breath, cough or wheezing. Cardiac Denies any chest pain, palpitations, claudication or edema. GI Denies any melena, hematochezia, hematemesis or pyrosis. Reports abdominal pain and distention, reports loose stools.  Denies any frequency, urgency, hesitancy or incontinence. Neuro Denies any focal motor or sensory deficits. SUBJECTIVE:   CURRENT VITALS:  height is 5' 10\" (1.778 m) and weight is 239 lb 6.4 oz (108.6 kg). His oral temperature is 97.3 °F (36.3 °C). His blood pressure is 149/57 (abnormal) and his pulse is 61. His respiration is 18 and oxygen saturation is 94%. Body mass index is 34.35 kg/m². Temperature Range (24h):Temp: 97.3 °F (36.3 °C) Temp  Av.7 °F (36.5 °C)  Min: 97.3 °F (36.3 °C)  Max: 98.1 °F (36.7 °C)  BP Range (41Z): Systolic (74SKR), WHU:818 , Min:149 , JASON:131     Diastolic (51UCO), BSQ:78, Min:57, Max:70    Pulse Range (24h): Pulse  Av.3  Min: 61  Max: 62  Respiration Range (24h): Resp  Av  Min: 16  Max: 18  Current Pulse Ox (24h):  SpO2: 94 %  Pulse Ox Range (24h):  SpO2  Av.7 %  Min: 94 %  Max: 97 %  Oxygen Amount and Delivery:    CONSTITUTIONAL: Alert and oriented times 3, no acute distress and cooperative to examination with proper mood and affect. Obese male. Uncomfortable appearing. Nontoxic appearing. SKIN: Skin color, texture, turgor normal. No rashes or lesions. LYMPH: No cervical nodes. HEENT: Head is normocephalic, atraumatic. EOMI.   NECK: Supple, symmetrical, trachea midline, no adenopathy, thyroid symmetric, not enlarged and no tenderness, skin normal.  CHEST/LUNGS: Chest symmetric with normal A/P diameter, normal respiratory rate and rhythm, lungs clear to auscultation without wheezes, rales or rhonchi. No accessory muscle use. CARDIOVASCULAR: Heart sounds are normal.  Regular rate and rhythm without murmur, gallop or rub. Normal S1 and S2.  ABDOMEN: Obese, distended abdomen with multiple old surgical scars present. The abdomen is tender throughout with hyperactive bowel sounds. NEUROLOGIC: There are no focalizing motor or sensory deficits. EXTREMITIES: No cyanosis. LABS:   No results for input(s): WBC, HGB, HCT, PLT, NA, K, CL, CO2, BUN, CREATININE, MG, PHOS, CALCIUM, PTT, INR, AST, ALT, BILITOT, BILIDIR, AMYLASE, LIPASE, LDH, LACTA, NITRU, COLORU, BACTERIA in the last 72 hours. Invalid input(s): PT, WBCU, RBCU, LEUKOCYTESUA  RADIOLOGY:     XR ABDOMEN (KUB) (SINGLE AP VIEW)   Final Result   Impression:   Diffuse gaseous colonic distention consider follow-up to exclude    progressive dilatation. This document has been electronically signed by: Mallory Vale MD on    06/06/2021 01:53 AM      VL DUP LOWER EXTREMITY VENOUS RIGHT   Final Result   No evidence of a DVT. **This report has been created using voice recognition software. It may contain minor errors which are inherent in voice recognition technology. **      Final report electronically signed by Dr Ivory Mason on 6/1/2021 4:43 PM      CT ABDOMEN PELVIS W IV CONTRAST Additional Contrast? None    (Results Pending)     Thank you for the interesting evaluation. Further recommendations to follow. Electronically signed by Sil Schmitt PA-C on 6/6/2021 at 2:36 AM     Patient seen and examined independently by me earlier this AM. Above discussed and I agree with YENY Judge. See my additional comments below for updated orders and plan. Labs, cultures, and radiographs where available were reviewed. I discussed patient concerns with the patient's nurse and instructions were given.  Please see our orders for the updated patient care plan. -CT imaging completed. No acute surgical intervention needed at this time. Okay for clear liquids. Serial abdominal examinations. Pain control. Clinically, patient still having bowel function. May benefit from GI evaluation if things do not improve. Movantik. Stool softeners as needed. Close observation.     Electronically signed by Jennifer Gallego MD on 6/6/21 at 9:52 AM EDT

## 2021-06-06 NOTE — PROGRESS NOTES
3070: Pt off unit for CT scan. 5047: Pt c/o pain 10/10. Dr. Nasim Herrera updated via secure system. Voiced concern about health. States he doesn't want surgery. Advised staff is waiting on test results. Pt threatened to leave the hospital stating he will walk out of here. Assisted pt up to EOB. Alarm on. Advised pt. The risks of leaving. He agreed to stay. 2533: N.O. for morphine IV. Noted contraindications d/t Tramadol and codeine allergies. Spoke with pt who states he's had morphine in the past without issue. Dr. Nasim Herrera notified and gave approval to administer med. 56: Staff alerted this nurse that pt. Is not feeling well and sweaty. This nurse to bedside to assess. Pt continues to c/o pain and states that is why he is sweating. /72. P 75. Chem 81. Dr. Joe Pierce in and CT reviewed. Spoke with pt. N.O. to upgrade diet to clear liquids. No needed surgical intervention at this time. 4030: Morphine administered IV per order.

## 2021-06-06 NOTE — PROGRESS NOTES
1417: Pt. Tolerating clear liquids without difficulty. Secure message sent to EBONY Love NP covering for Dr. Abdelrahman Gamboa requesting when his diet can be upgraded. Awaiting reply. 1441: Received response from Umberto Rosas NP. Pt to remain on clear liquid diet. Dr. Abdelrahman Gamboa with re-eval tomorrow.

## 2021-06-06 NOTE — FLOWSHEET NOTE
MD notified of xray results. One time dose of simethecone and a consult to general surgery to examine patient was ordered.

## 2021-06-06 NOTE — FLOWSHEET NOTE
06/06/21 0220   Provider Notification   Reason for Communication Evaluate; Review case   Provider Name YENY Lkae   Provider Notification   (Physician assistant)   Method of Communication Secure Message   Response Waiting for response   Notification Time 0221   Patient room [id number]. Patient experiencing abdominal pain 10/10, abdomen taut, distended, tender to touch, tympanic on examination. KUB done, Dr. Jessica Ivy asking for stat general surgery consult. Patient Has past hx of SBO and  abdominal surgeries.

## 2021-06-06 NOTE — PLAN OF CARE
Problem: Falls - Risk of:  Goal: Will remain free from falls  Description: Will remain free from falls  6/6/2021 0103 by Argenis Aguirre RN  Outcome: Ongoing     Problem: Falls - Risk of:  Goal: Absence of physical injury  Description: Absence of physical injury  Outcome: Ongoing     Problem: Skin Integrity:  Goal: Will show no infection signs and symptoms  Description: Will show no infection signs and symptoms  Outcome: Ongoing     Problem: Skin Integrity:  Goal: Absence of new skin breakdown  Description: Absence of new skin breakdown  6/6/2021 0103 by Argenis Aguirre RN  Outcome: Ongoing     Problem: Pain:  Goal: Pain level will decrease  Description: Pain level will decrease  Outcome: Ongoing     Problem: Pain:  Goal: Control of acute pain  Description: Control of acute pain  Outcome: Ongoing     Problem: Pain:  Goal: Control of chronic pain  Description: Control of chronic pain  6/6/2021 0103 by Argenis Aguirre RN  Outcome: Ongoing     Problem: Pain:  Goal: Control of acute pain  Description: Control of acute pain  Outcome: Ongoing     Problem: Pain:  Goal: Control of chronic pain  Description: Control of chronic pain  6/6/2021 0103 by Argenis Aguirre RN  Outcome: Ongoing     Problem: Bleeding:  Goal: Will show no signs and symptoms of excessive bleeding  Description: Will show no signs and symptoms of excessive bleeding  6/6/2021 0103 by Argenis Aguirre RN  Outcome: Ongoing     Problem: Mobility - Impaired:  Goal: Mobility will improve  Description: Mobility will improve  Outcome: Ongoing     Problem: Respiratory  Goal: No pulmonary complications  Outcome: Ongoing     Problem: Respiratory  Goal: O2 Sat > 90%  6/6/2021 0103 by Argenis Aguirre RN  Outcome: Ongoing     Problem: GI  Goal: No bowel complications  4/0/7518 4860 by Argenis Aguirre RN  Outcome: Ongoing     Problem: GI  Goal: Bowel movement at least every other day  Outcome: Ongoing     Problem:   Goal: Adequate urinary output  Outcome: Ongoing     Problem:   Goal: No urinary complication  5/4/1626 7506 by Justin Whelan RN  Outcome: Ongoing     Problem: Nutrition  Goal: Optimal nutrition therapy  6/6/2021 0103 by Justin Whelan RN  Outcome: Ongoing     Problem: IP DRESSINGS LOWER EXTREMITIES  Goal: LTG - patient will dress lower body with or without assistive device  Outcome: Ongoing     Problem: Discharge Planning:  Goal: Patients continuum of care needs are met  Description: Patients continuum of care needs are met  6/6/2021 0103 by Justin Whelan RN  Outcome: Ongoing     Problem: IP COMMUNICATION/DYSARTHRIA  Goal: LTG - Patient will effectively communicate in all situations with the use of compensatory strategies  Outcome: Ongoing     Problem: DISCHARGE BARRIERS  Goal: Patient's continuum of care needs are met  Outcome: Ongoing   Care plan reviewed with patient. Patient verbalizes understanding of care plan and treatment goals.

## 2021-06-06 NOTE — PLAN OF CARE
Problem: Falls - Risk of:  Goal: Will remain free from falls  Description: Will remain free from falls  Outcome: Ongoing  Note: No noted falls this shift. Needed safety reminders several times. Attempts to move beyond his limitations putting him at risk for falls. Alarms on. Gait unsteady with ambulation. R great toe frequently drags with ambulation. Problem: Skin Integrity:  Goal: Absence of new skin breakdown  Description: Absence of new skin breakdown  Outcome: Ongoing  Note: No noted skin breakdown. Rotated between sitting edge of bed and lying on his back semi morel. Problem: Pain:  Goal: Control of chronic pain  Description: Control of chronic pain  Outcome: Ongoing  Note: Jokes with staff and moves in bed. Frequently c/o pain #8-10/10. IV morphine given x1 dose this AM d/t being NPO and having severe pain. Continues on routine oxycodone and lyrica. Problem: Bleeding:  Goal: Will show no signs and symptoms of excessive bleeding  Description: Will show no signs and symptoms of excessive bleeding  Outcome: Ongoing  Note: Xarelto continues. No s/s of bleeding. Problem: Mobility - Impaired:  Goal: Mobility will improve  Description: Mobility will improve  Outcome: Ongoing  Note: Ambulates in room to and from bathroom with 1 Min A,.     Problem: Respiratory  Goal: O2 Sat > 90%  Outcome: Ongoing  Note: Sats stable. No respiratory s/s. Problem: GI  Goal: Bowel movement at least every other day  Outcome: Ongoing  Note: Large BM today. See CT scan. Dr. Navy De Denise in- diet clear liquids following CT results. Will re-eval tomorrow. Movantik added to bowel regimen. Problem:   Goal: No urinary complication  Outcome: Ongoing  Note: Urinating without difficulty. Problem: Nutrition  Goal: Optimal nutrition therapy  Outcome: Ongoing  Note: On clear liquid diet. Tolerating without difficulty.      Problem: Discharge Planning:  Goal: Patients continuum of care needs are met  Description: Patients continuum of care needs are met  Outcome: Ongoing  Note: Discharge planning continues. Tentative discharge date set for 6/8 with home health. Problem: IP COMMUNICATION/DYSARTHRIA  Goal: LTG - Patient will effectively communicate in all situations with the use of compensatory strategies  Outcome: Ongoing  Note: No difficulty communicating.

## 2021-06-06 NOTE — PROGRESS NOTES
cyanosis or edema  Skin: Skin color, texture, turgor normal. No rashes or lesions  Neurologic: Grossly normal      Electronically signed by Charmaine Spangler MD on 6/6/2021 at 11:37 AM

## 2021-06-07 PROCEDURE — 97542 WHEELCHAIR MNGMENT TRAINING: CPT

## 2021-06-07 PROCEDURE — 97130 THER IVNTJ EA ADDL 15 MIN: CPT

## 2021-06-07 PROCEDURE — 1180000000 HC REHAB R&B

## 2021-06-07 PROCEDURE — 6370000000 HC RX 637 (ALT 250 FOR IP): Performed by: PHYSICAL MEDICINE & REHABILITATION

## 2021-06-07 PROCEDURE — 6370000000 HC RX 637 (ALT 250 FOR IP): Performed by: PHYSICIAN ASSISTANT

## 2021-06-07 PROCEDURE — 6370000000 HC RX 637 (ALT 250 FOR IP): Performed by: NURSE PRACTITIONER

## 2021-06-07 PROCEDURE — APPSS45 APP SPLIT SHARED TIME 31-45 MINUTES: Performed by: NURSE PRACTITIONER

## 2021-06-07 PROCEDURE — 97530 THERAPEUTIC ACTIVITIES: CPT

## 2021-06-07 PROCEDURE — 97116 GAIT TRAINING THERAPY: CPT

## 2021-06-07 PROCEDURE — 99232 SBSQ HOSP IP/OBS MODERATE 35: CPT | Performed by: NURSE PRACTITIONER

## 2021-06-07 PROCEDURE — 6370000000 HC RX 637 (ALT 250 FOR IP): Performed by: INTERNAL MEDICINE

## 2021-06-07 PROCEDURE — 90791 PSYCH DIAGNOSTIC EVALUATION: CPT | Performed by: PSYCHOLOGIST

## 2021-06-07 PROCEDURE — 6370000000 HC RX 637 (ALT 250 FOR IP): Performed by: FAMILY MEDICINE

## 2021-06-07 PROCEDURE — 97129 THER IVNTJ 1ST 15 MIN: CPT

## 2021-06-07 PROCEDURE — 94660 CPAP INITIATION&MGMT: CPT

## 2021-06-07 PROCEDURE — 97535 SELF CARE MNGMENT TRAINING: CPT

## 2021-06-07 PROCEDURE — 99232 SBSQ HOSP IP/OBS MODERATE 35: CPT | Performed by: SURGERY

## 2021-06-07 PROCEDURE — 97110 THERAPEUTIC EXERCISES: CPT

## 2021-06-07 RX ADMIN — OXYCODONE HYDROCHLORIDE AND ACETAMINOPHEN 1 TABLET: 10; 325 TABLET ORAL at 07:43

## 2021-06-07 RX ADMIN — DICYCLOMINE HYDROCHLORIDE 10 MG: 10 CAPSULE ORAL at 18:17

## 2021-06-07 RX ADMIN — PREGABALIN 150 MG: 50 CAPSULE ORAL at 07:42

## 2021-06-07 RX ADMIN — SENNOSIDES 8.6 MG: 8.6 TABLET, FILM COATED ORAL at 22:07

## 2021-06-07 RX ADMIN — RIVAROXABAN 10 MG: 10 TABLET, FILM COATED ORAL at 22:07

## 2021-06-07 RX ADMIN — OXYCODONE HYDROCHLORIDE AND ACETAMINOPHEN 1 TABLET: 10; 325 TABLET ORAL at 13:46

## 2021-06-07 RX ADMIN — ATORVASTATIN CALCIUM 20 MG: 20 TABLET, FILM COATED ORAL at 07:43

## 2021-06-07 RX ADMIN — TAMSULOSIN HYDROCHLORIDE 0.4 MG: 0.4 CAPSULE ORAL at 22:07

## 2021-06-07 RX ADMIN — PREGABALIN 150 MG: 50 CAPSULE ORAL at 22:06

## 2021-06-07 RX ADMIN — DICYCLOMINE HYDROCHLORIDE 10 MG: 10 CAPSULE ORAL at 13:46

## 2021-06-07 RX ADMIN — PANTOPRAZOLE SODIUM 40 MG: 40 TABLET, DELAYED RELEASE ORAL at 06:00

## 2021-06-07 RX ADMIN — FUROSEMIDE 40 MG: 40 TABLET ORAL at 06:00

## 2021-06-07 RX ADMIN — NALOXEGOL OXALATE 12.5 MG: 12.5 TABLET, FILM COATED ORAL at 07:43

## 2021-06-07 RX ADMIN — SIMETHICONE 80 MG: 80 TABLET, CHEWABLE ORAL at 07:42

## 2021-06-07 RX ADMIN — DOCUSATE SODIUM 100 MG: 100 CAPSULE, LIQUID FILLED ORAL at 07:43

## 2021-06-07 RX ADMIN — OXYCODONE HYDROCHLORIDE AND ACETAMINOPHEN 1 TABLET: 10; 325 TABLET ORAL at 22:08

## 2021-06-07 RX ADMIN — OXYCODONE HYDROCHLORIDE AND ACETAMINOPHEN 1 TABLET: 10; 325 TABLET ORAL at 18:17

## 2021-06-07 RX ADMIN — LOSARTAN POTASSIUM 50 MG: 50 TABLET, FILM COATED ORAL at 07:43

## 2021-06-07 RX ADMIN — DICYCLOMINE HYDROCHLORIDE 10 MG: 10 CAPSULE ORAL at 06:00

## 2021-06-07 RX ADMIN — MECLIZINE HCL 25 MG: 25 TABLET, CHEWABLE ORAL at 07:43

## 2021-06-07 RX ADMIN — Medication 6 MG: at 22:07

## 2021-06-07 ASSESSMENT — PAIN DESCRIPTION - FREQUENCY: FREQUENCY: CONTINUOUS

## 2021-06-07 ASSESSMENT — PAIN SCALES - GENERAL
PAINLEVEL_OUTOF10: 10
PAINLEVEL_OUTOF10: 10
PAINLEVEL_OUTOF10: 9
PAINLEVEL_OUTOF10: 8
PAINLEVEL_OUTOF10: 10
PAINLEVEL_OUTOF10: 10

## 2021-06-07 ASSESSMENT — PAIN DESCRIPTION - DESCRIPTORS: DESCRIPTORS: ACHING

## 2021-06-07 ASSESSMENT — PAIN DESCRIPTION - LOCATION: LOCATION: BACK

## 2021-06-07 ASSESSMENT — PAIN DESCRIPTION - PAIN TYPE: TYPE: ACUTE PAIN

## 2021-06-07 ASSESSMENT — PAIN DESCRIPTION - ORIENTATION: ORIENTATION: RIGHT

## 2021-06-07 NOTE — PROGRESS NOTES
[address] [address]  INPATIENT PHYSICAL THERAPY  PROGRESS NOTE  [facility]    Time In: 730  Time Out: 830  Timed Code Treatment Minutes: 61 Minutes  Minutes: 60          Date: 2021  Patient Name: Ida Stephen,  Gender:  male        MRN: [id number]  : 1945  (76 y.o.)  Referral Date : 21  Referring Practitioner: STEFANO Little CNP  Diagnosis: Parkinson's disease  Additional Pertinent Hx: Pt is a 70-year-old male who was admitted to [facility] after presenting by EMS from local [facility]store confused, had unwitnessed fall presented with generalized AMS. Prior history of similar presentations in the past.  PMH include antiphospholipid syndrome, DVT, sleep apnea, COPD, CKD, HTN, hypothyroidism and RCC. Patient unable to provide more history. History obtained from chart review and ED accounts. Pt transferred to  rehab . Prior Level of Function:  Lives With: Other (comment) (2 roomates in apartment in [address], common areas shared)  Type of Home: Apartment  Home Layout: One level  Home Access: Level entry  Home Equipment: Cane, Lift chair   Bathroom Shower/Tub: Tub/Shower unit  Bathroom Toilet: Handicap height (standard toilet with frame overlay)  Bathroom Equipment: Shower chair, Grab bars in shower  Bathroom Accessibility: Walker accessible    ADL Assistance: Independent  Homemaking Assistance: Independent  Homemaking Responsibilities: Yes  Ambulation Assistance: Independent  Transfer Assistance: Independent  Active : Yes  IADL Comments: He reports he usually orders food to be delivered or has staff at [facility] make his food, but states he will make himself a sandwich for lunch. States that staff does his laundry 1x/week in the apartment across the ha because he cannot go downstairs to use the resident laundry. Additional Comments: has an aid 3x/wk to assist with bathing and dressing BLEs, as well as homemaking.   Pt using cane PTA for mobility. Patient reports no family or support system nearby, but has a step-brother who lives in [place] that he willl go visit. Restrictions/Precautions:  Restrictions/Precautions: General Precautions, Fall Risk     SUBJECTIVE: Pt. Laying in bed upon arrival. RN present administering meds. Pt. Requiring assistance to evelyn sofia hose and shoes. Pt. Requests to use restroom 2x during session. Pt. On IV fluids throughout session. PAIN: Not rated: Abdomen, R knee    Vitals: Vitals not assessed per clinical judgement, see nursing flowsheet    OBJECTIVE:  Bed Mobility:  Rolling to Left: Modified Independent   Rolling to Right: Modified Independent   Supine to Sit: Modified Independent  Sit to Supine: Modified Independent   Extra time required    Transfers:  Sit to Stand: Modified Independent  Stand to Sit:Modified Independent  Stand Pivot:Modified Independent    Ambulation:  Stand By Assistance, Contact Guard Assistance  Distance: 50' x 1, 75' x 1, 150' x 1, 15' x 1, 30' x 1  Surface: Level Tile  Device:Rolling Walker  Gait Deviations:  Slow Ananya, Decreased Step Length Bilaterally, Decreased Gait Speed, Decreased Heel Strike Bilaterally, Decreased Terminal Knee Extension with quad lag on L, Decreased toe clearance on R     Balance:  Dynamic Standing Balance: Contact Guard Assistance  Pt. Stood while toileting and washing hands 2x requiring CGA for safety as pt. Demos moderate trunk sway. Pt. Able to  an object from floor using long handled reacher and Rolling walker for support with Modified Independent    Exercise:  None    Functional Outcome Measures: Completed  Balance Score: 9  Gait Score: 5  Tinetti Total Score: 14   Risk Indicators:  Less than/equal to 18 = high risk  19-23 Moderate risk  Greater than/equal to 24 = low risk      ASSESSMENT:  Assessment: Patient progressing toward established goals, meeting 4/5 short term goals and 2 long term goals.   Pt continues to have limitations in strength, endurance and balance resulting in need for AFO RLE, SBA to CGA with gait with RW. He has gained the MOd I status for bed mobility and transfers. Tinetti showing high fall risk at 14/28. Pt will benefit from continued skilled PT for improvement in areas noted. Activity Tolerance:  Patient tolerance of  treatment: good. Equipment Recommendations:Equipment Needed: Yes (RW, monitor need for W/C)  Discharge Recommendations:  Continue to assess pending progress    Plan: Times per week: 5x/wk 90min, 1x/wk 30min  Times per day: Twice a day  Current Treatment Recommendations: Strengthening, Transfer Training, Endurance Training, Neuromuscular Re-education, Patient/Caregiver Education & Training, Balance Training, Gait Training, Home Exercise Program, Functional Mobility Training, Stair training, Safety Education & Training    Patient Education  Patient Education: Plan of Care, Reviewed Prior Education, Gait    Goals:  Patient goals : reduce pain, get stronger  Short term goals  Time Frame for Short term goals: 1 week  Short term goal 1: Patient will complete supine < > sit with SBA to transfer in/out of bed with decreased difficulty. GOAL MET, SEE LTG  Short term goal 2: Patient will complete sit < > stand with SBA with RW to stand to ambulate with increased independence. GOAL MET, SEE LTG  Short term goal 3: Patient will ambulate 48' with a RW and CGA, clearing right LE >/=50% of the time to increase safety with ambulation. MET, see LTG  Short term goal 4: Patient will complete rolling with SBA to reposition self with increased independence. GOAL MET, SEE LTG  Short term goal 5: Patient will propel wheelchair 150' with modified independence to increase independence and mobility in current living environment.   NOT ADDRESSED  Long term goals  Time Frame for Long term goals : 2 wks  Long term goal 1: Patient will complete supine < > sit and rolling with modified independence to transfer in/out of bed independently. GOAL MET, continue  Long term goal 2: Patient will complete sit < > stand and stand pivot transfer with RW and modified independence to transfer surface to surface safely. GOAL MET,continue  Long term goal 3: Patient will ambulate 150' with a RW and Rt AFO and modified independence to navigate home safely. NOT MET  Long term goal 4: Patient will improve tinetti to greater than or equal to 19/28 to reduce his risk for falls. NOT MET  Long term goal 5: Patient will complete car transfer with supervision to transfer in/out of vehicle safely. NOT ADDRESSED    Following session, patient left in safe position with all fall risk precautions in place. Treatment session and note completed by Farhad Garvey PTA.   Assessment and goal revision of Progress Note completed by Dinora Shannon PT.

## 2021-06-07 NOTE — PLAN OF CARE
Problem: Falls - Risk of:  Goal: Will remain free from falls  Description: Will remain free from falls  6/7/2021 0110 by Angelica Webb RN  Outcome: Ongoing  Uses the call light appropriately this shift. Bed Alarm activated. Problem: Skin Integrity:  Goal: Will show no infection signs and symptoms  Description: Will show no infection signs and symptoms  Outcome: Ongoing     Problem: Skin Integrity:  Goal: Absence of new skin breakdown  Description: Absence of new skin breakdown  6/7/2021 0110 by Angelica Webb RN  Outcome: Ongoing  No new skin breakdown noted this shift.   Problem: Pain:  Goal: Pain level will decrease  Description: Pain level will decrease  Outcome: Ongoing

## 2021-06-07 NOTE — PROGRESS NOTES
Present to pt room for consult of DTPI to left heel. Pt sitting in chair, building a jewelry box out of popsicle sticks. Pt states his left heel is soft and very sore. Pt wearing sofia hose and shoes. Removed shoes and pulled back sofia hose. Pt noted to have boggy area to left heel, area does teresa. No open areas or discoloration. Applied skin prep, allowed to dry, applied silicone bordered foam dressing for cushion. Applied offloading boot. Pt to take off offloading boot and wear shoe or non slid sock when ambulating. Pt resting in chair, call light in reach. Call wound ostomy as needed. Left heel, boggy area, blanching.

## 2021-06-07 NOTE — PROGRESS NOTES
[facility] - Dr. Georgi Dominguez  Daily Progress Note    Pt Name: Luis Alberto Campos  Medical Record Number: [id number]  Date of Birth 1945   Today's Date: 6/7/2021    Hospital day # 11     ASSESSMENT   KUB shows dilated bowel loops without definite obstruction  Diffuse abdominal pain and distention   Chronic abdominal pain   History of multiple abdominal surgeries   has a past medical history of Anxiety, Chronic back pain, Chronic kidney disease (CKD), stage II (mild), COPD (chronic obstructive pulmonary disease) ([facility] [address] Depression, Diabetes mellitus ([facility] [address] DVT (deep venous thrombosis) ([facility] [address] [facility] Hyperlipidemia, Hypertension, Hypothyroidism, Lumbago with sciatica, Polyneuropathy, and Renal cell carcinoma ([facility] [address] PLAN   CT imaging with no obstruction. Possible enteritis. C. Diff negative. Tolerating liquids and wants regular diet  Having bowel function  Mylicon PRN  Takes chronic pain medications  On stool softeners and Movantik  GI as needed - sees Dr. Inge Howell  Abdomen benign. Likely, chronic issues like patient states. He would like to see someone at [address] [place] [address] to see what they have to say. No acute surgical intervention and patient too high risk for any surgery here. SUBJECTIVE   Chief complaint: Generalized chronic abdominal pain    Patient stable on 7E. Has multiple complaints. States abdominal problems have been going on for years. Does not want surgery. Chart reviewed. Updated by nursing staff. Some abdominal bloating after eating. No N/V; (+) belching, flatus and having liquids BMs. Tolerating ADULT DIET; Clear Liquid diet. Pain controlled with analgesia. Up with assistance. Working with therapy.   CURRENT MEDICATIONS   Scheduled Meds:   docusate sodium  100 mg Oral Daily    naloxegol  12.5 mg Oral QAM    senna  1 tablet Oral Nightly    furosemide  40 mg Oral Daily    pregabalin  150 mg Oral BID    dicyclomine  10 mg Oral TID AC    pantoprazole  40 mg Oral QAM AC    atorvastatin  20 mg Oral Daily    losartan  50 mg Oral Daily    meclizine  25 mg Oral Daily    oxyCODONE-acetaminophen  1 tablet Oral 4x Daily    rivaroxaban  10 mg Oral Q24H    tamsulosin  0.4 mg Oral Daily     Continuous Infusions:   sodium chloride 75 mL/hr at 214     PRN Meds:.lidocaine, simethicone, calcium carbonate, melatonin, bisacodyl, polyethylene glycol, albuterol, acetaminophen **OR** [DISCONTINUED] acetaminophen  OBJECTIVE   CURRENT VITALS:  height is 5' 10\" (1.778 m) and weight is 239 lb 6.4 oz (108.6 kg). His oral temperature is 98.1 °F (36.7 °C). His blood pressure is 105/54 (abnormal) and his pulse is 57. His respiration is 12 and oxygen saturation is 96%. Temperature Range (24h):Temp: 98.1 °F (36.7 °C) Temp  Av.2 °F (36.8 °C)  Min: 98.1 °F (36.7 °C)  Max: 98.2 °F (36.8 °C)  BP Range (03H): Systolic (82KDI), ETR:828 , Min:105 , NBP:685     Diastolic (20XTI), ESF:00, Min:54, Max:71    Pulse Range (24h): Pulse  Av.5  Min: 57  Max: 58  Respiration Range (24h): Resp  Av.7  Min: 12  Max: 16  Current Pulse Ox (24h):  SpO2: 96 %  Pulse Ox Range (24h):  SpO2  Av.5 %  Min: 95 %  Max: 96 %  Oxygen Amount and Delivery:    Incentive Spirometry Tx:            GENERAL: alert, cooperative, no distress  SKIN: Skin color, texture, turgor normal. No rashes or lesions. HEENT: Head is normocephalic, atraumatic. NECK: Supple, symmetrical, trachea midline  LUNGS: clear to ausculation, without wheezes, rales or rhonci  HEART: normal rate and regular rhythm  ABDOMEN: soft & rounded, semi-firm which is his normal, generalized tenderness, non-distended, bowel sounds present. No guarding or peritoneal signs  NEUROLOGIC: There are no focalizing motor or sensory deficits. EXTREMITIES: no cyanosis, no clubbing. Edema to lower extremities.    In: 549.5 [I.V.:549.5]  Out: 3200 [Urine:3200]  Date 21 - 21 235   Shift 0403-4969 1788-8406 2841-7614 24 Hour Total   INTAKE Shift Total(mL/kg)       OUTPUT   Urine(mL/kg/hr) 1600   1600   Shift Total(mL/kg) 1600(14.7)   1600(14.7)   Weight (kg) 108.6 108.6 108.6 108.6     LABS     Recent Labs     06/06/21  0345   WBC 2.6*   HGB 12.5*   HCT 41.8*         K 4.1      CO2 28   BUN 14   CREATININE 1.2   CALCIUM 9.4      No results for input(s): PTT, INR in the last 72 hours. Invalid input(s): PT  Recent Labs     06/06/21  0345   AST 12   ALT 11   BILITOT 0.4   BILIDIR <0.2   LACTA 1.7     RADIOLOGY     PROCEDURE: CT ABDOMEN PELVIS W IV CONTRAST       CLINICAL INFORMATION: Abdominal pain and distention r/o ileus .       COMPARISON: Abdominal radiographs from the same date and CT abdomen and pelvis dated 5/24/2021.       TECHNIQUE: Helical CT of the chest, abdomen and pelvis following intravenous administration of 80 mL Isovue-370 injected in the right AC with sagittal and coronal reconstructions.       All CT scans at this facility use dose modulation, iterative reconstruction, and/or weight-based dosing when appropriate to reduce radiation dose to as low as reasonably achievable.       FINDINGS:    Chest:   There are posterior dependent changes. Lungs otherwise appear clear. There are mildly prominent pretracheal lymph nodes on the right largest measuring 9 mm in greatest short axis diameter with one of the nodes containing small amount of calcification. These are stable compared to prior chest CT dated 4/21/2020. There is otherwise no axillary, mediastinal or hilar lymphadenopathy. The heart is normal in appearance. There are bilateral pedicle screws at the T2-T4 levels with interconnecting rods which    have been placed in the interval since prior CT chest. There are otherwise stable degenerative changes of the thoracic spine without suspicious osseous lesion.       Abdomen/pelvis: The liver and gallbladder are unremarkable. Adrenal glands are unremarkable.  There is curvilinear hyperdense suture material at the superior pole of the left kidney with mildly irregular contour as evidence for prior surgery, stable compared to prior    exam. There is stable mild nonspecific perinephric fat stranding bilaterally. There is a 1.7 cm exophytic cyst at the inferior pole of the left kidney, slightly increased in size compared to prior exam. The spleen and pancreas are unremarkable. No    retroperitoneal or mesenteric lymphadenopathy is identified.       There is a suture line in the proximal sigmoid colon and transverse colon. There is scattered stool within the large bowel. There is mild gaseous distention within the large bowel as well. There is mild fluid distention in distal small bowel loops. There    is no definite evidence of obstruction. The unopacified bladder is unremarkable. The prostate is markedly enlarged, similar to prior exam. No free fluid is identified. There are bilateral inguinal hernias containing fat. There is subcutis edema about    the hips and lower extremities. Redemonstration of laminectomies at T11-12 through L5-S1. There are stable degenerative changes of the lumbar spine.           Impression       1. Mild fluid distention of distal small bowel and mild gaseous distention of large bowel is nonspecific, possibly on the basis of enteritis. No definite evidence of obstruction. 2. Prostatomegaly.                   **This report has been created using voice recognition software. It may contain minor errors which are inherent in voice recognition technology. **       Final report electronically signed by Dr. Elsa Lowry MD on 6/6/2021 8:52 AM     Electronically signed by STEFANO Garcia CNP on 6/7/2021 at 7:14 AM     Patient seen and examined independently by me earlier today. Above discussed and I agree with Em Norman CNP. See my additional comments below for updated orders and plan. Labs, cultures, and radiographs where available were reviewed.   I discussed patient concerns with the patient's nurse and instructions were given. Please see our orders for the updated patient care plan. -No acute surgical intervention needed at this time. Complicated and hostile abdomen from chronic issues. Okay to advance diet as tolerated from my standpoint. Mylicon as needed. Stool softeners as needed. Follow-up with GI as directed. Outpatient follow-up with [facility] as needed from a surgical standpoint.       Electronically signed by Nathan Magallanes MD on 6/7/21 at 5:13 PM EDT

## 2021-06-07 NOTE — CONSULTS
Anthony Romero [address] [place] [address]     PSYCHOLOGY CONSULTATION REPORT    TO:Aliyah Ahn MD  PATIENT: Mariya Subramanian  : 1945   MR NUMBER: [id number]  FROM: Greta Ag, Ph. D.   DATE: 2021      REPORT OF CONSULTATION: FINDINGS, OPINIONS and RECOMMENDATIONS     REASON FOR REFERRAL: The patient was referred for evaluation due to concerns about cognition and emotional status in the wake of recent admission. This occurred after patient was admitted with confusion, AMS. Please refer to his H&P for details of medical condition. Patient presents with the following presenting problems:   Patient Active Problem List   Diagnosis    Encephalopathy    Moderate malnutrition ([address]    Hypertension    Hyperlipidemia    History of DVT (deep vein thrombosis)    Diabetes ([address]    Acute encephalopathy    Fall    Disorientation    Somnolence    Parkinson's disease ([address]       BASIS OF EVALUATION:   Clinical assessment of the patient on 2021, review of medical records, consultation with Nursing, Physical Therapy, Occupational Therapy, Speech Language Pathology and Medical Social Work and with attending physician. BEHAVIORAL OBSERVATIONS: The patient presents as a 76y.o.-year-old male of  descent. Grooming was WNL. Interpersonally, the patient was somewhat unusual, telling somewhat dramatic tales of life on the streets, having thousands of dollars in his pocket, having six strong men who served as his body guards, etc.. Cooperation with assessment was fair. Level of consciousness was alert. Affect was nondepressed and nonanxious. Mood was euthymic, and patient seemed to be enjoying the interaction. Memory was WNL. Receptive language was WN, and expressive language was WNL. Attention/concentration was WNL. Judgment and problem solving were fair, mostly focused on the desire for additional medications as the solution. Frustration tolerance was WNL.   PRESENTING PROBLEM: The patient acknowledged having difficulty with anxiety and feeling that he gets angry when he doesn't get his way. Primary coping strategies involve reliance on friends/family, and medications. Support systems are family. MEDICAL HISTORY:   Past Medical History:   Diagnosis Date    Anxiety     Chronic back pain     Chronic kidney disease (CKD), stage II (mild)     COPD (chronic obstructive pulmonary disease) (HCC)     Depression     Diabetes mellitus (HCC)     DVT (deep venous thrombosis) (HCC)     Hyperlipidemia     Hypertension     Hypothyroidism     Lumbago with sciatica     Polyneuropathy     Renal cell carcinoma (HCC)         SOCIAL HISTORY:   Social History     Socioeconomic History    Marital status: Single     Spouse name: Not on file    Number of children: Not on file    Years of education: Not on file    Highest education level: Not on file   Occupational History    Not on file   Tobacco Use    Smoking status: Never Smoker    Smokeless tobacco: Never Used   Substance and Sexual Activity    Alcohol use: Never    Drug use: Never    Sexual activity: Not on file   Other Topics Concern    Not on file   Social History Narrative    Not on file     Social Determinants of Health     Financial Resource Strain:     Difficulty of Paying Living Expenses:    Food Insecurity:     Worried About Running Out of Food in the Last Year:     Ran Out of Food in the Last Year:    Transportation Needs:     Lack of Transportation (Medical):      Lack of Transportation (Non-Medical):    Physical Activity:     Days of Exercise per Week:     Minutes of Exercise per Session:    Stress:     Feeling of Stress :    Social Connections:     Frequency of Communication with Friends and Family:     Frequency of Social Gatherings with Friends and Family:     Attends Religion Services:     Active Member of Clubs or Organizations:     Attends Club or Organization Meetings:     Marital Status: Intimate Partner Violence:     Fear of Current or Ex-Partner:     Emotionally Abused:     Physically Abused:     Sexually Abused:         IMPRESSIONS:   1. Cognitively, the patient is doing nicely. Acute confusion has resolved. Presumed etiology is multifactorial, related to medications and illness  2. Very limited insight into his medical issues, for instance complaining that staff took away his food. 3. With some cuing, able to identify that he's on clear liquids for a legitimate medical reason, but wasn't able to get there on his own  4. The patient's emotional state ranges from euthymic and jocular to irritable, does not appear depressed nor anxious. 5. Very preoccupied with medications, wanting opiates and benzos, as I piece together his references to what he thinks would be helpful  6. Diagnosis: encephalopathy  7. Factors that may impede progress are reliance on medications that are contraindicated (benzos and opiates)  8. Patient strengths and resources include supportive friends. RECOMMENDATIONS:   1. I discussed his case with the Rehab Team  2. Medication recommendations: I would recommend we be very conservative with any potential meds of abuse, as they seem to be a stumbling block for patient    Time spent in evaluating patient, including face to face time with patient, review of records, consultation with staff, and documentation: 65  minutes  I evaluated patient on 6/7/21, but waited to complete note until I could speak with the Rehab Team the following day, completing the note on 6/8/21. Thank you for the opportunity to participate in the care of this patient.      Daisha Howard, Ph. D.

## 2021-06-07 NOTE — PROGRESS NOTES
Comprehensive Nutrition Assessment    Type and Reason for Visit:  Reassess (PO Monitor)    Nutrition Recommendations/Plan:   *Consider a Multivitamin w/minerals daily. *Advance diet as tolerated. *Started Ensure Clear TID. *Continue Colace, Movantik and Senna as ordered. Nutrition Assessment: Pt. declining from a nutritional standpoint AEB pt only on a Clear liquid diet at present. Remains at risk for further nutritional compromise r/t admit d/t AMS- improved, dysphagia (SLP following) and underlying medical condition (hx Parkinson's Diease, HTN, anxiety, depression, COPD, renal cancer, HLD, DM, CKD, partial bowel obstruction). Will continue to monitor for changes in nutritional status. Malnutrition Assessment:  Malnutrition Status:  No malnutrition    Context:  Chronic Illness     Findings of the 6 clinical characteristics of malnutrition:  Energy Intake:  No significant decrease in energy intake  Weight Loss:  Unable to assess (no weight hx per EMR)     Body Fat Loss:  No significant body fat loss     Muscle Mass Loss:  No significant muscle mass loss    Fluid Accumulation:  No significant fluid accumulation Extremities   Strength:  Not Performed    Estimated Daily Nutrient Needs:  Energy (kcal):  1799-5405 kcal/day (15-18 kcal/kg); Weight Used for Energy Requirements:   (108.6 kg)     Protein (g):  75-90 grams (1-1.2 grams/kg/day); Weight Used for Protein Requirements:   (IBW 75 kg)          Nutrition Related Findings: Pt admitted with encephalopathy, ? illicit drug use, hypoglyemia. Pt with abdominal pain and hard stomach yesterday- no obstruction noted per KUB. Pt seen this am- he reports tolerating Clear Liquid diet but is ready for real food. Pt amenable to try Ensure Clear TID. Pt denies N/V; last BM x2 on 6/5. Rx includes: Lipitor, Colace, Lasix, Movantik, and Senna. Wounds:  None       Current Nutrition Therapies:    ADULT DIET;  Clear Liquid  Adult Oral Nutrition Supplement; Clear Liquid Oral Supplement    Anthropometric Measures:  · Height: 5' 10\" (177.8 cm)  · Current Body Weight: 239 lb 6.4 oz (108.6 kg) (5/27; no edema noted)   · Admission Body Weight: 239 lb 6.4 oz (108.6 kg) (5/27; no edema noted)    · Usual Body Weight:  (Per pt~ 246#. Per EMR: 239 lb 1.6 oz on 5/24/21)     · Ideal Body Weight: 166 lbs  · BMI: 34.4   · BMI Categories: Obese Class 1 (BMI 30.0-34. 9)       Nutrition Diagnosis:   · Inadequate oral intake related to inadequate protein-energy intake as evidenced by NPO or clear liquid status due to medical condition    Nutrition Interventions:   Food and/or Nutrient Delivery:  Continue Current Diet, Start Oral Nutrition Supplement, Vitamin Supplement  Nutrition Education/Counseling:   (Encouraged po intake of meals and ONS at best effort. Soft and bite sized, carb controlled weight loss plan provided, 5 day menu idea 5/25/21. Encouraged oral intake, small frequent meals to prevent hypoglycemia.)   Coordination of Nutrition Care:  Continue to monitor while inpatient, Speech Therapy    Goals:  Pt will safely consume 75% or more of meals during LOS       Nutrition Monitoring and Evaluation:   Behavioral-Environmental Outcomes:  None Identified   Food/Nutrient Intake Outcomes:  Diet Advancement/Tolerance, Food and Nutrient Intake, Supplement Intake, Vitamin/Mineral Intake  Physical Signs/Symptoms Outcomes:  Biochemical Data, Weight, Skin, Nutrition Focused Physical Findings, Fluid Status or Edema, GI Status, Chewing or Swallowing     Discharge Planning:     Too soon to determine     Electronically signed by Jesus Mariano RD, LD on 6/7/21 at 11:01 AM EDT    Contact: [phone]

## 2021-06-07 NOTE — PLAN OF CARE
Problem: Nutrition  Goal: Optimal nutrition therapy  Outcome: Ongoing   Nutrition Problem #1: Inadequate oral intake  Intervention: Food and/or Nutrient Delivery: Continue Current Diet, Start Oral Nutrition Supplement, Vitamin Supplement  Nutritional Goals: Pt will safely consume 75% or more of meals during LOS

## 2021-06-07 NOTE — PROGRESS NOTES
close SBA. Cristian Paulino CARE Score: 3.     UPPER BODY DRESSING:Supervision or touching assistance. Pt donned hospital top while seated on shower chair, no difficulties present, pt demoed good use of adaptive strategies to compensate for decreased ROM in L shoulder. Cristian Paulino CARE Score: 4. LOWER BODY DRESSING:Supervision or touching assistance. Pt able to don/doff shorts with SBA while standing/seated at shower chair. CARE Score: 4. FOOTWEAR:Partial/moderate assistance  Pt required Mod A to doff and don shoes while seated at shower chair. CARE Score: 3.     TOILET TRANSFER: Supervision or touching assistance. Pt completed toilet t/f with SBA and no safety cues. CARE Score: 4. BALANCE:  Sitting Balance:  Supervision. on shower chair during shower  Standing Balance: Stand By Assistance. Close SBA and 1-2 hand support, occasional instability noted, pt able to self correct    BED MOBILITY:  Not Tested    TRANSFERS:  Sit to Stand:  Stand By Assistance. from recliner and shower chair, occasional safety cues required. Stand to Sit: Stand By Assistance. to recliner and shower chair    FUNCTIONAL MOBILITY:  Assistive Device: Rolling Walker  Assist Level:  Contact Guard Assistance. Distance: To and from shower room and To and from therapy apartment  Pt completed at a slow pace with occasional instability. Min A required x1 d/t minor LOB. Pt required min VC for safety throughout. ADDITIONAL ACTIVITIES:  Pt completed IADL task to challenge dynamic standing balance and endurance to increase overall ease with meal prep tasks. Pt stood at counter, required to put groceries away this date. Pt demoed good balance throughout while reaching outside of JEFFERY. Pt required min VC for problem solving at this time. Pt stood x10 min for putting groceries away and for reorganization of cabinet. 1 minor LOB with extensive reach outside of JEFFERY. Pt able to self correct.      Guided patient through KATERINA MATTHEWS/ ANTHONY PAZ this date x10 reps CONTINUE  Short term goal 4: Patient will complete simple homemaking task with SBA and min cues for safety to increase indep in home environment. NOT MET, CONTINUE  Short term goal 5: Patient will tolerate dynamic standing task with 1-2 UE release and SBA to increase safety and indep in sinkside grooming tasks. MET, CONTINUE  Long term goals  Time Frame for Long term goals : 2-3 weeks  Long term goal 1: Patient will complete simple meal prep task with SBA using compensatory techniques prn to increase indep in making a sandwich at home. NOT MET, CONTINUE  Long term goal 2: Patient will complete BADL routine with SBA and using compensatory techniques prn to increase indep in home environment. NOT MET, CONTINUE    Revised Short-Term Goals  Short term goals  Time Frame for Short term goals: 1 week  Short term goal 1: Pt will complete LB ADL with SBA and LHAE prn to increase indep with self care. Short term goal 2: Patient will increase LUE AROM >90 degrees with min cues to increase indep in ADL tasks. Short term goal 3: Pt will navigate to/from bathroom using RW with SBA and min cues for safety increase safety in toileting task. Short term goal 4: Patient will complete simple homemaking task with SBA and min cues for safety to increase indep in home environment. Short term goal 5: Patient will tolerate dynamic standing task with 1-2 UE release and SBA to increase safety and indep in sinkside grooming tasks. Long term goals  Time Frame for Long term goals : 2-3 weeks  Long term goal 1: Patient will complete simple meal prep task with SBA using compensatory techniques prn to increase indep in making a sandwich at home. Long term goal 2: Patient will complete BADL routine with SBA and using compensatory techniques prn to increase indep in home environment. Following session, patient left in safe position with all fall risk precautions in place.

## 2021-06-07 NOTE — PROGRESS NOTES
Ayan Viera NP in to assess pt. N.O. to advance diet and DC IV fluids. No need to follow up with Dr. Mariana Carter at discharge. Pt already established with Dr. Calvin Toribio. Referral to be made to [facility] to follow up outpatient.

## 2021-06-07 NOTE — PROGRESS NOTES
[address] [place] [facility]  [facility]  PROGRESS NOTE    TIME   SLP Individual Minutes  Time In: 0900  Time Out: 0930  Minutes: 30  Timed Code Treatment Minutes: 30 Minutes       Date: 2021  Patient Name: Chantel Truong      CSN: [id number]   : 1945  (76 y.o.)  Gender: male   Referring Physician: Prasanna Saravia MD  Diagnosis: Parkinson's Disease    Secondary Diagnosis: High level cognitive deficits   Precautions: Fall risk   Current Diet: Regular diet and thin liquids   Swallowing Strategies: Standard Universal Swallow Precautions  Date of Last MBS/FEES: Not Applicable    Pain:  3/10 - Pain location: abdomen - RN aware     Subjective:  Pt seen sitting upright in recliner, awake. Patient agreeable to cognitive therapy. Pleasant and cooperative. No family present. Short-Term Goals:  SHORT TERM GOAL #1:  REVISED Goal 1: Patient will complete STM tasks with 3-4 units (immediate/delayed) and working memory with 85% accuracy given MIN A to improve retention of personal, newly, and previously learned information. - GOAL MET; REVISE   REVISED Goal 1: Patient will complete STM tasks with 4-6 units (immediate/delayed) and working memory with 85% accuracy at MOD I to improve retention of personal, newly, and previously learned information. INTERVENTIONS: Did not address this date d/t focus on other goals. Prior Session   Delayed Recall of Grocery List from : 5/6 independently,  given min cues   *excellent recall following an extended period of time. Working Memory   Pt was prompted to say \"elephant\" for even, \"octopus\" for odd, and \"ta\" for face cards. Pt independently identified animal with corresponding card 25/25 cards    Pt was prompted to say opposite color of suit, \"elephant\" for even, \"octopus\" for odd, and \"ta\" for face cards.  Pt independently identified animal with corresponding card 25/26 independently,  given min cues  *Excellent success with both tasks this date      SHORT TERM GOAL #2:  REVISED Goal 2: Patient will complete higher level complex problem solving & executive functioning (meds/finances) with 85% accuracy given MIN A to improve independence with completion of ADLs/IADLs - GOAL MET; REVISE  REVISED Goal 2: Patient will complete higher level complex problem solving & executive functioning (meds/finances) with 85% accuracy at a MOD I to improve independence with completion of ADLs/IADLs  INTERVENTIONS:   Deductive Reasoning Puzzle #1  8/10 independently, 2/10 given mod cues     Prior Session   Daily Problem Solving   18/20 independently, 1/20 min, 1/20 total assist  *good success with task this date  *patient often identifying safe solution; however, reporting that he does not apply safe solution within his ADL/IADLs     Time Management - Word Problems (verbally presented)   10/10 independently   *excellent mental math computation with time.     Money Management - [place] Menu  8/9 Independently, 1/9 given min cues   *excellent scanning   *Cues for working memory   **good math computation via pen and paper      SHORT TERM GOAL #3:  Goal 3: Patient will complete moderately complex organizational naming and sequential analysis tasks with 80% accuracy given MIN A to improve thought processing, and processing speed, and lexical retrieval. - GOAL MET; REVISE  REVISED Goal 3: Patient will complete moderately complex organizational naming and sequential analysis tasks with 85% accuracy at a MOD I level to improve thought processing, and processing speed, and lexical retrieval.   INTERVENTIONS:   Organization of Daily Calendar  10/10 independently,   *Excellent success with organization of calendar  *Events are in chronological order within correct time of day (morning, afternoon, evening)   *Events include time and description of event     Prior Session   Divergent Naming - Abstract (target 12 words)   Trial 1: 11 primarily a visual learner)  Expression: 6 - Device used to express complex ideas/needs  Social Interaction: 6 - Patient requires medication for mood and/or effect  Problem Solvin - Patient able to solve simple/routine tasks  Memory: 5 - Patient requires prompting with stress/unfamiliar situations       EDUCATION:  Learner: Patient  Education:  Reviewed ST goals and Plan of Care, Reviewed recommendations for follow-up and Home Safety Education  Evaluation of Education: Verbalizes understanding and Family not present    ASSESSMENT/PLAN:  Summary: Patient has met 3/4 STGs and 0/1 LTGs this reporting period. Patient demonstrating improvements in the areas of immediate recall, delayed recall, working memory, problem solving, reasoning, executive functioning, thought organization, and sequential analysis. Patient continues to demonstrate increased difficulty with COMPLEX attention, problem solving, reasoning, and memory tasks. At times, patient can be impulsive and requires increased verbal cues to maintain attention to task. Patient continues to demonstrate evidence of a mild cognitive-linguistic impairment which impacts patients ability to safely complete ADL/IADLs with least amount of supervision/assistance. Patient would greatly benefit from continued skilled ST services to address aforementioned deficits in order to progress to a Mod I level or higher to promote safe discharge home and decrease risk of re-admittance to hospital.   Activity Tolerance:  Patient tolerance of  treatment: good. Assessment/Plan: Patient progressing toward established goals. Continues to require skilled care of licensed speech pathologist to progress toward achievement of established goals and plan of care.     Plan for Next Session: Executive Functioning - Money Management      [facility]) [address]

## 2021-06-07 NOTE — PROGRESS NOTES
[address] [facility] PHYSICAL THERAPY  DAILY NOTE  [facility]    Time In: 1430  Time Out: 1500  Timed Code Treatment Minutes: 30 Minutes  Minutes: 30          Date: 2021  Patient Name: Evelyn Gramajo,  Gender:  male        MRN: [id number]  : 1945  (76 y.o.)  Referral Date : 21  Referring Practitioner: STEFANO Oglesby CNP  Diagnosis: Parkinson's disease  Additional Pertinent Hx: Pt is a 80-year-old male who was admitted to [facility] after presenting by EMS from local drugstore confused, had unwitnessed fall presented with generalized AMS. Prior history of similar presentations in the past.  PMH include antiphospholipid syndrome, DVT, sleep apnea, COPD, CKD, HTN, hypothyroidism and RCC. Patient unable to provide more history. History obtained from chart review and ED accounts. Pt transferred to  rehab . Prior Level of Function:  Lives With: Other (comment) (2 roomates in apartment in [address], common areas shared)  Type of Home: Apartment  Home Layout: One level  Home Access: Level entry  Home Equipment: Cane, Lift chair   Bathroom Shower/Tub: Tub/Shower unit  Bathroom Toilet: Handicap height (standard toilet with frame overlay)  Bathroom Equipment: Shower chair, Grab bars in shower  Bathroom Accessibility: Walker accessible    ADL Assistance: Independent  Homemaking Assistance: Independent  Homemaking Responsibilities: Yes  Ambulation Assistance: Independent  Transfer Assistance: Independent  Active : Yes  IADL Comments: He reports he usually orders food to be delivered or has staff at [facility] make his food, but states he will make himself a sandwich for lunch. States that staff does his laundry 1x/week in the apartment across the ha because he cannot go downstairs to use the resident laundry. Additional Comments: has an aid 3x/wk to assist with bathing and dressing BLEs, as well as homemaking.   Pt using cane PTA for mobility. Patient reports no family or support system nearby, but has a step-brother who lives in [place] that he willl go visit. Restrictions/Precautions:  Restrictions/Precautions: General Precautions, Fall Risk     SUBJECTIVE: Pt in chair upon arrival speaking with Dr and nurse. Pt pleasant and agreeable to therapy. Time taken for bathroom use with mobility. Pt required cues to redirect to session. Pt returned to chair with chair alarms on    PAIN: 8/10: stomach    Vitals: Vitals not assessed per clinical judgement, see nursing flowsheet    OBJECTIVE:  Bed Mobility:  Not Tested    Transfers:  Sit to Stand: [employer], with increased time for completion, with verbal cues  Stand to Sit:Contact [employer], cues for hand placement    Wheelchair Mobility:  Stand By Assistance, with verbal cues , with increased time for completion  Extremities Used: Bilateral Upper Extremities  Type of Chair:Manual  Surface: Level Tile  Distance: 150'x2 with multiple turns  Quality: Slow Velocity, Short Strokes and Decreased Fluidity      Ambulation:  Contact Guard Assistance, with verbal cues , with increased time for completion  Distance: 5'x2  Surface: Level Tile  Device:Rolling Walker  Gait Deviations: Forward Flexed Posture, Slow Ananya, Decreased Step Length Bilaterally, Decreased Weight Shift Bilaterally, Decreased Gait Speed, Decreased Heel Strike Bilaterally, Mild Path Deviations and Unsteady Gait  Cues for increased hip and knee extension on L to prevent foot drop. Pt wearing AFO during session for improved dorsiflexion for swing through phase of gait. Balance:  Static Sitting Balance:  Stand By Assistance Pt sat Edge of chair with conversation ~5 minutes with no UE support and no LOB    Stairs:  Stairs:  6\" steps. X 4 using Bilateral Handrails and Minimal Assistance, Moderate Assistance, X 1, with verbal cues , with increased time for completion. Pt completed by going up/down one step. Pt did not go all the way up stairs. Pt required assistance to place R UE on/off step and to control descent. Cues for sequencing, increased muscle activation, and posture. Exercise:  Not completed    Functional Outcome Measures: Not completed  Balance Score: 9  Gait Score: 5  Tinetti Total Score: 14    ASSESSMENT:  Assessment: Patient progressing toward established goals. Activity Tolerance:  Patient tolerance of  treatment: good. Pt tolerated session well. Decreased muscle activation with steps. Decreased strength, mobility, endurance, and balance. Pt would benefit from continued PT for improved functional mobility     Equipment Recommendations:Equipment Needed: Yes (RW, monitor need for W/C)  Discharge Recommendations:  Continue to assess pending progress    Plan: Times per week: 5x/wk 90min, 1x/wk 30min  Times per day: Twice a day  Current Treatment Recommendations: Strengthening, Transfer Training, Endurance Training, Neuromuscular Re-education, Patient/Caregiver Education & Training, Balance Training, Gait Training, Home Exercise Program, Functional Mobility Training, Stair training, Safety Education & Training    Patient Education  Patient Education: Plan of Care, Transfers, Gait, Stairs, Use of Gait Belt, Wheelchair Mobility, Verbal Exercise Instruction    Goals:  Patient goals : reduce pain, get stronger  Short term goals  Time Frame for Short term goals: 1 week  Short term goal 1: Patient will complete supine < > sit with SBA to transfer in/out of bed with decreased difficulty. Short term goal 2: Patient will complete sit < > stand with SBA with RW to stand to ambulate with increased independence. Short term goal 3: Patient will ambulate 48' with a RW and CGA, clearing right LE >/=50% of the time to increase safety with ambulation. MET, see LTG  Short term goal 4: Patient will complete rolling with SBA to reposition self with increased independence.   Short term goal 5: Patient will propel wheelchair 150' with modified independence to increase independence and mobility in current living environment. Long term goals  Time Frame for Long term goals : 2 wks  Long term goal 1: Patient will complete supine < > sit and rolling with modified independence to transfer in/out of bed independently. Long term goal 2: Patient will complete sit < > stand and stand pivot transfer with RW and modified independence to transfer surface to surface safely. Long term goal 3: Patient will ambulate 80' with a RW and Rt AFO and modified independence to navigate home safely. Long term goal 4: Patient will improve tinetti to greater than or equal to 19/28 to reduce his risk for falls. Long term goal 5: Patient will complete car transfer with supervision to transfer in/out of vehicle safely. Following session, patient left in safe position with all fall risk precautions in place.

## 2021-06-07 NOTE — PROGRESS NOTES
[facility] NOTE    Conference Date: 2021  Admit Date:  2021  3:05 PM  Patient Name: Neno Hinton    MRN: [id number]    : 1945  (76 y.o.)  Rehabilitation Admitting Diagnosis:  Parkinson's disease ([facility] [address] Dunia Rodriguez  Referring Practitioner: STEFANO Patel CNP      CASE MANAGEMENT  Current issues/needs regarding patient and family discharge status: Patient has progressed well with PT/OT/ST. Patient to be discharged home today, 2021. Home health care services for RN/PT/OT/ST/HHA arranged through [facility]. SW also reached out to [employer] [facility], requesting for increased services through [employer] at discharge. SW to follow as needed. PHYSICAL THERAPY    Assessment: Patient progressing toward established goals, meeting 4/5 short term goals and 2 long term goals. Pt continues to have limitations in strength, endurance and balance resulting in need for AFO RLE, SBA to CGA with gait with RW. He has gained the MOd I status for bed mobility and transfers. Tinetti showing high fall risk at . Pt will benefit from continued skilled PT for improvement in areas noted  Equipment Needed: Yes (RW ordered from [facility],  Walk on AFO ordered from [employer])  SPEECH THERAPY  Patient has met 3/4 STGs and 0/1 LTGs this reporting period. Patient demonstrating improvements in the areas of immediate recall, delayed recall, working memory, problem solving, reasoning, executive functioning, thought organization, and sequential analysis. Patient continues to demonstrate increased difficulty with COMPLEX attention, problem solving, reasoning, and memory tasks. At times, patient can be impulsive and requires increased verbal cues to maintain attention to task.   Patient continues to demonstrate evidence of a mild cognitive-linguistic impairment which impacts patients ability to safely complete ADL/IADLs with least amount of Frequency: 2-3 times per day. Management: Movantik daily, Colace daily. Continent of Bladder: Yes. Frequency: every 1-3 hours. Management: Flomax. Pain is Managed:  Yes. Management: Percocet 10/325. Frequency of Intervention: QID. Adequately Controlled: No  Sleep: Adequate  Signs and Symptoms of Infection:  No.   Signs and Symptoms of Skin Breakdown:  No.  Watching L heel and applying pressure relief boot. Injury and Fall Free during Inpatient Rehabilitation Admission: Yes  Anticoagulants: Xarelto  Diabetic: No listed in hx but not being treated  Consultations/Labs/X-rays: CT scan completed 6/6 and general surgery consulted d/t taut, painful abdomen. Diet was downgraded to clear liquids but has since been upgraded back to a regular diet. No need to follow up with Dr. Angelica Patel. Referral being made to [address] [place] [address] outpatient.     Recent Labs     06/06/21  0942   POCGLU 81       Lab Results   Component Value Date    LDLCHOLESTEROL 47 04/14/2020         Vitals:    06/07/21 1330 06/07/21 2208 06/08/21 0007 06/08/21 0755   BP: (!) 142/92 133/64  139/67   Pulse: 65 69  59   Resp:  18 16 14   Temp:  98.1 °F (36.7 °C)  98.2 °F (36.8 °C)   TempSrc:  Oral  Oral   SpO2: 97% 98% 99% 95%   Weight:       Height:              Family Education: friend educated  Fall Risk:  Falling star program initiated  Is the patient appropriate for a stay in the functional apartment? no    Discharge Plan   Estimated Discharge Date: today, 6/8/2021   Destination: discharge home with supervision  Services at Discharge: Home Health  Physical Therapy, Occupational Therapy, Speech Therapy, Nursing and HA 3x week  Is patient appropriate for an outpatient driving evaluation? no  Equipment at Discharge: Walk on AFO, RW, LHAE  Factors facilitating achievement of predicted outcomes: Friend support, Motivated, Cooperative and Pleasant  Barriers to the achievement of predicted outcomes: Pain, Limited family support, Limited safety awareness, Limited insight into deficits, Decreased endurance, Lower extremity weakness and Long standing deficits    Team Members Present at Conference:  [facility], 45 Radha Jarrell OTR/L 1112  Physical Therapist:Dorita Howell, [facility]  Speech Heber El   Nurse:Anna Carvalho, RN  Psychologist: Patrick Santillan, PhD.    I approve the established interdisciplinary plan of care as documented within the medical record of [facility].     Sanya Sykes MD

## 2021-06-07 NOTE — PROGRESS NOTES
Physical Medicine & Rehabilitation   Progress Note    Chief Complaint:  Parkinson's, new diagnosis. Rehab needs    Subjective:  Patient seen up in his chair. Patient working on a craft with sticks. Patient with increased distention over weekend and surgery following. Patient on clear liquid diet at this time. States he doesn't think he can go home because he will eat what he wants. Nursing working to contact surgery for diet upgrade. Surgery following. Patient with ongoing complains of left heel pain. Noted boggy area on heel, wearing heel protectors. Patient preparing to work with therapy    Rehabilitation:  PT:   Bed Mobility:  Rolling to Left: Modified Independent   Rolling to Right: Modified Independent   Supine to Sit: Modified Independent  Sit to Supine: Modified Independent   Extra time required  Transfers:  Sit to Stand: Modified Independent  Stand to Sit:Modified Independent  Stand Pivot:Modified Independent  Ambulation:  Stand By Assistance, Contact Guard Assistance  Distance: 50' x 1, 75' x 1, 150' x 1, 15' x 1, 30' x 1  Surface: Level Tile  Device:Rolling Walker  Gait Deviations:  Slow Ananya, Decreased Step Length Bilaterally, Decreased Gait Speed, Decreased Heel Strike Bilaterally, Decreased Terminal Knee Extension with quad lag on L, Decreased toe clearance on R   Balance:  Dynamic Standing Balance: Contact Guard Assistance  Pt. Stood while toileting and washing hands 2x requiring CGA for safety as pt. Demos moderate trunk sway. Pt. Able to  an object from floor using long handled reacher and Rolling walker for support with Modified Independent    OT:   ADL:   EATING:Setup or clean-up assistance. placing water on tray table for pt to retrieve. CARE Score: 5. ORAL HYGIENE:Supervision or touching assistance. Pt stood x11 min at sink for completion of oral care with close SBA. Pt demoed good balance throughout with 1-2 hand release. CARE Score: 4.      TOILETING HYGIENE:Supervision or touching assistance. Pt required close SBA - CGA for clothing mgmt while standing at toilet. CARE Score: 4. SHOWERING/BATHING:Partial/moderate assistance. Pt required assist to wash/dry feet and distal BLE. Pt demoed good safety awareness and balance while standing in the shower to wash bottom and rashida area with close SBA. Bernabe Hart CARE Score: 3.     UPPER BODY DRESSING:Supervision or touching assistance. Pt donned hospital top while seated on shower chair, no difficulties present, pt demoed good use of adaptive strategies to compensate for decreased ROM in L shoulder. Bernabe Hart CARE Score: 4. LOWER BODY DRESSING:Supervision or touching assistance. Pt able to don/doff shorts with SBA while standing/seated at shower chair. CARE Score: 4. FOOTWEAR:Partial/moderate assistance  Pt required Mod A to doff and don shoes while seated at shower chair. CARE Score: 3.     TOILET TRANSFER: Supervision or touching assistance. Pt completed toilet t/f with SBA and no safety cues. CARE Score: 4. BALANCE:  Sitting Balance:  Supervision. on shower chair during shower  Standing Balance: Stand By Assistance. Close SBA and 1-2 hand support, occasional instability noted, pt able to self correct  TRANSFERS:  Sit to Stand:  Stand By Assistance. from recliner and shower chair, occasional safety cues required. Stand to Sit: Stand By Assistance. to recliner and shower chair  FUNCTIONAL MOBILITY:  Assistive Device: Rolling Walker  Assist Level:  Contact Guard Assistance. Distance: To and from shower room and To and from therapy apartment  Pt completed at a slow pace with occasional instability. Min A required x1 d/t minor LOB. Pt required min VC for safety throughout. ADDITIONAL ACTIVITIES:  Pt completed IADL task to challenge dynamic standing balance and endurance to increase overall ease with meal prep tasks. Pt stood at counter, required to put groceries away this date.  Pt demoed good balance throughout while reaching night  CARDIOVASCULAR:  positive for  fatigue  GASTROINTESTINAL:  Negative   GENITOURINARY:  retention  SKIN:  negative  HEMATOLOGIC/LYMPHATIC:  positive for swelling/edema  MUSCULOSKELETAL:  positive for  pain  NEUROLOGICAL:  positive for gait problems, weakness, numbness and pain and sciatica   BEHAVIOR/PSYCH:  positive for depressed mood  System review otherwise negative      Objective:  BP (!) 142/92   Pulse 65   Temp 97.6 °F (36.4 °C) (Oral)   Resp 16   Ht 5' 10\" (1.778 m)   Wt 239 lb 6.4 oz (108.6 kg)   SpO2 97%   BMI 34.35 kg/m²   awake  Orientation:   person, place, time  Mood: depressed  Affect: calm  General appearance: Patient is well nourished, well developed, well groomed and in no acute distress    Memory:   normal,   Attention/Concentration: normal  Language:  normal    Cranial Nerves:  cranial nerves II-XII are grossly intact  ROM:  normal  Motor Exam:  Motor exam is symmetrical 5 out of 5 upper extremities bilaterally. RLE foot drop  Tone:  increased  Muscle bulk: within normal limits  Sensory:  Decreased   Abdomen: Distention      Skin: warm and dry, no rash or erythema  Peripheral vascular: Pulses: Normal upper and lower extremity pulses; Edema: trace      Diagnostics:   Recent Results (from the past 24 hour(s))   Culture, Stool    Collection Time: 06/06/21  2:35 PM    Specimen: Stool   Result Value Ref Range    Culture, Stool No enteric pathogens isolated-preliminary. Clostridium Difficile Toxin/Antigen    Collection Time: 06/06/21  2:35 PM    Specimen: Stool   Result Value Ref Range    C.diff Toxin/Antigen NEGATIVE    Fecal leukocytes    Collection Time: 06/06/21  2:35 PM    Specimen: Stool   Result Value Ref Range    Fecal Leukocytes No WBC's observed.          PROCEDURE: CT ABDOMEN PELVIS W IV CONTRAST       CLINICAL INFORMATION: Abdominal pain and distention r/o ileus .       COMPARISON: Abdominal radiographs from the same date and CT abdomen and pelvis dated 5/24/2021.     TECHNIQUE: Helical CT of the chest, abdomen and pelvis following intravenous administration of 80 mL Isovue-370 injected in the right AC with sagittal and coronal reconstructions.       All CT scans at this facility use dose modulation, iterative reconstruction, and/or weight-based dosing when appropriate to reduce radiation dose to as low as reasonably achievable.       FINDINGS:    Chest:   There are posterior dependent changes. Lungs otherwise appear clear. There are mildly prominent pretracheal lymph nodes on the right largest measuring 9 mm in greatest short axis diameter with one of the nodes containing small amount of calcification. These are stable compared to prior chest CT dated 4/21/2020. There is otherwise no axillary, mediastinal or hilar lymphadenopathy. The heart is normal in appearance. There are bilateral pedicle screws at the T2-T4 levels with interconnecting rods which    have been placed in the interval since prior CT chest. There are otherwise stable degenerative changes of the thoracic spine without suspicious osseous lesion.       Abdomen/pelvis: The liver and gallbladder are unremarkable. Adrenal glands are unremarkable. There is curvilinear hyperdense suture material at the superior pole of the left kidney with mildly irregular contour as evidence for prior surgery, stable compared to prior    exam. There is stable mild nonspecific perinephric fat stranding bilaterally. There is a 1.7 cm exophytic cyst at the inferior pole of the left kidney, slightly increased in size compared to prior exam. The spleen and pancreas are unremarkable. No    retroperitoneal or mesenteric lymphadenopathy is identified.       There is a suture line in the proximal sigmoid colon and transverse colon. There is scattered stool within the large bowel. There is mild gaseous distention within the large bowel as well. There is mild fluid distention in distal small bowel loops.  There    is no definite evidence of obstruction. The unopacified bladder is unremarkable. The prostate is markedly enlarged, similar to prior exam. No free fluid is identified. There are bilateral inguinal hernias containing fat. There is subcutis edema about    the hips and lower extremities. Redemonstration of laminectomies at T11-12 through L5-S1. There are stable degenerative changes of the lumbar spine.           Impression       1. Mild fluid distention of distal small bowel and mild gaseous distention of large bowel is nonspecific, possibly on the basis of enteritis. No definite evidence of obstruction. 2. Prostatomegaly.            Impression:  · Parkinson's disease with bradykinesia, increased tone at left wrist, hushed voice, frequent falls with multiple admissions   · Encephalopathy/AMS likely secondary to illicit drug use  · Antiphospholipid syndrome  · Central sleep apnea on BiPAP  · Troponin leak likely related to chronic kidney disease  · Acute kidney injury   · Gastroesophageal reflux disease  · Anemia and leukopenia  · COPD  · Obesity  · Neuropathy   · Acid reflux   · Right foot drop     Plan:  Continue current therapies  Prophylaxis: DVT - xarelto, GI - protonix daily, tums PRN  Pain: tylenol, percocet, lyrica   Bowels: dulcolax, miralax, senna  · Bladder: flomax  · Psychology consult for coping and depression  · Melatonin 6mg nightly - working well  · Continue right walk on AFO  · Continue simethicone for gas and distention   · Ok for roommate to bring in seafood for patient  · Team conference Tuesday  · discharge planning for 6/8/21    Missed Therapy Time:  · None    Avril Cheung, APRN - CNP

## 2021-06-08 VITALS
RESPIRATION RATE: 14 BRPM | HEIGHT: 70 IN | WEIGHT: 239.4 LBS | BODY MASS INDEX: 34.27 KG/M2 | SYSTOLIC BLOOD PRESSURE: 139 MMHG | DIASTOLIC BLOOD PRESSURE: 67 MMHG | OXYGEN SATURATION: 95 % | TEMPERATURE: 98.2 F | HEART RATE: 59 BPM

## 2021-06-08 LAB — CULTURE, STOOL: NORMAL

## 2021-06-08 PROCEDURE — 2700000000 HC OXYGEN THERAPY PER DAY

## 2021-06-08 PROCEDURE — 94761 N-INVAS EAR/PLS OXIMETRY MLT: CPT

## 2021-06-08 PROCEDURE — 97116 GAIT TRAINING THERAPY: CPT

## 2021-06-08 PROCEDURE — 6370000000 HC RX 637 (ALT 250 FOR IP): Performed by: PHYSICAL MEDICINE & REHABILITATION

## 2021-06-08 PROCEDURE — 6370000000 HC RX 637 (ALT 250 FOR IP): Performed by: PHYSICIAN ASSISTANT

## 2021-06-08 PROCEDURE — 99239 HOSP IP/OBS DSCHRG MGMT >30: CPT | Performed by: NURSE PRACTITIONER

## 2021-06-08 PROCEDURE — 97129 THER IVNTJ 1ST 15 MIN: CPT

## 2021-06-08 PROCEDURE — 6370000000 HC RX 637 (ALT 250 FOR IP): Performed by: INTERNAL MEDICINE

## 2021-06-08 PROCEDURE — 6370000000 HC RX 637 (ALT 250 FOR IP): Performed by: NURSE PRACTITIONER

## 2021-06-08 PROCEDURE — 6370000000 HC RX 637 (ALT 250 FOR IP): Performed by: FAMILY MEDICINE

## 2021-06-08 PROCEDURE — 97530 THERAPEUTIC ACTIVITIES: CPT

## 2021-06-08 PROCEDURE — 94660 CPAP INITIATION&MGMT: CPT

## 2021-06-08 PROCEDURE — 97535 SELF CARE MNGMENT TRAINING: CPT

## 2021-06-08 RX ORDER — LANOLIN ALCOHOL/MO/W.PET/CERES
6 CREAM (GRAM) TOPICAL NIGHTLY PRN
Qty: 60 TABLET | Refills: 0 | Status: SHIPPED | OUTPATIENT
Start: 2021-06-08 | End: 2021-07-08

## 2021-06-08 RX ORDER — PSEUDOEPHEDRINE HCL 30 MG
100 TABLET ORAL DAILY
Qty: 30 CAPSULE | Refills: 0 | Status: SHIPPED | OUTPATIENT
Start: 2021-06-09

## 2021-06-08 RX ORDER — SIMETHICONE 80 MG
80 TABLET,CHEWABLE ORAL EVERY 6 HOURS PRN
Qty: 60 TABLET | Refills: 0 | Status: SHIPPED | OUTPATIENT
Start: 2021-06-08

## 2021-06-08 RX ORDER — PREGABALIN 150 MG/1
150 CAPSULE ORAL 2 TIMES DAILY
Qty: 60 CAPSULE | Refills: 0 | Status: SHIPPED | OUTPATIENT
Start: 2021-06-08 | End: 2021-07-08

## 2021-06-08 RX ORDER — POLYETHYLENE GLYCOL 3350 17 G/17G
17 POWDER, FOR SOLUTION ORAL DAILY PRN
COMMUNITY
Start: 2021-06-08

## 2021-06-08 RX ADMIN — DOCUSATE SODIUM 100 MG: 100 CAPSULE, LIQUID FILLED ORAL at 08:49

## 2021-06-08 RX ADMIN — OXYCODONE HYDROCHLORIDE AND ACETAMINOPHEN 1 TABLET: 10; 325 TABLET ORAL at 08:49

## 2021-06-08 RX ADMIN — NALOXEGOL OXALATE 12.5 MG: 12.5 TABLET, FILM COATED ORAL at 08:49

## 2021-06-08 RX ADMIN — ATORVASTATIN CALCIUM 20 MG: 20 TABLET, FILM COATED ORAL at 08:49

## 2021-06-08 RX ADMIN — PANTOPRAZOLE SODIUM 40 MG: 40 TABLET, DELAYED RELEASE ORAL at 06:23

## 2021-06-08 RX ADMIN — DICYCLOMINE HYDROCHLORIDE 10 MG: 10 CAPSULE ORAL at 12:36

## 2021-06-08 RX ADMIN — LOSARTAN POTASSIUM 50 MG: 50 TABLET, FILM COATED ORAL at 08:49

## 2021-06-08 RX ADMIN — OXYCODONE HYDROCHLORIDE AND ACETAMINOPHEN 1 TABLET: 10; 325 TABLET ORAL at 12:48

## 2021-06-08 RX ADMIN — PREGABALIN 150 MG: 50 CAPSULE ORAL at 08:49

## 2021-06-08 RX ADMIN — DICYCLOMINE HYDROCHLORIDE 10 MG: 10 CAPSULE ORAL at 05:52

## 2021-06-08 RX ADMIN — MECLIZINE HCL 25 MG: 25 TABLET, CHEWABLE ORAL at 08:49

## 2021-06-08 RX ADMIN — FUROSEMIDE 40 MG: 40 TABLET ORAL at 05:52

## 2021-06-08 ASSESSMENT — PAIN DESCRIPTION - DESCRIPTORS: DESCRIPTORS: ACHING

## 2021-06-08 ASSESSMENT — PAIN SCALES - GENERAL
PAINLEVEL_OUTOF10: 0
PAINLEVEL_OUTOF10: 8
PAINLEVEL_OUTOF10: 5

## 2021-06-08 ASSESSMENT — PAIN DESCRIPTION - ONSET: ONSET: ON-GOING

## 2021-06-08 ASSESSMENT — PAIN DESCRIPTION - ORIENTATION: ORIENTATION: RIGHT

## 2021-06-08 ASSESSMENT — PAIN DESCRIPTION - PROGRESSION: CLINICAL_PROGRESSION: NOT CHANGED

## 2021-06-08 ASSESSMENT — PAIN DESCRIPTION - FREQUENCY: FREQUENCY: CONTINUOUS

## 2021-06-08 ASSESSMENT — PAIN DESCRIPTION - LOCATION: LOCATION: LEG

## 2021-06-08 ASSESSMENT — PAIN DESCRIPTION - PAIN TYPE: TYPE: CHRONIC PAIN

## 2021-06-08 ASSESSMENT — PAIN - FUNCTIONAL ASSESSMENT: PAIN_FUNCTIONAL_ASSESSMENT: ACTIVITIES ARE NOT PREVENTED

## 2021-06-08 NOTE — PLAN OF CARE
Problem: Falls - Risk of:  Goal: Will remain free from falls  Description: Will remain free from falls  6/8/2021 0828 by Marsha Cuevas RN  Outcome: Completed  6/7/2021 2021 by David Alonso RN  Outcome: Ongoing  Goal: Absence of physical injury  Description: Absence of physical injury  Outcome: Completed     Problem: Skin Integrity:  Goal: Will show no infection signs and symptoms  Description: Will show no infection signs and symptoms  Outcome: Completed  Goal: Absence of new skin breakdown  Description: Absence of new skin breakdown  6/8/2021 0828 by Marsha Cuevas RN  Outcome: Completed  6/7/2021 2021 by David Alonso RN  Outcome: Ongoing     Problem: Pain:  Goal: Pain level will decrease  Description: Pain level will decrease  Outcome: Completed  Goal: Control of acute pain  Description: Control of acute pain  Outcome: Completed  Goal: Control of chronic pain  Description: Control of chronic pain  6/8/2021 0828 by Marsha Cuevas RN  Outcome: Completed  6/7/2021 2021 by David Alonso RN  Outcome: Ongoing     Problem: Bleeding:  Goal: Will show no signs and symptoms of excessive bleeding  Description: Will show no signs and symptoms of excessive bleeding  6/8/2021 0828 by Marsha Cuevas RN  Outcome: Completed  6/7/2021 2021 by David Alonso RN  Outcome: Ongoing     Problem: Mobility - Impaired:  Goal: Mobility will improve  Description: Mobility will improve  6/8/2021 0828 by Marsha Cuevas RN  Outcome: Completed  6/7/2021 2021 by David Alonso RN  Outcome: Ongoing     Problem: Respiratory  Goal: No pulmonary complications  3/1/8759 5898 by Marsha Cuevas RN  Outcome: Completed  6/7/2021 2021 by David Alonso RN  Outcome: Ongoing  Goal: O2 Sat > 90%  Outcome: Completed     Problem: GI  Goal: No bowel complications  Outcome: Completed  Goal: Bowel movement at least every other day  6/8/2021 0828 by Marsha Cuevas RN  Outcome: Completed  6/7/2021 2021 by David Alonso RN Outcome: Ongoing     Problem:   Goal: Adequate urinary output  Outcome: Completed  Goal: No urinary complication  1/2/2134 0472 by Marsha Cuevas RN  Outcome: Completed  6/7/2021 2021 by David Alonso RN  Outcome: Ongoing     Problem: Nutrition  Goal: Optimal nutrition therapy  6/8/2021 0828 by Marsha Cuevas RN  Outcome: Completed  6/7/2021 2021 by David Alonso RN  Outcome: Ongoing     Problem: IP DRESSINGS LOWER EXTREMITIES  Goal: LTG - patient will dress lower body with or without assistive device  Outcome: Completed     Problem: Discharge Planning:  Goal: Patients continuum of care needs are met  Description: Patients continuum of care needs are met  6/8/2021 0828 by Marsha Cuevas RN  Outcome: Completed  6/7/2021 2021 by David Alonso RN  Outcome: Ongoing     Problem: IP COMMUNICATION/DYSARTHRIA  Goal: LTG - Patient will effectively communicate in all situations with the use of compensatory strategies  6/8/2021 0828 by Marsha Cuevas RN  Outcome: Completed  6/7/2021 2021 by David Alonso RN  Outcome: Ongoing     Problem: DISCHARGE BARRIERS  Goal: Patient's continuum of care needs are met  Outcome: Completed

## 2021-06-08 NOTE — PROGRESS NOTES
[facility]  Inpatient Rehabilitation  Occupational Therapy  Discharge Note  Time:  Time In: 0800  Time Out: 0830  Timed Code Treatment Minutes: 30 Minutes  Minutes: 30          Date: 2021  Patient Name: Alistair Lockett,   Gender: male      Room: [facility]56/056-A  MRN: [id number]  : 1945  (76 y.o.)  Referring Practitioner: STEFANO Evans CNP (ordering provider)  Zackary García MD (attending)  Diagnosis: Parkinson's Disease  Additional Pertinent Hx: Alistair Lockett is a 76 y.o. male who presents with complaint of altered mental status. Patient was found at local drugstore confused, patient states that he fell. Patient is on Xarelto. Mechanism of patient's fall is unclear, patient is overall confused and erratic, cannot reliably contribute to HPI/ROS. Admitted to IP Rehab on . Restrictions/Precautions:  Restrictions/Precautions: General Precautions, Fall Risk    SUBJECTIVE: Pt was seated up on EOB upon arrival, pleasant and cooperative, wanting to gather personal items and get dressed in prep for discharge. PAIN: Denies    Vitals: Vitals not assessed per clinical judgement, see nursing flowsheet    COGNITION: Slow Processing, Decreased Insight, Decreased Problem Solving, Decreased Safety Awareness and Impulsive    ADL:     EATING:Independent. drinking water throughout session. CARE Score: 6. ORAL HYGIENE:Independent. Pt stood at sink x4 min with good balance noted. Pt demoed good balance with 1-2 hand release during task. Mariela Sida CARE Score: 6. TOILETING HYGIENE:Independent. standing at toilet to urinate with SBA, pt completed with unilateral support on grab bars. Good balance noted. Mariela Sida CARE Score: 6. CARE Score: 3.     UPPER BODY DRESSING:Independent. doffed scrub top and donned tshirt while seated on EOB. Pt retireved clothing from [employer] with good balance and safety. Mariela Sida CARE Score: 6. LOWER BODY DRESSING:Supervision or touching assistance.   Pt completed with SBA while sitting and standing, min VC for use of reacher. CARE Score: 4. FOOTWEAR:Supervision or touching assistance  Pt doffed/donned shoes while seated at EOB with LHAE prn and min VC. Prior to completion, pt provided and educated on Adelaida Balsam for increased ease with donning shoes. Pt demoed good understanding and technique throughout requiring min VC. Pt also administered with elastic shoe laces this date d/t difficulty untying and tying shoes. [facility] CARE Score: 4. TOILET TRANSFER: Independent. no safety cues required, good use of grab bars noted. [facility] CARE Score: 6. BALANCE:  Sitting Balance:  Independent. on EOB  Standing Balance: Stand By Assistance. Pt stood x4 min at sink for oral care, good balance noted. Pt stood x8 min within room collecting items    BED MOBILITY:  Not Tested    TRANSFERS:  Sit to Stand:  Stand By Assistance. with no VC for safety, from EOB  Stand to Sit: Stand By Assistance. to EOB    FUNCTIONAL MOBILITY:  Assistive Device: Rolling Walker  Assist Level:  Stand By Assistance and [address] Distance: To and from bathroom and within room  Pt demoed good balance throughout, min VC for safety     ADDITIONAL ACTIVITIES:  Pt completed mobility within room where he retrieved the items he will be discharged with and placed them into wash basin. Pt demoed good balance throughout with 1-2 hand release. Pt demoed good problem solving and EC/WS when collecting items. Pt utilized reacher prn for increased ease. Min difficulty transporting items d/t weight of basin and inability to ambulate with RW and unilateral release. Assist provided in transporting basin. Pt educated regarding strategies to increase ease of transportation of items. ASSESSMENT:  Activity Tolerance:  Patient tolerance of  treatment: good. Assessment: Assessment: Pt progressed significantly, achieving goals throughout LOS.  Pt with increased balance and endurance with ADLs and IADL, requiring occasional assist with tasks such as LB dressing. Pt requires some safety cues throughout participation in activities; however, has improved in overall safety awareness. Discharge Recommendations: Home with Home health OT  Equipment Recommendations: Equipment Needed: Yes  Other: Patient has shower chair and frame over toilet. PTA patient reports using a cane, will benefit from a walker, recommend LHAE for increased ease with dressing and ADLs. Plan: Times per week: 90 minutes 5x/week, 30 minutes 1x/week  Current Treatment Recommendations: Strengthening, Balance Training, Functional Mobility Training, Endurance Training, Self-Care / ADL, Patient/Caregiver Education & Training, Safety Education & Training, Home Management Training    Patient Education  Patient Education: Plan of Care, Energy Conservation, Home Exercise Program, Reviewed Prior Education and Importance of Increasing Activity    Goals  Short term goals  Time Frame for Short term goals: 1 week  Short term goal 1: Pt will complete LB ADL with SBA and LHAE prn to increase indep with self care. MET  Short term goal 2: Patient will increase LUE AROM >90 degrees with min cues to increase indep in ADL tasks. MET, pt achieved 90 degrees  Short term goal 3: Pt will navigate to/from bathroom using RW with SBA and min cues for safety increase safety in toileting task. NOT MET  Short term goal 4: Patient will complete simple homemaking task with SBA and min cues for safety to increase indep in home environment. NOT MET  Short term goal 5: Patient will tolerate dynamic standing task with 1-2 UE release and SBA to increase safety and indep in sinkside grooming tasks. MET  Long term goals  Time Frame for Long term goals : 2-3 weeks  Long term goal 1: Patient will complete simple meal prep task with SBA using compensatory techniques prn to increase indep in making a sandwich at home.  MET  Long term goal 2: Patient will complete BADL routine with SBA and using compensatory techniques prn to increase indep in home environment. NOT MET    Following session, patient left in safe position with all fall risk precautions in place.

## 2021-06-08 NOTE — PROGRESS NOTES
[address] [facility]  [facility]  DISCHARGE NOTE    TIME   SLP Individual Minutes  Time In: 4061  Time Out: 7104  Minutes: 20  Timed Code Treatment Minutes: 20 Minutes       Date: 2021  Patient Name: Gabriel Fam      CSN: [id number]   : 1945  (76 y.o.)  Gender: male   Referring Physician: Sydni Matson MD  Diagnosis: Parkinson's Disease    Secondary Diagnosis: High level cognitive deficits   Precautions: Fall risk   Current Diet: Regular diet and thin liquids   Swallowing Strategies: Standard Universal Swallow Precautions  Date of Last MBS/FEES: Not Applicable    Pain:  No pain reported. Subjective:  Pt seen sitting upright in recliner, awake. Patient agreeable to education regarding discharge recommendations. Pleasant and cooperative. No family present. Short-Term Goals:  SHORT TERM GOAL #1:  REVISED Goal 1: Patient will complete STM tasks with 4-6 units (immediate/delayed) and working memory with 85% accuracy at MOD I to improve retention of personal, newly, and previously learned information. - GOAL NOT MET  INTERVENTIONS: Not addressed this date d/t focus on education     Prior Session   Delayed Recall of Grocery List from : 5/6 independently, /6 given min cues   *excellent recall following an extended period of time. Working Memory   Pt was prompted to say \"elephant\" for even, \"octopus\" for odd, and \"ta\" for face cards. Pt independently identified animal with corresponding card 25/25 cards    Pt was prompted to say opposite color of suit, \"elephant\" for even, \"octopus\" for odd, and \"ta\" for face cards.  Pt independently identified animal with corresponding card /26 independently,  given min cues  *Excellent success with both tasks this date      SHORT TERM GOAL #2:  REVISED Goal 2: Patient will complete higher level complex problem solving & executive functioning (meds/finances) with 85% accuracy at a MOD I to improve independence with completion of ADLs/IADLs - GOAL NOT MET  INTERVENTIONS: Not addressed this date d/t focus on education   Deductive Reasoning Puzzle #1  8/10 independently, 2/10 given mod cues     Prior Session   Daily Problem Solving   18/20 independently, 1/20 min, 1/20 total assist  *good success with task this date  *patient often identifying safe solution; however, reporting that he does not apply safe solution within his ADL/IADLs     Time Management - Word Problems (verbally presented)   10/10 independently   *excellent mental math computation with time. Money Management - [place] [employer]  8/9 Independently, 1/9 given min cues   *excellent scanning   *Cues for working memory   **good math computation via pen and paper      SHORT TERM GOAL #3:  REVISED Goal 3: Patient will complete moderately complex organizational naming and sequential analysis tasks with 85% accuracy at a MOD I level to improve thought processing, and processing speed, and lexical retrieval. - GOAL NOT MET  INTERVENTIONS: Not addressed this date d/t focus on education     Prior Session   Organization of Daily Calendar  10/10 independently,   *Excellent success with organization of calendar  *Events are in chronological order within correct time of day (morning, afternoon, evening)   *Events include time and description of event     Divergent Naming - Abstract (target 12 words)   Trial 1: 11 words/minute independent  Trial 2: 12 words/minute independent  Trial 3: 12 words/minute independent  *excellent success with task this date. SHORT TERM GOAL #4:  Goal 4: Patient will complete complex attention tasks (divided, alternating, sustained) with no more than 3 errors in a given task to improve attentiveness and reduce tangential and disorganized conversational discourse.  - GOAL NOT MET  INTERVENTIONS: Not addressed this date d/t focus on education     Prior Session  Divided Attention - Newspaper Task  Patient was prompted to [place] target word (the) while simultaneously reading article for comprehension in order to provided detailed summary. Target word:  trials  Summary: Patient with fair recall regarding article    Patient Read 8 paragraph article in 8 minutes   *slow processing speed noted within task       Long-Term Goals:  Timeframe for Long-term Goals: 4 weeks    LONG TERM GOAL #1:  Goal 1: Patient will improve cognition to a mod I to independent level of functioning to encourage safe d/c home (apartment) at [facility] managing finances, medications, and hopeful return to driving. - ONGOING    ST FIM ASSESSMENT:     Admission score Current score Goal Status   COMMUNICATION 5 - Patient understands basic needs (hungry/hot/pain)   6 - Complex ideas 90% or device (hearing aid or glasses- if patient is primarily a visual learner)   Progressing   EXPRESSION 5 - Expresses basic ideas/needs only (hungry/hot/pain)     6 - Device used to express complex ideas/needs   Progressing   SOCIAL INTERACTION 6 - Patient requires medication for mood and/or effect   6 - Patient requires medication for mood and/or effect   STABLE   PROBLEM SOLVING 5 - Patient able to solve simple/routine tasks   5 - Patient able to solve simple/routine tasks   STABLE   MEMORY 3 - Patient remembers 50%-74% of the time   5 - Patient requires prompting with stress/unfamiliar situations   Progressing       Comprehension: 6 - Complex ideas 90% or device (hearing aid or glasses- if patient is primarily a visual learner)  Expression: 6 - Device used to express complex ideas/needs  Social Interaction: 6 - Patient requires medication for mood and/or effect  Problem Solvin - Patient able to solve simple/routine tasks  Memory: 5 - Patient requires prompting with stress/unfamiliar situations       EDUCATION:  Learner: Patient  Education:  ST completed education with patient regarding discharge recommendations.  ST reviewed recommendations of intermittent, daily supervision upon discharge, assistance with management of medications, refrain from driving until completion of further therapy, clearance from physician, and completion of driving evaluation, and utilization of strategies with management of finances (slow down, double check work, utilize calculator). ST also discussed continuation of speech therapy via home health. Patient was in agreement with all discharge recommendations this date and stated understanding. ST also discussed ways to keep brain active in order to maintain current cognitive-linguistic function and prevent decline following session. Evaluation of Education: Verbalizes understanding and Family not present    ASSESSMENT/PLAN:  Discharge Summary: As of progress report on 6/7, patient has met 3/4 STGs and 0/1 LTGs this previous reporting period. Patient's goals have been revised to reflect progress made thus far. ST not able to address recently revised goals d/t patient discharging from IPR this date. Patient has demonstrated improvements in the areas of immediate recall, delayed recall, working memory, problem solving, reasoning, executive functioning, thought organization, and sequential analysis. Patient continues to demonstrate increased difficulty with COMPLEX/HIGH LEVEL attention, problem solving, reasoning, and memory tasks. At times, patient can be impulsive and requires increased verbal cues to maintain attention to task. Patient continues to demonstrate evidence of a mild cognitive-linguistic impairment. At this time, it is recommended patient discharge to apartment building with intermittent daily supervision. In regards to medication management, home health RN should continue organizing pill box. ST educated patient on the importance of taking medication when alarm sounds for medication to be the most effective - patient stated understanding. Patient has demonstrated excellent success with math computation and financial management tasks during IPR stay. ST encouraged patient to slow down and double check work for accuracy. Upon discharge, patient would greatly benefit from continued skilled ST services to address aforementioned deficits in order to progress to a Mod I level or higher to promote safe discharge home and decrease risk of re-admittance to hospital.   Activity Tolerance:  Patient tolerance of  treatment: good. Assessment/Plan: Patient discharged from Speech Therapy at this time due to discharge from [facility]. Discharge Disposition: return to apartment.   Continued Speech Therapy Services recommended: Yes         Terese Hdez (Paco CardenasGlens Falls Hospitaledwardo 587) [address] M[address]

## 2021-06-08 NOTE — PROGRESS NOTES
[facility]  Recreational Therapy  Discharge Note  Inpatient Rehabilitation Unit           Date:  6/8/2021       Patient Name: Samantha Casiano      MRN: [id number]       YOB: 1945 (69 y.o.)       Gender: male  Diagnosis: Parkinson's Disease  Referring Practitioner: STEFANO Long CNP (ordering provider)  Evelyn Salazar MD (attending)    Patient discharged from Recreational Therapy at this time. See recreational therapy notes for details.     Electronically signed by: AMERICA Palomares  Date: 6/8/2021

## 2021-06-08 NOTE — PROGRESS NOTES
Team conference held Tuesday, 06/08/2021. Recommendations of the team were explained to the patient by EDILIA Nicolas, Dr. Shari Sawyer, and rehab team during walking care conference. Team is recommending that patient discharge home today, 06/08/2021, from acute inpatient rehab. Following discharge, team is recommending home health care services for RN/PT/OT/ST/HHA. Home health care services arranged through [facility]. Care plan reviewed with patient. Patient verbalized understanding of the plan of care and contribute to goal setting. SW contacted [facility] with discharge notification of today, 06/08/2021. Information provided to [place]. Discharge instructions, face to face encounter, H&P, and discharge summary faxed to agency.  Discharge transportation arranged through [address] [place] [address] with  at 4:30pm.

## 2021-06-08 NOTE — PROGRESS NOTES
on [place], indicating high fall risk and recommendation for use of RW and Rt AFO at all times. Pt will benefit from skilled PT in the home setting to further improve strength, balance and endurance to affect improved functional mobility and gait. Activity Tolerance:  Patient tolerance of  treatment: good. Equipment Recommendations:Equipment Needed: Yes (RW ordered from [facility]. Rt walk on AFO ordered from [facility].)  Discharge Recommendations: Home with home health PT    Plan: Pt. To be discharged home with [facility] PT     Patient Education  Patient Education: Plan of Care, Bed Mobility, Reviewed Prior Education, Gait, Stairs, Health Promotion and Wellness Education    Goals:  Patient goals : reduce pain, get stronger  Short term goals  Time Frame for Short term goals: 1 week  Short term goal 1: Patient will complete supine < > sit with SBA to transfer in/out of bed with decreased difficulty. MET, see LTG  Short term goal 2: Patient will complete sit < > stand with SBA with RW to stand to ambulate with increased independence. MET, see LTG  Short term goal 3: Patient will ambulate 48' with a RW and CGA, clearing right LE >/=50% of the time to increase safety with ambulation. MET, see LTG  Short term goal 4: Patient will complete rolling with SBA to reposition self with increased independence. MET, see LTG  Short term goal 5: Patient will propel wheelchair 150' with modified independence to increase independence and mobility in current living environment. GOAL MET, SEE LTG  Long term goals  Time Frame for Long term goals : 2 wks  Long term goal 1: Patient will complete supine < > sit and rolling with modified independence to transfer in/out of bed independently. MET  Long term goal 2: Patient will complete sit < > stand and stand pivot transfer with RW and modified independence to transfer surface to surface safely.  MET  Long term goal 3: Patient will ambulate 150' with a RW and Rt AFO and modified independence to

## 2021-06-08 NOTE — DISCHARGE SUMMARY
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      Etiologic Diagnosis that led to the condition: Parkinsonism     Comorbid conditions affecting rehabilitation:  · Parkinson's disease with bradykinesia, increased tone at left wrist, hushed voice, frequent falls with multiple admissions   · Encephalopathy/AMS secondary to illicit drug use  · Antiphospholipid syndrome  · Central sleep apnea on BiPAP  · Troponin leak related to chronic kidney disease  · *Acute kidney injury   · Gastroesophageal reflux disease  · Anemia and leukopenia  · COPD  · Obesity  · Neuropathy   · Acid reflux   · Right foot drop     Discharge Functional Status:    Physical therapy:  Bed Mobility:    Transfers:  ,  ,    Mobility:  , PT Equipment Recommendations  Equipment Needed: Yes (RW ordered from [facility]. Rt walk on AFO ordered from [facility] Assessment: The evaluation of Mr Subramanian indicates a decline in functional mobility compared to baseline. Patient independent at [facility] with cane, requiring min/mod assist with use of cane short distance and CGA/min assist with RW. Patient limited by weakness bilateral LE's and pain right hip and left side. Patient would benefit from skilled PT services to improve his ability to complete functional mobility, reduce his risk for falls and allow patient to return to OF. Occupational therapy:  ,  , Assessment: Patient is progressing towards his goals, however limited goal attainment due to short length of stay.  Pt demonstrates impairments in functional mobility, strength, endurance, safety awareness, and overall safety and indep in ADL/IADL tasks requiring skilled OT services in intensive rehabilitation setting to return to PLOF safely. Speech therapy:  Comprehension: 6 - Complex ideas 90% or device (hearing aid or glasses- if patient is primarily a visual learner)  Expression: 6 - Device used to express complex ideas/needs  Social Interaction: 6 - Patient requires medication for mood and/or effect  Problem Solvin - Patient able to solve simple/routine tasks  Memory: 5 - Patient requires prompting with stress/unfamiliar situations      Inpatient Rehabilitation Course:   Shira Farias is a 76 y.o. male admitted to inpatient rehabilitation on 2021 for encephalopathy. The patient participated in an aggressive multidisciplinary inpatient rehabilitation program involving 3 hours per day, 5 days per week of rehabilitation. Appropriate DVT prophylaxis options were continued with xarelto. Patient continued Bipap for MICKI during his stay. Patient with chronic right foot drop and was fitted for a walk on AFO. Patient denied any issues with this and it helped improve his gait. Patient was discharge with AFO for gait stability. Patient was continued on his home gabapentin, patient reported this was not as effective as the Lyrica he had been on in the past, restarted on Lyrica and gabapentin stopped. Patient tolerated lyrica and felt improvement with his neuropathy and radiculopathy. Patient with noted abdominal distention during his stay. Imaging without obstruction, surgery consulted. Will follow up with GI as OP as needed. Dr Enoc Singh followed during the IPR stay for medical management    Patient was discharged Home with [facility] in Stable condition.     Consults:   Psychology and family medicine     Significant Diagnostics:   CBC:   Lab Results   Component Value Date    WBC 2.6 2021    RBC 4.48 2021    HGB 12.5 2021    HCT 41.8 2021    MCV 93.3 2021    MCH 27.9 2021    MCHC 29.9 2021    RDW 14.1 2020     2021    MPV 9.8 2021     CMP:    Lab Results Component Value Date     06/06/2021    K 4.1 06/06/2021    K 4.0 05/28/2021     06/06/2021    CO2 28 06/06/2021    BUN 14 06/06/2021    CREATININE 1.2 06/06/2021    GFRAA >60 04/14/2020    LABGLOM 59 06/06/2021    GLUCOSE 111 06/06/2021    PROT 5.9 06/06/2021    LABALBU 3.2 06/06/2021    CALCIUM 9.4 06/06/2021    BILITOT 0.4 06/06/2021    ALKPHOS 93 06/06/2021    AST 12 06/06/2021    ALT 11 06/06/2021     Hepatic Function Panel:    Lab Results   Component Value Date    ALKPHOS 93 06/06/2021    ALT 11 06/06/2021    AST 12 06/06/2021    PROT 5.9 06/06/2021    BILITOT 0.4 06/06/2021    BILIDIR <0.2 06/06/2021    LABALBU 3.2 06/06/2021     Albumin:    Lab Results   Component Value Date    LABALBU 3.2 06/06/2021     Calcium:    Lab Results   Component Value Date    CALCIUM 9.4 06/06/2021     Magnesium:    Lab Results   Component Value Date    MG 2.1 05/25/2021     PT/INR:    Lab Results   Component Value Date    PROTIME 18.4 04/14/2020    INR 1.03 02/19/2021     PTT:    Lab Results   Component Value Date    APTT 51.0 07/11/2020     U/A:    Lab Results   Component Value Date    COLORU YELLOW 05/24/2021    PROTEINU NEGATIVE 05/24/2021    PHUR 5.0 05/24/2021    LABCAST 4-8 HYALINE 05/24/2021    LABCAST NONE SEEN 05/24/2021    WBCUA NONE SEEN 05/24/2021    RBCUA NONE SEEN 05/24/2021    YEAST NONE SEEN 05/24/2021    BACTERIA NONE SEEN 05/24/2021    SPECGRAV 1.009 05/24/2021    LEUKOCYTESUR NEGATIVE 05/24/2021    UROBILINOGEN 0.2 05/24/2021    BILIRUBINUR NEGATIVE 05/24/2021    BLOODU NEGATIVE 05/24/2021    GLUCOSEU NEGATIVE 02/19/2021     ABG:    Lab Results   Component Value Date    PH 7.43 02/19/2021    PCO2 45 02/19/2021    PO2 84 02/19/2021    HCO3 30 02/19/2021    O2SAT 96 02/19/2021     FLP:    Lab Results   Component Value Date    TRIG 129 04/14/2020    HDL 39 04/14/2020     TSH:    Lab Results   Component Value Date    TSH 0.542 05/24/2021     FOLATE:    Lab Results   Component Value Date    FOLATE 14.1 05/24/2021       Discharge Instructions     Patient Instructions:   Home  Therapy orders: PT, OT and SLP   Discharge lab work: none  Code status: Full Code   Activity: activity as tolerated  Diet: Adult Oral Nutrition Supplement; Clear Liquid Oral Supplement  ADULT DIET; Regular    Wound Care: as directed    Follow-up visits: See after visit summary from hospitalization    Discharge Medications:   Henrry Subramanian   Home Medication Instructions [id number]    Printed on:06/08/21 1001   Medication Information                      albuterol sulfate HFA (VENTOLIN HFA) 108 (90 Base) MCG/ACT inhaler  Inhale 2 puffs into the lungs every 6 hours as needed for Wheezing             ammonium lactate (AMLACTIN) 12 % cream  Apply topically as needed for Dry Skin Apply topically as needed.              atorvastatin (LIPITOR) 20 MG tablet  Take 20 mg by mouth daily pm             Calcium Ascorbate POWD  Take 1 tablet by mouth daily             citalopram (CELEXA) 40 MG tablet  Take 40 mg by mouth daily             cyanocobalamin 1000 MCG tablet  Take 1,000 mcg by mouth daily             dicyclomine (BENTYL) 10 MG capsule  Take 10 mg by mouth every 6 hours             docusate sodium (COLACE, DULCOLAX) 100 MG CAPS  Take 100 mg by mouth daily             ferrous sulfate (FE TABS 325) 325 (65 Fe) MG EC tablet  Take 325 mg by mouth 3 times daily (with meals)             furosemide (LASIX) 40 MG tablet  Take 40 mg by mouth daily             losartan (COZAAR) 50 MG tablet  Take 50 mg by mouth daily Am             meclizine (ANTIVERT) 25 MG tablet  Take 25 mg by mouth 2 times daily as needed              melatonin 3 MG TABS tablet  Take 2 tablets by mouth nightly as needed (sleep)             methylcellulose (SM FIBER LAXATIVE) 500 MG TABS  Take 1,000 mg by mouth daily as needed             naloxegol (MOVANTIK) 12.5 MG TABS tablet  Take 1 tablet by mouth every morning             oxyCODONE-acetaminophen (PERCOCET)  MG per tablet  Take 1 tablet by mouth 4 times daily. pantoprazole (PROTONIX) 40 MG tablet  Take 40 mg by mouth daily             polyethylene glycol (GLYCOLAX) 17 g packet  Take 17 g by mouth daily as needed for Constipation             pregabalin (LYRICA) 150 MG capsule  Take 1 capsule by mouth 2 times daily for 30 days. rivaroxaban (XARELTO) 10 MG TABS tablet  Take 10 mg by mouth every 24 hours             senna (SENOKOT) 8.6 MG tablet  Take 1 tablet by mouth 2 times daily             simethicone (MYLICON) 80 MG chewable tablet  Take 1 tablet by mouth every 6 hours as needed (gas and distention)             tamsulosin (FLOMAX) 0.4 MG capsule  Take 0.4 mg by mouth daily pm             umeclidinium-vilanterol (ANORO ELLIPTA) 62.5-25 MCG/INH AEPB inhaler  Inhale 1 puff into the lungs daily             zinc gluconate 50 MG tablet  Take 50 mg by mouth daily                 Controlled substances monitoring: possible medication side effects, risk of tolerance and/or dependence, and alternative treatments discussed and OARRS report reviewed today- activity consistent with treatment plan.      45 minutes spent preparing the patient for discharge    STEFANO Aguero - CNP
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
## 2021-07-02 ENCOUNTER — HOSPITAL ENCOUNTER (OUTPATIENT)
Dept: GENERAL RADIOLOGY | Age: 76
Discharge: HOME | End: 2021-07-02
Payer: MEDICARE

## 2021-07-02 DIAGNOSIS — R10.9 ABDOMINAL PAIN, UNSPECIFIED ABDOMINAL LOCATION: ICD-10-CM

## 2021-07-02 DIAGNOSIS — K56.609 SMALL BOWEL OBSTRUCTION (HCC): ICD-10-CM

## 2021-07-02 DIAGNOSIS — K66.0 ABDOMINAL ADHESIONS: ICD-10-CM

## 2021-07-02 LAB — GFR SERPL CREATININE-BSD FRML MDRD: 71 ML/MIN/1.73M2

## 2021-07-02 PROCEDURE — 2500000003 HC RX 250 WO HCPCS: Performed by: NURSE PRACTITIONER

## 2021-07-02 PROCEDURE — 74250 X-RAY XM SM INT 1CNTRST STD: CPT

## 2021-07-02 RX ADMIN — BARIUM SULFATE 600 ML: 240 SUSPENSION ORAL at 10:30

## 2021-10-11 ENCOUNTER — APPOINTMENT (OUTPATIENT)
Dept: CT IMAGING | Age: 76
End: 2021-10-11
Payer: MEDICARE

## 2021-10-11 ENCOUNTER — HOSPITAL ENCOUNTER (EMERGENCY)
Age: 76
Discharge: HOME | End: 2021-10-11
Attending: EMERGENCY MEDICINE
Payer: MEDICARE

## 2021-10-11 VITALS
RESPIRATION RATE: 16 BRPM | TEMPERATURE: 98.1 F | SYSTOLIC BLOOD PRESSURE: 162 MMHG | OXYGEN SATURATION: 96 % | WEIGHT: 245 LBS | HEART RATE: 60 BPM | DIASTOLIC BLOOD PRESSURE: 82 MMHG | HEIGHT: 70 IN | BODY MASS INDEX: 35.07 KG/M2

## 2021-10-11 DIAGNOSIS — R10.84 GENERALIZED ABDOMINAL PAIN: Primary | ICD-10-CM

## 2021-10-11 LAB
ALBUMIN SERPL-MCNC: 3.6 G/DL (ref 3.5–5.1)
ALP BLD-CCNC: 77 U/L (ref 38–126)
ALT SERPL-CCNC: 15 U/L (ref 11–66)
ANION GAP SERPL CALCULATED.3IONS-SCNC: 9 MEQ/L (ref 8–16)
APTT: 34.9 SECONDS (ref 22–38)
AST SERPL-CCNC: 16 U/L (ref 5–40)
BACTERIA: ABNORMAL /HPF
BASOPHILS # BLD: 1 %
BASOPHILS ABSOLUTE: 0 THOU/MM3 (ref 0–0.1)
BILIRUB SERPL-MCNC: 0.7 MG/DL (ref 0.3–1.2)
BILIRUBIN DIRECT: < 0.2 MG/DL (ref 0–0.3)
BILIRUBIN URINE: NEGATIVE
BLOOD, URINE: NEGATIVE
BUN BLDV-MCNC: 10 MG/DL (ref 7–22)
CALCIUM SERPL-MCNC: 9.1 MG/DL (ref 8.5–10.5)
CASTS 2: ABNORMAL /LPF
CASTS UA: ABNORMAL /LPF
CHARACTER, URINE: CLEAR
CHLORIDE BLD-SCNC: 106 MEQ/L (ref 98–111)
CO2: 25 MEQ/L (ref 23–33)
COLOR: YELLOW
CREAT SERPL-MCNC: 0.9 MG/DL (ref 0.4–1.2)
CRYSTALS, UA: ABNORMAL
EOSINOPHIL # BLD: 3.1 %
EOSINOPHILS ABSOLUTE: 0.1 THOU/MM3 (ref 0–0.4)
EPITHELIAL CELLS, UA: ABNORMAL /HPF
ERYTHROCYTE [DISTWIDTH] IN BLOOD BY AUTOMATED COUNT: 14.4 % (ref 11.5–14.5)
ERYTHROCYTE [DISTWIDTH] IN BLOOD BY AUTOMATED COUNT: 49.5 FL (ref 35–45)
GFR SERPL CREATININE-BSD FRML MDRD: > 90 ML/MIN/1.73M2
GLUCOSE BLD-MCNC: 90 MG/DL (ref 70–108)
GLUCOSE URINE: NEGATIVE MG/DL
HCT VFR BLD CALC: 41.6 % (ref 42–52)
HEMOGLOBIN: 12.7 GM/DL (ref 14–18)
IMMATURE GRANS (ABS): 0 THOU/MM3 (ref 0–0.07)
IMMATURE GRANULOCYTES: 0 %
INR BLD: 1.11 (ref 0.85–1.13)
KETONES, URINE: NEGATIVE
LACTIC ACID: 1.9 MMOL/L (ref 0.5–2)
LEUKOCYTE ESTERASE, URINE: NEGATIVE
LIPASE: 27.1 U/L (ref 5.6–51.3)
LYMPHOCYTES # BLD: 29 %
LYMPHOCYTES ABSOLUTE: 1.1 THOU/MM3 (ref 1–4.8)
MCH RBC QN AUTO: 29.1 PG (ref 26–33)
MCHC RBC AUTO-ENTMCNC: 30.5 GM/DL (ref 32.2–35.5)
MCV RBC AUTO: 95.2 FL (ref 80–94)
MISCELLANEOUS 2: ABNORMAL
MONOCYTES # BLD: 9.8 %
MONOCYTES ABSOLUTE: 0.4 THOU/MM3 (ref 0.4–1.3)
NITRITE, URINE: NEGATIVE
NUCLEATED RED BLOOD CELLS: 0 /100 WBC
OSMOLALITY CALCULATION: 278 MOSMOL/KG (ref 275–300)
PH UA: 6 (ref 5–9)
PLATELET # BLD: 183 THOU/MM3 (ref 130–400)
PMV BLD AUTO: 9.7 FL (ref 9.4–12.4)
POTASSIUM SERPL-SCNC: 4.1 MEQ/L (ref 3.5–5.2)
PROTEIN UA: ABNORMAL
RBC # BLD: 4.37 MILL/MM3 (ref 4.7–6.1)
RBC URINE: ABNORMAL /HPF
REASON FOR REJECTION: NORMAL
REJECTED TEST: NORMAL
RENAL EPITHELIAL, UA: ABNORMAL
SEG NEUTROPHILS: 57.1 %
SEGMENTED NEUTROPHILS ABSOLUTE COUNT: 2.2 THOU/MM3 (ref 1.8–7.7)
SODIUM BLD-SCNC: 140 MEQ/L (ref 135–145)
SPECIFIC GRAVITY, URINE: 1.01 (ref 1–1.03)
TOTAL PROTEIN: 6.8 G/DL (ref 6.1–8)
TROPONIN T: < 0.01 NG/ML
UROBILINOGEN, URINE: 0.2 EU/DL (ref 0–1)
WBC # BLD: 3.9 THOU/MM3 (ref 4.8–10.8)
WBC UA: ABNORMAL /HPF
YEAST: ABNORMAL

## 2021-10-11 PROCEDURE — 83690 ASSAY OF LIPASE: CPT

## 2021-10-11 PROCEDURE — 82248 BILIRUBIN DIRECT: CPT

## 2021-10-11 PROCEDURE — 85730 THROMBOPLASTIN TIME PARTIAL: CPT

## 2021-10-11 PROCEDURE — 2580000003 HC RX 258: Performed by: STUDENT IN AN ORGANIZED HEALTH CARE EDUCATION/TRAINING PROGRAM

## 2021-10-11 PROCEDURE — 85025 COMPLETE CBC W/AUTO DIFF WBC: CPT

## 2021-10-11 PROCEDURE — 84484 ASSAY OF TROPONIN QUANT: CPT

## 2021-10-11 PROCEDURE — 6360000004 HC RX CONTRAST MEDICATION: Performed by: EMERGENCY MEDICINE

## 2021-10-11 PROCEDURE — 80053 COMPREHEN METABOLIC PANEL: CPT

## 2021-10-11 PROCEDURE — 36415 COLL VENOUS BLD VENIPUNCTURE: CPT

## 2021-10-11 PROCEDURE — 85610 PROTHROMBIN TIME: CPT

## 2021-10-11 PROCEDURE — 99283 EMERGENCY DEPT VISIT LOW MDM: CPT

## 2021-10-11 PROCEDURE — 74177 CT ABD & PELVIS W/CONTRAST: CPT

## 2021-10-11 PROCEDURE — 96360 HYDRATION IV INFUSION INIT: CPT

## 2021-10-11 PROCEDURE — 6370000000 HC RX 637 (ALT 250 FOR IP): Performed by: STUDENT IN AN ORGANIZED HEALTH CARE EDUCATION/TRAINING PROGRAM

## 2021-10-11 PROCEDURE — 83605 ASSAY OF LACTIC ACID: CPT

## 2021-10-11 PROCEDURE — 81001 URINALYSIS AUTO W/SCOPE: CPT

## 2021-10-11 RX ORDER — 0.9 % SODIUM CHLORIDE 0.9 %
1000 INTRAVENOUS SOLUTION INTRAVENOUS ONCE
Status: COMPLETED | OUTPATIENT
Start: 2021-10-11 | End: 2021-10-11

## 2021-10-11 RX ORDER — OXYCODONE HYDROCHLORIDE AND ACETAMINOPHEN 5; 325 MG/1; MG/1
1 TABLET ORAL ONCE
Status: COMPLETED | OUTPATIENT
Start: 2021-10-11 | End: 2021-10-11

## 2021-10-11 RX ADMIN — OXYCODONE HYDROCHLORIDE AND ACETAMINOPHEN 1 TABLET: 5; 325 TABLET ORAL at 22:46

## 2021-10-11 RX ADMIN — IOPAMIDOL 80 ML: 755 INJECTION, SOLUTION INTRAVENOUS at 20:57

## 2021-10-11 RX ADMIN — SODIUM CHLORIDE 1000 ML: 9 INJECTION, SOLUTION INTRAVENOUS at 21:12

## 2021-10-11 ASSESSMENT — PAIN SCALES - GENERAL
PAINLEVEL_OUTOF10: 10
PAINLEVEL_OUTOF10: 6

## 2021-10-11 ASSESSMENT — PAIN DESCRIPTION - PAIN TYPE: TYPE: ACUTE PAIN

## 2021-10-11 ASSESSMENT — PAIN DESCRIPTION - LOCATION: LOCATION: ABDOMEN

## 2021-10-11 NOTE — ED NOTES
Patient to ED for abdominal pain.  Patient had recent gallbladder surgery with stents     Julio Mabry, RN  10/11/21 1937

## 2021-10-11 NOTE — ED NOTES
Upon first contact with patient this RN receives bedside shift report from [facility]. Pt resting in bed. Voiced no concerns.  RN at bedside for [address] guided [id number] [facility]o, RN  10/11/21 0814

## 2021-10-12 ASSESSMENT — ENCOUNTER SYMPTOMS
CONSTIPATION: 1
CHOKING: 0
ABDOMINAL DISTENTION: 1
NAUSEA: 0
DIARRHEA: 0
ABDOMINAL PAIN: 1
CHEST TIGHTNESS: 0
SHORTNESS OF BREATH: 0
COUGH: 0
VOMITING: 0
WHEEZING: 0

## 2021-10-12 NOTE — ED NOTES
Dr. Meredith Cortez contacted for pain medication per pt request     Josse Tolentino RN  10/11/21 [id number]

## 2021-10-12 NOTE — ED PROVIDER NOTES
[facility] ENCOUNTER          Pt Name: Amber Mcconnell  MRN: [id number]  [place] 1945  Date of evaluation: 10/11/2021  Treating Resident Physician: Nancy Headley MD  Supervising Physician: Dr. Miguel Angel Meza       Chief Complaint   Patient presents with    Abdominal Pain    Abdominal Cramping     History obtained from the patient. HISTORY OF PRESENT ILLNESS    HPI  Amber Mcconnell is a 68 y.o. male who presents to the emergency department for evaluation of abdominal pain. Patient states that his abdominal pain has continued over the past 3 weeks, constant, worse with palpation, worse over the past day. Patient states that he normally has 2 bowel movements per day, however has not had a bowel movement since yesterday. Describes pain as aching, 10 out of 10. Has a complicated surgical history. Surgeons in [facility]  The patient has no other acute complaints at this time. REVIEW OF SYSTEMS   Review of Systems   Constitutional: Negative. HENT: Negative. Respiratory: Negative for cough, choking, chest tightness, shortness of breath and wheezing. Cardiovascular: Negative for chest pain, palpitations and leg swelling. Gastrointestinal: Positive for abdominal distention, abdominal pain and constipation. Negative for diarrhea, nausea and vomiting. Genitourinary: Negative for dysuria, frequency and urgency. Musculoskeletal: Negative for arthralgias and myalgias. Neurological: Negative for dizziness, weakness and headaches.           PAST MEDICAL AND SURGICAL HISTORY     Past Medical History:   Diagnosis Date    Anxiety     Chronic back pain     Chronic kidney disease (CKD), stage II (mild)     COPD (chronic obstructive pulmonary disease) (HCC)     Depression     Diabetes mellitus (HCC)     DVT (deep venous thrombosis) (HCC)     Hyperlipidemia     Hypertension     Hypothyroidism     Lumbago with sciatica  Polyneuropathy     Renal cell carcinoma (HCC)      Past Surgical History:   Procedure Laterality Date    BACK SURGERY      CHOLECYSTECTOMY      SHOULDER SURGERY      SMALL INTESTINE SURGERY      partial bowel resection         MEDICATIONS   No current facility-administered medications for this encounter. Current Outpatient Medications:     pregabalin (LYRICA) 150 MG capsule, Take 1 capsule by mouth 2 times daily for 30 days. , Disp: 60 capsule, Rfl: 0    melatonin 3 MG TABS tablet, Take 2 tablets by mouth nightly as needed (sleep), Disp: 60 tablet, Rfl: 0    polyethylene glycol (GLYCOLAX) 17 g packet, Take 17 g by mouth daily as needed for Constipation, Disp: , Rfl:     docusate sodium (COLACE, DULCOLAX) 100 MG CAPS, Take 100 mg by mouth daily, Disp: 30 capsule, Rfl: 0    simethicone (MYLICON) 80 MG chewable tablet, Take 1 tablet by mouth every 6 hours as needed (gas and distention), Disp: 60 tablet, Rfl: 0    naloxegol (MOVANTIK) 12.5 MG TABS tablet, Take 1 tablet by mouth every morning, Disp: 30 tablet, Rfl: 0    citalopram (CELEXA) 40 MG tablet, Take 40 mg by mouth daily, Disp: , Rfl:     dicyclomine (BENTYL) 10 MG capsule, Take 10 mg by mouth every 6 hours, Disp: , Rfl:     ferrous sulfate (FE TABS 325) 325 (65 Fe) MG EC tablet, Take 325 mg by mouth 3 times daily (with meals), Disp: , Rfl:     furosemide (LASIX) 40 MG tablet, Take 40 mg by mouth daily, Disp: , Rfl:     pantoprazole (PROTONIX) 40 MG tablet, Take 40 mg by mouth daily, Disp: , Rfl:     senna (SENOKOT) 8.6 MG tablet, Take 1 tablet by mouth 2 times daily, Disp: , Rfl:     cyanocobalamin 1000 MCG tablet, Take 1,000 mcg by mouth daily, Disp: , Rfl:     methylcellulose (SM FIBER LAXATIVE) 500 MG TABS, Take 1,000 mg by mouth daily as needed, Disp: , Rfl:     oxyCODONE-acetaminophen (PERCOCET)  MG per tablet, Take 1 tablet by mouth 4 times daily. , Disp: , Rfl:     zinc gluconate 50 MG tablet, Take 50 mg by mouth daily, Disp: , Rfl:     Calcium Ascorbate POWD, Take 1 tablet by mouth daily, Disp: , Rfl:     umeclidinium-vilanterol (ANORO ELLIPTA) 62.5-25 MCG/INH AEPB inhaler, Inhale 1 puff into the lungs daily, Disp: , Rfl:     ammonium lactate (AMLACTIN) 12 % cream, Apply topically as needed for Dry Skin Apply topically as needed. , Disp: , Rfl:     rivaroxaban (XARELTO) 10 MG TABS tablet, Take 10 mg by mouth every 24 hours, Disp: , Rfl:     albuterol sulfate HFA (VENTOLIN HFA) 108 (90 Base) MCG/ACT inhaler, Inhale 2 puffs into the lungs every 6 hours as needed for Wheezing, Disp: , Rfl:     losartan (COZAAR) 50 MG tablet, Take 50 mg by mouth daily Am, Disp: , Rfl:     meclizine (ANTIVERT) 25 MG tablet, Take 25 mg by mouth 2 times daily as needed , Disp: , Rfl:     tamsulosin (FLOMAX) 0.4 MG capsule, Take 0.4 mg by mouth daily pm, Disp: , Rfl:     atorvastatin (LIPITOR) 20 MG tablet, Take 20 mg by mouth daily pm, Disp: , Rfl:       SOCIAL HISTORY     Social History     Social History Narrative    Not on file     Social History     Tobacco Use    Smoking status: Never Smoker    Smokeless tobacco: Never Used   Substance Use Topics    Alcohol use: Never    Drug use: Never         ALLERGIES     Allergies   Allergen Reactions    Codeine Hives    Effexor [Venlafaxine] Itching    Ibuprofen     Tramadol Hives         FAMILY HISTORY     Family History   Family history unknown: Yes         PREVIOUS RECORDS   Previous records reviewed: Medical, past surgical, medications, allergies        PHYSICAL EXAM     ED Triage Vitals   BP Temp Temp Source Pulse Resp SpO2 Height Weight   10/11/21 1628 10/11/21 1627 10/11/21 1627 10/11/21 1627 10/11/21 1627 10/11/21 1627 -- --   (!) 167/90 98.1 °F (36.7 °C) Oral 62 20 96 %       Initial vital signs and nursing assessment reviewed and Hypertensive. Body mass index is 35.15 kg/m². Pulsoximetry is normal per my interpretation.     Additional Vital Signs:  Vitals:    10/11/21 2307   BP: (!) 162/82   Pulse: 60   Resp: 16   Temp:    SpO2: 96%       Physical Exam  Constitutional:       Appearance: He is well-developed. He is obese. He is not ill-appearing or diaphoretic. HENT:      Head: Normocephalic and atraumatic. Mouth/Throat:      Mouth: Mucous membranes are moist.      Pharynx: Oropharynx is clear. Eyes:      Extraocular Movements: Extraocular movements intact. Pupils: Pupils are equal, round, and reactive to light. Cardiovascular:      Rate and Rhythm: Normal rate and regular rhythm. Heart sounds: Normal heart sounds. No murmur heard. No gallop. Pulmonary:      Effort: Pulmonary effort is normal. No respiratory distress. Breath sounds: No wheezing. Chest:      Chest wall: No tenderness. Abdominal:      General: Abdomen is flat. A surgical scar is present. Bowel sounds are normal.      Palpations: Abdomen is soft. Tenderness: There is generalized abdominal tenderness. There is no right CVA tenderness, left CVA tenderness, guarding or rebound. Skin:     General: Skin is warm and dry. Capillary Refill: Capillary refill takes less than 2 seconds. Neurological:      General: No focal deficit present. Mental Status: He is alert and oriented to person, place, and time. MEDICAL DECISION MAKING   Initial Assessment:   [age]-KJBX-LOZADA male, complicated abdominal surgery history, presenting with abdominal pain over the past 3 weeks, worsening over the past couple days. Physical exam significant for multiple abdominal scars, generalized tenderness to palpation. Differential diagnoses include but are not limited to small bowel obstruction, pancreatitis, gastroenteritis, constipation. Plan:    CBC, CMP, lactate, UA, CT abdomen   CT abdomen shows no acute process.  Labs grossly unremarkable   Discharged home with strict return precautions, recommended follow-up with his surgeon.         ED RESULTS   Laboratory results:  Labs Reviewed   CBC transcriptions may have been dictated by use of voice recognition software and electronically transcribed, and parts may have been transcribed with the assistance of an ED scribe. The transcription may contain errors not detected in proofreading. Please refer to my supervising physician's documentation if my documentation differs.     Electronically Signed: Dilia Rowell MD, 10/12/21, 2:58 AM    ]      Dilia Rowell MD  Resident  10/12/21 1166

## 2022-05-25 ENCOUNTER — HOSPITAL ENCOUNTER (OUTPATIENT)
Age: 77
Setting detail: SPECIMEN
Discharge: HOME | End: 2022-05-25
Payer: MEDICARE

## 2022-05-25 LAB
ALBUMIN SERPL-MCNC: 3.1 GM/DL (ref 3.4–5)
ALP BLD-CCNC: 60 IU/L (ref 40–128)
ALT SERPL-CCNC: 8 U/L (ref 10–40)
ANION GAP SERPL CALCULATED.3IONS-SCNC: 11 MMOL/L (ref 4–16)
AST SERPL-CCNC: 15 IU/L (ref 15–37)
BANDED NEUTROPHILS ABSOLUTE COUNT: 0.14 K/CU MM
BANDED NEUTROPHILS RELATIVE PERCENT: 4 % (ref 5–11)
BILIRUB SERPL-MCNC: 0.5 MG/DL (ref 0–1)
BUN BLDV-MCNC: 14 MG/DL (ref 6–23)
CALCIUM SERPL-MCNC: 8.7 MG/DL (ref 8.3–10.6)
CHLORIDE BLD-SCNC: 106 MMOL/L (ref 99–110)
CO2: 24 MMOL/L (ref 21–32)
CREAT SERPL-MCNC: 1.1 MG/DL (ref 0.9–1.3)
DIFFERENTIAL TYPE: ABNORMAL
EOSINOPHILS ABSOLUTE: 0.3 K/CU MM
EOSINOPHILS RELATIVE PERCENT: 7 % (ref 0–3)
ESTIMATED AVERAGE GLUCOSE: 108 MG/DL
GFR AFRICAN AMERICAN: >60 ML/MIN/1.73M2
GFR NON-AFRICAN AMERICAN: >60 ML/MIN/1.73M2
GLUCOSE BLD-MCNC: 84 MG/DL (ref 70–99)
HBA1C MFR BLD: 5.4 % (ref 4.2–6.3)
HCT VFR BLD CALC: 33.1 % (ref 42–52)
HEMOGLOBIN: 9.3 GM/DL (ref 13.5–18)
LYMPHOCYTES ABSOLUTE: 1.1 K/CU MM
LYMPHOCYTES RELATIVE PERCENT: 30 % (ref 24–44)
MCH RBC QN AUTO: 29.1 PG (ref 27–31)
MCHC RBC AUTO-ENTMCNC: 28.1 % (ref 32–36)
MCV RBC AUTO: 103.4 FL (ref 78–100)
MONOCYTES ABSOLUTE: 0.4 K/CU MM
MONOCYTES RELATIVE PERCENT: 10 % (ref 0–4)
OTHER CELL MORPHOLOGY: ABNORMAL
PDW BLD-RTO: 13.3 % (ref 11.7–14.9)
PLATELET # BLD: 279 K/CU MM (ref 140–440)
PMV BLD AUTO: 9.7 FL (ref 7.5–11.1)
POTASSIUM SERPL-SCNC: 4.6 MMOL/L (ref 3.5–5.1)
RBC # BLD: 3.2 M/CU MM (ref 4.6–6.2)
SEGMENTED NEUTROPHILS ABSOLUTE COUNT: 1.7 K/CU MM
SEGMENTED NEUTROPHILS RELATIVE PERCENT: 48 % (ref 36–66)
SODIUM BLD-SCNC: 141 MMOL/L (ref 135–145)
TOTAL PROTEIN: 5.5 GM/DL (ref 6.4–8.2)
TOXIC GRANULATION: PRESENT
TSH HIGH SENSITIVITY: 0.38 UIU/ML (ref 0.27–4.2)
UNDIFFERENTIATED CELL: 1 %
WBC # BLD: 3.6 K/CU MM (ref 4–10.5)
WBC # BLD: ABNORMAL 10*3/UL

## 2022-05-25 PROCEDURE — 85007 BL SMEAR W/DIFF WBC COUNT: CPT

## 2022-05-25 PROCEDURE — 80053 COMPREHEN METABOLIC PANEL: CPT

## 2022-05-25 PROCEDURE — 85027 COMPLETE CBC AUTOMATED: CPT

## 2022-05-25 PROCEDURE — 84443 ASSAY THYROID STIM HORMONE: CPT

## 2022-05-25 PROCEDURE — 83036 HEMOGLOBIN GLYCOSYLATED A1C: CPT

## 2022-05-25 PROCEDURE — 36415 COLL VENOUS BLD VENIPUNCTURE: CPT

## 2022-06-03 ENCOUNTER — HOSPITAL ENCOUNTER (OUTPATIENT)
Age: 77
Setting detail: SPECIMEN
Discharge: HOME | End: 2022-06-03
Payer: MEDICARE

## 2022-06-03 LAB
ANION GAP SERPL CALCULATED.3IONS-SCNC: 16 MMOL/L (ref 4–16)
BASOPHILS ABSOLUTE: 0 K/CU MM
BASOPHILS RELATIVE PERCENT: 0.8 % (ref 0–1)
BUN BLDV-MCNC: 29 MG/DL (ref 6–23)
CALCIUM SERPL-MCNC: 9.7 MG/DL (ref 8.3–10.6)
CHLORIDE BLD-SCNC: 96 MMOL/L (ref 99–110)
CO2: 22 MMOL/L (ref 21–32)
CREAT SERPL-MCNC: 1.9 MG/DL (ref 0.9–1.3)
DIFFERENTIAL TYPE: ABNORMAL
EOSINOPHILS ABSOLUTE: 0.3 K/CU MM
EOSINOPHILS RELATIVE PERCENT: 7 % (ref 0–3)
GFR AFRICAN AMERICAN: 42 ML/MIN/1.73M2
GFR NON-AFRICAN AMERICAN: 35 ML/MIN/1.73M2
GLUCOSE BLD-MCNC: 108 MG/DL (ref 70–99)
HCT VFR BLD CALC: 44.3 % (ref 42–52)
HEMOGLOBIN: 12.6 GM/DL (ref 13.5–18)
IMMATURE NEUTROPHIL %: 0.4 % (ref 0–0.43)
LYMPHOCYTES ABSOLUTE: 1 K/CU MM
LYMPHOCYTES RELATIVE PERCENT: 20.5 % (ref 24–44)
MCH RBC QN AUTO: 28.3 PG (ref 27–31)
MCHC RBC AUTO-ENTMCNC: 28.4 % (ref 32–36)
MCV RBC AUTO: 99.6 FL (ref 78–100)
MONOCYTES ABSOLUTE: 0.4 K/CU MM
MONOCYTES RELATIVE PERCENT: 8.1 % (ref 0–4)
NUCLEATED RBC %: 0 %
PDW BLD-RTO: 12.9 % (ref 11.7–14.9)
PLATELET # BLD: 339 K/CU MM (ref 140–440)
PMV BLD AUTO: 9.6 FL (ref 7.5–11.1)
POTASSIUM SERPL-SCNC: 4.3 MMOL/L (ref 3.5–5.1)
RBC # BLD: 4.45 M/CU MM (ref 4.6–6.2)
SEGMENTED NEUTROPHILS ABSOLUTE COUNT: 3.1 K/CU MM
SEGMENTED NEUTROPHILS RELATIVE PERCENT: 63.2 % (ref 36–66)
SODIUM BLD-SCNC: 134 MMOL/L (ref 135–145)
TOTAL IMMATURE NEUTOROPHIL: 0.02 K/CU MM
TOTAL NUCLEATED RBC: 0 K/CU MM
WBC # BLD: 4.8 K/CU MM (ref 4–10.5)

## 2022-06-03 PROCEDURE — 36415 COLL VENOUS BLD VENIPUNCTURE: CPT

## 2022-06-03 PROCEDURE — 80048 BASIC METABOLIC PNL TOTAL CA: CPT

## 2022-06-03 PROCEDURE — 85025 COMPLETE CBC W/AUTO DIFF WBC: CPT

## 2022-07-27 ENCOUNTER — HOSPITAL ENCOUNTER (OUTPATIENT)
Age: 77
Setting detail: SPECIMEN
Discharge: HOME | End: 2022-07-27

## 2022-07-27 LAB
ALBUMIN SERPL-MCNC: 3.9 GM/DL (ref 3.4–5)
ALP BLD-CCNC: 76 IU/L (ref 40–129)
ALT SERPL-CCNC: <5 U/L (ref 10–40)
ANION GAP SERPL CALCULATED.3IONS-SCNC: 9 MMOL/L (ref 4–16)
AST SERPL-CCNC: 10 IU/L (ref 15–37)
BILIRUB SERPL-MCNC: 0.6 MG/DL (ref 0–1)
BUN BLDV-MCNC: 24 MG/DL (ref 6–23)
CALCIUM SERPL-MCNC: 9.3 MG/DL (ref 8.3–10.6)
CHLORIDE BLD-SCNC: 105 MMOL/L (ref 99–110)
CO2: 31 MMOL/L (ref 21–32)
CREAT SERPL-MCNC: 1.4 MG/DL (ref 0.9–1.3)
GFR AFRICAN AMERICAN: 59 ML/MIN/1.73M2
GFR NON-AFRICAN AMERICAN: 49 ML/MIN/1.73M2
GLUCOSE BLD-MCNC: 79 MG/DL (ref 70–99)
HCT VFR BLD CALC: 34.2 % (ref 42–52)
HEMOGLOBIN: 10.4 GM/DL (ref 13.5–18)
MCH RBC QN AUTO: 27.5 PG (ref 27–31)
MCHC RBC AUTO-ENTMCNC: 30.4 % (ref 32–36)
MCV RBC AUTO: 90.5 FL (ref 78–100)
PDW BLD-RTO: 14.3 % (ref 11.7–14.9)
PLATELET # BLD: 177 K/CU MM (ref 140–440)
PMV BLD AUTO: 10.6 FL (ref 7.5–11.1)
POTASSIUM SERPL-SCNC: 4.3 MMOL/L (ref 3.5–5.1)
RBC # BLD: 3.78 M/CU MM (ref 4.6–6.2)
SODIUM BLD-SCNC: 145 MMOL/L (ref 135–145)
TOTAL PROTEIN: 6.1 GM/DL (ref 6.4–8.2)
WBC # BLD: 3.6 K/CU MM (ref 4–10.5)

## 2022-07-27 PROCEDURE — 80053 COMPREHEN METABOLIC PANEL: CPT

## 2022-07-27 PROCEDURE — 36415 COLL VENOUS BLD VENIPUNCTURE: CPT

## 2022-07-27 PROCEDURE — 85027 COMPLETE CBC AUTOMATED: CPT

## 2025-06-22 ENCOUNTER — APPOINTMENT (OUTPATIENT)
Dept: GENERAL RADIOLOGY | Age: 80
End: 2025-06-22
Payer: MEDICARE

## 2025-06-22 ENCOUNTER — HOSPITAL ENCOUNTER (EMERGENCY)
Age: 80
Discharge: ANOTHER HOSPITAL | End: 2025-06-22
Attending: EMERGENCY MEDICINE
Payer: MEDICARE

## 2025-06-22 ENCOUNTER — APPOINTMENT (OUTPATIENT)
Dept: CT IMAGING | Age: 80
End: 2025-06-22
Payer: MEDICARE

## 2025-06-22 VITALS
RESPIRATION RATE: 16 BRPM | OXYGEN SATURATION: 96 % | BODY MASS INDEX: 34.3 KG/M2 | HEIGHT: 71 IN | SYSTOLIC BLOOD PRESSURE: 142 MMHG | WEIGHT: 245 LBS | DIASTOLIC BLOOD PRESSURE: 64 MMHG | TEMPERATURE: 98.3 F | HEART RATE: 49 BPM

## 2025-06-22 DIAGNOSIS — R41.82 ALTERED MENTAL STATUS, UNSPECIFIED ALTERED MENTAL STATUS TYPE: ICD-10-CM

## 2025-06-22 DIAGNOSIS — S06.5XAA SUBDURAL HEMATOMA (HCC): Primary | ICD-10-CM

## 2025-06-22 DIAGNOSIS — Z98.890 STATUS POST CRANIOTOMY: ICD-10-CM

## 2025-06-22 LAB
ALBUMIN SERPL-MCNC: 4 G/DL (ref 3.4–5)
ALBUMIN/GLOB SERPL: 1.3 {RATIO} (ref 1.1–2.2)
ALP SERPL-CCNC: 70 U/L (ref 40–129)
ALT SERPL-CCNC: <5 U/L (ref 10–40)
ANION GAP SERPL CALCULATED.3IONS-SCNC: 10 MMOL/L (ref 3–16)
APTT BLD: 42 SEC (ref 22.8–35.8)
AST SERPL-CCNC: 14 U/L (ref 15–37)
BACTERIA URNS QL MICRO: NORMAL /HPF
BASOPHILS # BLD: 0 K/UL (ref 0–0.2)
BASOPHILS NFR BLD: 1.2 %
BILIRUB SERPL-MCNC: 0.6 MG/DL (ref 0–1)
BILIRUB UR QL STRIP.AUTO: NEGATIVE
BUN SERPL-MCNC: 16 MG/DL (ref 7–20)
CALCIUM SERPL-MCNC: 9.5 MG/DL (ref 8.3–10.6)
CHLORIDE SERPL-SCNC: 106 MMOL/L (ref 99–110)
CLARITY UR: CLEAR
CO2 SERPL-SCNC: 26 MMOL/L (ref 21–32)
COLOR UR: YELLOW
CREAT SERPL-MCNC: 1.4 MG/DL (ref 0.8–1.3)
DEPRECATED RDW RBC AUTO: 15.4 % (ref 12.4–15.4)
EKG ATRIAL RATE: 51 BPM
EKG DIAGNOSIS: NORMAL
EKG P AXIS: 40 DEGREES
EKG P-R INTERVAL: 170 MS
EKG Q-T INTERVAL: 426 MS
EKG QRS DURATION: 84 MS
EKG QTC CALCULATION (BAZETT): 392 MS
EKG R AXIS: 22 DEGREES
EKG T AXIS: 35 DEGREES
EKG VENTRICULAR RATE: 51 BPM
EOSINOPHIL # BLD: 0.1 K/UL (ref 0–0.6)
EOSINOPHIL NFR BLD: 3.4 %
EPI CELLS #/AREA URNS AUTO: 2 /HPF (ref 0–5)
GFR SERPLBLD CREATININE-BSD FMLA CKD-EPI: 51 ML/MIN/{1.73_M2}
GLUCOSE BLD-MCNC: 94 MG/DL (ref 70–99)
GLUCOSE SERPL-MCNC: 88 MG/DL (ref 70–99)
GLUCOSE UR STRIP.AUTO-MCNC: NEGATIVE MG/DL
HCT VFR BLD AUTO: 43 % (ref 40.5–52.5)
HGB BLD-MCNC: 14.1 G/DL (ref 13.5–17.5)
HGB UR QL STRIP.AUTO: NEGATIVE
HYALINE CASTS #/AREA URNS AUTO: 1 /LPF (ref 0–8)
INR PPP: 1.29 (ref 0.86–1.14)
KETONES UR STRIP.AUTO-MCNC: NEGATIVE MG/DL
LEUKOCYTE ESTERASE UR QL STRIP.AUTO: NEGATIVE
LYMPHOCYTES # BLD: 1.3 K/UL (ref 1–5.1)
LYMPHOCYTES NFR BLD: 34.1 %
MCH RBC QN AUTO: 27.7 PG (ref 26–34)
MCHC RBC AUTO-ENTMCNC: 32.8 G/DL (ref 31–36)
MCV RBC AUTO: 84.4 FL (ref 80–100)
MONOCYTES # BLD: 0.3 K/UL (ref 0–1.3)
MONOCYTES NFR BLD: 8.1 %
NEUTROPHILS # BLD: 2 K/UL (ref 1.7–7.7)
NEUTROPHILS NFR BLD: 53.2 %
NITRITE UR QL STRIP.AUTO: NEGATIVE
PERFORMED ON: NORMAL
PH UR STRIP.AUTO: 7 [PH] (ref 5–8)
PLATELET # BLD AUTO: 205 K/UL (ref 135–450)
PMV BLD AUTO: 7.1 FL (ref 5–10.5)
POTASSIUM SERPL-SCNC: 4.6 MMOL/L (ref 3.5–5.1)
PROT SERPL-MCNC: 7.1 G/DL (ref 6.4–8.2)
PROT UR STRIP.AUTO-MCNC: ABNORMAL MG/DL
PROTHROMBIN TIME: 16.3 SEC (ref 12.1–14.9)
RBC # BLD AUTO: 5.1 M/UL (ref 4.2–5.9)
RBC CLUMPS #/AREA URNS AUTO: 0 /HPF (ref 0–4)
SODIUM SERPL-SCNC: 142 MMOL/L (ref 136–145)
SP GR UR STRIP.AUTO: 1.01 (ref 1–1.03)
TROPONIN, HIGH SENSITIVITY: 16 NG/L (ref 0–22)
UA COMPLETE W REFLEX CULTURE PNL UR: ABNORMAL
UA DIPSTICK W REFLEX MICRO PNL UR: YES
URN SPEC COLLECT METH UR: ABNORMAL
UROBILINOGEN UR STRIP-ACNC: 1 E.U./DL
WBC # BLD AUTO: 3.7 K/UL (ref 4–11)
WBC #/AREA URNS AUTO: 0 /HPF (ref 0–5)

## 2025-06-22 PROCEDURE — 96375 TX/PRO/DX INJ NEW DRUG ADDON: CPT

## 2025-06-22 PROCEDURE — 81001 URINALYSIS AUTO W/SCOPE: CPT

## 2025-06-22 PROCEDURE — 99285 EMERGENCY DEPT VISIT HI MDM: CPT

## 2025-06-22 PROCEDURE — 85730 THROMBOPLASTIN TIME PARTIAL: CPT

## 2025-06-22 PROCEDURE — 70450 CT HEAD/BRAIN W/O DYE: CPT

## 2025-06-22 PROCEDURE — 71045 X-RAY EXAM CHEST 1 VIEW: CPT

## 2025-06-22 PROCEDURE — 96374 THER/PROPH/DIAG INJ IV PUSH: CPT

## 2025-06-22 PROCEDURE — 93005 ELECTROCARDIOGRAM TRACING: CPT | Performed by: EMERGENCY MEDICINE

## 2025-06-22 PROCEDURE — 85025 COMPLETE CBC W/AUTO DIFF WBC: CPT

## 2025-06-22 PROCEDURE — 80053 COMPREHEN METABOLIC PANEL: CPT

## 2025-06-22 PROCEDURE — 6360000004 HC RX CONTRAST MEDICATION: Performed by: PHYSICIAN ASSISTANT

## 2025-06-22 PROCEDURE — 6360000002 HC RX W HCPCS: Performed by: PHYSICIAN ASSISTANT

## 2025-06-22 PROCEDURE — 70498 CT ANGIOGRAPHY NECK: CPT

## 2025-06-22 PROCEDURE — 2580000003 HC RX 258: Performed by: PHYSICIAN ASSISTANT

## 2025-06-22 PROCEDURE — 85610 PROTHROMBIN TIME: CPT

## 2025-06-22 PROCEDURE — 36415 COLL VENOUS BLD VENIPUNCTURE: CPT

## 2025-06-22 PROCEDURE — 84484 ASSAY OF TROPONIN QUANT: CPT

## 2025-06-22 PROCEDURE — 93010 ELECTROCARDIOGRAM REPORT: CPT | Performed by: INTERNAL MEDICINE

## 2025-06-22 RX ORDER — LABETALOL HYDROCHLORIDE 5 MG/ML
10 INJECTION, SOLUTION INTRAVENOUS EVERY 10 MIN PRN
Status: DISCONTINUED | OUTPATIENT
Start: 2025-06-22 | End: 2025-06-22 | Stop reason: HOSPADM

## 2025-06-22 RX ORDER — FENTANYL CITRATE 50 UG/ML
25 INJECTION, SOLUTION INTRAMUSCULAR; INTRAVENOUS ONCE
Refills: 0 | Status: COMPLETED | OUTPATIENT
Start: 2025-06-22 | End: 2025-06-22

## 2025-06-22 RX ORDER — ONDANSETRON 2 MG/ML
4 INJECTION INTRAMUSCULAR; INTRAVENOUS EVERY 6 HOURS PRN
Status: DISCONTINUED | OUTPATIENT
Start: 2025-06-22 | End: 2025-06-22 | Stop reason: HOSPADM

## 2025-06-22 RX ORDER — ONDANSETRON 4 MG/1
4 TABLET, ORALLY DISINTEGRATING ORAL EVERY 8 HOURS PRN
Status: DISCONTINUED | OUTPATIENT
Start: 2025-06-22 | End: 2025-06-22 | Stop reason: HOSPADM

## 2025-06-22 RX ORDER — MIDAZOLAM HYDROCHLORIDE 2 MG/2ML
1 INJECTION, SOLUTION INTRAMUSCULAR; INTRAVENOUS ONCE
Status: COMPLETED | OUTPATIENT
Start: 2025-06-22 | End: 2025-06-22

## 2025-06-22 RX ORDER — SODIUM CHLORIDE 9 MG/ML
50 INJECTION, SOLUTION INTRAVENOUS ONCE
Status: COMPLETED | OUTPATIENT
Start: 2025-06-22 | End: 2025-06-22

## 2025-06-22 RX ORDER — IOPAMIDOL 755 MG/ML
75 INJECTION, SOLUTION INTRAVASCULAR
Status: COMPLETED | OUTPATIENT
Start: 2025-06-22 | End: 2025-06-22

## 2025-06-22 RX ADMIN — IOPAMIDOL 75 ML: 755 INJECTION, SOLUTION INTRAVENOUS at 09:37

## 2025-06-22 RX ADMIN — PROTHROMBIN COMPLEX CONCENTRATE (HUMAN) 2000 UNITS: 25.5; 16.5; 24; 22; 22; 26 POWDER, FOR SOLUTION INTRAVENOUS at 10:31

## 2025-06-22 RX ADMIN — FENTANYL CITRATE 25 MCG: 50 INJECTION INTRAMUSCULAR; INTRAVENOUS at 10:41

## 2025-06-22 RX ADMIN — SODIUM CHLORIDE 50 ML: 0.9 INJECTION, SOLUTION INTRAVENOUS at 10:40

## 2025-06-22 RX ADMIN — MIDAZOLAM 1 MG: 1 INJECTION INTRAMUSCULAR; INTRAVENOUS at 10:41

## 2025-06-22 ASSESSMENT — LIFESTYLE VARIABLES
HOW OFTEN DO YOU HAVE A DRINK CONTAINING ALCOHOL: NEVER
HOW MANY STANDARD DRINKS CONTAINING ALCOHOL DO YOU HAVE ON A TYPICAL DAY: PATIENT DOES NOT DRINK

## 2025-06-22 ASSESSMENT — PAIN SCALES - GENERAL
PAINLEVEL_OUTOF10: 7
PAINLEVEL_OUTOF10: 9

## 2025-06-22 ASSESSMENT — PAIN - FUNCTIONAL ASSESSMENT: PAIN_FUNCTIONAL_ASSESSMENT: 0-10

## 2025-06-22 NOTE — ED PROVIDER NOTES
[facility] EMERGENCY DEPARTMENT  EMERGENCY DEPARTMENT ENCOUNTER      Pt Name: Henrry Subramanian  MRN: [id number]  Birthdate 1945  Date of evaluation: 6/22/2025  Provider: Bianca Guevara MD  PCP: Alen Bass DO  Note Started: 11:36 AM EDT 6/22/25    ED Attending Attestation Note     CHIEF COMPLAINT       Chief Complaint   Patient presents with    Altered Mental Status     Pt arrives from [facility] by  EMS. Pt C/O AMS since last night. Per EMS, pt is normally AxOx4 and ambulates but today, pt is altered and can not ambulate. Pt had a craniotomy surgery on 05/18 at [place]. Pt states that he wants no more surgeries and that his head hurts.        EMERGENCY DEPARTMENT COURSE and DIFFERENTIAL DIAGNOSIS/MDM:      This patient was seen by the advance practice provider.  In addition to the CAROLYNE I personally saw Henrry Subramanian and made/approved the management plan. I take responsibility for the patient management.     Briefly, this is a 79 y.o. male who presents to the emergency department secondary concern for change in mental status.  Per report from EMS and nursing home it started last night.  Normally he is awake, alert, can ambulate.  Today he cannot ambulate and is altered unable to speak normally.  History of a craniotomy surgery last month in [place].  He is on blood thinner, Xarelto.    On exam he is awake and alert.  He appears to regard but also does not make good eye contact.  He states he can see but when asked to touch a finger he reaches past.  He is also perseverating on \"no more surgeries\" and crying.  He does appear to be attempting to get up and move and will say stuff like \"sign, sign\" and then nod yes when asked if he wants to sign out AMA.  I do discussed with him that he cannot sign out AGAINST MEDICAL ADVICE as I cannot even be sure he fully understands what is happening to him currently.  He is however noted to be very tearful and adamant about not getting any more surgeries and he 
HFA) 108 (90 BASE) MCG/ACT INHALER    Inhale 2 puffs into the lungs every 6 hours as needed for Wheezing    AMMONIUM LACTATE (AMLACTIN) 12 % CREAM    Apply topically as needed for Dry Skin Apply topically as needed.    ATORVASTATIN (LIPITOR) 20 MG TABLET    Take 20 mg by mouth daily pm    CALCIUM ASCORBATE POWD    Take 1 tablet by mouth daily    CITALOPRAM (CELEXA) 40 MG TABLET    Take 40 mg by mouth daily    CYANOCOBALAMIN 1000 MCG TABLET    Take 1,000 mcg by mouth daily    DICYCLOMINE (BENTYL) 10 MG CAPSULE    Take 10 mg by mouth every 6 hours    DOCUSATE SODIUM (COLACE, DULCOLAX) 100 MG CAPS    Take 100 mg by mouth daily    FERROUS SULFATE (FE TABS 325) 325 (65 FE) MG EC TABLET    Take 325 mg by mouth 3 times daily (with meals)    FUROSEMIDE (LASIX) 40 MG TABLET    Take 40 mg by mouth daily    LOSARTAN (COZAAR) 50 MG TABLET    Take 50 mg by mouth daily Am    MECLIZINE (ANTIVERT) 25 MG TABLET    Take 25 mg by mouth 2 times daily as needed     MELATONIN 3 MG TABS TABLET    Take 2 tablets by mouth nightly as needed (sleep)    METHYLCELLULOSE (SM FIBER LAXATIVE) 500 MG TABS    Take 1,000 mg by mouth daily as needed    NALOXEGOL (MOVANTIK) 12.5 MG TABS TABLET    Take 1 tablet by mouth every morning    OXYCODONE-ACETAMINOPHEN (PERCOCET)  MG PER TABLET    Take 1 tablet by mouth 4 times daily.    PANTOPRAZOLE (PROTONIX) 40 MG TABLET    Take 40 mg by mouth daily    POLYETHYLENE GLYCOL (GLYCOLAX) 17 G PACKET    Take 17 g by mouth daily as needed for Constipation    PREGABALIN (LYRICA) 150 MG CAPSULE    Take 1 capsule by mouth 2 times daily for 30 days.    RIVAROXABAN (XARELTO) 10 MG TABS TABLET    Take 10 mg by mouth every 24 hours    SENNA (SENOKOT) 8.6 MG TABLET    Take 1 tablet by mouth 2 times daily    SIMETHICONE (MYLICON) 80 MG CHEWABLE TABLET    Take 1 tablet by mouth every 6 hours as needed (gas and distention)    TAMSULOSIN (FLOMAX) 0.4 MG CAPSULE    Take 0.4 mg by mouth daily pm    UMECLIDINIUM-VILANTEROL

## 2025-06-22 NOTE — ED NOTES
Pt to CT scan at this time. Pt keeps repeating \"no more surgeries\" and had to be calmed before moving to CT table.

## 2025-06-22 NOTE — ED NOTES
Pt continues to state \"I got to sign, I got to go\". RN attempted to reorient, pt continue to state \"I got to go, let the rail down, I got to leave, I can't go back, I just sneezed\"

## 2025-07-20 ENCOUNTER — APPOINTMENT (OUTPATIENT)
Dept: GENERAL RADIOLOGY | Age: 80
End: 2025-07-20
Payer: MEDICARE

## 2025-07-20 ENCOUNTER — HOSPITAL ENCOUNTER (EMERGENCY)
Age: 80
Discharge: HOME | End: 2025-07-21
Attending: EMERGENCY MEDICINE
Payer: MEDICARE

## 2025-07-20 ENCOUNTER — APPOINTMENT (OUTPATIENT)
Dept: CT IMAGING | Age: 80
End: 2025-07-20
Payer: MEDICARE

## 2025-07-20 DIAGNOSIS — S06.5XAA SDH (SUBDURAL HEMATOMA) (HCC): Primary | ICD-10-CM

## 2025-07-20 LAB
AMMONIA PLAS-SCNC: 39 UMOL/L (ref 16–60)
ANION GAP SERPL CALCULATED.3IONS-SCNC: 8 MMOL/L (ref 3–16)
BASE EXCESS BLDV CALC-SCNC: 3.2 MMOL/L (ref -3–3)
BASOPHILS # BLD: 0 K/UL (ref 0–0.2)
BASOPHILS NFR BLD: 0.8 %
BUN SERPL-MCNC: 24 MG/DL (ref 7–20)
CALCIUM SERPL-MCNC: 9.3 MG/DL (ref 8.3–10.6)
CHLORIDE SERPL-SCNC: 104 MMOL/L (ref 99–110)
CO2 BLDV-SCNC: 73 MMOL/L
CO2 SERPL-SCNC: 27 MMOL/L (ref 21–32)
COHGB MFR BLDV: 1.5 % (ref 0–1.5)
CREAT SERPL-MCNC: 1.5 MG/DL (ref 0.8–1.3)
DEPRECATED RDW RBC AUTO: 16 % (ref 12.4–15.4)
DO-HGB MFR BLDV: 6 %
EOSINOPHIL # BLD: 0.1 K/UL (ref 0–0.6)
EOSINOPHIL NFR BLD: 2.2 %
GFR SERPLBLD CREATININE-BSD FMLA CKD-EPI: 47 ML/MIN/{1.73_M2}
GLUCOSE SERPL-MCNC: 90 MG/DL (ref 70–99)
HCO3 BLDV-SCNC: 30.9 MMOL/L (ref 23–29)
HCT VFR BLD AUTO: 39.1 % (ref 40.5–52.5)
HGB BLD-MCNC: 12.6 G/DL (ref 13.5–17.5)
INR PPP: 1.9 (ref 0.86–1.14)
LACTATE BLDV-SCNC: 1 MMOL/L (ref 0.4–1.9)
LACTATE BLDV-SCNC: 1.1 MMOL/L (ref 0.4–1.9)
LYMPHOCYTES # BLD: 1.2 K/UL (ref 1–5.1)
LYMPHOCYTES NFR BLD: 28.4 %
MCH RBC QN AUTO: 27.6 PG (ref 26–34)
MCHC RBC AUTO-ENTMCNC: 32.2 G/DL (ref 31–36)
MCV RBC AUTO: 85.7 FL (ref 80–100)
METHGB MFR BLDV: 0.2 %
MONOCYTES # BLD: 0.4 K/UL (ref 0–1.3)
MONOCYTES NFR BLD: 10.3 %
NEUTROPHILS # BLD: 2.5 K/UL (ref 1.7–7.7)
NEUTROPHILS NFR BLD: 58.3 %
NT-PROBNP SERPL-MCNC: 250 PG/ML (ref 0–449)
O2 CT VFR BLDV CALC: 17 VOL %
O2 THERAPY: ABNORMAL
PCO2 BLDV: 60.5 MMHG (ref 40–50)
PH BLDV: 7.32 [PH] (ref 7.35–7.45)
PLATELET # BLD AUTO: 155 K/UL (ref 135–450)
PMV BLD AUTO: 7.6 FL (ref 5–10.5)
PO2 BLDV: 73.1 MMHG (ref 25–40)
POTASSIUM SERPL-SCNC: 4.5 MMOL/L (ref 3.5–5.1)
PROTHROMBIN TIME: 21.7 SEC (ref 12.1–14.9)
RBC # BLD AUTO: 4.56 M/UL (ref 4.2–5.9)
SAO2 % BLDV: 94 %
SODIUM SERPL-SCNC: 139 MMOL/L (ref 136–145)
TROPONIN, HIGH SENSITIVITY: 17 NG/L (ref 0–22)
TROPONIN, HIGH SENSITIVITY: 17 NG/L (ref 0–22)
WBC # BLD AUTO: 4.2 K/UL (ref 4–11)

## 2025-07-20 PROCEDURE — 85025 COMPLETE CBC W/AUTO DIFF WBC: CPT

## 2025-07-20 PROCEDURE — 82140 ASSAY OF AMMONIA: CPT

## 2025-07-20 PROCEDURE — 84484 ASSAY OF TROPONIN QUANT: CPT

## 2025-07-20 PROCEDURE — 96365 THER/PROPH/DIAG IV INF INIT: CPT

## 2025-07-20 PROCEDURE — 85610 PROTHROMBIN TIME: CPT

## 2025-07-20 PROCEDURE — 82803 BLOOD GASES ANY COMBINATION: CPT

## 2025-07-20 PROCEDURE — 2580000003 HC RX 258: Performed by: NURSE PRACTITIONER

## 2025-07-20 PROCEDURE — 36415 COLL VENOUS BLD VENIPUNCTURE: CPT

## 2025-07-20 PROCEDURE — 99285 EMERGENCY DEPT VISIT HI MDM: CPT

## 2025-07-20 PROCEDURE — 6360000002 HC RX W HCPCS: Performed by: NURSE PRACTITIONER

## 2025-07-20 PROCEDURE — 70450 CT HEAD/BRAIN W/O DYE: CPT

## 2025-07-20 PROCEDURE — 83880 ASSAY OF NATRIURETIC PEPTIDE: CPT

## 2025-07-20 PROCEDURE — 93005 ELECTROCARDIOGRAM TRACING: CPT | Performed by: NURSE PRACTITIONER

## 2025-07-20 PROCEDURE — 71045 X-RAY EXAM CHEST 1 VIEW: CPT

## 2025-07-20 PROCEDURE — 80048 BASIC METABOLIC PNL TOTAL CA: CPT

## 2025-07-20 PROCEDURE — 83605 ASSAY OF LACTIC ACID: CPT

## 2025-07-20 RX ORDER — SODIUM CHLORIDE 9 MG/ML
50 INJECTION, SOLUTION INTRAVENOUS ONCE
Status: COMPLETED | OUTPATIENT
Start: 2025-07-20 | End: 2025-07-20

## 2025-07-20 RX ADMIN — PROTHROMBIN COMPLEX CONCENTRATE (HUMAN) 2000 UNITS: 25.5; 16.5; 24; 22; 22; 26 POWDER, FOR SOLUTION INTRAVENOUS at 22:50

## 2025-07-20 RX ADMIN — SODIUM CHLORIDE 50 ML: 0.9 INJECTION, SOLUTION INTRAVENOUS at 23:16

## 2025-07-20 ASSESSMENT — PAIN SCALES - GENERAL: PAINLEVEL_OUTOF10: 5

## 2025-07-20 ASSESSMENT — LIFESTYLE VARIABLES
HOW MANY STANDARD DRINKS CONTAINING ALCOHOL DO YOU HAVE ON A TYPICAL DAY: PATIENT DOES NOT DRINK
HOW OFTEN DO YOU HAVE A DRINK CONTAINING ALCOHOL: NEVER

## 2025-07-20 ASSESSMENT — PAIN DESCRIPTION - LOCATION: LOCATION: HEAD

## 2025-07-20 ASSESSMENT — PAIN - FUNCTIONAL ASSESSMENT: PAIN_FUNCTIONAL_ASSESSMENT: 0-10

## 2025-07-21 VITALS
SYSTOLIC BLOOD PRESSURE: 123 MMHG | DIASTOLIC BLOOD PRESSURE: 67 MMHG | BODY MASS INDEX: 34.07 KG/M2 | OXYGEN SATURATION: 97 % | HEART RATE: 59 BPM | RESPIRATION RATE: 20 BRPM | WEIGHT: 238 LBS | TEMPERATURE: 97 F | HEIGHT: 70 IN

## 2025-07-21 LAB
EKG ATRIAL RATE: 55 BPM
EKG DIAGNOSIS: NORMAL
EKG P AXIS: 73 DEGREES
EKG P-R INTERVAL: 196 MS
EKG Q-T INTERVAL: 436 MS
EKG QRS DURATION: 80 MS
EKG QTC CALCULATION (BAZETT): 417 MS
EKG R AXIS: 36 DEGREES
EKG T AXIS: 38 DEGREES
EKG VENTRICULAR RATE: 55 BPM

## 2025-07-21 PROCEDURE — 93010 ELECTROCARDIOGRAM REPORT: CPT | Performed by: INTERNAL MEDICINE

## 2025-07-22 NOTE — ED PROVIDER NOTES
EMERGENCY DEPARTMENT SUPERVISING PHYSICIAN NOTE    I have seen this patient & have reviewed history and findings with the PA, NP, or resident physician and provided direct supervision. I saw the patient and performed a substantive portion of the visit. I was present for key portions of any procedures performed. I've participated in medical management, monitoring, and treatment of this patient with the provider. I have reviewed currently available documentation, test results, and laboratory results in the interim. Care plan has been developed collaboratively. I take responsibility for the patient's management from when I was asked to get involved in this patient's care. Octavia and YULIA are the primary clinicians of record.    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 in 05/2025  Required craniotomy then  Had acute-on-chronic SDH about 1 month ago  This required MMA embolization procedure for bleeding control  SNF staff felt he was doing well and has fairly significant change in CASI and mental status this evening so he was sent here    Pertinent Exam Findings:  Slightly somnolent, chronically ill-appearing, not distressed  GCS 13 (E3 / V4 / M6)  RUE - 5/5 strength, normal bulk and tone, no tremor  RLE - 5/5 strength, normal bulk and tone, no tremor  LUE - 5/5 strength, normal bulk and tone, no tremor  LLE - 5/5 strength, normal bulk and tone, no tremor  Sensation to light touch intact all extremities    CT HEAD WO CONTRAST   Final Result   4 mm thickness intermediate density subdural hematoma overlying the right   frontoparietal convexity. 3 mm thickness intermediate density subdural   hematoma overlying the left frontoparietal convexity. No significant mass   effect or midline shift.      Findings were discussed with Dr. Rivas  at 10:00 pm on 7/20/2025.         XR CHEST PORTABLE   Final Result   1.  No acute abnormality.           Plan:  Imaging findings as above  I spoke at length over the phone with 
daily as needed for Constipation    PREGABALIN (LYRICA) 150 MG CAPSULE    Take 1 capsule by mouth 2 times daily for 30 days.    RIVAROXABAN (XARELTO) 10 MG TABS TABLET    Take 10 mg by mouth every 24 hours    SENNA (SENOKOT) 8.6 MG TABLET    Take 1 tablet by mouth 2 times daily    SIMETHICONE (MYLICON) 80 MG CHEWABLE TABLET    Take 1 tablet by mouth every 6 hours as needed (gas and distention)    TAMSULOSIN (FLOMAX) 0.4 MG CAPSULE    Take 0.4 mg by mouth daily pm    UMECLIDINIUM-VILANTEROL (ANORO ELLIPTA) 62.5-25 MCG/INH AEPB INHALER    Inhale 1 puff into the lungs daily    ZINC GLUCONATE 50 MG TABLET    Take 50 mg by mouth daily       ALLERGIES     Codeine, Effexor [venlafaxine], Ibuprofen, and Tramadol    FAMILYHISTORY       Family History   Family history unknown: Yes        SOCIAL HISTORY       Social History     Tobacco Use    Smoking status: Never    Smokeless tobacco: Never   Substance Use Topics    Alcohol use: Never    Drug use: Never       SCREENINGS     NIHSS:     GCS: [place] Coma Scale  Eye Opening: Spontaneous  Best Verbal Response: Confused  Best Motor Response: Obeys commands  Radha Coma Scale Score: 14    Heart Score      PECARN Last:       CIWA: CIWA Assessment  BP: 125/75  Pulse: 55  COW Score: No data recorded   CURB 65 Last:     PORT Score:     WELL Criteria: Well's Criteria for Pulmonary Embolism  PERC Score:     CURB 65:      PHYSICAL EXAM  1 or more Elements     ED Triage Vitals [07/20/25 1831]   BP Systolic BP Percentile Diastolic BP Percentile Temp Temp Source Pulse Respirations SpO2   (!) 144/94 -- -- 97.4 °F (36.3 °C) Oral 67 14 94 %      Height Weight - Scale         1.778 m (5' 10\") 108 kg (238 lb)             Physical Exam  Vitals and nursing note reviewed.   Constitutional:       Appearance: He is well-developed. He is not diaphoretic.   HENT:      Head: Normocephalic and atraumatic.      Right Ear: External ear normal.      Left Ear: External ear normal.   Eyes:      General:

## 2025-08-13 PROBLEM — D68.61 ANTIPHOSPHOLIPID ANTIBODY SYNDROME: Status: ACTIVE | Noted: 2025-08-13

## 2025-08-13 PROBLEM — R41.0 DISORIENTATION: Status: RESOLVED | Noted: 2021-05-24 | Resolved: 2025-08-13
